# Patient Record
Sex: FEMALE | Race: BLACK OR AFRICAN AMERICAN | NOT HISPANIC OR LATINO | Employment: FULL TIME | URBAN - METROPOLITAN AREA
[De-identification: names, ages, dates, MRNs, and addresses within clinical notes are randomized per-mention and may not be internally consistent; named-entity substitution may affect disease eponyms.]

---

## 2017-05-18 ENCOUNTER — OFFICE VISIT (OUTPATIENT)
Dept: OBSTETRICS AND GYNECOLOGY | Facility: CLINIC | Age: 32
End: 2017-05-18
Payer: COMMERCIAL

## 2017-05-18 VITALS
HEIGHT: 66 IN | WEIGHT: 293 LBS | BODY MASS INDEX: 47.09 KG/M2 | SYSTOLIC BLOOD PRESSURE: 120 MMHG | DIASTOLIC BLOOD PRESSURE: 76 MMHG

## 2017-05-18 DIAGNOSIS — E28.2 PCOS (POLYCYSTIC OVARIAN SYNDROME): ICD-10-CM

## 2017-05-18 DIAGNOSIS — N97.9 FEMALE FERTILITY PROBLEMS: ICD-10-CM

## 2017-05-18 DIAGNOSIS — N92.0 MENORRHAGIA WITH REGULAR CYCLE: ICD-10-CM

## 2017-05-18 DIAGNOSIS — Z01.419 ENCOUNTER FOR GYNECOLOGICAL EXAMINATION WITHOUT ABNORMAL FINDING: ICD-10-CM

## 2017-05-18 DIAGNOSIS — Z00.00 ANNUAL PHYSICAL EXAM: Primary | ICD-10-CM

## 2017-05-18 PROCEDURE — 1160F RVW MEDS BY RX/DR IN RCRD: CPT | Mod: S$GLB,,, | Performed by: OBSTETRICS & GYNECOLOGY

## 2017-05-18 PROCEDURE — 99999 PR PBB SHADOW E&M-EST. PATIENT-LVL III: CPT | Mod: PBBFAC,,, | Performed by: OBSTETRICS & GYNECOLOGY

## 2017-05-18 PROCEDURE — 99385 PREV VISIT NEW AGE 18-39: CPT | Mod: S$GLB,,, | Performed by: OBSTETRICS & GYNECOLOGY

## 2017-05-18 PROCEDURE — 88175 CYTOPATH C/V AUTO FLUID REDO: CPT

## 2017-05-18 NOTE — PATIENT INSTRUCTIONS

## 2017-05-18 NOTE — MR AVS SNAPSHOT
Follow-up and Disposition     Return in about 4 weeks (around 6/15/2017).           Agustina Tillman   2017 11:00 AM   Office Visit    Description:  Female : 1985   Provider:  Karl Camarillo MD   Department:  [facility] - OB/ GYN           Reason for Visit     Gynecologic Exam     Menstrual Problem     Reason for Visit History      Diagnoses this Visit        Comments    Annual physical exam    -  Primary     Encounter for gynecological examination without abnormal finding         PCOS (polycystic ovarian syndrome)         Menorrhagia with regular cycle         Female fertility problems           Problem List as of 2017     Encounter for annual physical exam    Encounter for routine gynecological examination    Polycystic ovaries    Menorrhagia with regular cycle    Female fertility problem      Goals     None      Vital Signs/Measurements  Most recent update: 2017 11:21 AM by Xiomara Joseph MA    BP Wt BMI          120/76 (!) 137.5 kg (303 lb 2.1 oz) 48.93 kg/m2           Medications and Orders      Your Current Medications Are        Disp Refills Start End    azithromycin (ZITHROMAX Z-CIARA) 250 MG tablet 6 tablet 0 2016     Sig: Take 2 tablets on day 1, then 1 tablet on day 2-5    Class: Print      To-Do List     Future Orders Complete By Expires    Comprehensive metabolic panel [LAB17 Custom]  2017    DHEA-sulfate [TFK528 Custom]  2017    Estrogens, total [CML174 Custom]  2017    Follicle stimulating hormone [LAB86 Custom]  2017    Insulin, random [LIR719 Custom]  2017    Luteinizing hormone [LAB87 Custom]  2017    Prolactin [VCS934 Custom]  2017    Testosterone Panel [GKB833 Custom]  2017    TSH [WGL646 Custom]  2017    US Pelvis Comp with Transvag NON-OB (xpd [XNO192 Custom]  2017    Follow-Up    Return in about 4 weeks (around  6/15/2017).      We Performed the Following     Liquid-based pap smear, screening [LAB4 Custom]             Allergies as of 5/18/2017     No Known Allergies      Immunizations Administered on Date of Encounter - 5/18/2017     None         Result Summary      Instructions      Today's Plan:  Refer to your After Visit Summary for more information on your visit.       Talking to Your Healthcare Provider: Play an Active Role  To get the most out of your healthcare, take an active role. This means thinking of every healthcare provider (HCP) as a partner in your care. Below are things you can do to help that partnership go smoothly.    Tell the truth.  To get the best possible care, you need to be honest with your healthcare provider.      Dont be afraid to talk about sensitive subjects.  Keep in mind that your HCP is used to talking about personal matters.    Stay focused.  Try not to think about what your HCP said 5 minutes ago or what you will say next.    Take notes.  When you write down what you hear, you listen more carefully. Dont write down every word your HCP says, just the main points.    Look at your HCP.  When you make eye contact, you show you are listening and you get more out of the discussion.    Repeat back.  Say something like What I hear you saying is... This shows you are listening and understanding, and gives your HCP a chance to correct any mistakes.     Keep all your appointments.  If you cant make it, call as soon as you know.    Be on time.  When a patient is late for an appointment, it can throw the HCP and his or her staff off schedule.    If you are having a medical test, follow your HCPs instructions.  For some medical tests, you need to fast (not eat or drink). For others, you need to take medication.    Do what your HCP asks.  Your HCP may give you instructions concerning things such as medication use, diet, or exercise.    If you cant follow your HCPs instructions, be sure to speak  up.  Whether your medication makes you sick or you cant give up junk food, let your HCP know. Together, you may be able to solve the problem.    Talk about over-the-counter drugs.  If you are receiving treatment, ask your HCP which over-the-counter drugs you can take.    Take prescription drugs as instructed.  Dont take more or less than your doctor prescribed. Dont stop taking your medication without talking to your doctor first.    Make sure your primary doctor is kept up-to-date.  If you are seeing a specialist, ask the specialist to let your primary doctor know how you are doing.    Ask your HCP how to contact him or her.  Find out if there are certain times during the day when your HCP takes phone calls. Ask who to call if you need an answer right away.    © 3974-2131 [employer], [address] [place] [address], [place] [address]. All rights reserved. This information is not intended as a substitute for professional medical care. Always follow your healthcare professional's instructions.      Thank you for your visit today. Please call our office if you can NOT make your future appointment.    If you are a smoker and would like to quit smoking and need help please call [phone] ([phone])\

## 2017-05-18 NOTE — MR AVS SNAPSHOT
"    [facility] - OB/ GYN  [address] [facility] [address]  [place] [address]  Phone: [phone]                  Agustina Tillman   2017 11:00 AM   Office Visit    Description:  Female : 1985   Provider:  Karl Camarillo MD   Department:  [facility] - OB/ GYN           Reason for Visit     Gynecologic Exam     Menstrual Problem                To Do List           Goals (5 Years of Data)     None      [facility]     [facility] Nurse Care Line -  Assistance  Unless otherwise directed by your provider, please contact [facility], our nurse care line that is available for  assistance.     Registered nurses in the [facility] provide: appointment scheduling, clinical advisement, health education, and other advisory services.  Call: [phone] (toll free)               Medications           Message regarding Medications     Verify the changes and/or additions to your medication regime listed below are the same as discussed with your clinician today.  If any of these changes or additions are incorrect, please notify your healthcare provider.             Verify that the below list of medications is an accurate representation of the medications you are currently taking.  If none reported, the list may be blank. If incorrect, please contact your healthcare provider. Carry this list with you in case of emergency.           Current Medications     azithromycin (ZITHROMAX Z-CIARA) 250 MG tablet Take 2 tablets on day 1, then 1 tablet on day 2-5           Clinical Reference Information           Your Vitals Were     BP Height Weight Last Period BMI    120/76 5' 6" (1.676 m) 137.5 kg (303 lb 2.1 oz) 2017 48.93 kg/m2      Blood Pressure          Most Recent Value    BP  120/76      Allergies as of 2017     No Known Allergies      Immunizations Administered on Date of Encounter - 2017     None      Language Assistance Services     ATTENTION: Language assistance services " are available, free of charge. Please call [phone].      ATENCIÓN: Si habla monisha, tiene a owusu disposición servicios gratuitos de asistencia lingüística. Llame al [phone].     CHÚ Ý: N?u b?n nói Ti?ng Vi?t, có các d?ch v? h? tr? ngôn ng? mi?n phí dành cho b?n. G?i s? [phone].         [facility] - OB/ GYN complies with applicable Federal civil rights laws and does not discriminate on the basis of race, color, national origin, age, disability, or sex.

## 2017-05-18 NOTE — PROGRESS NOTES
Subjective:      Chief Complaint:    Chief Complaint   Patient presents with    Gynecologic Exam    Menstrual Problem       Menstrual History:    OB History     No data available          Menarche age: 15     Patient's last menstrual period was 04/19/2017.          HISTORY OF PRESENT ILLNESS:  The patient is 31 years of age.  Nulligravida, new   patient to [facility], new patient to me.  Apparently, Pap smear two years ago and   the patient is sexually active, has been on birth control pills; however, not at   the present time.  The patient's menarche is at age 14, regular cycles   according to the patient, bleeds seven days heavy with clots; the patient   changes about every two hours.  The patient denies any diabetes or problems,   sexually active.  Pregnancy has not occurred.  However, the patient also stated   that she was put on some birth control pill to regulate her cycles, I am not   quite clear if to regulate increased bleeding or she had irregular cycles.    PHYSICAL EXAMINATION:  VITAL SIGNS:  Blood pressure 120/76, weight 303.  HEAD:  Normocephalic.  EYES:  React.  BREASTS:  No lump, masses, discharge, skin changes, retraction, nipple changes.    Axilla negative.  PELVIC:  External normal.  Vulva normal.  Bartholin, urethral and [place] glands   are negative, no clitoral enlargement.  Vagina is clear.  Cervix is clear with   minimal bleeding starting the next cycle.  Uterus, normal size, shape, position.    Adnexa is negative, somewhat difficult exam because of the patient's size.  RECTAL:  Negative.    IMPRESSION:  Morbid obesity, overweight.  Irregular menstrual cycle.    Possibility of ovulatory dysfunction, polycystic ovary exists since she is   sexually active at the present, not using any methods of birth controls.    PLAN:  We will do a baseline evaluation and do a pelvic ultrasound,   comprehensive metabolic panel, and we will check for possible polycystic ovarian   syndrome and then we will have  the patient back and reevaluate the situation   and see what kind of treatment or we should be sure to follow.  The patient also   concerned with the fertility.      JIV/HN  dd: 05/18/2017 11:46:08 (CDT)  td: 05/19/2017 06:47:27 (CDT)  Doc ID   #[id number]  Job ID #[id number]    CC:        Objective:        History of Present Illness AND  Examination detailed DICTATE:        Physical Exam   Constitutional: She is oriented to person, place, and time. She appears well-developed and well-nourished. No distress.   HENT:   Head: Normocephalic.   Mouth/Throat: Oropharynx is clear.  Eyes: Pupils are equal, round, and reactive to light.   Neck: Neck supple.    Cardiovascular: Normal rate, regular rhythm and normal heart sounds. No murmur heard.  Pulmonary/Chest: Effort normal and breath sounds normal. No respiratory distress. She has no wheezes. She has no rales. She exhibits no tenderness.   Abdominal: Bowel sounds are normal. She exhibits no distension and no mass. There is no tenderness. There is no rebound and no guarding. .   Musculoskeletal: Normal range of motion.   Lymphadenopathy:        Right: No inguinal adenopathy present.        Left: No inguinal adenopathy present.   Neurological: She is alert and oriented.  Skin: Skin is warm. No rash noted.        Review of Systems  Review of Systems   Normal ROS:   Constitutional: Negative for fever, chills, activity change fatigue and unexpected weight change.   HENT: Negative for nosebleeds, congestion.  Eyes: Negative for visual disturbance.   Respiratory: Negative for shortness of breath and wheezing.    Cardiovascular: Negative for chest pain, palpitations.   Gastrointestinal: Negative for abdominal pain, diarrhea, constipation, blood in stool and abdominal distention.   Musculoskeletal: Negative for back pain.   Allergic/Immunologic: Negative    Neurological: Negative   Hematological: Negative for adenopathy. Does not bruise/bleed easily.   Psychiatric/Behavioral: Negative  f    Assessment:      Diagnosis:ANNUAL   EXAM   MENORRHAGIA      FERTILITY       Plan:      Return in 4  weeks

## 2017-05-23 ENCOUNTER — TELEPHONE (OUTPATIENT)
Dept: OBSTETRICS AND GYNECOLOGY | Facility: CLINIC | Age: 32
End: 2017-05-23

## 2017-05-23 NOTE — TELEPHONE ENCOUNTER
----- Message from Renu Perales sent at 5/23/2017  1:17 PM CDT -----  Contact: self  Pt states that someone from office called. Nothing is documented and pt was not left a name to call back and ask for. She can be reached @ [phone]      5/23/2017 Returned pt call, no answer, LM in regards to normal pap. sal

## 2017-05-28 ENCOUNTER — HOSPITAL ENCOUNTER (EMERGENCY)
Facility: OTHER | Age: 32
Discharge: HOME | End: 2017-05-28
Attending: EMERGENCY MEDICINE
Payer: COMMERCIAL

## 2017-05-28 VITALS
OXYGEN SATURATION: 98 % | RESPIRATION RATE: 20 BRPM | HEART RATE: 76 BPM | SYSTOLIC BLOOD PRESSURE: 139 MMHG | BODY MASS INDEX: 46.12 KG/M2 | TEMPERATURE: 101 F | DIASTOLIC BLOOD PRESSURE: 86 MMHG | WEIGHT: 287 LBS | HEIGHT: 66 IN

## 2017-05-28 DIAGNOSIS — J11.1 INFLUENZA: Primary | ICD-10-CM

## 2017-05-28 LAB
CTP QC/QA: YES
INFLUENZA A ANTIGEN, POC: NEGATIVE
INFLUENZA B ANTIGEN, POC: POSITIVE
S PYO RRNA THROAT QL PROBE: NEGATIVE

## 2017-05-28 PROCEDURE — 63600175 PHARM REV CODE 636 W HCPCS: Performed by: EMERGENCY MEDICINE

## 2017-05-28 PROCEDURE — 25000003 PHARM REV CODE 250: Performed by: EMERGENCY MEDICINE

## 2017-05-28 PROCEDURE — 96372 THER/PROPH/DIAG INJ SC/IM: CPT

## 2017-05-28 PROCEDURE — 99284 EMERGENCY DEPT VISIT MOD MDM: CPT | Mod: 25

## 2017-05-28 RX ORDER — IBUPROFEN 600 MG/1
600 TABLET ORAL EVERY 6 HOURS PRN
Qty: 20 TABLET | Refills: 0 | Status: SHIPPED | OUTPATIENT
Start: 2017-05-28 | End: 2017-09-02 | Stop reason: SDUPTHER

## 2017-05-28 RX ORDER — BENZONATATE 100 MG/1
100 CAPSULE ORAL 3 TIMES DAILY PRN
Qty: 20 CAPSULE | Refills: 0 | Status: SHIPPED | OUTPATIENT
Start: 2017-05-28 | End: 2017-06-01 | Stop reason: ALTCHOICE

## 2017-05-28 RX ORDER — ACETAMINOPHEN 500 MG
1000 TABLET ORAL
Status: COMPLETED | OUTPATIENT
Start: 2017-05-28 | End: 2017-05-28

## 2017-05-28 RX ORDER — KETOROLAC TROMETHAMINE 30 MG/ML
30 INJECTION, SOLUTION INTRAMUSCULAR; INTRAVENOUS
Status: COMPLETED | OUTPATIENT
Start: 2017-05-28 | End: 2017-05-28

## 2017-05-28 RX ORDER — OSELTAMIVIR PHOSPHATE 75 MG/1
75 CAPSULE ORAL 2 TIMES DAILY
Qty: 10 CAPSULE | Refills: 0 | Status: SHIPPED | OUTPATIENT
Start: 2017-05-28 | End: 2017-06-01 | Stop reason: ALTCHOICE

## 2017-05-28 RX ORDER — FLUTICASONE PROPIONATE 50 MCG
2 SPRAY, SUSPENSION (ML) NASAL 2 TIMES DAILY PRN
Qty: 15 G | Refills: 0 | Status: SHIPPED | OUTPATIENT
Start: 2017-05-28 | End: 2017-06-01 | Stop reason: ALTCHOICE

## 2017-05-28 RX ORDER — DEXAMETHASONE SODIUM PHOSPHATE 4 MG/ML
6 INJECTION, SOLUTION INTRA-ARTICULAR; INTRALESIONAL; INTRAMUSCULAR; INTRAVENOUS; SOFT TISSUE
Status: COMPLETED | OUTPATIENT
Start: 2017-05-28 | End: 2017-05-28

## 2017-05-28 RX ADMIN — DEXAMETHASONE SODIUM PHOSPHATE 6 MG: 4 INJECTION, SOLUTION INTRA-ARTICULAR; INTRALESIONAL; INTRAMUSCULAR; INTRAVENOUS; SOFT TISSUE at 02:05

## 2017-05-28 RX ADMIN — ACETAMINOPHEN 1000 MG: 500 TABLET ORAL at 03:05

## 2017-05-28 RX ADMIN — KETOROLAC TROMETHAMINE 30 MG: 30 INJECTION, SOLUTION INTRAMUSCULAR; INTRAVENOUS at 04:05

## 2017-05-28 NOTE — ED NOTES
32 y/o female prsents to the ER with the cc of pain to the throat and a post nasal drip x 2 days. Patient reports she has been using OTC medications with no relief. Patient at present has clear thin mucus noted. No coughing or sneezing at present. GIBRAN

## 2017-05-28 NOTE — ED PROVIDER NOTES
Encounter Date: 5/28/2017       History     Chief Complaint   Patient presents with    URI     nasal congestion, sore throat, non productive cough     Review of patient's allergies indicates:  No Known Allergies  Miss Tillman reports sore throat, congestion, and mild Dr. sounding cough.  Reports onset was yesterday.  She tried Tylenol Cold and sinus and had some mild relief.  She reports sore throat is the worst.  She denies difficulty swallowing or change in voice.  She denies history of same recently.  She denies chest pain, shortness of breath, neck pain or other acute problems.          History reviewed. No pertinent past medical history.  History reviewed. No pertinent surgical history.  Family History   Problem Relation Age of Onset    Lung cancer Father     Hypertension Mother      Social History   Substance Use Topics    Smoking status: Never Smoker    Smokeless tobacco: Not on file    Alcohol use Yes     Review of Systems   HENT: Positive for congestion, sinus pressure and sore throat. Negative for ear pain, facial swelling, hearing loss, trouble swallowing and voice change.    Respiratory: Positive for cough. Negative for chest tightness, shortness of breath, wheezing and stridor.    Cardiovascular: Negative.    All other systems reviewed and are negative.      Physical Exam     Initial Vitals [05/28/17 1352]   BP Pulse Resp Temp SpO2   124/67 88 18 99.6 °F (37.6 °C) 97 %     Physical Exam    Nursing note and vitals reviewed.  Constitutional: She appears well-developed and well-nourished. She is not diaphoretic. No distress.   HENT:   Head: Normocephalic and atraumatic.   Right Ear: External ear normal.   Left Ear: External ear normal.   Nose: Nose normal.   Mild bilateral symmetric tonsillar swelling with erythema.  No peritonsillar swelling.  Questionable exudate on the right.  Uvula is midline.  Oropharynx is clear, patent.   Eyes: EOM are normal. Pupils are equal, round, and reactive to light.    Neck: Normal range of motion. Neck supple.   Abdominal: Soft. She exhibits no distension. There is no tenderness. There is no rebound.   Neurological: She is alert and oriented to person, place, and time.   Skin: Skin is warm and dry.   Psychiatric: She has a normal mood and affect. Her behavior is normal. Thought content normal.         ED Course   Procedures  Labs Reviewed   POCT STREP A, RAPID             Medical Decision Making:   ED Management:  Rapid strep negative, rapid flu positive. Pt reports no known contacts, was surprised that flu positive. Is within tamiflu window, will treat. We discussed worrisome signs that should prompt need to return to er should they occur. There is no indication for further emergent intervention or evaluation at this time.              Admission on 05/28/2017, Discharged on 05/28/2017   Component Date Value Ref Range Status    Rapid Strep A Screen 05/28/2017 Negative  Negative Final     Acceptable 05/28/2017 Yes   Final    Influenza B Ag 05/28/2017 Positive* Positive/Negative Final    Inflenza A Ag 05/28/2017 Negative* Positive/Negative Final                ED Course     Clinical Impression:   The encounter diagnosis was Influenza.          Mona Reynaga MD  06/01/17 0633       Mona Reynaga MD  06/01/17 0628

## 2017-06-01 ENCOUNTER — OFFICE VISIT (OUTPATIENT)
Dept: FAMILY MEDICINE | Facility: CLINIC | Age: 32
End: 2017-06-01
Payer: COMMERCIAL

## 2017-06-01 VITALS
BODY MASS INDEX: 47.09 KG/M2 | TEMPERATURE: 99 F | WEIGHT: 293 LBS | DIASTOLIC BLOOD PRESSURE: 74 MMHG | HEART RATE: 80 BPM | OXYGEN SATURATION: 97 % | SYSTOLIC BLOOD PRESSURE: 108 MMHG | HEIGHT: 66 IN | RESPIRATION RATE: 20 BRPM

## 2017-06-01 DIAGNOSIS — E66.01 MORBID OBESITY WITH BMI OF 45.0-49.9, ADULT: ICD-10-CM

## 2017-06-01 DIAGNOSIS — Z00.00 WELL WOMAN EXAM (NO GYNECOLOGICAL EXAM): Primary | ICD-10-CM

## 2017-06-01 DIAGNOSIS — E28.2 PCOS (POLYCYSTIC OVARIAN SYNDROME): ICD-10-CM

## 2017-06-01 PROCEDURE — 99999 PR PBB SHADOW E&M-EST. PATIENT-LVL III: CPT | Mod: PBBFAC,,, | Performed by: FAMILY MEDICINE

## 2017-06-01 PROCEDURE — 99385 PREV VISIT NEW AGE 18-39: CPT | Mod: S$GLB,,, | Performed by: FAMILY MEDICINE

## 2017-06-01 RX ORDER — METFORMIN HYDROCHLORIDE 500 MG/1
500 TABLET ORAL 2 TIMES DAILY WITH MEALS
Qty: 180 TABLET | Refills: 1 | Status: SHIPPED | OUTPATIENT
Start: 2017-06-01 | End: 2018-02-16

## 2017-06-01 NOTE — PROGRESS NOTES
"Well Woman VISIT      CHIEF COMPLAINT  Chief Complaint   Patient presents with    Other     ER Follow-up       HPI  Agustina Tillman is a 31 y.o. female who presents for well woman.  Recently diagnosed with flu in ER, was given tamiflu.  +stuffy nose now resolving.  Some subjective fevers at night, has been taking ibuprofen.  Her throat hurts just a little bit.  Eating and drinking today.  +some nausea.  No vomiting. Had diarrhea, now resolved.   Wanted to make sure she can go back to work tomorrow.    Social Factors  Tobacco use: No  Alcohol: No  Intimate partner violence screening -   "Do you feel safe in your current relationship?" Yes  "Have you ever been in a relationship in which your partner frightened you or hurt you?" No  Regular Exercise: Yes - walks on treadmill.  30 min every night.  Sleep : 7 hrs/night    Depression  Over the past two weeks, have you felt down, depressed, or hopeless? No  Over the past two weeks, have you felt little interest or pleasure in doing things? No    Reproductive Health  First period at age 14.  Always has had abnormal periods.  Last menstrual period began May 4, 2017 and lasted 3 days.   Patient is sexually active.   Contraception: no method. Wanting pregnancy.  On Daily folic acid:  no  STD screening in last year: no  Last PAP: recent  HIV screening: no    CHD, HTN, DM2  CHD Risk Factors: obesity (BMI >= 30 kg/m2)   Women 45 years and older should be screened for dyslipidemia if at increased risk of CHD  Women 20 to 45 years of age should be screened for dyslipidemia if at increased risk of CHD  Asymptomatic adults with sustained blood pressure greater than 135/80 mm Hg (treated or untreated) should be screened for type 2 diabetes mellitus    Estimated body mass index is 48.75 kg/m² as calculated from the following:    Height as of this encounter: 5' 6" (1.676 m).    Weight as of this encounter: 137 kg (302 lb 0.5 oz).      Screening  Mammogram needed: no  Colonoscopy " "needed: no  Osteoporosis screen needed: no     Women 50 to 74 years of age should be screened for breast cancer with mammography biennially.  Women should be screened for cervical cancer with Pap tests beginning at 21 years of age. Low-risk women should receive Pap testing every three years. Co-testing for human papillomavirus is an option beginning at 30 years of age, and can extend the screening interval to five years. Cervical cancer screening should be discontinued at 65 years of age or after total hysterectomy if the woman has a benign gynecologic history  Adults 50 to 75 years of age should be screened for colorectal cancer with an FOBT annually, sigmoidoscopy every five years with an FOBT every three years, or colonoscopy every 10 years.  Women 65 years and older should be screened for osteoporosis. Women younger than 65 years should be screened if the risk of fracture is greater than or equal to that of a 65-year-old white woman without additional risk factors.      Immunizations  up to date and documented    ALLERGIES and MEDS were verified.   PMHx, PSHx, FHx, SOCIALHx were updated as pertinent.    REVIEW OF SYSTEMS  Negative 5 point review of systems    PHYSICAL EXAM  VITAL SIGNS: /74 (BP Location: Right arm, Patient Position: Sitting, BP Method: Manual)   Pulse 80   Temp 98.7 °F (37.1 °C) (Oral)   Resp 20   Ht 5' 6" (1.676 m)   Wt (!) 137 kg (302 lb 0.5 oz)   LMP 05/19/2017 (Exact Date)   SpO2 97%   BMI 48.75 kg/m²   GEN: Well developed, Well nourished, No acute distress.  Obese AAF in NAD.   HENT: Normocephalic, Atraumatic, Bilateral external ears normal, Nose normal, Oropharynx moist, No oral exudates.   Eyes: PERRLA, EOMI, Conjunctiva normal, No discharge.   Neck: Supple, No tenderness.  Lymphatic: No cervical or supraclavicular lymphadenopathy noted.   Cardiovascular: Normal heart rate, Normal rhythm, No murmurs, No rubs, No gallops.   Thorax & Lungs: Normal breath sounds, No respiratory " distress, No wheezing.  Abdomen: Soft, No tenderness, Bowel sounds normal.  Skin: Warm, Dry, No erythema, No rash.   Extremities: No edema, No tenderness.       ASSESSMENT/PLAN    Agustina was seen today for other.    Diagnoses and all orders for this visit:    Well woman exam (no gynecological exam)  -     Comprehensive metabolic panel; Future  -     Lipid panel; Future  -     Hemoglobin A1c; Future  -     TSH; Future  -     CBC auto differential; Future  -     Vitamin D; Future  -     T4, free; Future  -     RPR; Future  -     HIV-1 and HIV-2 antibodies; Future  -     Hepatitis C antibody; Future  -     C. trachomatis/N. gonorrhoeae by AMP DNA Urine; Future    PCOS (polycystic ovarian syndrome)  -     metformin (GLUCOPHAGE) 500 MG tablet; Take 1 tablet (500 mg total) by mouth 2 (two) times daily with meals.  -     May start with 1 tab in morning only, then increase to 1 tab bid.  Goal of increasing to 2 tabs bid.    Morbid obesity with BMI of 45.0-49.9, adult        -     Packet given for weight management.   She has secondary PCOS symptoms of acanthosis nigricans, and irregular periods.     FOLLOW UP: 1 month for weight management

## 2017-06-02 PROBLEM — Z01.419 ENCOUNTER FOR GYNECOLOGICAL EXAMINATION WITHOUT ABNORMAL FINDING: Status: RESOLVED | Noted: 2017-05-18 | Resolved: 2017-06-02

## 2017-06-02 PROBLEM — Z00.00 ANNUAL PHYSICAL EXAM: Status: RESOLVED | Noted: 2017-05-18 | Resolved: 2017-06-02

## 2017-06-08 ENCOUNTER — LAB VISIT (OUTPATIENT)
Dept: LAB | Facility: HOSPITAL | Age: 32
End: 2017-06-08
Attending: FAMILY MEDICINE
Payer: COMMERCIAL

## 2017-06-08 ENCOUNTER — TELEPHONE (OUTPATIENT)
Dept: FAMILY MEDICINE | Facility: CLINIC | Age: 32
End: 2017-06-08

## 2017-06-08 DIAGNOSIS — Z00.00 WELL WOMAN EXAM (NO GYNECOLOGICAL EXAM): ICD-10-CM

## 2017-06-08 LAB
ALBUMIN SERPL BCP-MCNC: 3.5 G/DL
ALP SERPL-CCNC: 81 U/L
ALT SERPL W/O P-5'-P-CCNC: 15 U/L
ANION GAP SERPL CALC-SCNC: 9 MMOL/L
AST SERPL-CCNC: 18 U/L
BASOPHILS # BLD AUTO: 0.03 K/UL
BASOPHILS NFR BLD: 0.4 %
BILIRUB SERPL-MCNC: 0.4 MG/DL
BUN SERPL-MCNC: 10 MG/DL
CALCIUM SERPL-MCNC: 9.4 MG/DL
CHLORIDE SERPL-SCNC: 106 MMOL/L
CHOLEST/HDLC SERPL: 4.3 {RATIO}
CO2 SERPL-SCNC: 29 MMOL/L
CREAT SERPL-MCNC: 1 MG/DL
DIFFERENTIAL METHOD: ABNORMAL
EOSINOPHIL # BLD AUTO: 0.1 K/UL
EOSINOPHIL NFR BLD: 1 %
ERYTHROCYTE [DISTWIDTH] IN BLOOD BY AUTOMATED COUNT: 16.1 %
EST. GFR  (AFRICAN AMERICAN): >60 ML/MIN/1.73 M^2
EST. GFR  (NON AFRICAN AMERICAN): >60 ML/MIN/1.73 M^2
GLUCOSE SERPL-MCNC: 76 MG/DL
HCT VFR BLD AUTO: 35.8 %
HDL/CHOLESTEROL RATIO: 23.1 %
HDLC SERPL-MCNC: 173 MG/DL
HDLC SERPL-MCNC: 40 MG/DL
HGB BLD-MCNC: 11.4 G/DL
LDLC SERPL CALC-MCNC: 113 MG/DL
LYMPHOCYTES # BLD AUTO: 3 K/UL
LYMPHOCYTES NFR BLD: 37.3 %
MCH RBC QN AUTO: 23.8 PG
MCHC RBC AUTO-ENTMCNC: 31.8 %
MCV RBC AUTO: 75 FL
MONOCYTES # BLD AUTO: 0.5 K/UL
MONOCYTES NFR BLD: 5.5 %
NEUTROPHILS # BLD AUTO: 4.5 K/UL
NEUTROPHILS NFR BLD: 55.7 %
NONHDLC SERPL-MCNC: 133 MG/DL
PLATELET # BLD AUTO: 350 K/UL
PMV BLD AUTO: 10.3 FL
POTASSIUM SERPL-SCNC: 4.2 MMOL/L
PROT SERPL-MCNC: 7.2 G/DL
RBC # BLD AUTO: 4.79 M/UL
SODIUM SERPL-SCNC: 144 MMOL/L
T4 FREE SERPL-MCNC: 0.95 NG/DL
TRIGL SERPL-MCNC: 100 MG/DL
TSH SERPL DL<=0.005 MIU/L-ACNC: 1.74 UIU/ML
WBC # BLD AUTO: 8.15 K/UL

## 2017-06-08 PROCEDURE — 85025 COMPLETE CBC W/AUTO DIFF WBC: CPT

## 2017-06-08 PROCEDURE — 83036 HEMOGLOBIN GLYCOSYLATED A1C: CPT

## 2017-06-08 PROCEDURE — 86592 SYPHILIS TEST NON-TREP QUAL: CPT

## 2017-06-08 PROCEDURE — 84439 ASSAY OF FREE THYROXINE: CPT

## 2017-06-08 PROCEDURE — 84443 ASSAY THYROID STIM HORMONE: CPT

## 2017-06-08 PROCEDURE — 86703 HIV-1/HIV-2 1 RESULT ANTBDY: CPT

## 2017-06-08 PROCEDURE — 80053 COMPREHEN METABOLIC PANEL: CPT

## 2017-06-08 PROCEDURE — 36415 COLL VENOUS BLD VENIPUNCTURE: CPT | Mod: PN

## 2017-06-08 PROCEDURE — 82306 VITAMIN D 25 HYDROXY: CPT

## 2017-06-08 PROCEDURE — 80061 LIPID PANEL: CPT

## 2017-06-08 PROCEDURE — 86803 HEPATITIS C AB TEST: CPT

## 2017-06-08 NOTE — TELEPHONE ENCOUNTER
----- Message from Swati Dillon sent at 6/8/2017 11:11 AM CDT -----  Contact: self  Patient states she is returning someone's call . She does not know who it was  .       [phone] YR

## 2017-06-09 LAB
25(OH)D3+25(OH)D2 SERPL-MCNC: 11 NG/ML
ESTIMATED AVG GLUCOSE: 117 MG/DL
HBA1C MFR BLD HPLC: 5.7 %
HCV AB SERPL QL IA: NEGATIVE
HIV 1+2 AB+HIV1 P24 AG SERPL QL IA: NEGATIVE
RPR SER QL: NORMAL

## 2017-06-13 ENCOUNTER — PATIENT MESSAGE (OUTPATIENT)
Dept: FAMILY MEDICINE | Facility: CLINIC | Age: 32
End: 2017-06-13

## 2017-06-13 DIAGNOSIS — E55.9 VITAMIN D DEFICIENCY: ICD-10-CM

## 2017-06-13 DIAGNOSIS — R73.03 PREDIABETES: ICD-10-CM

## 2017-06-13 DIAGNOSIS — D50.8 IRON DEFICIENCY ANEMIA SECONDARY TO INADEQUATE DIETARY IRON INTAKE: ICD-10-CM

## 2017-09-02 ENCOUNTER — HOSPITAL ENCOUNTER (EMERGENCY)
Facility: OTHER | Age: 32
Discharge: HOME | End: 2017-09-02
Attending: INTERNAL MEDICINE
Payer: COMMERCIAL

## 2017-09-02 VITALS
WEIGHT: 293 LBS | RESPIRATION RATE: 18 BRPM | HEART RATE: 68 BPM | HEIGHT: 66 IN | SYSTOLIC BLOOD PRESSURE: 130 MMHG | TEMPERATURE: 99 F | BODY MASS INDEX: 47.09 KG/M2 | OXYGEN SATURATION: 99 % | DIASTOLIC BLOOD PRESSURE: 77 MMHG

## 2017-09-02 DIAGNOSIS — J00 ACUTE NASOPHARYNGITIS: Primary | ICD-10-CM

## 2017-09-02 PROCEDURE — 99283 EMERGENCY DEPT VISIT LOW MDM: CPT

## 2017-09-02 RX ORDER — AZELASTINE 1 MG/ML
2 SPRAY, METERED NASAL 2 TIMES DAILY
Qty: 30 ML | Refills: 0 | Status: SHIPPED | OUTPATIENT
Start: 2017-09-02 | End: 2017-10-17 | Stop reason: SDUPTHER

## 2017-09-02 RX ORDER — IBUPROFEN 800 MG/1
800 TABLET ORAL EVERY 8 HOURS PRN
Qty: 30 TABLET | Refills: 0 | Status: SHIPPED | OUTPATIENT
Start: 2017-09-02 | End: 2018-02-16 | Stop reason: SDUPTHER

## 2017-09-02 RX ORDER — FLUTICASONE PROPIONATE 50 MCG
2 SPRAY, SUSPENSION (ML) NASAL DAILY
Qty: 15 G | Refills: 0 | Status: SHIPPED | OUTPATIENT
Start: 2017-09-02 | End: 2017-10-17 | Stop reason: SDUPTHER

## 2017-09-02 NOTE — ED PROVIDER NOTES
Encounter Date: 9/2/2017       History     Chief Complaint   Patient presents with    Sore Throat     pt presents to ER with c/o sore throat accompanied by nasal congestion and post nasal drip.  She denies any fever or other symptoms.      32-year-old female presents to the emergency department with sore throat, nasal congestion and cough ×3 days.  She denies fevers/chills, nausea/vomiting      The history is provided by the patient. No  was used.   Sore Throat   This is a new problem. The current episode started more than 2 days ago. The problem occurs daily. The problem has not changed since onset.Pertinent negatives include no chest pain, no abdominal pain, no headaches and no shortness of breath. The symptoms are aggravated by swallowing. She has tried nothing for the symptoms.     Review of patient's allergies indicates:  No Known Allergies  History reviewed. No pertinent past medical history.  History reviewed. No pertinent surgical history.  Family History   Problem Relation Age of Onset    Lung cancer Father     Hypertension Mother      Social History   Substance Use Topics    Smoking status: Never Smoker    Smokeless tobacco: Not on file    Alcohol use Yes     Review of Systems   HENT: Positive for congestion, postnasal drip, rhinorrhea and sore throat.    Respiratory: Positive for cough. Negative for shortness of breath.    Cardiovascular: Negative for chest pain.   Gastrointestinal: Negative for abdominal pain.   Musculoskeletal: Positive for myalgias.   Neurological: Negative for headaches.   All other systems reviewed and are negative.      Physical Exam     Initial Vitals [09/02/17 0236]   BP Pulse Resp Temp SpO2   133/70 69 18 98.7 °F (37.1 °C) 99 %      MAP       91         Physical Exam    Nursing note and vitals reviewed.  Constitutional: She appears well-developed and well-nourished.   HENT:   Head: Normocephalic and atraumatic.   Right Ear: External ear normal.   Left  Ear: External ear normal.   Clear post nasal drip, clear nasal discharge, posterior or pharyngeal erythema without exudate or edema   Eyes: Conjunctivae and EOM are normal. Pupils are equal, round, and reactive to light.   Neck: Normal range of motion. Neck supple.   Cardiovascular: Normal rate and regular rhythm.   Pulmonary/Chest: Breath sounds normal. No respiratory distress. She has no wheezes. She has no rales.   Abdominal: Soft. Bowel sounds are normal.   Musculoskeletal: Normal range of motion.   Neurological: She is alert and oriented to person, place, and time.   Skin: Skin is warm and dry.   Psychiatric: She has a normal mood and affect.         ED Course   Procedures  Labs Reviewed - No data to display          Medical Decision Making:   Initial Assessment:   32-year-old female presents to the emergency department with sore throat, nasal congestion and cough ×3 days.  She denies fevers/chills, nausea/vomiting    Differential Diagnosis:   Acute nasopharyngitis  Influenza  Bronchitis  ED Management:  Patient was given instructions for acute nasopharyngitis and prescriptions for Astelin, fluticasone and ibuprofen.  She was advised to follow-up with her primary care physician within the next week for reevaluation.                   ED Course      Clinical Impression:   The encounter diagnosis was Acute nasopharyngitis.    Disposition:   Disposition: Discharged  Condition: Stable                        Celio Looney MD  09/02/17 0319

## 2017-09-02 NOTE — ED NOTES
Pt reports pain to the throat and swallowing complaints, states the pain is worsee now than when she went to bed

## 2017-10-17 ENCOUNTER — HOSPITAL ENCOUNTER (EMERGENCY)
Facility: OTHER | Age: 32
Discharge: HOME | End: 2017-10-17
Attending: INTERNAL MEDICINE
Payer: COMMERCIAL

## 2017-10-17 VITALS
DIASTOLIC BLOOD PRESSURE: 71 MMHG | HEART RATE: 104 BPM | SYSTOLIC BLOOD PRESSURE: 98 MMHG | TEMPERATURE: 100 F | RESPIRATION RATE: 20 BRPM | OXYGEN SATURATION: 99 %

## 2017-10-17 DIAGNOSIS — B34.9 VIRAL SYNDROME: Primary | ICD-10-CM

## 2017-10-17 LAB
B-HCG UR QL: NEGATIVE
CTP QC/QA: YES

## 2017-10-17 PROCEDURE — 99284 EMERGENCY DEPT VISIT MOD MDM: CPT

## 2017-10-17 PROCEDURE — 25000003 PHARM REV CODE 250: Performed by: INTERNAL MEDICINE

## 2017-10-17 PROCEDURE — 81025 URINE PREGNANCY TEST: CPT | Performed by: EMERGENCY MEDICINE

## 2017-10-17 RX ORDER — ONDANSETRON 4 MG/1
4 TABLET, FILM COATED ORAL EVERY 6 HOURS PRN
Qty: 12 TABLET | Refills: 0 | Status: SHIPPED | OUTPATIENT
Start: 2017-10-17 | End: 2018-02-16

## 2017-10-17 RX ORDER — ACETAMINOPHEN 500 MG
1000 TABLET ORAL
Status: COMPLETED | OUTPATIENT
Start: 2017-10-17 | End: 2017-10-17

## 2017-10-17 RX ORDER — FLUTICASONE PROPIONATE 50 MCG
2 SPRAY, SUSPENSION (ML) NASAL DAILY
Qty: 15 G | Refills: 0 | Status: SHIPPED | OUTPATIENT
Start: 2017-10-17 | End: 2018-08-14 | Stop reason: SDUPTHER

## 2017-10-17 RX ORDER — AZELASTINE 1 MG/ML
2 SPRAY, METERED NASAL 2 TIMES DAILY
Qty: 30 ML | Refills: 0 | Status: SHIPPED | OUTPATIENT
Start: 2017-10-17 | End: 2019-01-23 | Stop reason: SDUPTHER

## 2017-10-17 RX ADMIN — ACETAMINOPHEN 1000 MG: 500 TABLET ORAL at 08:10

## 2017-10-18 ENCOUNTER — TELEPHONE (OUTPATIENT)
Dept: FAMILY MEDICINE | Facility: CLINIC | Age: 32
End: 2017-10-18

## 2017-10-18 RX ORDER — LOPERAMIDE HYDROCHLORIDE 2 MG/1
2 CAPSULE ORAL 4 TIMES DAILY PRN
Qty: 30 CAPSULE | Refills: 0 | Status: SHIPPED | OUTPATIENT
Start: 2017-10-18 | End: 2017-10-28

## 2017-10-18 NOTE — ED TRIAGE NOTES
Patient states that she has fever, chills, generalized body aches, headache and right ear pain that started yesterday.    LOC: The patient is awake and alert; oriented x 3 and speaking appropriately.  APPEARANCE: Patient resting comfortably, patient is clean and well groomed  SKIN: warm and dry, normal skin turgor & moist mucus membranes, skin intact, no breakdown noted.  MUSCULOSKELETAL: Patient moving all extremities well, no obvious swelling or deformities noted  RESPIRATORY: Airway is open and patent, breath sounds clear throughout all lung fields; respirations are spontaneous, normal effort and rate  CARDIAC: Patient has a normal rate, no peripheral edema noted, capillary refill < 3 seconds; No complaints of chest pain   ABDOMEN: Soft and non tender to palpation, no distention noted. Bowel sounds present x 4

## 2017-10-18 NOTE — ED PROVIDER NOTES
"Encounter Date: 10/17/2017       History     Chief Complaint   Patient presents with    Generalized Body Aches     patient reports having "flu like symptoms" X2 days,     Dizziness    Abdominal Cramping    Nausea     32-year-old female presents to the emergency department complaining of body aches, fever of 102 today, and dizziness ×2 days.  She had one episode of nausea with vomiting today and states she took ibuprofen this morning for fever.      The history is provided by the patient. No  was used.   Fever   Primary symptoms of the febrile illness include fever, nausea, vomiting and myalgias. The current episode started yesterday. This is a new problem. The problem has not changed since onset.  The maximum temperature recorded prior to her arrival was unknown.   Myalgias began yesterday. The myalgias have been unchanged since their onset. The myalgias are generalized. The myalgia pain is at a severity of 4/10.     Review of patient's allergies indicates:  No Known Allergies  History reviewed. No pertinent past medical history.  History reviewed. No pertinent surgical history.  Family History   Problem Relation Age of Onset    Lung cancer Father     Hypertension Mother      Social History   Substance Use Topics    Smoking status: Never Smoker    Smokeless tobacco: Never Used    Alcohol use Yes     Review of Systems   Constitutional: Positive for fever.   Gastrointestinal: Positive for nausea and vomiting. Negative for abdominal distention.   Musculoskeletal: Positive for myalgias.   All other systems reviewed and are negative.      Physical Exam     Initial Vitals [10/17/17 1830]   BP Pulse Resp Temp SpO2   98/71 104 20 99.5 °F (37.5 °C) 99 %      MAP       80         Physical Exam    Nursing note and vitals reviewed.  Constitutional: She appears well-developed and well-nourished. No distress.   HENT:   Head: Normocephalic and atraumatic.   Right Ear: External ear normal.   Left Ear: " External ear normal.   Posterior oropharyngeal erythema, clear nasal discharge, enlarged nasal turbinates, clear postnasal drip   Eyes: Conjunctivae and EOM are normal. Pupils are equal, round, and reactive to light.   Neck: Normal range of motion. Neck supple.   Cardiovascular: Normal rate and regular rhythm.   Pulmonary/Chest: Breath sounds normal. No respiratory distress.   Abdominal: Soft. She exhibits no distension. There is no tenderness.   Musculoskeletal:   Generalized muscle pain upon movement without tenderness to palpation or gross deformities   Neurological: She is alert. She has normal strength.   Skin: Skin is dry.   Psychiatric: She has a normal mood and affect.         ED Course   Procedures  Labs Reviewed   POCT URINE PREGNANCY             Medical Decision Making:   Initial Assessment:   32-year-old female presents to the emergency department complaining of body aches, fever of 102 today, and dizziness ×2 days.  She had one episode of nausea with vomiting today and states she took ibuprofen this morning for fever.    Differential Diagnosis:   Influenza  Acute nasopharyngitis    ED Management:  Patient was given instructions for viral syndrome and prescriptions for Astelin, Zofran and fluticasone.  She was given Tylenol in the emergency department.                   ED Course      Clinical Impression:   The encounter diagnosis was Viral syndrome.    Disposition:   Disposition: Discharged  Condition: Stable                        Celio Looney MD  10/17/17 2022

## 2017-10-18 NOTE — TELEPHONE ENCOUNTER
----- Message from Swati Dillon sent at 10/18/2017 10:23 AM CDT -----  Contact: self  Patient went to the ER last night . She was suppose to receive a prescription for diarrhea , but it was not sent in .She is requesting that something be called in to [facility] in [place] .             pts # [phone]       LL

## 2017-10-20 ENCOUNTER — OFFICE VISIT (OUTPATIENT)
Dept: FAMILY MEDICINE | Facility: CLINIC | Age: 32
End: 2017-10-20
Payer: COMMERCIAL

## 2017-10-20 ENCOUNTER — TELEPHONE (OUTPATIENT)
Dept: FAMILY MEDICINE | Facility: CLINIC | Age: 32
End: 2017-10-20

## 2017-10-20 VITALS
TEMPERATURE: 99 F | WEIGHT: 293 LBS | BODY MASS INDEX: 47.09 KG/M2 | HEART RATE: 74 BPM | RESPIRATION RATE: 20 BRPM | DIASTOLIC BLOOD PRESSURE: 80 MMHG | HEIGHT: 66 IN | OXYGEN SATURATION: 96 % | SYSTOLIC BLOOD PRESSURE: 108 MMHG

## 2017-10-20 DIAGNOSIS — K52.9 ACUTE GASTROENTERITIS: Primary | ICD-10-CM

## 2017-10-20 PROBLEM — J00 ACUTE NASOPHARYNGITIS: Status: RESOLVED | Noted: 2017-09-02 | Resolved: 2017-10-20

## 2017-10-20 PROCEDURE — 99214 OFFICE O/P EST MOD 30 MIN: CPT | Mod: S$GLB,,, | Performed by: FAMILY MEDICINE

## 2017-10-20 PROCEDURE — 99999 PR PBB SHADOW E&M-EST. PATIENT-LVL III: CPT | Mod: PBBFAC,,, | Performed by: FAMILY MEDICINE

## 2017-10-20 RX ORDER — HYOSCYAMINE SULFATE 0.125 MG
125 TABLET ORAL EVERY 6 HOURS PRN
Qty: 30 TABLET | Refills: 0 | Status: SHIPPED | OUTPATIENT
Start: 2017-10-20 | End: 2018-02-16

## 2017-10-20 NOTE — PROGRESS NOTES
Chief Complaint   Patient presents with    Diarrhea    Abdominal Cramping       CIERRA Tillman is 32 y.o. female. The encounter diagnosis was Acute gastroenteritis.    32 year old female comes to clinic with complaint of persistent GI symptoms.  Patient reports 5 days ago she began to have chills without other symptoms, the following day she began to have loss of appetite, nausea, and vomiting x 4 episodes; she also noted the onset of fever of 102 along with severe myalgias.  Before presenting to the ED she denied diarrhea. Patient denies upper respiratory symptoms.    Patient now reports continued abdominal cramping though decreased from presentation.  She has minimal nausea but vomiting has resolved.      Review of Systems   Constitutional: Positive for fatigue. Negative for activity change, chills, diaphoresis and fever.   Respiratory: Negative for shortness of breath.    Cardiovascular: Negative for chest pain.   Gastrointestinal: Positive for abdominal pain and diarrhea. Negative for nausea and vomiting.   Musculoskeletal: Negative for gait problem.   Psychiatric/Behavioral: Negative for suicidal ideas.           Current Outpatient Prescriptions:     azelastine (ASTELIN) 137 mcg (0.1 %) nasal spray, 2 sprays (274 mcg total) by Nasal route 2 (two) times daily., Disp: 30 mL, Rfl: 0    fluticasone (FLONASE) 50 mcg/actuation nasal spray, 2 sprays by Each Nare route once daily., Disp: 15 g, Rfl: 0    loperamide (IMODIUM) 2 mg capsule, Take 1 capsule (2 mg total) by mouth 4 (four) times daily as needed for Diarrhea., Disp: 30 capsule, Rfl: 0    ondansetron (ZOFRAN) 4 MG tablet, Take 1 tablet (4 mg total) by mouth every 6 (six) hours as needed for Nausea (for nausea)., Disp: 12 tablet, Rfl: 0    hyoscyamine (ANASPAZ,LEVSIN) 0.125 mg Tab, Take 1 tablet (125 mcg total) by mouth every 6 (six) hours as needed (abdominal cramping)., Disp: 30 tablet, Rfl: 0    ibuprofen (ADVIL,MOTRIN) 800 MG tablet, Take 1  "tablet (800 mg total) by mouth every 8 (eight) hours as needed for Pain., Disp: 30 tablet, Rfl: 0    metformin (GLUCOPHAGE) 500 MG tablet, Take 1 tablet (500 mg total) by mouth 2 (two) times daily with meals., Disp: 180 tablet, Rfl: 1      Blood pressure 108/80, pulse 74, temperature 99 °F (37.2 °C), temperature source Oral, resp. rate 20, height 5' 6" (1.676 m), weight 133.8 kg (295 lb), last menstrual period 09/12/2017, SpO2 96 %.    Physical Exam   Constitutional: Vital signs are normal. She appears well-developed.   HENT:   Mouth/Throat: Normal dentition.   Neck: Trachea normal. No thyromegaly present.   Cardiovascular: Normal rate, regular rhythm and intact distal pulses.    No murmur heard.  Pulmonary/Chest: Effort normal. She has no decreased breath sounds. She has no wheezes. She exhibits no deformity.   Abdominal: Soft. Bowel sounds are normal. She exhibits no distension and no mass. There is generalized tenderness.   Musculoskeletal:   Normal gait. No decreased range of motion of major joints.   Neurological: She is not disoriented.   Skin: Skin is intact. Capillary refill takes less than 2 seconds.   Psychiatric: Her speech is normal and behavior is normal. Her mood appears not anxious. She does not exhibit a depressed mood.       Admission on 10/17/2017, Discharged on 10/17/2017   Component Date Value Ref Range Status    POC Preg Test, Ur 10/17/2017 Negative  Negative Final     Acceptable 10/17/2017 Yes   Final   ]    Assessment:    1. Acute gastroenteritis          Agustina was seen today for diarrhea and abdominal cramping.    Diagnoses and all orders for this visit:    Acute gastroenteritis  -     hyoscyamine (ANASPAZ,LEVSIN) 0.125 mg Tab; Take 1 tablet (125 mcg total) by mouth every 6 (six) hours as needed (abdominal cramping).  - Improved. Patient counseled on symptoms of viral gastroenteritis.  - ED record reviewed with patient, patient seen for above complaints and prescribed " respiratory medications. See HPI for further summary.  - Antispasmodic prescribed as above.  - Recommend OTC Imodium or Pepto-bismol add probiotic to regimen.          FOLLOW UP: Return in about 1 week (around 10/27/2017), or if symptoms worsen or fail to improve.

## 2017-10-20 NOTE — PATIENT INSTRUCTIONS
Viral Gastroenteritis in Children  Viral gastroenteritis is often called stomach flu. But it is not really related to the flu or influenza. It is irritation of the stomach and intestines due to infection with a virus. Most children with viral gastroenteritis get better in a few days without a healthcare providers treatment. Because a child with gastroenteritis may have trouble keeping fluids down, he or she is at risk for fluid loss (dehydration) and should be watched closely.     Handwashing is the best way to prevent the spread of viruses that cause stomach flu.   Symptoms of viral gastroenteritis  Symptoms of gastroenteritis include loose, watery stools (diarrhea), sometimes with nausea and vomiting. The child may have cramps or pain in the stomach area. A fever or headache may also be present. Symptoms usually last for about 2 days, but may take as long as 7 days to go away.  How is viral gastroenteritis spread?  Viral gastroenteritis is highly contagious. The viruses that cause the infection are often passed from person to person by unwashed hands. Children can get the viruses from food, eating utensils, or toys. People who have had the infection can be contagious even after they feel better. And some people are infected but never have symptoms. Because of this, outbreaks of gastroenteritis are common in childcare and other group settings.  Treatment  Most cases of viral gastroenteritis get better without treatment. (Antibiotics are not helpful against viral infections.) The goal of treatment is to make your child comfortable and to prevent dehydration. These tips can help:  · Be sure your child gets plenty of rest.  · To prevent dehydration:  ¨ Give your child plenty of liquids such as water. You can also give your child an oral rehydration solution, which you can buy at the grocery store or pharmacy. Ask your child's healthcare provider which types of solutions are best for your child. Have your child take  small sips of fluid at first to avoid nausea. Dont dilute juice or give other drinks with sugar in them (such as sports drinks) as this may worsen the diarrhea.  ¨ If your older child seems dehydrated, give 1 to 2 teaspoons of an oral rehydration solution. Do this every 10 minutes until vomiting stops and your child is able to keep down larger amounts of liquid.  ¨ If your baby is bottle fed, you can give an oral rehydration solution for 4 to 6 hours and then resume formula. You may need to feed your baby more often to ensure he or she gets enough fluids. You can also give an oral rehydration solution if your baby is urinating less often or the urine is dark in color.  ¨ If your baby is breastfeeding, you may need to feed your baby more often. You can also give an oral rehydration solution if your baby is urinating less often or the urine is dark in color.   · When your child is able to eat again:  ¨ Feed your child regular foods. Returning to a regular diet quickly has been shown to reduce the length of symptoms of gastroenteritis.  ¨ Ask your childs healthcare provider if there are any foods to avoid while your child is recovering from gastroenteritis.  · Dont give your child any medicines unless they have been recommended by your child's healthcare provider.  · Some children may develop a short-term (temporary) intolerance to dairy products after a diarrheal illness. If dairy items seem to make your child's symptoms worse, you may need to avoid them temporarily.  Preventing viral gastroenteritis  These steps may help lessen the chances that you or your child will get or pass on viral gastroenteritis:  · Wash your hands with warm water and soap often, especially after going to the bathroom, diapering your child, and before preparing, serving, or eating food.  · Have your child wash his or her hands frequently.  · Keep food preparation areas clean.  · Wash soiled clothing promptly.  · Use diapers with waterproof  outer covers or use plastic pants.  · Prevent contact between your child and those who are sick.  · Keep your sick child home from school or childcare.  · Ask your childs healthcare provider if your child should receive the rotavirus vaccine. This vaccine protects infants and young children against rotavirus infection, one cause of viral gastroenteritis.  When to call the healthcare provider  Call your childs healthcare provider right away if your child:  · Has a fever (see fever and children section below)  · Has had a seizure caused by the fever  · Has been vomiting and having diarrhea for more than 6 hours  · Has blood in vomit or bloody diarrhea  · Is lethargic  · Has severe stomach pain  · Cant keep even small amounts of liquid down  · Shows signs of dehydration, such as very dark or very little urine, excessive thirst, dry mouth, or dizziness  · Is a baby and does not urinate for 8 hours or more  Fever and children  Always use a digital thermometer to check your childs temperature. Never use a mercury thermometer.  For infants and toddlers, be sure to use a rectal thermometer correctly. A rectal thermometer may accidentally poke a hole in (perforate) the rectum. It may also pass on germs from the stool. Always follow the product makers directions for proper use. If you dont feel comfortable taking a rectal temperature, use another method. When you talk to your childs healthcare provider, tell him or her which method you used to take your childs temperature.  Here are guidelines for fever temperature. Ear temperatures arent accurate before 6 months of age. Dont take an oral temperature until your child is at least 4 years old.  Infant under 3 months old:  · Ask your childs healthcare provider how you should take the temperature.  · Rectal or forehead (temporal artery) temperature of 100.4°F (38°C) or higher, or as directed by the provider  · Armpit temperature of 99°F (37.2°C) or higher, or as  directed by the provider  Child age 3 to 36 months:  · Rectal, forehead, or ear temperature of 102°F (38.9°C) or higher, or as directed by the provider  · Armpit (axillary) temperature of 101°F (38.3°C) or higher, or as directed by the provider  Child of any age:  · Repeated temperature of 104°F (40°C) or higher, or as directed by the provider  · Fever that lasts more than 24 hours in a child under 2 years old. Or a fever that lasts for 3 days in a child 2 years or older.   Date Last Reviewed: 1/1/2017 © 2000-2017 [employer] [address] [place] [address], [place] [address]. All rights reserved. This information is not intended as a substitute for professional medical care. Always follow your healthcare professional's instructions.        Noninfectious Gastroenteritis (Ages 6 Years to Adult)    Gastroenteritis can cause nausea, vomiting, diarrhea, and abdominal cramping. This may occur as a result of food sensitivity, inflammation of your gastrointestinal tract, medicines, stress, or other causes not related to infection. Your symptoms will usually last from 1 to 3 days, but can last longer. Antibiotics are not effective, but simple home treatment will be helpful.  Home care  Medicine  · You may use acetaminophen or NSAID medicines like ibuprofen or naproxen to control fever, unless another medicine is prescribed. (Note: If you have chronic liver or kidney disease, or ever had a stomach ulcer or gastrointestinalI bleeding, talk with your healthcare provider before using these medicines.) Aspirin should never be used in anyone under 18 years of age who is ill with a fever. It may cause severe liver damage. Don't increase your NSAID medicines if you are already taking these medicines for another condition (like arthritis). Don't use NSAIDS if you are on aspirin (such as for heart disease, or after a stroke).  · If medicines for diarrhea or vomiting are prescribed, take only as directed.  General care and  preventing spread of the illness  · If symptoms are severe, rest at home for the next 24 hours or until you feel better.  · Hand washing with soap and water is the best way to prevent the spread of infection. Wash your hands after touching anyone who is sick.  · Wash your hands after using the toilet and before meals. Clean the toilet after each use.  · Caffeine, tobacco, and alcohol can make your diarrhea, cramping, and pain worse.  Diet  · Water and clear liquids are important so you do not get dehydrated. Drink a small amount at a time.  · Do not force yourself to eat, especially if you have cramps, vomiting, or diarrhea. When you finally decide to start eating, do not eat large amounts at a time, even if you are hungry.  · If you eat, avoid fatty, greasy, spicy, or fried foods.  · Do not eat dairy products if you have diarrhea; they can make the diarrhea worse.  During the first 24 hours (the first full day), follow the diet below:  · Beverages: Water, clear liquids, soft drinks without caffeine, like ginger ale; mineral water (plain or flavored); decaffeinated tea and coffee.  · Soups: Clear broth, consommé, and bouillon Sports drinks aren't a good choice because they have too much sugar and not enough electrolytes. In this case, commercially available products called oral rehydration solutions are best.  · Desserts: Plain gelatin, popsicles, and fruit juice bars.  During the next 24 hours (the second day), you may add the following to the above if you have improved. If not, continue what you did the first day:  · Hot cereal, plain toast, bread, rolls, crackers  · Plain noodles, rice, mashed potatoes, chicken noodle or rice soup  · Unsweetened canned fruit (avoid pineapple), bananas  · Limit caffeine and chocolate. No spices or seasonings except salt.  During the next 24 hours  · Gradually resume a normal diet, as you feel better and your symptoms improve.  · If at any time your symptoms start getting worse, go  back to clear liquids until you feel better.  Food preparation  · If you have diarrhea, you should not prepare food for others. When you  prepare food for yourself, wash your hands before and after.  · Wash your hands after using cutting boards, countertops, and knives that have been in contact with raw food.  · Keep uncooked meats away from cooked and ready-to-eat foods.  Follow-up care  Follow up with your healthcare provider if you are not improving over the next 2 to 3 days, or as advised. If a stool (diarrhea) sample was taken, call for the results as directed.  When to seek medical care  Call your healthcare provider right away if any of these occur:   · Increasing abdominal pain or constant lower right abdominal pain  · Continued vomiting (unable to keep liquids down)  · Frequent diarrhea (more than 5 times a day)  · Blood in vomit or stool (black or red color)  · Inability to tolerate solid food after a few days.  · Dark urine, reduced urine output  · Weakness, dizziness  · Drowsiness  · Fever of 100.4ºF (38.0ºC) or higher, or as directed by your healthcare provider  · New rash  Call 911  Call 911 if any of these occur:  · Trouble breathing  · Chest pain  · Confusion  · Severe drowsiness or trouble awakening  · Seizure  · Stiff neck  Date Last Reviewed: 11/16/2015  © 2824-2582 [employer] [address] [place] [address], [place] [address]. All rights reserved. This information is not intended as a substitute for professional medical care. Always follow your healthcare professional's instructions.

## 2017-12-04 ENCOUNTER — PATIENT MESSAGE (OUTPATIENT)
Dept: FAMILY MEDICINE | Facility: CLINIC | Age: 32
End: 2017-12-04

## 2018-01-29 ENCOUNTER — TELEPHONE (OUTPATIENT)
Dept: FAMILY MEDICINE | Facility: CLINIC | Age: 33
End: 2018-01-29

## 2018-02-16 ENCOUNTER — TELEPHONE (OUTPATIENT)
Dept: FAMILY MEDICINE | Facility: CLINIC | Age: 33
End: 2018-02-16

## 2018-02-16 ENCOUNTER — OFFICE VISIT (OUTPATIENT)
Dept: FAMILY MEDICINE | Facility: CLINIC | Age: 33
End: 2018-02-16
Payer: COMMERCIAL

## 2018-02-16 VITALS
BODY MASS INDEX: 47.09 KG/M2 | SYSTOLIC BLOOD PRESSURE: 126 MMHG | TEMPERATURE: 99 F | DIASTOLIC BLOOD PRESSURE: 72 MMHG | OXYGEN SATURATION: 96 % | WEIGHT: 293 LBS | HEIGHT: 66 IN | HEART RATE: 69 BPM

## 2018-02-16 DIAGNOSIS — E66.01 MORBID OBESITY WITH BMI OF 45.0-49.9, ADULT: ICD-10-CM

## 2018-02-16 DIAGNOSIS — J02.9 ACUTE VIRAL PHARYNGITIS: Primary | ICD-10-CM

## 2018-02-16 DIAGNOSIS — R73.03 PREDIABETES: ICD-10-CM

## 2018-02-16 PROBLEM — N92.0 MENORRHAGIA WITH REGULAR CYCLE: Status: RESOLVED | Noted: 2017-05-18 | Resolved: 2018-02-16

## 2018-02-16 PROBLEM — B34.9 VIRAL SYNDROME: Status: RESOLVED | Noted: 2017-10-17 | Resolved: 2018-02-16

## 2018-02-16 LAB
CTP QC/QA: YES
S PYO RRNA THROAT QL PROBE: NEGATIVE

## 2018-02-16 PROCEDURE — 99999 PR PBB SHADOW E&M-EST. PATIENT-LVL III: CPT | Mod: PBBFAC,,, | Performed by: FAMILY MEDICINE

## 2018-02-16 PROCEDURE — 99214 OFFICE O/P EST MOD 30 MIN: CPT | Mod: S$GLB,,, | Performed by: FAMILY MEDICINE

## 2018-02-16 PROCEDURE — 87880 STREP A ASSAY W/OPTIC: CPT | Mod: QW,S$GLB,, | Performed by: FAMILY MEDICINE

## 2018-02-16 PROCEDURE — 3008F BODY MASS INDEX DOCD: CPT | Mod: S$GLB,,, | Performed by: FAMILY MEDICINE

## 2018-02-16 RX ORDER — METFORMIN HYDROCHLORIDE 500 MG/1
500 TABLET ORAL
Qty: 90 TABLET | Refills: 3
Start: 2018-02-16 | End: 2019-01-23 | Stop reason: SDUPTHER

## 2018-02-16 RX ORDER — CODEINE PHOSPHATE AND GUAIFENESIN 10; 100 MG/5ML; MG/5ML
10 SOLUTION ORAL EVERY 12 HOURS PRN
Qty: 118 ML | Refills: 0 | Status: SHIPPED | OUTPATIENT
Start: 2018-02-16 | End: 2018-02-26

## 2018-02-16 RX ORDER — IBUPROFEN 800 MG/1
800 TABLET ORAL EVERY 8 HOURS PRN
Qty: 30 TABLET | Refills: 0 | Status: SHIPPED | OUTPATIENT
Start: 2018-02-16 | End: 2018-08-14

## 2018-02-16 NOTE — PATIENT INSTRUCTIONS
Polycystic Ovary Syndrome  Polycystic ovary syndrome (PCOS) causes harmless, small cysts in the ovaries and other symptoms. PCOS is caused by certain hormones being out of balance. The word syndrome means a group of symptoms. Women with PCOS may have no periods, irregular periods, or very long periods.    Your ovaries  Women store their eggs in their ovaries. Each egg is in a capsule called a follicle. Normally during the reproductive years, one follicle grows to produce a mature egg each month. This egg is released during ovulation and the follicle dissolves.  Hormones out of balance  With polycystic ovary syndrome (PCOS), the hormones that control ovulation are out of balance. These include estrogen, progesterones, and androgen. As a result, ovulation may not occur. Instead, the follicle stays enlarged. This is a fluid-filled sac (cyst). Over time, the ovaries fill with many small cysts. This is why they are called poly (many) cystic ovaries. In some women, the ovaries also make too much androgen.  PCOS symptoms  Women with PCOS may also have one or more of these symptoms:  · Trouble getting pregnant (fertility problems)  · Weight gain, especially around the waist   · Acne  · Hair growth on the face and other parts of the body  · Patches of thickened, velvety, darkened skin called acanthosis nigricans  Women with PCOS are also at increased risk of developing cancer of the uterine lining, diabetes, and heart disease.   Date Last Reviewed: 5/10/2015  © 0259-4593 [employer]. [address] [place] [address]. All rights reserved. This information is not intended as a substitute for professional medical care. Always follow your healthcare professional's instructions.

## 2018-02-16 NOTE — TELEPHONE ENCOUNTER
----- Message from Anna Boucher sent at 2/16/2018 12:01 PM CST -----  Contact: [facility]  guaifenesin-codeine 100-10 mg/5 ml (TUSSI-ORGANIDIN NR)  mg/5 mL syrup    [facility] requests to change the medication listed above to the generic version. Can be reached at [phone]. Thank you!

## 2018-02-16 NOTE — PROGRESS NOTES
Chief Complaint   Patient presents with    Otalgia    Sore Throat    Nasal Congestion       HPI    Agustina Tillman is 32 y.o. female. The primary encounter diagnosis was Acute viral pharyngitis. Diagnoses of Prediabetes and Morbid obesity with BMI of 45.0-49.9, adult were also pertinent to this visit.    89 year old female comes to clinic with complaint of upper respiratory symptoms.  Patient reports 2 weeks prior being treated for Strep throat at [facility] with an antibiotic injection and oral antibiotics.  Patient reports having sore throat that resolved then returned while completing the antibiotics.    Patient reports that her current episode has been present for over 1 week.  OTC medications did not improve her symptoms.  She reports all of her symptoms began on the left side of the throat then moved to right.  Patient reports severe cough most bothersome at night.  Patient also notes intermittent hoarseness and discolored mucous.  She admits that she exerpienced body aches and fevers prior to the initial episode with multiple sick contacts.     Review of Systems   Constitutional: Negative for chills, diaphoresis, fatigue and fever.   HENT: Positive for ear pain, postnasal drip, rhinorrhea and sore throat. Negative for congestion, ear discharge, sinus pain and sinus pressure.    Respiratory: Positive for cough. Negative for chest tightness, shortness of breath and wheezing.    Cardiovascular: Negative for chest pain.   Musculoskeletal: Negative for arthralgias, back pain, joint swelling and myalgias.           Current Outpatient Prescriptions:     fluticasone (FLONASE) 50 mcg/actuation nasal spray, 2 sprays by Each Nare route once daily., Disp: 15 g, Rfl: 0    ibuprofen (ADVIL,MOTRIN) 800 MG tablet, Take 1 tablet (800 mg total) by mouth every 8 (eight) hours as needed for Pain or Temperature greater than., Disp: 30 tablet, Rfl: 0    metFORMIN (GLUCOPHAGE) 500 MG tablet, Take 1 tablet (500 mg total) by  "mouth daily with breakfast., Disp: 90 tablet, Rfl: 3    azelastine (ASTELIN) 137 mcg (0.1 %) nasal spray, 2 sprays (274 mcg total) by Nasal route 2 (two) times daily., Disp: 30 mL, Rfl: 0    guaifenesin-codeine 100-10 mg/5 ml (TUSSI-ORGANIDIN NR)  mg/5 mL syrup, Take 10 mLs by mouth every 12 (twelve) hours as needed for Cough or Congestion., Disp: 118 mL, Rfl: 0      Blood pressure 126/72, pulse 69, temperature 98.5 °F (36.9 °C), temperature source Oral, height 5' 6" (1.676 m), weight 134.8 kg (297 lb 2.9 oz), last menstrual period 02/01/2018, SpO2 96 %.    Physical Exam   Constitutional: Vital signs are normal. She appears well-developed.   HENT:   Mouth/Throat: Normal dentition.   Neck: Trachea normal. No thyromegaly present.   Cardiovascular: Normal rate, regular rhythm and intact distal pulses.    No murmur heard.  Pulmonary/Chest: Effort normal. She has no decreased breath sounds. She has no wheezes. She exhibits no deformity.   Musculoskeletal:   Normal gait. No decreased range of motion of major joints.   Neurological: She is not disoriented.   Skin: Skin is intact. Capillary refill takes less than 2 seconds.   Psychiatric: Her speech is normal and behavior is normal. Her mood appears not anxious. She does not exhibit a depressed mood.       Office Visit on 02/16/2018   Component Date Value Ref Range Status    Rapid Strep A Screen 02/16/2018 Negative  Negative Final     Acceptable 02/16/2018 Yes   Final   ]    Assessment:    1. Acute viral pharyngitis    2. Prediabetes    3. Morbid obesity with BMI of 45.0-49.9, adult          Agustina was seen today for otalgia, sore throat and nasal congestion.    Diagnoses and all orders for this visit:    Acute viral pharyngitis  -     POCT Rapid Strep A  -     guaifenesin-codeine 100-10 mg/5 ml (TUSSI-ORGANIDIN NR)  mg/5 mL syrup; Take 10 mLs by mouth every 12 (twelve) hours as needed for Cough or Congestion.  -     ibuprofen (ADVIL,MOTRIN) " 800 MG tablet; Take 1 tablet (800 mg total) by mouth every 8 (eight) hours as needed for Pain or Temperature greater than.  - New problem.  POCT negative.  Medications for viral pharyngitis prescribed.    Prediabetes  -     metFORMIN (GLUCOPHAGE) 500 MG tablet; Take 1 tablet (500 mg total) by mouth daily with breakfast.  - Stable.  Patient to restart Metformin daily.  Will repeat A1c and discuss return of regular menses at next visit.  - If not improved increase.    Morbid obesity with BMI of 45.0-49.9, adult  -     metFORMIN (GLUCOPHAGE) 500 MG tablet; Take 1 tablet (500 mg total) by mouth daily with breakfast.  - Stable.  Diet and exercise to be discussed further at next visit. Restart Metformin.          FOLLOW UP: Follow-up in about 4 months (around 6/16/2018) for Prediabetes/Menses follow up.

## 2018-02-26 ENCOUNTER — PATIENT MESSAGE (OUTPATIENT)
Dept: FAMILY MEDICINE | Facility: CLINIC | Age: 33
End: 2018-02-26

## 2018-08-14 ENCOUNTER — OFFICE VISIT (OUTPATIENT)
Dept: FAMILY MEDICINE | Facility: CLINIC | Age: 33
End: 2018-08-14
Payer: COMMERCIAL

## 2018-08-14 VITALS
HEIGHT: 65 IN | RESPIRATION RATE: 20 BRPM | WEIGHT: 293 LBS | TEMPERATURE: 99 F | DIASTOLIC BLOOD PRESSURE: 76 MMHG | BODY MASS INDEX: 48.82 KG/M2 | OXYGEN SATURATION: 98 % | SYSTOLIC BLOOD PRESSURE: 130 MMHG | HEART RATE: 81 BPM

## 2018-08-14 DIAGNOSIS — J06.9 VIRAL UPPER RESPIRATORY INFECTION: Primary | ICD-10-CM

## 2018-08-14 PROCEDURE — 99214 OFFICE O/P EST MOD 30 MIN: CPT | Mod: S$GLB,,, | Performed by: FAMILY MEDICINE

## 2018-08-14 PROCEDURE — 99999 PR PBB SHADOW E&M-EST. PATIENT-LVL III: CPT | Mod: PBBFAC,,, | Performed by: FAMILY MEDICINE

## 2018-08-14 RX ORDER — IBUPROFEN 600 MG/1
600 TABLET ORAL EVERY 6 HOURS PRN
Qty: 40 TABLET | Refills: 0 | Status: SHIPPED | OUTPATIENT
Start: 2018-08-14 | End: 2019-02-21

## 2018-08-14 RX ORDER — FLUTICASONE PROPIONATE 50 MCG
2 SPRAY, SUSPENSION (ML) NASAL DAILY
Qty: 15 G | Refills: 0 | Status: SHIPPED | OUTPATIENT
Start: 2018-08-14 | End: 2019-08-22

## 2018-08-14 RX ORDER — PROMETHAZINE HYDROCHLORIDE AND DEXTROMETHORPHAN HYDROBROMIDE 6.25; 15 MG/5ML; MG/5ML
5 SYRUP ORAL
Qty: 240 ML | Refills: 0 | Status: SHIPPED | OUTPATIENT
Start: 2018-08-14 | End: 2018-08-24

## 2018-08-20 NOTE — PROGRESS NOTES
Routine Office Visit    Patient Name: Agustina Tillman    : 1985  MRN: [id number]    Subjective:  Agustina is a 33 y.o. female who presents today for:   Chief Complaint   Patient presents with    Cough    Sore Throat    Laryngitis       33-year-old female comes in for evaluation of 2 day of cough, sore throat, and losing her voice.  The she states that last week she had a cold and thought that she had been better but then today started to lose her voice and having a sore throat.  She still has somewhat of a runny nose but no longer has a fever that she did last week.  She reports possible chills.  There is no chest pain, or shortness of breath.    Past Medical History  History reviewed. No pertinent past medical history.    Past Surgical History  History reviewed. No pertinent surgical history.     Family History  Family History   Problem Relation Age of Onset    Lung cancer Father     Hypertension Mother        Social History  Social History     Socioeconomic History    Marital status: Single     Spouse name: Not on file    Number of children: Not on file    Years of education: Not on file    Highest education level: Not on file   Social Needs    Financial resource strain: Not on file    Food insecurity - worry: Not on file    Food insecurity - inability: Not on file    Transportation needs - medical: Not on file    Transportation needs - non-medical: Not on file   Occupational History    Not on file   Tobacco Use    Smoking status: Never Smoker    Smokeless tobacco: Never Used   Substance and Sexual Activity    Alcohol use: Yes    Drug use: No    Sexual activity: Yes     Partners: Male   Other Topics Concern    Not on file   Social History Narrative    Not on file       Current Medications  Current Outpatient Medications on File Prior to Visit   Medication Sig Dispense Refill    azelastine (ASTELIN) 137 mcg (0.1 %) nasal spray 2 sprays (274 mcg total) by Nasal route 2 (two) times daily.  "30 mL 0    metFORMIN (GLUCOPHAGE) 500 MG tablet Take 1 tablet (500 mg total) by mouth daily with breakfast. 90 tablet 3     No current facility-administered medications on file prior to visit.        Allergies   Review of patient's allergies indicates:  No Known Allergies    Review of Systems   Constitutional: Positive for fatigue. Negative for chills and fever.   HENT: Positive for congestion, postnasal drip, rhinorrhea, sinus pressure, sore throat and voice change. Negative for ear discharge.    Eyes: Negative for discharge.   Respiratory: Positive for cough (productive). Negative for shortness of breath and wheezing.    Cardiovascular: Negative for palpitations.   Gastrointestinal: Negative for abdominal pain, blood in stool, constipation and diarrhea.   Genitourinary: Negative for dysuria.   Skin: Negative for rash.       /76 (BP Location: Left arm, Patient Position: Sitting, BP Method: Medium (Manual))   Pulse 81   Temp 98.6 °F (37 °C) (Oral)   Resp 20   Ht 5' 5" (1.651 m)   Wt 134.6 kg (296 lb 11.8 oz)   LMP 08/01/2018 (Exact Date)   SpO2 98%   BMI 49.38 kg/m²     Physical Exam   Constitutional: She appears well-developed. She appears ill. No distress.   HENT:   Head: Normocephalic and atraumatic.   Right Ear: Tympanic membrane, external ear and ear canal normal.   Left Ear: Tympanic membrane, external ear and ear canal normal.   Nose: Mucosal edema and rhinorrhea present.  No foreign bodies. Right sinus exhibits no maxillary sinus tenderness. Left sinus exhibits no maxillary sinus tenderness.   Mouth/Throat: Posterior oropharyngeal erythema present.   Eyes: EOM and lids are normal. Pupils are equal, round, and reactive to light.   Neck: Trachea normal and normal range of motion. No tracheal tenderness present. No thyroid mass present.   Cardiovascular: Normal rate, regular rhythm, normal heart sounds and intact distal pulses.   Pulmonary/Chest: Effort normal and breath sounds normal. No " respiratory distress. She has no wheezes. She has no rales.   Lymphadenopathy:     She has no cervical adenopathy.   Psychiatric: She has a normal mood and affect. Her behavior is normal.   Vitals reviewed.        Assessment/Plan:  Agustina was seen today for cough, sore throat and laryngitis.    Diagnoses and all orders for this visit:    Viral upper respiratory infection  -     promethazine-dextromethorphan (PROMETHAZINE-DM) 6.25-15 mg/5 mL Syrp; Take 5 mLs by mouth every 4 to 6 hours as needed.  -     fluticasone (FLONASE) 50 mcg/actuation nasal spray; 2 sprays (100 mcg total) by Each Nare route once daily.  -     ibuprofen (ADVIL,MOTRIN) 600 MG tablet; Take 1 tablet (600 mg total) by mouth every 6 (six) hours as needed for Pain or Temperature greater than (101).       Advised symptomatic care with plenty of fluids, honey and lemon, rest, and above regimen..  Discussed course of a cold.   Explained that after cold is better, may continue to have a dry cough for a week or two.  Return to office if symptoms worsen or do not improve in a week.        This office note has been dictated.  This dictation has been generated using [employer] Direct dictation; some phonetic errors may occur.

## 2018-08-28 ENCOUNTER — PATIENT MESSAGE (OUTPATIENT)
Dept: BARIATRICS | Facility: CLINIC | Age: 33
End: 2018-08-28

## 2018-08-28 NOTE — PROGRESS NOTES
Subjective:       Patient ID: Agustina Tillman is a 33 y.o. female.    Chief Complaint: Consult        Current attempts at weight loss: None  New pt to me, self referral  No address on file , with Patient Active Problem List:     PCOS (polycystic ovarian syndrome)     Female fertility problems     Morbid obesity with BMI of 45.0-49.9, adult     Vitamin D deficiency     Prediabetes     Iron deficiency anemia secondary to inadequate dietary iron intake     Lab Results       Component                Value               Date                       HGBA1C                   5.7                 06/08/2017            Lab Results       Component                Value               Date                       LDLCALC                  113.0               06/08/2017                 CREATININE               1.0                 06/08/2017               Previous diet attempts: Keto diet June for 2 weeks low energy level, intermittent fasting for one month last year stopped after vacation    History of medication for loss: none    Heaviest weight: 297, current    Lightest weight: 172    Goal weight: Wants to be at healthy weight, 60 pounds in 6 months      Last eye exam:  None                                Typical eating patterns:   Breakfast: ; none    lunch:; Food court    dinner:; Cooks heavy dinners on nights when she gets off early. Usually rice/bread/pasta. Drive through when get off late    snacks:; Chips, Cheez its    Beverages:. Soft drinks x 3 daily, mostly Fantas/fruit flavored    Willingness to change: 10    EKG:Normal sinus rhythm  Normal ECG    BMR: 2047    Walk in park, treadmill at home, walks for 30 minutes. Signed up for gym.     Works for [employer]      Review of Systems   Constitutional: Negative for chills and fever.   HENT: Positive for congestion. Negative for sore throat and trouble swallowing.    Eyes: Negative for visual disturbance.   Respiratory: Negative for cough and shortness of breath.   "  Cardiovascular: Positive for leg swelling. Negative for chest pain.   Gastrointestinal: Negative for diarrhea, nausea and vomiting.   Endocrine: Positive for cold intolerance.   Genitourinary: Negative for dysuria and hematuria.   Musculoskeletal: Negative for arthralgias and myalgias.   Skin: Negative for rash and wound.   Neurological: Positive for seizures (absence seizures as child).   Psychiatric/Behavioral: Negative for agitation and confusion.       Objective:     /70   Pulse 107   Ht 5' 4" (1.626 m)   Wt 134.8 kg (297 lb 2.9 oz)   LMP 08/01/2018 (Exact Date)   BMI 51.01 kg/m²     Physical Exam   Constitutional: She is oriented to person, place, and time. She appears well-developed. No distress.   Morbidly obese     HENT:   Head: Normocephalic and atraumatic.   Eyes: EOM are normal. Pupils are equal, round, and reactive to light. No scleral icterus.   Neck: Normal range of motion. Neck supple. No thyromegaly present.   Cardiovascular: Normal rate and normal heart sounds. Exam reveals no gallop and no friction rub.   No murmur heard.  Pulmonary/Chest: Effort normal and breath sounds normal. No respiratory distress. She has no wheezes.   Abdominal: Soft. Bowel sounds are normal. She exhibits no distension. There is no tenderness.   Musculoskeletal: Normal range of motion. She exhibits no edema.   Neurological: She is alert and oriented to person, place, and time. No cranial nerve deficit.   Skin: Skin is warm and dry. No erythema.   Psychiatric: She has a normal mood and affect. Her behavior is normal. Judgment normal.   Vitals reviewed.      Assessment:       1. Class 3 severe obesity due to excess calories with serious comorbidity and body mass index (BMI) of 50.0 to 59.9 in adult    2. Routine check-up    3. Pre-diabetes    4. PCOS (polycystic ovarian syndrome)        Plan:         1. Class 3 severe obesity due to excess calories with serious comorbidity and body mass index (BMI) of 50.0 to 59.9 " in adult    - phentermine (ADIPEX-P) 37.5 mg tablet; Take 1 tablet (37.5 mg total) by mouth before breakfast.  Dispense: 30 tablet; Refill: 2    Patient warned of common side effects of phentermine including anxiety, insomnia, palpitations and increased blood pressure. It was also explained that it is for short-term usage along with diet and exercise, and that stopping the medication without making lifestyle changes will result in regain of weight. Patient states understanding.     Weight loss medications are controlled substances.  They require routine follow up. Prescription or pills that are lost or destroyed will not be replaced.     Start phentermine with 1/2 pill a day for at least 1 week to see if that will control your appetite.  Go up to a full pill when needed.     Start metformin.      Exercise start 30 min 3 days a week.     Patient counseled in strategies for long term weight loss and maintenance: Keeping a food diary, exercise for 1 hour a day and eating breakfast everyday.        Intermittent fasting. NO eating from 8 pm to 12pm. Can have water, tea, coffee in that time without sweeteners.    2 meals made up of the following:  Unlimited green vegetables, tomatoes, mushrooms, spaghetti squash, cauliflower, meat, poultry, seafood, eggs and hard cheeses.   Avoid fried foods  Dressings, seasonings, condiments, etc should have less than 2 g sugars.   Beans or nuts can have 1 x a day.   1-2 servings of citrus fruits, berries, pineapple or melon a day (1/2 cup)  Milk and plain yogurt    No soda, sweet tea, juices or lemonade    [email or web address] for info on intermittent fasting.     2. Routine check-up  Update labs   - Hemoglobin A1c; Future  - CBC auto differential; Future  - Lipid panel; Future  - Comprehensive metabolic panel; Future  - TSH; Future    3. Pre-diabetes  Discussed decreasing the risks of diabetes with changes in diet, routine exercise and losing weight.  Could also consider metformin.    -  Hemoglobin A1c; Future    4. PCOS (polycystic ovarian syndrome)  Discussed using reliable contraception, as fertility may increase with changes in diet and weight loss.       1. Instructed on need for exercise, dietary changes. Start exercise at 30 minutes 3 days a week.   Intermittent fasting, patient has used in the past.   Will start pharmacotherapy with phentermine.     Patient warned of common side effects of phentermine including anxiety, insomnia, palpitations and increased blood pressure. It was also explained that it is for short-term usage along with diet and exercise, and that stopping the medication without making lifestyle changes will result in regain of weight. Patient states understanding.     Weight loss medications are controlled substances.  They require routine follow up. Prescription or pills that are lost or destroyed will not be replaced.     Start phentermine with 1/2 pill a day for at least 1 week to see if that will control your appetite.  Go up to a full pill when needed.     2. In setting of obesity, PCOS will screen for metabolic complications including DM, renal dysfunction, anemia, dyslipidemia, hypothyroidism    3. Pre-diabetes on last check, diet poor and possibly worsened since last check.   Advised to begin taking previously prescribed metformin, will follow up lab results    4. Metformin, as above    Patient seen and plan formulated by Dr. Van Angel MD   Internal Medicine PGY-2  [phone]

## 2018-08-29 ENCOUNTER — INITIAL CONSULT (OUTPATIENT)
Dept: BARIATRICS | Facility: CLINIC | Age: 33
End: 2018-08-29
Payer: COMMERCIAL

## 2018-08-29 VITALS
DIASTOLIC BLOOD PRESSURE: 70 MMHG | WEIGHT: 293 LBS | SYSTOLIC BLOOD PRESSURE: 120 MMHG | HEIGHT: 64 IN | BODY MASS INDEX: 50.02 KG/M2 | HEART RATE: 107 BPM

## 2018-08-29 DIAGNOSIS — E28.2 PCOS (POLYCYSTIC OVARIAN SYNDROME): ICD-10-CM

## 2018-08-29 DIAGNOSIS — E66.01 CLASS 3 SEVERE OBESITY DUE TO EXCESS CALORIES WITH SERIOUS COMORBIDITY AND BODY MASS INDEX (BMI) OF 50.0 TO 59.9 IN ADULT: Primary | ICD-10-CM

## 2018-08-29 DIAGNOSIS — R73.03 PRE-DIABETES: ICD-10-CM

## 2018-08-29 DIAGNOSIS — Z00.00 ROUTINE CHECK-UP: ICD-10-CM

## 2018-08-29 PROCEDURE — 99999 PR PBB SHADOW E&M-EST. PATIENT-LVL III: CPT | Mod: PBBFAC,,, | Performed by: INTERNAL MEDICINE

## 2018-08-29 PROCEDURE — 99215 OFFICE O/P EST HI 40 MIN: CPT | Mod: S$GLB,,, | Performed by: INTERNAL MEDICINE

## 2018-08-29 RX ORDER — PHENTERMINE HYDROCHLORIDE 37.5 MG/1
37.5 TABLET ORAL
Qty: 30 TABLET | Refills: 2 | Status: SHIPPED | OUTPATIENT
Start: 2018-08-29 | End: 2018-09-28

## 2018-08-29 NOTE — PATIENT INSTRUCTIONS
Patient warned of common side effects of phentermine including anxiety, insomnia, palpitations and increased blood pressure. It was also explained that it is for short-term usage along with diet and exercise, and that stopping the medication without making lifestyle changes will result in regain of weight. Patient states understanding.     Weight loss medications are controlled substances.  They require routine follow up. Prescription or pills that are lost or destroyed will not be replaced.     Start phentermine with 1/2 pill a day for at least 1 week to see if that will control your appetite.  Go up to a full pill when needed.     Start metformin.      Exercise start 30 min 3 days a week.     Patient counseled in strategies for long term weight loss and maintenance: Keeping a food diary, exercise for 1 hour a day and eating breakfast everyday.        Intermittent fasting. NO eating from 8 pm to 12pm. Can have water, tea, coffee in that time without sweeteners.    2 meals made up of the following:  Unlimited green vegetables, tomatoes, mushrooms, spaghetti squash, cauliflower, meat, poultry, seafood, eggs and hard cheeses.   Avoid fried foods  Dressings, seasonings, condiments, etc should have less than 2 g sugars.   Beans or nuts can have 1 x a day.   1-2 servings of citrus fruits, berries, pineapple or melon a day (1/2 cup)  Milk and plain yogurt    No soda, sweet tea, juices or lemonade    Www.dietdoctor.AthleteNetwork for info on intermittent fasting.     *You can substitute regular dairy and/or dressings, and whole eggs for egg whites in the ideas below.       Meal Ideas for Regular Bariatric Diet  *Recipes and products available at www.bariatriceating.com      Breakfast: (15-20g protein)    - Egg white omelet: 2 egg whites or ½ cup Egg Beaters. (Optional proteins: cheese, shrimp, black beans, chicken, sliced turkey) (Optional veggies: tomatoes, salsa, spinach, mushrooms, onions, green peppers, or small slice avocado)      - Egg and sausage: 1 egg or ¼ cup Egg Beaters (any variety), with 1 alma or 2 links of Turkey sausage or Veggie breakfast sausage ([employer] or Tufin)    - Crust-less breakfast quiche: To make a glass pie dish, mix 4oz part skim Ricotta, 1 cup skim milk, and 2 eggs as your base. Add protein: shredded cheese, sliced lean ham or turkey, turkey ramos/sausage. Add veggies: tomato, onion, green onion, mushroom, green pepper, spinach, etc.    - Yogurt parfait: Mix 1 - 6oz container Dannon Light N Fit vanilla yogurt, with ¼ cup Kashi Go Lean cereal    - Cottage cheese and fruit: ½ cup part-skim cottage cheese or ricotta cheese topped with fresh fruit or sugar free preserves     - Berenice Campbell's Vanilla Egg custard* (add 2 Tbsp instant coffee granules to make Cappuccino Custard*)    - Hi-Protein café latte (skim milk, decaf coffee, 1 scoop protein powder). Optional to add Sugar free syrup or extract flavoring.    Lunch: (20-30g protein)    - ½ cup Black bean soup (Homemade or Progresso), with ¼ cup shredded low-fat cheese. Top with chopped tomato or fresh salsa.     - Lean deli turkey breast and low-fat sliced cheese, mustard or light shields to moisten, rolled up together, or wrapped in a Omega lettuce leaf    - Chicken salad made from dinner leftovers, moisten with low-fat salad dressing or light shields. Also try leftover salmon, shrimp, tuna or boiled eggs. Serve ½ cup over dark green salad    - Fat-free canned refried beans, topped with ¼ cup shredded low-fat cheese. Top with chopped tomato or fresh salsa.     - Greek salad: Top mixed greens with 1-2oz grilled chicken, tomatoes, red onions, 2-3 kalamata olives, and sprinkle lightly with feta cheese. Spritz with Balsamic vinegar to taste.     - Crust-less lunch quiche: To make a glass pie dish, mix 4oz part skim Ricotta, 1 cup skim milk, and 2 eggs as your base. Add protein: shredded cheese, sliced lean ham or turkey, shrimp, chicken. Add veggies: tomato, onion,  green onion, mushroom, green pepper, spinach, artichoke, broccoli, etc.    - Pizza bake: tomato sauce, low-fat shredded mozzarella and turkey pepperoni or Nauruan ramos. Add any veggies.    - Cucumber crab bites: Spread ¼ cup crab dip (lump crabmeat + light cream cheese and green onions) over sliced cucumber.     - Chicken with light spinach and artichoke dip*: Puree in : 6oz cooked and drained spinach, 2 cloves garlic, 1 can cannelloni beans, ½ cup chopped green onions, 1 can drained artichoke hearts (not marinated in oil), lemon juice and basil. Mix in 2oz chopped up chicken.    Supper: (20-30g protein)    - Serve grilled fish over dark green salad tossed with low-fat dressing, served with grilled asparagus parrish     - Rotisserie chicken salad: served with sliced strawberries, walnuts, fat-free feta cheese crumbles and 1 tbsp Jains Own Light Raspberry [place] Vinaigrette    - Shrimp cocktail: Dip cold boiled shrimp in homemade low-sugar cocktail sauce (1/2 cup Chato One Carb ketchup, 2 tbsp horseradish, 1/4 tsp hot sauce, 1 tsp Worcestershire sauce, 1 tbsp freshly-squeezed lemon juice). Serve with dark green salad, walnuts, and crumbled blue cheese drizzled with olive oil and Balsamic vinegar    - Tuna Melt: Spread tuna salad onto 2 thick slices of tomato. Top with low-fat cheese and broil until cheese is melted. May also be made with chicken salad of shrimp salad. [place] with different types of cheeses.    - Homemade low-fat Chili using extra lean ground beef or ground turkey. Top with shredded cheese and salsa as desired. May add dollop fat-free sour cream if desired    - Dinner Omelet with shrimp or chicken and onion, green peppers and chives.    - No noodle lasagna: Use sliced zucchini or eggplant in place of noodles.  Layer with part skim ricotta cheese and low sugar meat sauce (use very lean ground beef or ground turkey).    - Mexican chicken bake: Bake chunks of chicken breast or  thigh with taco seasoning, Pace brand enchilada sauce, green onions and low-fat cheese. Serve with ¼ cup black beans or fat free refried beans topped with chopped tomatoes or salsa.    - Renea frozen meatballs, simmered in Classico Marinara sauce. Different flavors of salsa or spaghetti sauce create different dishes! Sprinkle with parmesan cheese. Serve with grilled or steamed veggies, or a dark green salad.    - Simmer boneless skinless chicken thigh chunks in Classico Marinara sauce or roasted salsa until tender with chopped onion, bell pepper, garlic, mushrooms, spinach, etc.     - Hamburger, without the bun, dressed the way you like. Served with grilled or steamed veggies.    - Eggplant parmesan: Bake slices of eggplant at 350 degrees for 15 minutes. Layer tomato sauce, sliced eggplant and low-fat mozzarella cheese in a baking dish and cover with foil. Bake 30-40 more minutes or until bubbly. Uncover and bake at 400 degrees for about 15 more minutes, or until top is slightly crisp.    - Fish tacos: grilled/baked white fish, wrapped in Omega lettuce leaf, topped with salsa, shredded low-fat cheese, and light coleslaw.    Snacks: (100-200 calories; >5g protein)    - 1 low-fat cheese stick with 8 cherry tomatoes or 1 serving fresh fruit  - 4 thin slices fat-free turkey breast and 1 slice low-fat cheese  - 4 thin slices fat-free honey ham with wedge of melon  - 1/4 cup unsalted nuts with ½ cup fruit  - 6-oz container Dannon Light n Fit vanilla yogurt, topped with 1oz unsalted nuts         - apple, celery or baby carrots spread with 2 Tbsp natural peanut butter or almond butter   - apple slices with 1 oz slice low-fat cheese  - celery, cucumber, bell pepper or baby carrots dipped in ¼ cup hummus bean spread or light spinach and artichoke dip (*recipe in lunch section)  - 100 calorie bag microwave light popcorn with 3 tbsp grated parmesan cheese  - Vitor Links Beef Steak - 14g protein! (similar to beef  jerky)  - 2 wedges [place] - Light Herb & Garlic Cheese with sliced cucumber or green bell pepper  - 1/2 cup low-fat cottage cheese with ¼ cup fruit or ¼ cup salsa  - RTD Protein drinks: Atkins, Low Carb Slim Fast, EAS light, Muscle Milk Light, etc.  - Homemade Protein drinks: GNC Soy95, Isopure, Nectar, UNJURY, Whey Gourmet, etc. Mix 1 scoop powder with 8oz skim/1% milk or light soymilk.  - Protein bars: Atkins, EAS, Pure Protein, Think Thin, Detour, etc. Must have 0-4 grams sugar - Read the label.    Takeout Options: No more than twice/week  Deli - Salads (no pasta or rice), meats, cheeses. Roasted chicken. Lox (salmon)    Mexican - Platters which don't include tortillas, chips, or rice. Go easy on the beans. Example: Fajitas without the tortillas. Ask the  not to bring chips to the table if they are too tempting.    Greek - Meat or fish and vegetable, but no bread or rice. Including hummus, baba ganoush, etc, is OK. Most sit-down Greek restaurants can provide you with cucumber slices for dipping instead of shavonne bread.    Fast Food (Avoid as much as possible) - Salads (no croutons and limit salad dressing to 2 tbsp), grilled chicken sandwich without the bun and ask for no shields. Naimas low fat chili or Taco Bell pintos and cheese.    BBQ - The meats are fine if you ask for sauces on the side, but most of the traditional side dishes are loaded with carbs. Jey slaw, baked beans and BBQ sauce are typically made with sugar.    Chinese - Nothing deep-fried, no rice or noodles. Many Chinese sauces have starch and sugar in them, so you'll have to use your judgement. If you find that these sauces trigger cravings, or cause Dumping, you can ask for the sauce to be made without sugar or just use soy sauce.    Eating well to be healthy and lose weight does not have to be hard. It also does not have to be time consuming or expensive. There a lots of ways you can work in healthy choices into your day. Many of these  are easy, quick and even family friendly!    Homemade hazelnut au lait  Brew your favorite brand of hazelnut flavored coffee (Community makes a good one). Microwave 1/2 cup of milk that fits your eating plan (whole, skim or sugar-free almond milk can all work). Add half to 1 oz sugar free hazelnut syrup.     Quick and easy breakfast  1-2 boiled eggs or mini-frittatas with a tangerine. The boiled eggs and mini-frittatas can both be made ahead and last for up to 4 days in the refrigerator. Bonus if you portion them out in ready to go containers or zipper bags.     Breakfast Egg Muffins with Ramos and Spinach  Makes 12 muffins  Ingredients    6 eggs  ¼ cup milk  ¼ teaspoon salt  2 cups grated cheddar cheese  3/4 cup spinach, cooked and drained (about 8 oz fresh spinach)  6 ramos slices, cooked, drained of fat, and chopped  1/2 cup grated Parmesan cheese (optional)    Instructions      Preheat oven to 350 degrees. Use a regular 12-cup muffin pan. Spray the muffin pan with non-stick cooking spray.  In a large bowl, beat eggs until smooth. Add milk, salt, Cheddar cheese and mix. Stir spinach, cooked ramos into the egg mixture. Ladle the egg mixture into greased muffin cups ¾ full.  Top each muffin cup with grated Parmesan cheese.  Bake for 25 minutes. Remove from the oven, let the muffins cool for 30 minutes before removing them from the pan.      Be a brown bagger! When you make dinner, plan for an extra helping. When you serve your plate for dinner, serve an additional helping into a container that you can take with you the next day. If you don't have a refrigerator available during the day, an insulated lunch bag and ice packs will help you safely store you lunch.     Cold Brewed Iced tea. Fill a pitcher with 64 oz filtered water. Add either 4 regular tea bags of your choice or a large iced tea bag. Refrigerate over night then remove the tea bags. The tea will not be bitter and is super flavorful. Get creative! Try  combinations like green tea and hibiscus tea or black tea with lemon zinger. Add orange or lemon slices for even more flavor.     Snack wisely. Protein filled snacks will fill you up, allowing you to get by with fewer calories. String cheese, pork skins (chicharrones), turkey pepperoni, or celery with cream cheese will all fit the bill.       Ditch the take out. Turkey tacos (with or without a low carb tortilla), burgers (without the bun), or fun stir fries are all quick and easy. The whole family will be happy, and you can save calories and money.      Orange Chicken Stir hernandez with asparagus   Makes 6 servings  Ingredients:    1.5 lbs boneless skinless chicken breast/tenders, diced into 1-inch pieces  1 Tbsp extra virgin olive or avocado oil, divided  2 lb asparagus, end portions trimmed and remainder diced into 1 1/2-inch pieces  1 small yellow onion, sliced into thin strips  8 oz button mushrooms, sliced  1 Tbsp peeled and finely grated fresh chavez  4 cloves garlic, minced  1/2 cup low-sodium chicken broth  Juice of 2 fresh oranges  2 Tbsp low sodium soy sauce  2 Tbsp cornstarch  Sea salt and freshly ground black pepper    Directions:    In a 12-inch non-stick wok, heat 1/2 oil over moderately high heat. Once oil is hot, add diced chicken and season lightly with salt and pepper. Sauté until cooked through, tossing occasionally, about 5-6 minutes.  Place chicken on a large plate and set aside. Return wok, reduce to medium-high heat, add remaining oil.  Once oil is hot, add asparagus, yellow onion and mushrooms, and sauté until tender-crisp, about 4 - 5 minutes, adding in garlic and chavez during the last 1 minute of sautéing.  Meanwhile, in a mixing bowl whisk together chicken broth, orange juice, soy sauce and cornstarch until well blended.  Pour chicken broth mixture into skillet with veggies, season with salt and pepper to taste, and bring mixture to a light boil, stirring constantly. Allow mixture to gently  boil, stirring constantly, until thickened, about 1 minute.  Toss chicken into mixture and serve immediately over cauliflower rice or Shirataki noodles.      Skinny Chicken Tortilla Soup  Makes 7 servings    2 teaspoons olive oil  1 cup onion, chopped (about 1 small)  2 cups celery, sliced (about 4 medium stalks)  4 garlic cloves, minced  4 medium tomatoes, chopped  2 cups water  4 cups low-sodium organic chicken broth  3 cups chopped and/or shredded rotisserie chicken, skinless  2 cups sliced carrots (about 3 medium)  1 teaspoon dried oregano leaves  2 teaspoons chili powder  1 teaspoon garlic powder  2 teaspoons cumin  ½ teaspoon cayenne pepper (add less or omit, if you don't want a spicy soup)  ½ teaspoon sea salt + more to taste  ½ teaspoon pepper + more to taste    Directions:   Put all ingredients into a large crock pot. Cook on low for 5-6 hours.     Optional garnish with chopped avocado, chopped fresh cilantro, crumbled Cotija cheese, sour cream, Greek yogurt, your favorite hot sauce.           Vegan Avocado Banana Chocolate Pudding  Makes 4 servings  Ingredients    1 1/2 ripe avocados  2 ripe bananas  6 tbsp raw cacao powder or unsweetened cocoa powder  2-3 tbsp maple syrup (or calorie free sweetener)  1/4 cup almond milk  Instructions    Blend everything together in a  until the consistency is smooth and velvety. Taste and see if more sweetener is needed and stir to make sure everything is evenly mixed. Blend a second time if needed.  Top with banana slices, raw cacao nibs, almond butter, or any other toppings and enjoy!

## 2018-09-26 ENCOUNTER — OFFICE VISIT (OUTPATIENT)
Dept: FAMILY MEDICINE | Facility: CLINIC | Age: 33
End: 2018-09-26
Payer: COMMERCIAL

## 2018-09-26 VITALS
TEMPERATURE: 99 F | OXYGEN SATURATION: 96 % | HEIGHT: 64 IN | WEIGHT: 289.5 LBS | HEART RATE: 72 BPM | SYSTOLIC BLOOD PRESSURE: 119 MMHG | BODY MASS INDEX: 49.42 KG/M2 | DIASTOLIC BLOOD PRESSURE: 70 MMHG

## 2018-09-26 DIAGNOSIS — J01.90 ACUTE BACTERIAL SINUSITIS: Primary | ICD-10-CM

## 2018-09-26 DIAGNOSIS — B96.89 ACUTE BACTERIAL SINUSITIS: Primary | ICD-10-CM

## 2018-09-26 PROCEDURE — 99999 PR PBB SHADOW E&M-EST. PATIENT-LVL IV: CPT | Mod: PBBFAC,,, | Performed by: INTERNAL MEDICINE

## 2018-09-26 PROCEDURE — 99203 OFFICE O/P NEW LOW 30 MIN: CPT | Mod: S$GLB,,, | Performed by: INTERNAL MEDICINE

## 2018-09-26 RX ORDER — AMOXICILLIN AND CLAVULANATE POTASSIUM 875; 125 MG/1; MG/1
1 TABLET, FILM COATED ORAL EVERY 12 HOURS
Qty: 14 TABLET | Refills: 0 | Status: SHIPPED | OUTPATIENT
Start: 2018-09-26 | End: 2018-10-03

## 2018-09-26 NOTE — PROGRESS NOTES
Subjective:        Chief Complaint  Chief Complaint   Patient presents with    Cough       HPI  Agustina Tillman is a 33 y.o. female with multiple medical diagnoses as listed in the medical history and problem list that presents for cough and sinus problems.  Patient is new to me.    Seen by other physician approx 1 month ago for symptoms of   Congestion remained for the past month  This past weekend had fever/chlls  Severe cough since yesterday and throughout the night  Has wet/productive cough, some blood noted from nose with blowing   Some facial discomfort/pressure  Was taking promethazine-dextromethorphan for cough - not helpful  Also was using using OTC cough med  On vacation currently    Takes allergy meds sometimes when exposed to perfume    PAST MEDICAL HISTORY:  Past Medical History:   Diagnosis Date    PCOS (polycystic ovarian syndrome) 5/18/2017       PAST SURGICAL HISTORY:  History reviewed. No pertinent surgical history.    SOCIAL HISTORY:  Social History     Socioeconomic History    Marital status: Single     Spouse name: Not on file    Number of children: Not on file    Years of education: Not on file    Highest education level: Not on file   Social Needs    Financial resource strain: Not on file    Food insecurity - worry: Not on file    Food insecurity - inability: Not on file    Transportation needs - medical: Not on file    Transportation needs - non-medical: Not on file   Occupational History    Not on file   Tobacco Use    Smoking status: Never Smoker    Smokeless tobacco: Never Used   Substance and Sexual Activity    Alcohol use: Yes    Drug use: No    Sexual activity: Yes     Partners: Male   Other Topics Concern    Not on file   Social History Narrative    Not on file       FAMILY HISTORY:  Family History   Problem Relation Age of Onset    Lung cancer Father     Hypertension Mother        ALLERGIES AND MEDICATIONS: updated and reviewed.  Review of patient's allergies  "indicates:  No Known Allergies  Current Outpatient Medications   Medication Sig Dispense Refill    metFORMIN (GLUCOPHAGE) 500 MG tablet Take 1 tablet (500 mg total) by mouth daily with breakfast. 90 tablet 3    phentermine (ADIPEX-P) 37.5 mg tablet Take 1 tablet (37.5 mg total) by mouth before breakfast. 30 tablet 2    azelastine (ASTELIN) 137 mcg (0.1 %) nasal spray 2 sprays (274 mcg total) by Nasal route 2 (two) times daily. 30 mL 0    fluticasone (FLONASE) 50 mcg/actuation nasal spray 2 sprays (100 mcg total) by Each Nare route once daily. 15 g 0    ibuprofen (ADVIL,MOTRIN) 600 MG tablet Take 1 tablet (600 mg total) by mouth every 6 (six) hours as needed for Pain or Temperature greater than (101). 40 tablet 0     No current facility-administered medications for this visit.          ROS  Review of Systems   Constitutional: Positive for chills and fever.   HENT: Positive for congestion, ear pain, hearing loss, rhinorrhea, sinus pressure, sneezing and sore throat. Negative for voice change.    Eyes: Positive for discharge. Negative for pain and redness.   Respiratory: Positive for cough and shortness of breath.    Cardiovascular: Negative for chest pain.   Gastrointestinal: Positive for abdominal pain (upper). Negative for diarrhea, nausea and vomiting.   Genitourinary: Negative for dysuria and hematuria.   Musculoskeletal: Negative for arthralgias and myalgias.   Skin: Negative for rash.   Allergic/Immunologic: Positive for environmental allergies (possible).         Objective:     Physical Exam  Vitals:    09/26/18 1056   BP: 119/70   Pulse: 72   Temp: 98.6 °F (37 °C)   TempSrc: Oral   SpO2: 96%   Weight: 131.3 kg (289 lb 8 oz)   Height: 5' 4" (1.626 m)    Body mass index is 49.69 kg/m².  Weight: 131.3 kg (289 lb 8 oz)   Height: 5' 4" (162.6 cm)   Physical Exam   Constitutional: She appears well-developed. No distress.   HENT:   Head: Normocephalic and atraumatic.   Mouth/Throat: Oropharynx is clear and " moist.   Bilateral nasal turbinate hypertrophy  +effusions b/l TMs   Eyes: Conjunctivae and EOM are normal. Pupils are equal, round, and reactive to light.   Neck: Normal range of motion. Neck supple. No thyromegaly present.   Cardiovascular: Normal rate, normal heart sounds and intact distal pulses.   No murmur heard.  Pulmonary/Chest: Effort normal and breath sounds normal.   Musculoskeletal: She exhibits no edema or deformity.   Lymphadenopathy:     She has no cervical adenopathy.   Neurological: She is alert. No cranial nerve deficit.   Skin: Skin is warm and dry.   Psychiatric: She has a normal mood and affect. Her behavior is normal.   Vitals reviewed.      Assessment (All problems are new to me):  aaA:     1. Acute bacterial sinusitis      Plan:     Agustina was seen today for cough.    Diagnoses and all orders for this visit:    Acute bacterial sinusitis  Discussed symptomatology and natural history of illness   -     amoxicillin-clavulanate 875-125mg (AUGMENTIN) 875-125 mg per tablet; Take 1 tablet by mouth every 12 (twelve) hours. for 7 days      Health Maintenance       Date Due Completion Date    TETANUS VACCINE 06/20/2005 ---    Influenza Vaccine 08/01/2018 ---    Pap Smear with HPV Cotest 05/18/2020 5/18/2017            Health Maintenance reviewed, deferred seasonal influenza vaccination    Follow-up in about 4 weeks (around 10/24/2018) for cpe.     The patient expressed understanding and no barriers to adherence were identified.     1. The patient indicates understanding of these issues and agrees with the plan. Brief care plan is updated and reviewed with the patient as applicable.     2. The patient is given an After Visit Summary that lists all medications with directions, allergies, orders placed during this encounter and follow-up instructions.     3. I have reviewed the patient's medical information including past medical, family, and social history sections including the medications and  allergies.     4. We discussed the patient's current medications. I reconciled the patient's medication list and prepared and supplied needed refills.       John Gaston MD  Internal Medicine-Pediatrics

## 2019-01-23 ENCOUNTER — NURSE TRIAGE (OUTPATIENT)
Dept: ADMINISTRATIVE | Facility: CLINIC | Age: 34
End: 2019-01-23

## 2019-01-23 ENCOUNTER — OFFICE VISIT (OUTPATIENT)
Dept: FAMILY MEDICINE | Facility: CLINIC | Age: 34
End: 2019-01-23
Payer: COMMERCIAL

## 2019-01-23 VITALS
HEART RATE: 81 BPM | SYSTOLIC BLOOD PRESSURE: 94 MMHG | BODY MASS INDEX: 49.17 KG/M2 | HEIGHT: 64 IN | DIASTOLIC BLOOD PRESSURE: 58 MMHG | OXYGEN SATURATION: 98 % | WEIGHT: 288 LBS | TEMPERATURE: 101 F

## 2019-01-23 DIAGNOSIS — R50.9 FEVER, UNSPECIFIED FEVER CAUSE: ICD-10-CM

## 2019-01-23 DIAGNOSIS — E66.01 MORBID OBESITY WITH BMI OF 45.0-49.9, ADULT: ICD-10-CM

## 2019-01-23 DIAGNOSIS — J06.9 VIRAL UPPER RESPIRATORY INFECTION: Primary | ICD-10-CM

## 2019-01-23 DIAGNOSIS — R73.03 PREDIABETES: ICD-10-CM

## 2019-01-23 DIAGNOSIS — R68.89 FLU-LIKE SYMPTOMS: ICD-10-CM

## 2019-01-23 LAB
CTP QC/QA: YES
POC MOLECULAR INFLUENZA A AGN: NEGATIVE
POC MOLECULAR INFLUENZA B AGN: NEGATIVE

## 2019-01-23 PROCEDURE — 99214 PR OFFICE/OUTPT VISIT, EST, LEVL IV, 30-39 MIN: ICD-10-PCS | Mod: S$GLB,,, | Performed by: NURSE PRACTITIONER

## 2019-01-23 PROCEDURE — 87502 POCT INFLUENZA A/B MOLECULAR: ICD-10-PCS | Mod: QW,S$GLB,, | Performed by: NURSE PRACTITIONER

## 2019-01-23 PROCEDURE — 87502 INFLUENZA DNA AMP PROBE: CPT | Mod: QW,S$GLB,, | Performed by: NURSE PRACTITIONER

## 2019-01-23 PROCEDURE — 99999 PR PBB SHADOW E&M-EST. PATIENT-LVL IV: ICD-10-PCS | Mod: PBBFAC,,, | Performed by: NURSE PRACTITIONER

## 2019-01-23 PROCEDURE — 99214 OFFICE O/P EST MOD 30 MIN: CPT | Mod: S$GLB,,, | Performed by: NURSE PRACTITIONER

## 2019-01-23 PROCEDURE — 99999 PR PBB SHADOW E&M-EST. PATIENT-LVL IV: CPT | Mod: PBBFAC,,, | Performed by: NURSE PRACTITIONER

## 2019-01-23 RX ORDER — METHYLPREDNISOLONE 4 MG/1
TABLET ORAL
Qty: 1 PACKAGE | Refills: 0 | Status: SHIPPED | OUTPATIENT
Start: 2019-01-23 | End: 2019-02-13

## 2019-01-23 RX ORDER — ACETAMINOPHEN 500 MG
1000 TABLET ORAL
Status: COMPLETED | OUTPATIENT
Start: 2019-01-23 | End: 2019-01-23

## 2019-01-23 RX ORDER — ACETAMINOPHEN 500 MG
500 TABLET ORAL
Status: DISCONTINUED | OUTPATIENT
Start: 2019-01-23 | End: 2019-01-23

## 2019-01-23 RX ORDER — AZELASTINE 1 MG/ML
2 SPRAY, METERED NASAL 2 TIMES DAILY
Qty: 30 ML | Refills: 0 | Status: SHIPPED | OUTPATIENT
Start: 2019-01-23 | End: 2019-08-22

## 2019-01-23 RX ORDER — METFORMIN HYDROCHLORIDE 500 MG/1
500 TABLET ORAL
Qty: 90 TABLET | Refills: 3
Start: 2019-01-23 | End: 2019-09-20

## 2019-01-23 RX ORDER — PROMETHAZINE HYDROCHLORIDE AND DEXTROMETHORPHAN HYDROBROMIDE 6.25; 15 MG/5ML; MG/5ML
5 SYRUP ORAL NIGHTLY
Qty: 180 ML | Refills: 0 | Status: SHIPPED | OUTPATIENT
Start: 2019-01-23 | End: 2019-02-02

## 2019-01-23 RX ADMIN — Medication 1000 MG: at 10:01

## 2019-01-23 NOTE — LETTER
January 23, 2019      [facility]  [address] [facility] [facility]  [place] [address]  Phone: [phone]  Fax: [phone]       Patient: Agustina Tillman   YOB: 1985  Date of Visit: 01/23/2019    To Whom It May Concern:    Evelyn Tillman  was at [facility] on 01/23/2019. She may return to work/school on 01/25/2019 with no restrictions. If you have any questions or concerns, or if I can be of further assistance, please do not hesitate to contact me.    Sincerely,      Sabina Phillip NP

## 2019-01-23 NOTE — PATIENT INSTRUCTIONS
Viral Upper Respiratory Illness (Adult)  You have a viral upper respiratory illness (URI), which is another term for the common cold. This illness is contagious during the first few days. It is spread through the air by coughing and sneezing. It may also be spread by direct contact (touching the sick person and then touching your own eyes, nose, or mouth). Frequent handwashing will decrease risk of spread. Most viral illnesses go away within 7 to 10 days with rest and simple home remedies. Sometimes the illness may last for several weeks. Antibiotics will not kill a virus, and they are generally not prescribed for this condition.    Home care  · If symptoms are severe, rest at home for the first 2 to 3 days. When you resume activity, don't let yourself get too tired.  · Avoid being exposed to cigarette smoke (yours or others).  · You may use acetaminophen or ibuprofen to control pain and fever, unless another medicine was prescribed. (Note: If you have chronic liver or kidney disease, have ever had a stomach ulcer or gastrointestinal bleeding, or are taking blood-thinning medicines, talk with your healthcare provider before using these medicines.) Aspirin should never be given to anyone under 18 years of age who is ill with a viral infection or fever. It may cause severe liver or brain damage.  · Your appetite may be poor, so a light diet is fine. Avoid dehydration by drinking 6 to 8 glasses of fluids per day (water, soft drinks, juices, tea, or soup). Extra fluids will help loosen secretions in the nose and lungs.  · Over-the-counter cold medicines will not shorten the length of time youre sick, but they may be helpful for the following symptoms: cough, sore throat, and nasal and sinus congestion. (Note: Do not use decongestants if you have high blood pressure.)  Follow-up care  Follow up with your healthcare provider, or as advised.  When to seek medical advice  Call your healthcare provider right away if any  of these occur:  · Cough with lots of colored sputum (mucus)  · Severe headache; face, neck, or ear pain  · Difficulty swallowing due to throat pain  · Fever of 100.4°F (38°C)  Call 911, or get immediate medical care  Call emergency services right away if any of these occur:  · Chest pain, shortness of breath, wheezing, or difficulty breathing  · Coughing up blood  · Inability to swallow due to throat pain  Date Last Reviewed: 9/13/2015  © 1934-7314 [employer]. [address] [place] [address]. All rights reserved. This information is not intended as a substitute for professional medical care. Always follow your healthcare professional's instructions.

## 2019-01-23 NOTE — PROGRESS NOTES
History of Present Illness   Agustina Tillman is a 33 y.o. woman with medical history as listed below who presents today for evaluation of flu like symptoms x1 day. She reports fevers, chills, body aches, and fatigue. She also reports nasal congestion, sore throat, and mostly dry cough. She reports intermittent ear pain and headaches. She has tried DayQuil with no relief. She denies known sick contacts. She has not had her flu shot this year. She has no additional complaints and is otherwise healthy on today's visit.    Past Medical History:   Diagnosis Date    PCOS (polycystic ovarian syndrome) 5/18/2017       History reviewed. No pertinent surgical history.    Social History     Socioeconomic History    Marital status: Single     Spouse name: None    Number of children: None    Years of education: None    Highest education level: None   Social Needs    Financial resource strain: None    Food insecurity - worry: None    Food insecurity - inability: None    Transportation needs - medical: None    Transportation needs - non-medical: None   Occupational History    None   Tobacco Use    Smoking status: Never Smoker    Smokeless tobacco: Never Used   Substance and Sexual Activity    Alcohol use: Yes    Drug use: No    Sexual activity: Yes     Partners: Male   Other Topics Concern    None   Social History Narrative    None       Family History   Problem Relation Age of Onset    Lung cancer Father     Hypertension Mother        Review of Systems  Review of Systems   Constitutional: Positive for chills, fever and malaise/fatigue.   HENT: Positive for congestion, ear pain and sore throat. Negative for ear discharge and sinus pain.    Eyes: Negative for discharge and redness.   Respiratory: Positive for cough. Negative for sputum production, shortness of breath and wheezing.    Cardiovascular: Negative for chest pain.   Gastrointestinal: Negative for nausea and vomiting.   Musculoskeletal: Positive for  "myalgias.   Neurological: Positive for headaches.     A complete review of systems was otherwise negative.    Physical Exam  BP (!) 94/58 (BP Location: Right arm, Patient Position: Sitting, BP Method: X-Large (Manual))   Pulse 81   Temp (!) 100.8 °F (38.2 °C) (Oral)   Ht 5' 4" (1.626 m)   Wt 130.6 kg (288 lb)   SpO2 98%   BMI 49.44 kg/m²   General appearance: alert, appears stated age, cooperative, no distress and ill appearing  Eyes: negative findings: lids and lashes normal and conjunctivae and sclerae normal  Ears: normal TM's and external ear canals both ears  Nose: clear discharge, moderate congestion, turbinates red, swollen, no sinus tenderness  Throat: lips, mucosa, and tongue normal; teeth and gums normal and moderate oropharyngeal erythema  Lungs: clear to auscultation bilaterally  Heart: regular rate and rhythm, S1, S2 normal, no murmur, click, rub or gallop  Skin: Skin color, texture, turgor normal. No rashes or lesions  Lymph nodes: Cervical, supraclavicular, and axillary nodes normal.  Neurologic: Grossly normal    Assessment/Plan  Viral upper respiratory infection  Rapid flu negative.  Tylenol x1 dose provided in clinic for body aches and fever.  Viral syndrome, antibiotics not indicated.  Medrol dose pack, take as directed.  Resume the Astelin nasal spray BID.  Nighttime cough syrup PRN.  Alternate Tylenol and NSAID for fever and body aches.  Rest and stay hydrated.  RTC PRN.  -     methylPREDNISolone (MEDROL DOSEPACK) 4 mg tablet; use as directed  Dispense: 1 Package; Refill: 0  -     promethazine-dextromethorphan (PROMETHAZINE-DM) 6.25-15 mg/5 mL Syrp; Take 5 mLs by mouth every evening. for 10 days  Dispense: 180 mL; Refill: 0    Flu-like symptoms  As above.  -     POCT Influenza A/B Molecular  -     acetaminophen tablet 1,000 mg    Fever, unspecified fever cause  As above.  -     POCT Influenza A/B Molecular  -     acetaminophen tablet 1,000 mg    Prediabetes  The current medical regimen is " effective;  continue present plan and medications.  She is also due for labs as ordered per Dr. Araujo, she will call to schedule when feeling better.  -     metFORMIN (GLUCOPHAGE) 500 MG tablet; Take 1 tablet (500 mg total) by mouth daily with breakfast.  Dispense: 90 tablet; Refill: 3    Morbid obesity with BMI of 45.0-49.9, adult  The patient is asked to make an attempt to improve diet and exercise patterns to aid in medical management of this problem.  The current medical regimen is effective;  continue present plan and medications.  Followed by bariatrics.  -     metFORMIN (GLUCOPHAGE) 500 MG tablet; Take 1 tablet (500 mg total) by mouth daily with breakfast.  Dispense: 90 tablet; Refill: 3    Other orders  As above.  -     azelastine (ASTELIN) 137 mcg (0.1 %) nasal spray; 2 sprays (274 mcg total) by Nasal route 2 (two) times daily.  Dispense: 30 mL; Refill: 0    Patient has verbalized understanding and is in agreement with plan of care.    Follow-up if symptoms worsen or fail to improve.

## 2019-01-24 NOTE — TELEPHONE ENCOUNTER
"Advised that the ibuprofen and the flonase are both available otc, no need for rx.  Will call in metformin and astelin, as it looks like they were printed instead of escripted.      Reason for Disposition   [1] Prescription not at pharmacy AND [2] was prescribed today by PCP    Answer Assessment - Initial Assessment Questions  1. SYMPTOMS: "Do you have any symptoms?"      Missing the astelin, ibuprofen, flonase, and metformin.  2. SEVERITY: If symptoms are present, ask "Are they mild, moderate or severe?"      na    Protocols used: ST MEDICATION QUESTION CALL-A-AH      "

## 2019-01-30 ENCOUNTER — PATIENT MESSAGE (OUTPATIENT)
Dept: FAMILY MEDICINE | Facility: CLINIC | Age: 34
End: 2019-01-30

## 2019-02-21 ENCOUNTER — OFFICE VISIT (OUTPATIENT)
Dept: FAMILY MEDICINE | Facility: CLINIC | Age: 34
End: 2019-02-21
Payer: COMMERCIAL

## 2019-02-21 VITALS
SYSTOLIC BLOOD PRESSURE: 126 MMHG | DIASTOLIC BLOOD PRESSURE: 76 MMHG | TEMPERATURE: 101 F | HEART RATE: 95 BPM | BODY MASS INDEX: 49.34 KG/M2 | WEIGHT: 289 LBS | OXYGEN SATURATION: 99 % | HEIGHT: 64 IN

## 2019-02-21 DIAGNOSIS — J06.9 UPPER RESPIRATORY TRACT INFECTION, UNSPECIFIED TYPE: ICD-10-CM

## 2019-02-21 DIAGNOSIS — M79.10 MYALGIA: ICD-10-CM

## 2019-02-21 DIAGNOSIS — R50.9 FEVER AND CHILLS: ICD-10-CM

## 2019-02-21 DIAGNOSIS — R68.89 FLU-LIKE SYMPTOMS: Primary | ICD-10-CM

## 2019-02-21 LAB
CTP QC/QA: YES
POC MOLECULAR INFLUENZA A AGN: NEGATIVE
POC MOLECULAR INFLUENZA B AGN: NEGATIVE

## 2019-02-21 PROCEDURE — 99999 PR PBB SHADOW E&M-EST. PATIENT-LVL III: ICD-10-PCS | Mod: PBBFAC,,, | Performed by: NURSE PRACTITIONER

## 2019-02-21 PROCEDURE — 99214 PR OFFICE/OUTPT VISIT, EST, LEVL IV, 30-39 MIN: ICD-10-PCS | Mod: S$GLB,,, | Performed by: NURSE PRACTITIONER

## 2019-02-21 PROCEDURE — 87502 POCT INFLUENZA A/B MOLECULAR: ICD-10-PCS | Mod: QW,S$GLB,, | Performed by: NURSE PRACTITIONER

## 2019-02-21 PROCEDURE — 99999 PR PBB SHADOW E&M-EST. PATIENT-LVL III: CPT | Mod: PBBFAC,,, | Performed by: NURSE PRACTITIONER

## 2019-02-21 PROCEDURE — 87502 INFLUENZA DNA AMP PROBE: CPT | Mod: QW,S$GLB,, | Performed by: NURSE PRACTITIONER

## 2019-02-21 PROCEDURE — 99214 OFFICE O/P EST MOD 30 MIN: CPT | Mod: S$GLB,,, | Performed by: NURSE PRACTITIONER

## 2019-02-21 RX ORDER — IBUPROFEN 800 MG/1
800 TABLET ORAL 3 TIMES DAILY
Qty: 30 TABLET | Refills: 0 | Status: SHIPPED | OUTPATIENT
Start: 2019-02-21 | End: 2019-08-22

## 2019-02-21 RX ORDER — PROMETHAZINE HYDROCHLORIDE AND DEXTROMETHORPHAN HYDROBROMIDE 6.25; 15 MG/5ML; MG/5ML
5 SYRUP ORAL
Qty: 240 ML | Refills: 0 | Status: SHIPPED | OUTPATIENT
Start: 2019-02-21 | End: 2019-03-03

## 2019-02-21 RX ORDER — OSELTAMIVIR PHOSPHATE 75 MG/1
75 CAPSULE ORAL 2 TIMES DAILY
Qty: 10 CAPSULE | Refills: 0 | Status: SHIPPED | OUTPATIENT
Start: 2019-02-21 | End: 2019-02-26

## 2019-02-21 NOTE — PROGRESS NOTES
"Subjective:       Patient ID: Agustina Tillman is a 33 y.o. female.    Chief Complaint: Dizziness; Headache; Sore Throat; Cough; Nasal Congestion; and Chills    Cough   This is a new problem. The current episode started yesterday. The problem has been gradually worsening. The problem occurs every few minutes. The cough is non-productive. Associated symptoms include chills, ear pain, a fever (102 max at home), headaches, myalgias, nasal congestion and a sore throat. Pertinent negatives include no chest pain, ear congestion, heartburn, hemoptysis, postnasal drip, rash, rhinorrhea, shortness of breath, sweats, weight loss or wheezing.     Review of Systems   Constitutional: Positive for appetite change (decreased), chills, fatigue and fever (102 max at home). Negative for weight loss.   HENT: Positive for congestion, ear pain, sinus pressure, sneezing and sore throat. Negative for postnasal drip, rhinorrhea, sinus pain, tinnitus, trouble swallowing and voice change.    Respiratory: Positive for cough. Negative for hemoptysis, shortness of breath and wheezing.    Cardiovascular: Negative for chest pain.   Gastrointestinal: Negative for heartburn.   Musculoskeletal: Positive for myalgias.   Skin: Negative for rash.   Neurological: Positive for dizziness, weakness (generalized) and headaches.       Objective:       /76 (BP Location: Left arm, Patient Position: Sitting, BP Method: Large (Manual))   Pulse 95   Temp (!) 100.5 °F (38.1 °C) (Oral)   Ht 5' 4" (1.626 m)   Wt 131.1 kg (289 lb 0.4 oz)   SpO2 99%   BMI 49.61 kg/m²     Physical Exam   Constitutional: She appears well-developed and well-nourished. No distress.   HENT:   Head: Normocephalic and atraumatic.   Right Ear: Tympanic membrane, external ear and ear canal normal.   Left Ear: Tympanic membrane, external ear and ear canal normal.   Nose: Mucosal edema present.   Mouth/Throat: Oropharynx is clear and moist. Mucous membranes are not dry. No " oropharyngeal exudate, posterior oropharyngeal edema or posterior oropharyngeal erythema.   Cardiovascular: Normal rate and regular rhythm.   No murmur heard.  Pulmonary/Chest: Effort normal and breath sounds normal. She has no wheezes. She has no rales.   Lymphadenopathy:     She has no cervical adenopathy.   Skin: Skin is warm and dry.   Nursing note and vitals reviewed.      Recent Results (from the past 24 hour(s))   POCT Influenza A/B Molecular    Collection Time: 02/21/19  3:20 PM   Result Value Ref Range    POC Molecular Influenza A Ag Negative Negative, Not Reported    POC Molecular Influenza B Ag Negative Negative, Not Reported     Acceptable Yes      Assessment:       1. Flu-like symptoms    2. Upper respiratory tract infection, unspecified type    3. Myalgia    4. Fever and chills        Plan:       Agustina was seen today for dizziness, headache, sore throat, cough, nasal congestion and chills.    Diagnoses and all orders for this visit:    Flu-like symptoms  -     oseltamivir (TAMIFLU) 75 MG capsule; Take 1 capsule (75 mg total) by mouth 2 (two) times daily. for 5 days    Upper respiratory tract infection, unspecified type  -     oseltamivir (TAMIFLU) 75 MG capsule; Take 1 capsule (75 mg total) by mouth 2 (two) times daily. for 5 days  -     promethazine-dextromethorphan (PROMETHAZINE-DM) 6.25-15 mg/5 mL Syrp; Take 5 mLs by mouth every 4 to 6 hours as needed (cough).    Myalgia  -     POCT Influenza A/B Molecular  -     oseltamivir (TAMIFLU) 75 MG capsule; Take 1 capsule (75 mg total) by mouth 2 (two) times daily. for 5 days  -     ibuprofen (ADVIL,MOTRIN) 800 MG tablet; Take 1 tablet (800 mg total) by mouth 3 (three) times daily.    Fever and chills  -     POCT Influenza A/B Molecular  -     oseltamivir (TAMIFLU) 75 MG capsule; Take 1 capsule (75 mg total) by mouth 2 (two) times daily. for 5 days  -     ibuprofen (ADVIL,MOTRIN) 800 MG tablet; Take 1 tablet (800 mg total) by mouth 3  (three) times daily.    ernestina your temperature several times a day. If your fever is 100.4°F for more than a day, call your doctor.   Relax, lie down. Go to bed if you want. Just get off your feet and rest. Also, drink plenty of fluids to avoid dehydration.   Tylenol 500 mg or ibuprofen 200 mg 2 tabs every 6 hours as needed for fever and pain  Breathe steam to open blocked nasal passages.  a hot shower or use a vaporizer. Be careful not to get burned by the steam.   Saline nasal sprays and decongestant tablets help open a stuffy nose. Dry up a runny nose or postnasal drip with antihistamines. Recommend Claritin D daily   Gargle every 2 hours with 1/4 teaspoon of salt dissolved in 1/2 cup of warm water. Suck on throat lozenges and cough drops to moisten your throat.   If coughing is a problem, take an expectorant to loosen mucus. If you have a dry cough, use a cough suppressant. Recommend Delsym.   Put fluid back into your body. Take frequent sips of clear liquids such as water or broth. Do not drink beverages with a lot of sugar in them, such as juices and sodas. These can make diarrhea worse. Also, do not drink sport drinks, such as Gatorade, which dont have the right mix of water, sugar, and minerals, and can make the symptoms worse.   As your appetite returns, you can resume your normal diet. Ask your doctor whether there are any foods you should avoid.     When to Call Your Doctor   When you first notice symptoms, ask your healthcare provider about antiviral medication. If taken soon after flu symptoms start, this can help you get well sooner. (Antibiotics should not be taken for colds or flu.) Also, call your doctor if you have any of the following symptoms or if you arent feeling better after 7 days:   Shortness of breath   Pain or pressure in the chest or abdomen   Worsening symptoms, especially after a period of improvement   Fever of 100.4°F or higher, or fever that doesnt go down with medication    Sudden dizziness or confusion   Severe or continued vomiting   Signs of dehydration, including extreme thirst, dark urine, infrequent urination, dry mouth   Spotted, red, or very sore throat    © 1653-7264 [employer], [address] [place] [address], [place] [address]. All rights reserved. This information is not intended as a substitute for professional medical care. Always follow your healthcare professional's instructions.

## 2019-02-21 NOTE — PATIENT INSTRUCTIONS
ernestina your temperature several times a day. If your fever is 100.4°F for more than a day, call your doctor.   Relax, lie down. Go to bed if you want. Just get off your feet and rest. Also, drink plenty of fluids to avoid dehydration.   Tylenol 500 mg or ibuprofen 200 mg 2 tabs every 6 hours as needed for fever and pain  Breathe steam to open blocked nasal passages.  a hot shower or use a vaporizer. Be careful not to get burned by the steam.   Saline nasal sprays and decongestant tablets help open a stuffy nose. Dry up a runny nose or postnasal drip with antihistamines. Recommend Claritin D daily   Gargle every 2 hours with 1/4 teaspoon of salt dissolved in 1/2 cup of warm water. Suck on throat lozenges and cough drops to moisten your throat.   If coughing is a problem, take an expectorant to loosen mucus. If you have a dry cough, use a cough suppressant. Recommend Delsym.   Put fluid back into your body. Take frequent sips of clear liquids such as water or broth. Do not drink beverages with a lot of sugar in them, such as juices and sodas. These can make diarrhea worse. Also, do not drink sport drinks, such as Gatorade, which dont have the right mix of water, sugar, and minerals, and can make the symptoms worse.   As your appetite returns, you can resume your normal diet. Ask your doctor whether there are any foods you should avoid.     When to Call Your Doctor   When you first notice symptoms, ask your healthcare provider about antiviral medication. If taken soon after flu symptoms start, this can help you get well sooner. (Antibiotics should not be taken for colds or flu.) Also, call your doctor if you have any of the following symptoms or if you arent feeling better after 7 days:   Shortness of breath   Pain or pressure in the chest or abdomen   Worsening symptoms, especially after a period of improvement   Fever of 100.4°F or higher, or fever that doesnt go down with medication   Sudden dizziness or  confusion   Severe or continued vomiting   Signs of dehydration, including extreme thirst, dark urine, infrequent urination, dry mouth   Spotted, red, or very sore throat    © 6833-3722 [employer], [address] [place] [address], [place] [address]. All rights reserved. This information is not intended as a substitute for professional medical care. Always follow your healthcare professional's instructions.

## 2019-03-07 ENCOUNTER — PATIENT MESSAGE (OUTPATIENT)
Dept: FAMILY MEDICINE | Facility: CLINIC | Age: 34
End: 2019-03-07

## 2019-07-15 ENCOUNTER — PATIENT OUTREACH (OUTPATIENT)
Dept: ADMINISTRATIVE | Facility: HOSPITAL | Age: 34
End: 2019-07-15

## 2019-07-15 DIAGNOSIS — R73.03 PRE-DIABETES: ICD-10-CM

## 2019-07-15 DIAGNOSIS — Z13.6 ENCOUNTER FOR SCREENING FOR CARDIOVASCULAR DISORDERS: Primary | ICD-10-CM

## 2019-07-15 DIAGNOSIS — E66.01 MORBID OBESITY WITH BMI OF 45.0-49.9, ADULT: Primary | ICD-10-CM

## 2019-07-30 ENCOUNTER — HOSPITAL ENCOUNTER (EMERGENCY)
Facility: HOSPITAL | Age: 34
Discharge: HOME | End: 2019-07-30
Attending: EMERGENCY MEDICINE
Payer: COMMERCIAL

## 2019-07-30 VITALS
BODY MASS INDEX: 50.81 KG/M2 | SYSTOLIC BLOOD PRESSURE: 112 MMHG | RESPIRATION RATE: 16 BRPM | OXYGEN SATURATION: 100 % | DIASTOLIC BLOOD PRESSURE: 64 MMHG | WEIGHT: 293 LBS | HEART RATE: 87 BPM | TEMPERATURE: 99 F

## 2019-07-30 DIAGNOSIS — K52.9 GASTROENTERITIS: Primary | ICD-10-CM

## 2019-07-30 LAB
ALBUMIN SERPL-MCNC: 3.8 G/DL (ref 3.3–5.5)
ALBUMIN SERPL-MCNC: NORMAL G/DL (ref 3.3–5.5)
ALP SERPL-CCNC: 78 U/L (ref 42–141)
ALP SERPL-CCNC: NORMAL U/L (ref 42–141)
B-HCG UR QL: NEGATIVE
BILIRUB SERPL-MCNC: 0.9 MG/DL (ref 0.2–1.6)
BILIRUB SERPL-MCNC: NORMAL MG/DL (ref 0.2–1.6)
BILIRUBIN, POC UA: NEGATIVE
BLOOD, POC UA: NEGATIVE
BUN SERPL-MCNC: NORMAL MG/DL (ref 7–22)
CALCIUM SERPL-MCNC: NORMAL MG/DL (ref 8–10.3)
CHLORIDE SERPL-SCNC: NORMAL MMOL/L (ref 98–108)
CLARITY, POC UA: ABNORMAL
COLOR, POC UA: YELLOW
CREAT SERPL-MCNC: NORMAL MG/DL (ref 0.6–1.2)
CTP QC/QA: YES
GLUCOSE SERPL-MCNC: NORMAL MG/DL (ref 73–118)
GLUCOSE, POC UA: NEGATIVE
KETONES, POC UA: NEGATIVE
LEUKOCYTE EST, POC UA: NEGATIVE
NITRITE, POC UA: NEGATIVE
PH UR STRIP: 7 [PH]
POC ALT (SGPT): 21 U/L (ref 10–47)
POC ALT (SGPT): NORMAL U/L (ref 10–47)
POC AMYLASE: 31 U/L (ref 14–97)
POC AST (SGOT): 23 U/L (ref 11–38)
POC AST (SGOT): NORMAL U/L (ref 11–38)
POC GGT: 11 U/L (ref 5–65)
POC TCO2: NORMAL MMOL/L (ref 18–33)
POTASSIUM BLD-SCNC: NORMAL MMOL/L (ref 3.6–5.1)
PROTEIN, POC UA: NEGATIVE
PROTEIN, POC: 7 G/DL (ref 6.4–8.1)
PROTEIN, POC: NORMAL G/DL (ref 6.4–8.1)
SODIUM BLD-SCNC: NORMAL MMOL/L (ref 128–145)
SPECIFIC GRAVITY, POC UA: 1.01
UROBILINOGEN, POC UA: 0.2 E.U./DL

## 2019-07-30 PROCEDURE — 85025 COMPLETE CBC W/AUTO DIFF WBC: CPT | Mod: ER

## 2019-07-30 PROCEDURE — 80053 COMPREHEN METABOLIC PANEL: CPT | Mod: ER

## 2019-07-30 PROCEDURE — 96372 THER/PROPH/DIAG INJ SC/IM: CPT | Mod: 59,ER

## 2019-07-30 PROCEDURE — 99284 EMERGENCY DEPT VISIT MOD MDM: CPT | Mod: 25,ER

## 2019-07-30 PROCEDURE — 81025 URINE PREGNANCY TEST: CPT | Mod: ER | Performed by: EMERGENCY MEDICINE

## 2019-07-30 PROCEDURE — 96374 THER/PROPH/DIAG INJ IV PUSH: CPT | Mod: ER

## 2019-07-30 PROCEDURE — 82150 ASSAY OF AMYLASE: CPT | Mod: ER

## 2019-07-30 PROCEDURE — 81003 URINALYSIS AUTO W/O SCOPE: CPT | Mod: ER

## 2019-07-30 PROCEDURE — 63600175 PHARM REV CODE 636 W HCPCS: Mod: ER | Performed by: NURSE PRACTITIONER

## 2019-07-30 RX ORDER — SODIUM CHLORIDE 9 MG/ML
1000 INJECTION, SOLUTION INTRAVENOUS
Status: COMPLETED | OUTPATIENT
Start: 2019-07-30 | End: 2019-07-30

## 2019-07-30 RX ORDER — ONDANSETRON 2 MG/ML
8 INJECTION INTRAMUSCULAR; INTRAVENOUS ONCE
Status: COMPLETED | OUTPATIENT
Start: 2019-07-30 | End: 2019-07-30

## 2019-07-30 RX ORDER — DICYCLOMINE HYDROCHLORIDE 10 MG/ML
20 INJECTION INTRAMUSCULAR
Status: COMPLETED | OUTPATIENT
Start: 2019-07-30 | End: 2019-07-30

## 2019-07-30 RX ORDER — DICYCLOMINE HYDROCHLORIDE 20 MG/1
20 TABLET ORAL EVERY 6 HOURS PRN
Qty: 28 TABLET | Refills: 0 | Status: SHIPPED | OUTPATIENT
Start: 2019-07-30 | End: 2019-08-22

## 2019-07-30 RX ORDER — ONDANSETRON 8 MG/1
8 TABLET, ORALLY DISINTEGRATING ORAL EVERY 8 HOURS PRN
Qty: 20 TABLET | Refills: 0 | Status: SHIPPED | OUTPATIENT
Start: 2019-07-30 | End: 2019-08-22

## 2019-07-30 RX ADMIN — DICYCLOMINE HYDROCHLORIDE 20 MG: 20 INJECTION, SOLUTION INTRAMUSCULAR at 04:07

## 2019-07-30 RX ADMIN — SODIUM CHLORIDE 1000 ML: 0.9 INJECTION, SOLUTION INTRAVENOUS at 04:07

## 2019-07-30 RX ADMIN — ONDANSETRON 8 MG: 2 INJECTION INTRAMUSCULAR; INTRAVENOUS at 04:07

## 2019-07-30 NOTE — ED PROVIDER NOTES
"Encounter Date: 7/30/2019    SCRIBE #1 NOTE: I, Chelsea Huber, am scribing for, and in the presence of,  OTILIA Linda. I have scribed the following portions of the note - Other sections scribed: HPI, ROS, PE.       History     Chief Complaint   Patient presents with    Abdominal Pain     Pt states," Diarrhea started last night, vomiting today with stomach pains."    Diarrhea    Vomiting    Nausea     34 year old female complaining of abdominal beginning last night. Patient is experiencing diarrhea, vomiting, and nausea.  She reports 1 episode of vomiting last night.  She had 5-7 episodes of vomiting this a.m. patient reports 4-5 episodes of diarrhea.  She had abdominal cramping this a.m. which has resolved. Denies sick contacts.  She states she ate Chinese food on yesterday.  No complaints of fever or chills.     The history is provided by the patient. No  was used.   Abdominal Pain   The current episode started yesterday (abdominal cramping ). The problem has been gradually improving. The abdominal pain is generalized. The abdominal pain does not radiate. The severity of the abdominal pain is 9/10. The other symptoms of the illness include nausea, vomiting and diarrhea. The other symptoms of the illness do not include fever.   The diarrhea began yesterday. The diarrhea is watery. The diarrhea occurs 2 - 4 times per day.   Nausea began yesterday.   The vomiting began yesterday. Vomiting occurs 6 to 10 times per day. The emesis contains stomach contents.   Symptoms associated with the illness do not include chills.     Review of patient's allergies indicates:  No Known Allergies  Past Medical History:   Diagnosis Date    PCOS (polycystic ovarian syndrome) 5/18/2017     History reviewed. No pertinent surgical history.  Family History   Problem Relation Age of Onset    Lung cancer Father     Hypertension Mother      Social History     Tobacco Use    Smoking status: Never Smoker    " Smokeless tobacco: Never Used   Substance Use Topics    Alcohol use: Yes    Drug use: No     Review of Systems   Constitutional: Negative.  Negative for chills and fever.   HENT: Negative.    Eyes: Negative.  Negative for redness.   Respiratory: Negative.    Cardiovascular: Negative.    Gastrointestinal: Positive for abdominal pain, diarrhea, nausea and vomiting.   Endocrine: Negative.    Genitourinary: Negative.    Musculoskeletal: Negative.    Skin: Negative.  Negative for rash and wound.   Allergic/Immunologic: Negative.    Neurological: Negative.    Hematological: Negative.    Psychiatric/Behavioral: Negative.    All other systems reviewed and are negative.      Physical Exam     Initial Vitals [07/30/19 1534]   BP Pulse Resp Temp SpO2   (!) 141/77 81 16 99.1 °F (37.3 °C) 99 %      MAP       --         Physical Exam    Nursing note and vitals reviewed.  Constitutional: She appears well-developed.   HENT:   Right Ear: External ear normal.   Left Ear: External ear normal.   Nose: Nose normal.   Mouth/Throat: Oropharynx is clear and moist.   Eyes: Conjunctivae are normal.   Neck: Normal range of motion. Neck supple.   Cardiovascular: Normal rate, regular rhythm, normal heart sounds and intact distal pulses.   Pulmonary/Chest: Effort normal and breath sounds normal. No respiratory distress.   Abdominal: Soft. Bowel sounds are normal. She exhibits no distension.   Musculoskeletal: Normal range of motion.   Neurological: She is alert and oriented to person, place, and time.   Skin: Skin is warm and dry. No rash noted.   Psychiatric: She has a normal mood and affect.         ED Course   Procedures  Labs Reviewed   POCT URINALYSIS W/O SCOPE - Abnormal; Notable for the following components:       Result Value    Glucose, UA Negative (*)     Bilirubin, UA Negative (*)     Ketones, UA Negative (*)     Blood, UA Negative (*)     Protein, UA Negative (*)     Nitrite, UA Negative (*)     Leukocytes, UA Negative (*)     All  other components within normal limits   POCT URINE PREGNANCY   POCT URINALYSIS W/O SCOPE   POCT CBC   POCT CMP   POCT AMYLASE   POCT LIVER PANEL   POCT CMP          Imaging Results    None       Imaging Results    None               Medical Decision Making:   Initial Assessment:   34 year old female complaining of abdominal beginning last night. Patient is experiencing diarrhea, vomiting, and nausea.  She reports 1 episode of vomiting last night.  She had 5-7 episodes of vomiting this a.m. patient reports 4-5 episodes of diarrhea.  She had abdominal cramping this a.m. which has resolved.  Differential Diagnosis:   Gastroenteritis, dehydration  Clinical Tests:   Lab Tests: Ordered and Reviewed  ED Management:  1 Liter NS administered. Medicated with Zofran and Bentyl.  Oral fluid challenge tolerated well. Abdominal cramping improving.   Discharged home with Zofran and Bentyl prn.  [place] diet.   Follow-up with PCP in 1-2 days return to ED for worsening of symptoms.            Scribe Attestation:   Scribe #1: I performed the above scribed service and the documentation accurately describes the services I performed. I attest to the accuracy of the note.    This document was produced by a scribe under my direction and in my presence. I agree with the content of the note and have made any necessary edits.     OTILIA Linda    07/30/2019 6:25 PM           Clinical Impression:     1. Gastroenteritis                                   OTILIA Archer  07/30/19 1826

## 2019-07-30 NOTE — ED NOTES
Pt reports feeling better compared to initial arrival to the ER and is comfortable leaving the facility.  Pt encouraged to return to ER for any new problems or if symptoms worsen.

## 2019-08-22 ENCOUNTER — HOSPITAL ENCOUNTER (EMERGENCY)
Facility: HOSPITAL | Age: 34
Discharge: HOME | End: 2019-08-22
Attending: EMERGENCY MEDICINE
Payer: COMMERCIAL

## 2019-08-22 VITALS
BODY MASS INDEX: 48.82 KG/M2 | SYSTOLIC BLOOD PRESSURE: 132 MMHG | OXYGEN SATURATION: 99 % | WEIGHT: 293 LBS | RESPIRATION RATE: 16 BRPM | HEIGHT: 65 IN | DIASTOLIC BLOOD PRESSURE: 74 MMHG | TEMPERATURE: 99 F | HEART RATE: 81 BPM

## 2019-08-22 DIAGNOSIS — J06.9 UPPER RESPIRATORY TRACT INFECTION, UNSPECIFIED TYPE: Primary | ICD-10-CM

## 2019-08-22 LAB
B-HCG UR QL: NEGATIVE
CTP QC/QA: YES
POCT GLUCOSE: 92 MG/DL (ref 70–110)

## 2019-08-22 PROCEDURE — 99284 EMERGENCY DEPT VISIT MOD MDM: CPT | Mod: 25,ER

## 2019-08-22 PROCEDURE — 81025 URINE PREGNANCY TEST: CPT | Mod: ER | Performed by: EMERGENCY MEDICINE

## 2019-08-22 PROCEDURE — 96372 THER/PROPH/DIAG INJ SC/IM: CPT | Mod: ER

## 2019-08-22 PROCEDURE — 63600175 PHARM REV CODE 636 W HCPCS: Mod: ER | Performed by: NURSE PRACTITIONER

## 2019-08-22 RX ORDER — CETIRIZINE HYDROCHLORIDE 10 MG/1
10 TABLET ORAL DAILY PRN
Qty: 30 TABLET | Refills: 0 | Status: SHIPPED | OUTPATIENT
Start: 2019-08-22 | End: 2019-10-08

## 2019-08-22 RX ORDER — DEXAMETHASONE SODIUM PHOSPHATE 4 MG/ML
10 INJECTION, SOLUTION INTRA-ARTICULAR; INTRALESIONAL; INTRAMUSCULAR; INTRAVENOUS; SOFT TISSUE
Status: COMPLETED | OUTPATIENT
Start: 2019-08-22 | End: 2019-08-22

## 2019-08-22 RX ORDER — BENZONATATE 200 MG/1
200 CAPSULE ORAL 3 TIMES DAILY PRN
Qty: 24 CAPSULE | Refills: 0 | Status: SHIPPED | OUTPATIENT
Start: 2019-08-22 | End: 2019-09-01

## 2019-08-22 RX ADMIN — DEXAMETHASONE SODIUM PHOSPHATE 10 MG: 4 INJECTION, SOLUTION INTRA-ARTICULAR; INTRALESIONAL; INTRAMUSCULAR; INTRAVENOUS; SOFT TISSUE at 02:08

## 2019-08-22 NOTE — ED PROVIDER NOTES
Encounter Date: 8/22/2019    SCRIBE #1 NOTE: I, Akila Coelho, am scribing for, and in the presence of,  OTILIA Linda. I have scribed the following portions of the note - Other sections scribed: HPI, ROS, PE.       History     Chief Complaint   Patient presents with    Cough     Productive cough with dark yellowish to green sputum since last monday  Patient also with stuffy nose  Denies fever     This is a 34 y.o. nontoxic female who presents to the ED with complaints of productive cough with green sputum, sore throat, congestion x a few days. Patient reports that the Sx first occurred 2 weeks ago and resolved after 3 days, but returned a few days ago. Patient denies fever. She reports nausea and weakness s/p taking Mucinex, and denies relief. Patient does not smoke.    The history is provided by the patient. No  was used.     Review of patient's allergies indicates:  No Known Allergies  Past Medical History:   Diagnosis Date    Diabetes mellitus     PCOS (polycystic ovarian syndrome) 5/18/2017     History reviewed. No pertinent surgical history.  Family History   Problem Relation Age of Onset    Lung cancer Father     Hypertension Mother      Social History     Tobacco Use    Smoking status: Never Smoker    Smokeless tobacco: Never Used   Substance Use Topics    Alcohol use: Yes    Drug use: No     Review of Systems   Constitutional: Negative.  Negative for fever.   HENT: Positive for congestion and sore throat.    Eyes: Negative.    Respiratory: Positive for cough. Negative for shortness of breath.    Cardiovascular: Negative.  Negative for chest pain.   Gastrointestinal: Positive for nausea.   Endocrine: Negative.    Genitourinary: Negative.    Musculoskeletal: Negative.    Skin: Negative.    Allergic/Immunologic: Negative.    Neurological: Positive for weakness.   Hematological: Negative.    Psychiatric/Behavioral: Negative.    All other systems reviewed and are negative.      Physical  Exam     Initial Vitals [08/22/19 1410]   BP Pulse Resp Temp SpO2   138/75 77 20 99.1 °F (37.3 °C) 100 %      MAP       --         Physical Exam    Nursing note and vitals reviewed.  Constitutional: She appears well-developed.   HENT:   Right Ear: External ear normal.   Left Ear: External ear normal.   Nose: Mucosal edema present.   Mouth/Throat: Oropharynx is clear and moist.   Eyes: Conjunctivae are normal.   Neck: Normal range of motion. Neck supple.   Cardiovascular: Normal rate, regular rhythm, normal heart sounds and intact distal pulses.   Pulmonary/Chest: Effort normal and breath sounds normal.   Abdominal: Soft.   Musculoskeletal: Normal range of motion. She exhibits no edema or tenderness.   Neurological: She is alert and oriented to person, place, and time.   Skin: Skin is warm and dry.   Psychiatric: She has a normal mood and affect. Her behavior is normal.         ED Course   Procedures  Labs Reviewed   POCT URINE PREGNANCY   POCT GLUCOSE MONITORING CONTINUOUS   POCT GLUCOSE          Imaging Results          X-Ray Chest PA And Lateral (Final result)  Result time 08/22/19 14:35:00    Final result by Jefferson Arvizu MD (08/22/19 14:35:00)                 Impression:      1. No acute cardiopulmonary process.      Electronically signed by: Jefferson Arvizu MD  Date:    08/22/2019  Time:    14:35             Narrative:    EXAMINATION:  XR CHEST PA AND LATERAL    CLINICAL HISTORY:  cough;    TECHNIQUE:  PA and lateral views of the chest were performed.    COMPARISON:  None    FINDINGS:  The cardiomediastinal silhouette is not enlarged.  There is no pleural effusion.  The trachea is midline.  The lungs are symmetrically expanded bilaterally without evidence of acute parenchymal process. No large focal consolidation seen.  There is no pneumothorax.  The osseous structures are remarkable for dextroscoliotic curvature at the thoracolumbar junction..                                 Medical Decision Making:    History:   Old Medical Records: I decided to obtain old medical records.  Initial Assessment:   This is a 34 y.o. nontoxic female who presents to the ED with complaints of productive cough with green sputum, sore throat, congestion x a few days. Patient reports that the Sx first occurred 2 weeks ago and resolved after 3 days, but returned a few days ago. Patient denies fever. She reports nausea and weakness s/p taking Mucinex, and denies relief.   Differential Diagnosis:   Acute sinusitis, bronchitis, pneumonia.  Independently Interpreted Test(s):   I have ordered and independently interpreted X-rays - see prior notes.  Clinical Tests:   Lab Tests: Ordered and Reviewed  Radiological Study: Ordered and Reviewed  ED Management:  Decadron 10 mg IM.    Discharge home with Tessalon Perles and Zyrtec.  Follow-up with PCP in 1 day.                Scribe Attestation:   Scribe #1: I performed the above scribed service and the documentation accurately describes the services I performed. I attest to the accuracy of the note.    This document was produced by a scribe under my direction and in my presence. I agree with the content of the note and have made any necessary edits.     OTILIA Linda    08/22/2019 9:22 PM           Clinical Impression:     1. Upper respiratory tract infection, unspecified type                                   OTILIA Archer  08/22/19 1457

## 2019-08-22 NOTE — ED TRIAGE NOTES
Pt with productive cough, throat pain and nasal congestion x 1 week. Denies fever and vomiting. Pt reports yellow/green sputum. Pt in no distress at present. RR unlabored. VSS.

## 2019-09-08 ENCOUNTER — HOSPITAL ENCOUNTER (EMERGENCY)
Facility: HOSPITAL | Age: 34
Discharge: HOME | End: 2019-09-08
Attending: EMERGENCY MEDICINE
Payer: COMMERCIAL

## 2019-09-08 VITALS
RESPIRATION RATE: 16 BRPM | TEMPERATURE: 100 F | DIASTOLIC BLOOD PRESSURE: 89 MMHG | OXYGEN SATURATION: 100 % | WEIGHT: 293 LBS | HEIGHT: 65 IN | HEART RATE: 67 BPM | BODY MASS INDEX: 48.82 KG/M2 | SYSTOLIC BLOOD PRESSURE: 135 MMHG

## 2019-09-08 DIAGNOSIS — K52.9 ENTERITIS: Primary | ICD-10-CM

## 2019-09-08 LAB
ALBUMIN SERPL-MCNC: 3.6 G/DL (ref 3.3–5.5)
ALBUMIN SERPL-MCNC: 3.8 G/DL (ref 3.3–5.5)
ALP SERPL-CCNC: 63 U/L (ref 42–141)
ALP SERPL-CCNC: 66 U/L (ref 42–141)
B-HCG UR QL: NEGATIVE
BILIRUB SERPL-MCNC: 0.4 MG/DL (ref 0.2–1.6)
BILIRUB SERPL-MCNC: 0.5 MG/DL (ref 0.2–1.6)
BILIRUBIN, POC UA: ABNORMAL
BLOOD, POC UA: ABNORMAL
BUN SERPL-MCNC: 9 MG/DL (ref 7–22)
CALCIUM SERPL-MCNC: 8.3 MG/DL (ref 8–10.3)
CHLORIDE SERPL-SCNC: 103 MMOL/L (ref 98–108)
CLARITY, POC UA: ABNORMAL
COLOR, POC UA: YELLOW
CREAT SERPL-MCNC: 1.1 MG/DL (ref 0.6–1.2)
CTP QC/QA: YES
GLUCOSE SERPL-MCNC: 87 MG/DL (ref 73–118)
GLUCOSE, POC UA: NEGATIVE
KETONES, POC UA: ABNORMAL
LEUKOCYTE EST, POC UA: NEGATIVE
NITRITE, POC UA: NEGATIVE
PH UR STRIP: 5.5 [PH]
POC ALT (SGPT): 14 U/L (ref 10–47)
POC ALT (SGPT): 23 U/L (ref 10–47)
POC AMYLASE: 15 U/L (ref 14–97)
POC AST (SGOT): 35 U/L (ref 11–38)
POC AST (SGOT): 39 U/L (ref 11–38)
POC GGT: 20 U/L (ref 5–65)
POC TCO2: 28 MMOL/L (ref 18–33)
POCT GLUCOSE: 101 MG/DL (ref 70–110)
POCT GLUCOSE: 65 MG/DL (ref 70–110)
POCT GLUCOSE: 68 MG/DL (ref 70–110)
POCT GLUCOSE: 80 MG/DL (ref 70–110)
POTASSIUM BLD-SCNC: 4.4 MMOL/L (ref 3.6–5.1)
PROTEIN, POC UA: ABNORMAL
PROTEIN, POC: 6.7 G/DL (ref 6.4–8.1)
PROTEIN, POC: 7.1 G/DL (ref 6.4–8.1)
SODIUM BLD-SCNC: 139 MMOL/L (ref 128–145)
SPECIFIC GRAVITY, POC UA: >=1.03
UROBILINOGEN, POC UA: 1 E.U./DL

## 2019-09-08 PROCEDURE — 81003 URINALYSIS AUTO W/O SCOPE: CPT | Mod: ER

## 2019-09-08 PROCEDURE — 63600175 PHARM REV CODE 636 W HCPCS: Mod: ER | Performed by: EMERGENCY MEDICINE

## 2019-09-08 PROCEDURE — 25500020 PHARM REV CODE 255: Mod: ER | Performed by: EMERGENCY MEDICINE

## 2019-09-08 PROCEDURE — 63600175 PHARM REV CODE 636 W HCPCS: Mod: ER | Performed by: NURSE PRACTITIONER

## 2019-09-08 PROCEDURE — 81025 URINE PREGNANCY TEST: CPT | Mod: ER | Performed by: EMERGENCY MEDICINE

## 2019-09-08 PROCEDURE — 85025 COMPLETE CBC W/AUTO DIFF WBC: CPT | Mod: ER

## 2019-09-08 PROCEDURE — 82150 ASSAY OF AMYLASE: CPT | Mod: ER

## 2019-09-08 PROCEDURE — 99284 EMERGENCY DEPT VISIT MOD MDM: CPT | Mod: 25,ER

## 2019-09-08 PROCEDURE — 80053 COMPREHEN METABOLIC PANEL: CPT | Mod: ER

## 2019-09-08 PROCEDURE — 25000003 PHARM REV CODE 250: Mod: ER | Performed by: NURSE PRACTITIONER

## 2019-09-08 PROCEDURE — 96361 HYDRATE IV INFUSION ADD-ON: CPT | Mod: ER

## 2019-09-08 PROCEDURE — 96374 THER/PROPH/DIAG INJ IV PUSH: CPT | Mod: ER,59

## 2019-09-08 RX ORDER — DICYCLOMINE HYDROCHLORIDE 20 MG/1
20 TABLET ORAL EVERY 6 HOURS PRN
Qty: 28 TABLET | Refills: 0 | Status: SHIPPED | OUTPATIENT
Start: 2019-09-08 | End: 2019-09-20 | Stop reason: SDUPTHER

## 2019-09-08 RX ORDER — ONDANSETRON 4 MG/1
4 TABLET, ORALLY DISINTEGRATING ORAL EVERY 8 HOURS PRN
Qty: 16 TABLET | Refills: 0 | Status: SHIPPED | OUTPATIENT
Start: 2019-09-08 | End: 2019-09-20 | Stop reason: SDUPTHER

## 2019-09-08 RX ORDER — DEXTROSE MONOHYDRATE 100 MG/ML
1000 INJECTION, SOLUTION INTRAVENOUS
Status: DISCONTINUED | OUTPATIENT
Start: 2019-09-08 | End: 2019-09-08

## 2019-09-08 RX ORDER — ONDANSETRON 2 MG/ML
4 INJECTION INTRAMUSCULAR; INTRAVENOUS
Status: COMPLETED | OUTPATIENT
Start: 2019-09-08 | End: 2019-09-08

## 2019-09-08 RX ORDER — ACETAMINOPHEN 325 MG/1
650 TABLET ORAL ONCE
Status: COMPLETED | OUTPATIENT
Start: 2019-09-08 | End: 2019-09-08

## 2019-09-08 RX ORDER — SODIUM CHLORIDE 9 MG/ML
1000 INJECTION, SOLUTION INTRAVENOUS
Status: COMPLETED | OUTPATIENT
Start: 2019-09-08 | End: 2019-09-08

## 2019-09-08 RX ORDER — DEXTROSE MONOHYDRATE 50 MG/ML
1000 INJECTION, SOLUTION INTRAVENOUS
Status: COMPLETED | OUTPATIENT
Start: 2019-09-08 | End: 2019-09-08

## 2019-09-08 RX ADMIN — ONDANSETRON 4 MG: 2 INJECTION INTRAMUSCULAR; INTRAVENOUS at 02:09

## 2019-09-08 RX ADMIN — IOHEXOL 100 ML: 350 INJECTION, SOLUTION INTRAVENOUS at 03:09

## 2019-09-08 RX ADMIN — SODIUM CHLORIDE 1000 ML: 0.9 INJECTION, SOLUTION INTRAVENOUS at 02:09

## 2019-09-08 RX ADMIN — SODIUM CHLORIDE 1000 ML: 0.9 INJECTION, SOLUTION INTRAVENOUS at 03:09

## 2019-09-08 RX ADMIN — DEXTROSE MONOHYDRATE 1000 ML: 5 INJECTION, SOLUTION INTRAVENOUS at 03:09

## 2019-09-08 RX ADMIN — ACETAMINOPHEN 650 MG: 325 TABLET, FILM COATED ORAL at 05:09

## 2019-09-08 NOTE — ED PROVIDER NOTES
Encounter Date: 9/8/2019    SCRIBE #1 NOTE: I, Holly Pedraza, am scribing for, and in the presence of,  OTILIA Linda. I have scribed the following portions of the note - Other sections scribed: HPI, ROS, PE.       History     Chief Complaint   Patient presents with    Diarrhea     pt reports upper abdominal pain, nausea and diarrhea that is green in color. She states her stool was dark initially after taking peptobismal and then turned green. She also adds back pain    Back Pain    Abdominal Pain     Agustina Tillman is a 34 y.o. female with PCOS who presents to the ED complaining of upper abdominal pain with nausea and diarrhea for a 4 days. Reports abdominal pain is a cramping and burning pain like menstrual cramps. Reports dark stool after taking Pepto Bismol. Denies fever and chills. No vomiting. Reports her niece has diarrhea, and she has been babysitting her for the past couple days. Denies recent antibiotics and foreign travel.     The history is provided by the patient.   Abdominal Pain   The current episode started several days ago. The onset of the illness was gradual. The abdominal pain is located in the epigastric region. The abdominal pain does not radiate. The severity of the abdominal pain is 5/10. The abdominal pain is relieved by nothing. The other symptoms of the illness include nausea and diarrhea. The other symptoms of the illness do not include fever, shortness of breath or vomiting.   The diarrhea began 3 to 5 days ago. The diarrhea is watery. The diarrhea occurs 5 - 10 times per day.   Nausea began 3 to 5 days ago. The nausea is exacerbated by food.   The patient states that she believes she is currently not pregnant. Additional symptoms associated with the illness include back pain. Symptoms associated with the illness do not include chills.     Review of patient's allergies indicates:  No Known Allergies  Past Medical History:   Diagnosis Date    Diabetes mellitus     PCOS  (polycystic ovarian syndrome) 5/18/2017     History reviewed. No pertinent surgical history.  Family History   Problem Relation Age of Onset    Lung cancer Father     Hypertension Mother      Social History     Tobacco Use    Smoking status: Never Smoker    Smokeless tobacco: Never Used   Substance Use Topics    Alcohol use: Yes    Drug use: No     Review of Systems   Constitutional: Negative.  Negative for chills and fever.   HENT: Negative.    Eyes: Negative.    Respiratory: Negative.  Negative for shortness of breath.    Cardiovascular: Negative.  Negative for chest pain.   Gastrointestinal: Positive for abdominal pain, diarrhea and nausea. Negative for vomiting.        +dark stool   Endocrine: Negative.    Genitourinary: Negative.    Musculoskeletal: Positive for back pain.   Skin: Negative.    Allergic/Immunologic: Negative.    Neurological: Negative.    Hematological: Negative.    Psychiatric/Behavioral: Negative.    All other systems reviewed and are negative.      Physical Exam     Initial Vitals [09/08/19 1216]   BP Pulse Resp Temp SpO2   121/74 68 16 99.2 °F (37.3 °C) 97 %      MAP       --         Physical Exam    Nursing note and vitals reviewed.  Constitutional: She appears well-developed.   HENT:   Right Ear: External ear normal.   Left Ear: External ear normal.   Nose: Nose normal.   Mouth/Throat: Oropharynx is clear and moist.   Eyes: Conjunctivae are normal.   Neck: Normal range of motion. Neck supple.   Cardiovascular: Normal rate, regular rhythm, normal heart sounds and intact distal pulses.   No murmur heard.  Pulmonary/Chest: Effort normal and breath sounds normal. No respiratory distress.   Abdominal: Soft. Bowel sounds are normal. She exhibits no distension. There is tenderness in the right upper quadrant, epigastric area and suprapubic area.   Musculoskeletal: Normal range of motion. She exhibits no edema or tenderness.   Neurological: She is alert and oriented to person, place, and  time.   Skin: Skin is warm and dry. Capillary refill takes less than 2 seconds. No rash noted.   Psychiatric: She has a normal mood and affect. Her behavior is normal.         ED Course   Procedures  Labs Reviewed   POCT URINALYSIS W/O SCOPE - Abnormal; Notable for the following components:       Result Value    Glucose, UA Negative (*)     Bilirubin, UA 1+ (*)     Ketones, UA Trace (*)     Spec Grav UA >=1.030 (*)     Blood, UA 3+ (*)     Protein, UA 1+ (*)     Nitrite, UA Negative (*)     Leukocytes, UA Negative (*)     All other components within normal limits   POCT GLUCOSE - Abnormal; Notable for the following components:    POCT Glucose 65 (*)     All other components within normal limits   POCT CMP - Abnormal; Notable for the following components:    AST (SGOT), POC 39 (*)     All other components within normal limits   POCT GLUCOSE - Abnormal; Notable for the following components:    POCT Glucose 68 (*)     All other components within normal limits   POCT URINE PREGNANCY   POCT URINALYSIS W/O SCOPE   POCT CBC   POCT CMP   POCT AMYLASE   POCT GLUCOSE   POCT LIVER PANEL   POCT GLUCOSE              Imaging Results          CT Abdomen Pelvis With Contrast (Final result)  Result time 09/08/19 15:38:36    Final result by Dean Marqeuz MD (09/08/19 15:38:36)                 Impression:      Nonvisualization of the appendix.  No CT findings of acute appendicitis.    Fluid prominence of the small bowel, suggestive of enteritis.    Innumerable lymph nodes in the abdomen and pelvis.  The findings may represent some component of mesenteric adenitis.  Suggest clinical correlation.    Poor definition of the bilateral adnexa.  Pelvic ultrasound may be obtained for further evaluation.      Electronically signed by: Dean Marquez MD  Date:    09/08/2019  Time:    15:38             Narrative:    EXAMINATION:  CT ABDOMEN PELVIS WITH CONTRAST    CLINICAL HISTORY:  Abd pain, fever, abscess suspected;RLQ pain, appendicitis  suspected;    TECHNIQUE:  Low dose axial images, sagittal and coronal reformations were obtained from the lung bases to the pubic symphysis following the IV administration of 100 mL of Omnipaque 350 .  Oral contrast was not given.    COMPARISON:  None.    FINDINGS:  There are no pleural effusions.  There is no evidence of a pneumothorax.  No airspace opacity is present.  No discrete pulmonary nodule is identified.    The heart is unremarkable.  There is normal tapering of the abdominal aorta.  The aortic branch vessels are within normal limits.  The portal veins and mesenteric vessels are unremarkable.  The IVC and the remainder of the venous structures are within normal limits.  There are innumerable subcentimeter lymph nodes throughout the abdomen and pelvis.    The esophagus, stomach, and duodenum are within normal limits.  There is minimal fluid prominence of the small bowel loops with minimal wall thickening.  The appendix is not visualized.  There are no secondary findings of acute appendicitis.  There is colonic diverticula without evidence of acute diverticulitis.    The liver is unremarkable.  The gallbladder is within normal limits.  The biliary tree is within normal limits.  The spleen is unremarkable.  The pancreas is within normal limits.    The adrenal glands are unremarkable.  The kidneys are within normal limits.  The ureters and urinary bladder are within normal limits.  The uterus is unremarkable.  There is fullness of the bilateral adnexa.    There is no evidence of free fluid in the abdomen or pelvis.  There is no evidence of free air.  There is no evidence of pneumatosis.  No portal venous air is identified.    The psoas margins are unremarkable.  The abdominal wall is within normal limits.  The osseous structures are unremarkable.  There is no evidence of a fracture.                               X-ray chest PA and lateral (Final result)  Result time 09/08/19 13:13:53    Final result by Jefferson  ZOYA Arvizu MD (09/08/19 13:13:53)                 Impression:      1. No acute cardiopulmonary process, hypoventilatory exam.      Electronically signed by: Jefferson Arvizu MD  Date:    09/08/2019  Time:    13:13             Narrative:    EXAMINATION:  XR CHEST PA AND LATERAL    CLINICAL HISTORY:  Other (add clinical information to comments box below);    TECHNIQUE:  PA and lateral views of the chest were performed.    COMPARISON:  08/22/2019    FINDINGS:  The cardiomediastinal silhouette is not enlarged.  There is no pleural effusion.  The trachea is midline.  The lungs are symmetrically expanded bilaterally without evidence of acute parenchymal process. No large focal consolidation seen.  There is no pneumothorax.  The osseous structures are remarkable for S shaped scoliotic curvature of the spine..                                 Medical Decision Making:   Initial Assessment:   This is a 34 y.o. nontoxic female who presents to the ED with complaints of upper abdominal pain with nausea and diarrhea onset 3 days. Denies fever, chills, or vomiting.  Differential Diagnosis:   Gastroenteritis  Gastritis  Appendicitis   Dehydration  Pancreatitis   Clinical Tests:   Lab Tests: Reviewed  The following lab test(s) were unremarkable: UPT       <> Summary of Lab: CMP- BUN 9, creatinine 1.1, glucose 87, K 4.4, Sodium 139, amylase 15, bicarb 28    Radiological Study: Ordered and Reviewed  ED Management:  Pt was given dextrose 5 % 1000 ml due to hypoglycemia. Glucose 65.  She was able to tolerate a meal during visit. Glucose rechecked.  Medicated with Zofran 4 mg IV. Medicated with Tylenol 650 mg. Pain improved.   Discharge with Bentyl and Zofran prn  [place] diet.  Follow-up with PCP in 2 days.   Return to ED for worsening of symptoms     I have reviewed the notes, assessments, and/or procedures performed by NP/PA, I concur with her/his documentation of Agustina Tillman.  Attending:   Physician Attestation Statement: I have  reviewed this case with my non-physician provider.   Physician Attestation Statement: I have provided a face to face evaluation of this patient at the request of my non-physician provider.  I agree with the HPI, review of systems, and physical exam, as documented.  The patient's condition warranted physician involvement.  Other Attend Additions:   Physical Exam: Awake alert oriented ×4 speaking clearly in full sentences.    Regular rate and rhythm no murmur gallop or rub.   Clear to auscultation bilateral without wheeze crackles or Rales   Abdomen is soft with right CVA and RUQ tenderness. Normal bowel sounds. No rebound, guarding, or rigidity.  Pulses palpable ×4 no clubbing cyanosis or edema.   Skin no rash, no wound.   Medical Decision Making:     I reviewed radiology and Lab Data.  The treatment regimen was reviewed by me.  Patient reports feeling better after treatment in the emergency room.  I agree with outpatient management as documented.  Discussed labs, and imaging results. Answered questions and discussed discharge plan.   Patient is to return to the Emergency department if symptoms worsen or do not resolve.                       Clinical Impression:     1. Enteritis                                   OTILIA Archer  09/10/19 [id number]

## 2019-09-12 ENCOUNTER — PATIENT MESSAGE (OUTPATIENT)
Dept: FAMILY MEDICINE | Facility: CLINIC | Age: 34
End: 2019-09-12

## 2019-09-12 ENCOUNTER — OFFICE VISIT (OUTPATIENT)
Dept: FAMILY MEDICINE | Facility: CLINIC | Age: 34
End: 2019-09-12
Payer: COMMERCIAL

## 2019-09-12 ENCOUNTER — LAB VISIT (OUTPATIENT)
Dept: LAB | Facility: HOSPITAL | Age: 34
End: 2019-09-12
Attending: INTERNAL MEDICINE
Payer: COMMERCIAL

## 2019-09-12 VITALS
BODY MASS INDEX: 48.82 KG/M2 | WEIGHT: 293 LBS | TEMPERATURE: 98 F | OXYGEN SATURATION: 99 % | DIASTOLIC BLOOD PRESSURE: 72 MMHG | HEART RATE: 96 BPM | HEIGHT: 65 IN | SYSTOLIC BLOOD PRESSURE: 128 MMHG

## 2019-09-12 DIAGNOSIS — Z13.6 ENCOUNTER FOR SCREENING FOR CARDIOVASCULAR DISORDERS: ICD-10-CM

## 2019-09-12 DIAGNOSIS — D50.8 IRON DEFICIENCY ANEMIA SECONDARY TO INADEQUATE DIETARY IRON INTAKE: ICD-10-CM

## 2019-09-12 DIAGNOSIS — E66.01 MORBID OBESITY WITH BMI OF 45.0-49.9, ADULT: ICD-10-CM

## 2019-09-12 DIAGNOSIS — E66.01 MORBID OBESITY WITH BMI OF 45.0-49.9, ADULT: Primary | ICD-10-CM

## 2019-09-12 DIAGNOSIS — R73.03 PREDIABETES: ICD-10-CM

## 2019-09-12 LAB
ALBUMIN SERPL BCP-MCNC: 3.4 G/DL (ref 3.5–5.2)
ALBUMIN SERPL BCP-MCNC: 3.4 G/DL (ref 3.5–5.2)
ALP SERPL-CCNC: 70 U/L (ref 55–135)
ALP SERPL-CCNC: 70 U/L (ref 55–135)
ALT SERPL W/O P-5'-P-CCNC: 18 U/L (ref 10–44)
ALT SERPL W/O P-5'-P-CCNC: 18 U/L (ref 10–44)
ANION GAP SERPL CALC-SCNC: 7 MMOL/L (ref 8–16)
ANION GAP SERPL CALC-SCNC: 7 MMOL/L (ref 8–16)
AST SERPL-CCNC: 21 U/L (ref 10–40)
AST SERPL-CCNC: 21 U/L (ref 10–40)
BASOPHILS # BLD AUTO: 0.03 K/UL (ref 0–0.2)
BASOPHILS # BLD AUTO: 0.03 K/UL (ref 0–0.2)
BASOPHILS NFR BLD: 0.5 % (ref 0–1.9)
BASOPHILS NFR BLD: 0.5 % (ref 0–1.9)
BILIRUB SERPL-MCNC: 0.3 MG/DL (ref 0.1–1)
BILIRUB SERPL-MCNC: 0.3 MG/DL (ref 0.1–1)
BUN SERPL-MCNC: 6 MG/DL (ref 6–20)
BUN SERPL-MCNC: 6 MG/DL (ref 6–20)
CALCIUM SERPL-MCNC: 8.8 MG/DL (ref 8.7–10.5)
CALCIUM SERPL-MCNC: 8.8 MG/DL (ref 8.7–10.5)
CHLORIDE SERPL-SCNC: 104 MMOL/L (ref 95–110)
CHLORIDE SERPL-SCNC: 104 MMOL/L (ref 95–110)
CHOLEST SERPL-MCNC: 150 MG/DL (ref 120–199)
CHOLEST/HDLC SERPL: 4.3 {RATIO} (ref 2–5)
CO2 SERPL-SCNC: 28 MMOL/L (ref 23–29)
CO2 SERPL-SCNC: 28 MMOL/L (ref 23–29)
CREAT SERPL-MCNC: 0.9 MG/DL (ref 0.5–1.4)
CREAT SERPL-MCNC: 0.9 MG/DL (ref 0.5–1.4)
DIFFERENTIAL METHOD: ABNORMAL
DIFFERENTIAL METHOD: ABNORMAL
EOSINOPHIL # BLD AUTO: 0.1 K/UL (ref 0–0.5)
EOSINOPHIL # BLD AUTO: 0.1 K/UL (ref 0–0.5)
EOSINOPHIL NFR BLD: 2.1 % (ref 0–8)
EOSINOPHIL NFR BLD: 2.1 % (ref 0–8)
ERYTHROCYTE [DISTWIDTH] IN BLOOD BY AUTOMATED COUNT: 14.6 % (ref 11.5–14.5)
ERYTHROCYTE [DISTWIDTH] IN BLOOD BY AUTOMATED COUNT: 14.6 % (ref 11.5–14.5)
EST. GFR  (AFRICAN AMERICAN): >60 ML/MIN/1.73 M^2
EST. GFR  (AFRICAN AMERICAN): >60 ML/MIN/1.73 M^2
EST. GFR  (NON AFRICAN AMERICAN): >60 ML/MIN/1.73 M^2
EST. GFR  (NON AFRICAN AMERICAN): >60 ML/MIN/1.73 M^2
ESTIMATED AVG GLUCOSE: 103 MG/DL (ref 68–131)
GLUCOSE SERPL-MCNC: 91 MG/DL (ref 70–110)
GLUCOSE SERPL-MCNC: 91 MG/DL (ref 70–110)
HBA1C MFR BLD HPLC: 5.2 % (ref 4–5.6)
HCT VFR BLD AUTO: 36.7 % (ref 37–48.5)
HCT VFR BLD AUTO: 36.7 % (ref 37–48.5)
HDLC SERPL-MCNC: 35 MG/DL (ref 40–75)
HDLC SERPL: 23.3 % (ref 20–50)
HGB BLD-MCNC: 11.5 G/DL (ref 12–16)
HGB BLD-MCNC: 11.5 G/DL (ref 12–16)
IMM GRANULOCYTES # BLD AUTO: 0.02 K/UL (ref 0–0.04)
IMM GRANULOCYTES # BLD AUTO: 0.02 K/UL (ref 0–0.04)
IMM GRANULOCYTES NFR BLD AUTO: 0.3 % (ref 0–0.5)
IMM GRANULOCYTES NFR BLD AUTO: 0.3 % (ref 0–0.5)
LDLC SERPL CALC-MCNC: 91.8 MG/DL (ref 63–159)
LYMPHOCYTES # BLD AUTO: 3.1 K/UL (ref 1–4.8)
LYMPHOCYTES # BLD AUTO: 3.1 K/UL (ref 1–4.8)
LYMPHOCYTES NFR BLD: 48.4 % (ref 18–48)
LYMPHOCYTES NFR BLD: 48.4 % (ref 18–48)
MCH RBC QN AUTO: 24.9 PG (ref 27–31)
MCH RBC QN AUTO: 24.9 PG (ref 27–31)
MCHC RBC AUTO-ENTMCNC: 31.3 G/DL (ref 32–36)
MCHC RBC AUTO-ENTMCNC: 31.3 G/DL (ref 32–36)
MCV RBC AUTO: 79 FL (ref 82–98)
MCV RBC AUTO: 79 FL (ref 82–98)
MONOCYTES # BLD AUTO: 0.3 K/UL (ref 0.3–1)
MONOCYTES # BLD AUTO: 0.3 K/UL (ref 0.3–1)
MONOCYTES NFR BLD: 4.4 % (ref 4–15)
MONOCYTES NFR BLD: 4.4 % (ref 4–15)
NEUTROPHILS # BLD AUTO: 2.8 K/UL (ref 1.8–7.7)
NEUTROPHILS # BLD AUTO: 2.8 K/UL (ref 1.8–7.7)
NEUTROPHILS NFR BLD: 44.3 % (ref 38–73)
NEUTROPHILS NFR BLD: 44.3 % (ref 38–73)
NONHDLC SERPL-MCNC: 115 MG/DL
NRBC BLD-RTO: 0 /100 WBC
NRBC BLD-RTO: 0 /100 WBC
PLATELET # BLD AUTO: 292 K/UL (ref 150–350)
PLATELET # BLD AUTO: 292 K/UL (ref 150–350)
PMV BLD AUTO: 10.1 FL (ref 9.2–12.9)
PMV BLD AUTO: 10.1 FL (ref 9.2–12.9)
POTASSIUM SERPL-SCNC: 3.1 MMOL/L (ref 3.5–5.1)
POTASSIUM SERPL-SCNC: 3.1 MMOL/L (ref 3.5–5.1)
PROT SERPL-MCNC: 6.8 G/DL (ref 6–8.4)
PROT SERPL-MCNC: 6.8 G/DL (ref 6–8.4)
RBC # BLD AUTO: 4.62 M/UL (ref 4–5.4)
RBC # BLD AUTO: 4.62 M/UL (ref 4–5.4)
SODIUM SERPL-SCNC: 139 MMOL/L (ref 136–145)
SODIUM SERPL-SCNC: 139 MMOL/L (ref 136–145)
TRIGL SERPL-MCNC: 116 MG/DL (ref 30–150)
WBC # BLD AUTO: 6.32 K/UL (ref 3.9–12.7)
WBC # BLD AUTO: 6.32 K/UL (ref 3.9–12.7)

## 2019-09-12 PROCEDURE — 99999 PR PBB SHADOW E&M-EST. PATIENT-LVL IV: CPT | Mod: PBBFAC,,, | Performed by: NURSE PRACTITIONER

## 2019-09-12 PROCEDURE — 3008F PR BODY MASS INDEX (BMI) DOCUMENTED: ICD-10-PCS | Mod: CPTII,S$GLB,, | Performed by: NURSE PRACTITIONER

## 2019-09-12 PROCEDURE — 3008F BODY MASS INDEX DOCD: CPT | Mod: CPTII,S$GLB,, | Performed by: NURSE PRACTITIONER

## 2019-09-12 PROCEDURE — 83036 HEMOGLOBIN GLYCOSYLATED A1C: CPT

## 2019-09-12 PROCEDURE — 80061 LIPID PANEL: CPT

## 2019-09-12 PROCEDURE — 80053 COMPREHEN METABOLIC PANEL: CPT

## 2019-09-12 PROCEDURE — 99214 PR OFFICE/OUTPT VISIT, EST, LEVL IV, 30-39 MIN: ICD-10-PCS | Mod: S$GLB,,, | Performed by: NURSE PRACTITIONER

## 2019-09-12 PROCEDURE — 99214 OFFICE O/P EST MOD 30 MIN: CPT | Mod: S$GLB,,, | Performed by: NURSE PRACTITIONER

## 2019-09-12 PROCEDURE — 85025 COMPLETE CBC W/AUTO DIFF WBC: CPT

## 2019-09-12 PROCEDURE — 36415 COLL VENOUS BLD VENIPUNCTURE: CPT | Mod: PO

## 2019-09-12 PROCEDURE — 99999 PR PBB SHADOW E&M-EST. PATIENT-LVL IV: ICD-10-PCS | Mod: PBBFAC,,, | Performed by: NURSE PRACTITIONER

## 2019-09-12 NOTE — PROGRESS NOTES
Subjective:       Patient ID: Agustina Tillman is a 34 y.o. female.    Chief Complaint: Weight Loss    Patient is a 34  year old female known to me who presented to clinic to discuss a referral to bariatrics for weight loss surgery. Patient has no other complaints. She states she has tried dieting and exercise but she is not able to lose the weight.     Past Medical History:   Diagnosis Date    Diabetes mellitus     PCOS (polycystic ovarian syndrome) 5/18/2017     History reviewed. No pertinent surgical history.   Social History     Socioeconomic History    Marital status: Single     Spouse name: Not on file    Number of children: Not on file    Years of education: Not on file    Highest education level: Not on file   Occupational History    Not on file   Social Needs    Financial resource strain: Not on file    Food insecurity:     Worry: Not on file     Inability: Not on file    Transportation needs:     Medical: Not on file     Non-medical: Not on file   Tobacco Use    Smoking status: Never Smoker    Smokeless tobacco: Never Used   Substance and Sexual Activity    Alcohol use: Yes    Drug use: No    Sexual activity: Yes     Partners: Male   Lifestyle    Physical activity:     Days per week: Not on file     Minutes per session: Not on file    Stress: Not on file   Relationships    Social connections:     Talks on phone: Not on file     Gets together: Not on file     Attends Zoroastrian service: Not on file     Active member of club or organization: Not on file     Attends meetings of clubs or organizations: Not on file     Relationship status: Not on file   Other Topics Concern    Not on file   Social History Narrative    Not on file     Family History   Problem Relation Age of Onset    Lung cancer Father     Hypertension Mother      Review of Systems   Constitutional: Negative for chills, diaphoresis, fatigue and fever.   HENT: Negative for congestion, nosebleeds, postnasal drip,  rhinorrhea, sinus pressure and sneezing.    Respiratory: Negative for cough, chest tightness, shortness of breath and wheezing.    Cardiovascular: Negative for chest pain, palpitations and leg swelling.   Gastrointestinal: Negative for abdominal pain, constipation, diarrhea, nausea and vomiting.   Genitourinary: Negative for dysuria, vaginal discharge and vaginal pain.   Musculoskeletal: Negative for arthralgias, back pain and myalgias.   Skin: Negative for color change and rash.   Neurological: Negative for dizziness, weakness, light-headedness and headaches.       Objective:      Physical Exam   Constitutional: She is oriented to person, place, and time. Vital signs are normal. She appears well-developed and well-nourished.   HENT:   Head: Normocephalic and atraumatic.   Right Ear: External ear normal.   Left Ear: External ear normal.   Nose: Nose normal.   Mouth/Throat: Oropharynx is clear and moist. No oropharyngeal exudate.   Eyes: Pupils are equal, round, and reactive to light. Conjunctivae and EOM are normal.   Cardiovascular: Normal rate, regular rhythm and normal heart sounds.   Pulmonary/Chest: Effort normal and breath sounds normal.   Neurological: She is alert and oriented to person, place, and time.   Skin: Skin is warm, dry and intact. Capillary refill takes less than 2 seconds.   Psychiatric: She has a normal mood and affect.       Assessment:       1. Morbid obesity with BMI of 45.0-49.9, adult    2. Prediabetes    3. Iron deficiency anemia secondary to inadequate dietary iron intake        Plan:       Agustina was seen today for weight loss.    Diagnoses and all orders for this visit:    Morbid obesity with BMI of 45.0-49.9, adult  -     Ambulatory consult to Bariatric Medicine    Prediabetes  -     CBC auto differential; Future  -     Comprehensive metabolic panel; Future  -     Hemoglobin A1c; Future    Iron deficiency anemia secondary to inadequate dietary iron intake  Your blood count was  slightly decreased. I recommend a daily multivitamin with iron.

## 2019-09-12 NOTE — PATIENT INSTRUCTIONS
Prediabetes  You have been diagnosed with prediabetes. This means that the level of sugar (glucose) in your blood is too high. If you have prediabetes, you are at risk for developing type 2 diabetes. Type 2 diabetes is diagnosed when the level of glucose in the blood reaches a certain high level. With prediabetes, it hasnt reached this point yet, but it is higher than normal. It is vital to make lifestyle changes to lower your blood sugar, improve your health, and prevent diabetes. This sheet will tell you more.      Why worry about prediabetes?  Prediabetes is a disease where the bodys cells have trouble using glucose in the blood for energy. As a result, too much glucose stays in the blood and can affect how your heart and blood vessels work. Without changes in diet and lifestyle, the problem can get worse. Once you have type 2 diabetes, it is chronic (ongoing) and needs to be managed for the rest of your life. Diabetes can harm the body and your health by damaging organs, such as your eyes and kidneys. It makes you more likely to have heart disease. And it can damage nerves and blood vessels.  Who is a risk for prediabetes?  The exact cause of prediabetes is not clear. But certain risk factors make a person more likely to have it. These include:  · A family history of type 2 diabetes  · Being overweight  · Being over age 45  · Have hypertension or elevated cholesterol   · Having had gestational diabetes  · Not being physically active  · Being ,  American, , , , or   Diagnosing prediabetes  Prediabetes may have no symptoms or you may have some of the symptoms of diabetes. The diagnosis is made with a blood test. You may have one or more of these blood tests:   · Fasting glucose test. Blood is taken and tested after you have fasted (not eaten) for at least 8 hours. A normal test result is 99 milligrams per deciliter (mg/dL) or lower.  Prediabetes is 100 mg/dL to 125 mg/dL. Diabetes is 126 mg/dL or higher.  · Glucose tolerance test. Your blood sugar is measured before and after you drink a very sugary liquid. A normal test result is 139 milligrams per deciliter (mg/dL) or lower. Prediabetes is 140 mg/dL to 199 mg/dL. Diabetes is 200 mg/dL or higher.  · Hemoglobin A1c (HbA1c). Your HbA1c is normal if it is below 5.7%. Prediabetes is 5.7% to 6.4%. Diabetes is 6.5% or higher.   Treating prediabetes  The best way to treat prediabetes is to lose at least 5% to 7% of your current weight and be more physically active by getting at least 150 minutes a week of physical activity. When sitting for long periods of time, get up for short sessions of light activity every 30 minutes. These changes help the bodys cells use blood sugar better. Even a small amount of weight loss can help. Work with your healthcare provider to make a plan to eat well and be more active. Keep in mind that small changes can add up. Other changes in your lifestyle (or even taking certain medicines, such as metformin) may make you less likely to develop diabetes. Your healthcare provider can talk with you about these.  Follow-up  If it is untreated, prediabetes can turn into diabetes. This is a serious health condition. Take steps to stop this from happening. Follow the treatment plan you have been given. You may have your blood glucose tested again in about 12 to 18 months.  Symptoms of diabetes  Let your healthcare provider know if you have any of the following:  · Always feel very tired  · Feel very thirsty or hungry much of the time  · Have to urinate often  · Lose weight for no reason  · Feel numbness or tingling in your fingers or toes  · Have cuts or bruises that dont seem to heal  · Have blurry vision   Date Last Reviewed: 5/1/2016  © 6361-6105 [employer]. [address] [place] [address], [place] [address]. All rights reserved. This information is not intended as a  substitute for professional medical care. Always follow your healthcare professional's instructions.

## 2019-09-13 NOTE — TELEPHONE ENCOUNTER
Patient says she tried calling to set up appointment with the provider Chhaya ross, says she only does medicine and not the surgery.    Patient says she was also told for insurance purposes have to specify what you referring her for and that is the only way her insurance company will cover it.     Thanks,  Alexander

## 2019-09-16 ENCOUNTER — TELEPHONE (OUTPATIENT)
Dept: FAMILY MEDICINE | Facility: CLINIC | Age: 34
End: 2019-09-16

## 2019-09-16 NOTE — TELEPHONE ENCOUNTER
----- Message from Aracely Francisco sent at 9/16/2019 10:28 AM CDT -----  Contact: Patient   Patient is looking for a sooner appointment and she is willing to go the [facility]    [phone]

## 2019-09-20 ENCOUNTER — OFFICE VISIT (OUTPATIENT)
Dept: FAMILY MEDICINE | Facility: CLINIC | Age: 34
End: 2019-09-20
Payer: COMMERCIAL

## 2019-09-20 VITALS
HEART RATE: 75 BPM | BODY MASS INDEX: 48.34 KG/M2 | DIASTOLIC BLOOD PRESSURE: 64 MMHG | HEIGHT: 65 IN | TEMPERATURE: 99 F | WEIGHT: 290.13 LBS | OXYGEN SATURATION: 97 % | RESPIRATION RATE: 16 BRPM | SYSTOLIC BLOOD PRESSURE: 82 MMHG

## 2019-09-20 DIAGNOSIS — A08.4 VIRAL GASTROENTERITIS: Primary | ICD-10-CM

## 2019-09-20 DIAGNOSIS — Z23 NEED FOR INFLUENZA VACCINATION: ICD-10-CM

## 2019-09-20 PROCEDURE — 90686 FLU VACCINE (QUAD) GREATER THAN OR EQUAL TO 3YO PRESERVATIVE FREE IM: ICD-10-PCS | Mod: S$GLB,,, | Performed by: NURSE PRACTITIONER

## 2019-09-20 PROCEDURE — 99214 OFFICE O/P EST MOD 30 MIN: CPT | Mod: 25,S$GLB,, | Performed by: NURSE PRACTITIONER

## 2019-09-20 PROCEDURE — 90471 IMMUNIZATION ADMIN: CPT | Mod: S$GLB,,, | Performed by: NURSE PRACTITIONER

## 2019-09-20 PROCEDURE — 99214 PR OFFICE/OUTPT VISIT, EST, LEVL IV, 30-39 MIN: ICD-10-PCS | Mod: 25,S$GLB,, | Performed by: NURSE PRACTITIONER

## 2019-09-20 PROCEDURE — 3008F PR BODY MASS INDEX (BMI) DOCUMENTED: ICD-10-PCS | Mod: CPTII,S$GLB,, | Performed by: NURSE PRACTITIONER

## 2019-09-20 PROCEDURE — 90686 IIV4 VACC NO PRSV 0.5 ML IM: CPT | Mod: S$GLB,,, | Performed by: NURSE PRACTITIONER

## 2019-09-20 PROCEDURE — 99999 PR PBB SHADOW E&M-EST. PATIENT-LVL IV: CPT | Mod: PBBFAC,,, | Performed by: NURSE PRACTITIONER

## 2019-09-20 PROCEDURE — 90471 FLU VACCINE (QUAD) GREATER THAN OR EQUAL TO 3YO PRESERVATIVE FREE IM: ICD-10-PCS | Mod: S$GLB,,, | Performed by: NURSE PRACTITIONER

## 2019-09-20 PROCEDURE — 99999 PR PBB SHADOW E&M-EST. PATIENT-LVL IV: ICD-10-PCS | Mod: PBBFAC,,, | Performed by: NURSE PRACTITIONER

## 2019-09-20 PROCEDURE — 3008F BODY MASS INDEX DOCD: CPT | Mod: CPTII,S$GLB,, | Performed by: NURSE PRACTITIONER

## 2019-09-20 RX ORDER — LOPERAMIDE HYDROCHLORIDE 2 MG/1
2 CAPSULE ORAL 4 TIMES DAILY PRN
Qty: 30 CAPSULE | Refills: 0 | Status: SHIPPED | OUTPATIENT
Start: 2019-09-20 | End: 2019-09-30

## 2019-09-20 RX ORDER — ONDANSETRON 8 MG/1
8 TABLET, ORALLY DISINTEGRATING ORAL EVERY 8 HOURS PRN
Qty: 30 TABLET | Refills: 0 | Status: SHIPPED | OUTPATIENT
Start: 2019-09-20 | End: 2019-09-20 | Stop reason: DRUGHIGH

## 2019-09-20 RX ORDER — ONDANSETRON HYDROCHLORIDE 8 MG/1
8 TABLET, FILM COATED ORAL EVERY 8 HOURS PRN
Qty: 30 TABLET | Refills: 0 | Status: SHIPPED | OUTPATIENT
Start: 2019-09-20 | End: 2019-10-08

## 2019-09-20 RX ORDER — ONDANSETRON 4 MG/1
8 TABLET, ORALLY DISINTEGRATING ORAL ONCE
Status: COMPLETED | OUTPATIENT
Start: 2019-09-20 | End: 2019-09-20

## 2019-09-20 RX ORDER — ONDANSETRON 4 MG/1
8 TABLET, FILM COATED ORAL ONCE
Status: DISCONTINUED | OUTPATIENT
Start: 2019-09-20 | End: 2019-09-20

## 2019-09-20 RX ORDER — DICYCLOMINE HYDROCHLORIDE 20 MG/1
20 TABLET ORAL
Qty: 28 TABLET | Refills: 0 | Status: SHIPPED | OUTPATIENT
Start: 2019-09-20 | End: 2019-09-27

## 2019-09-20 RX ADMIN — ONDANSETRON 8 MG: 4 TABLET, ORALLY DISINTEGRATING ORAL at 01:09

## 2019-09-20 NOTE — PATIENT INSTRUCTIONS

## 2019-09-20 NOTE — PROGRESS NOTES
Patient tolerated vaccination well. Advised patient to wait 15 mins after shot. Verbalized understanding.  Patient tolerated GI cocktail.

## 2019-09-20 NOTE — LETTER
September 20, 2019      [facility]  [address]  [place] [address]  Phone: [phone]  Fax: [phone]       Patient: Agustina Tillman   YOB: 1985  Date of Visit: 09/20/2019    To Whom It May Concern:    Evelyn Tillman  was at [facility] on 09/20/2019. She may return to work on 9/22/2019 with no restrictions. If you have any questions or concerns, or if I can be of further assistance, please do not hesitate to contact me.    Sincerely,    Sarah Lewis, DNP

## 2019-09-20 NOTE — PROGRESS NOTES
Subjective:        Chief Complaint  Chief Complaint   Patient presents with    Abdominal Pain     Started yesterday     Emesis    Nausea    Diarrhea       HPI  Agustina Tillman is a 34 y.o. female with multiple medical diagnoses as listed in the medical history and problem list that presents for N/V/D with abd cramping since yesterday.  Patient is new to me. Diarrhea x1 that started yesterday, with nausea and vomiting, and generalized abdominal cramping was able to rest and the symptoms resolved. This morning, diarrhea x4, vomiting x1 with abdominal cramping, which has resolved for the last 3-4 hours. Has been taking bentyl and zofran without significant relief. Right now, she feels nauseous. Denies abdominal pain or diarrhea/needing to go to the restroom at this time. Was seen in the ED on 9/8/2019 for similar symptoms, but with increased diarrhea, those symptoms resolved after 2-3 days.           PAST MEDICAL HISTORY:  Past Medical History:   Diagnosis Date    Diabetes mellitus     PCOS (polycystic ovarian syndrome) 5/18/2017       PAST SURGICAL HISTORY:  History reviewed. No pertinent surgical history.    SOCIAL HISTORY:  Social History     Socioeconomic History    Marital status: Single     Spouse name: Not on file    Number of children: Not on file    Years of education: Not on file    Highest education level: Not on file   Occupational History    Not on file   Social Needs    Financial resource strain: Not on file    Food insecurity:     Worry: Not on file     Inability: Not on file    Transportation needs:     Medical: Not on file     Non-medical: Not on file   Tobacco Use    Smoking status: Never Smoker    Smokeless tobacco: Never Used   Substance and Sexual Activity    Alcohol use: Yes    Drug use: No    Sexual activity: Yes     Partners: Male   Lifestyle    Physical activity:     Days per week: Not on file     Minutes per session: Not on file    Stress: Not on file    Relationships    Social connections:     Talks on phone: Not on file     Gets together: Not on file     Attends Yazdanism service: Not on file     Active member of club or organization: Not on file     Attends meetings of clubs or organizations: Not on file     Relationship status: Not on file   Other Topics Concern    Not on file   Social History Narrative    Not on file       FAMILY HISTORY:  Family History   Problem Relation Age of Onset    Lung cancer Father     Hypertension Mother        ALLERGIES AND MEDICATIONS: updated and reviewed.  Review of patient's allergies indicates:  No Known Allergies  Current Outpatient Medications   Medication Sig Dispense Refill    cetirizine (ZYRTEC) 10 MG tablet Take 1 tablet (10 mg total) by mouth daily as needed (congestion). 30 tablet 0    dicyclomine (BENTYL) 20 mg tablet Take 1 tablet (20 mg total) by mouth 4 (four) times daily before meals and nightly. for 7 days 28 tablet 0    loperamide (IMODIUM) 2 mg capsule Take 1 capsule (2 mg total) by mouth 4 (four) times daily as needed for Diarrhea. 30 capsule 0    ondansetron (ZOFRAN) 8 MG tablet Take 1 tablet (8 mg total) by mouth every 8 (eight) hours as needed for Nausea. 30 tablet 0     Current Facility-Administered Medications   Medication Dose Route Frequency Provider Last Rate Last Dose    ondansetron tablet 8 mg  8 mg Oral Once Sarah Lewis, NICANOR             ROS  Review of Systems   Constitutional: Negative for chills and fever.   Respiratory: Negative for shortness of breath.    Cardiovascular: Negative for chest pain.   Gastrointestinal: Positive for abdominal distention, abdominal pain, diarrhea, nausea and vomiting. Negative for anal bleeding, blood in stool, constipation and rectal pain.   Neurological: Negative for dizziness and headaches.   Psychiatric/Behavioral: Negative for behavioral problems and confusion.         Objective:     Physical Exam  Vitals:    09/20/19 1320   BP: (!) 82/64  "  BP Location: Right arm   Patient Position: Sitting   BP Method: Thigh Cuff (Manual)   Pulse: 75   Resp: 16   Temp: 99.1 °F (37.3 °C)   TempSrc: Oral   SpO2: 97%   Weight: 131.6 kg (290 lb 2 oz)   Height: 5' 5" (1.651 m)    Body mass index is 48.28 kg/m².  Weight: 131.6 kg (290 lb 2 oz)   Height: 5' 5" (165.1 cm)   Physical Exam   Constitutional: She appears well-developed. No distress.   HENT:   Head: Normocephalic and atraumatic.   Mouth/Throat: Oropharynx is clear and moist.   Eyes: Pupils are equal, round, and reactive to light. Conjunctivae and EOM are normal.   Neck: Normal range of motion. Neck supple. No thyromegaly present.   Cardiovascular: Normal rate, normal heart sounds and intact distal pulses.   No murmur heard.  Pulmonary/Chest: Effort normal and breath sounds normal.   Abdominal: Soft. Normal appearance and bowel sounds are normal. There is generalized tenderness. There is no rigidity, no rebound, no guarding, no CVA tenderness, no tenderness at McBurney's point and negative Blunt's sign.   Musculoskeletal: She exhibits no edema or deformity.   Lymphadenopathy:     She has no cervical adenopathy.   Neurological: She is alert. No cranial nerve deficit.   Skin: Skin is warm and dry.   Psychiatric: She has a normal mood and affect. Her behavior is normal.   Vitals reviewed.      Assessment:     1. Viral gastroenteritis    2. Need for influenza vaccination      Plan:     Agustina was seen today for abdominal pain, emesis, nausea and diarrhea.    Diagnoses and all orders for this visit:    Viral gastroenteritis  -     dicyclomine (BENTYL) 20 mg tablet; Take 1 tablet (20 mg total) by mouth 4 (four) times daily before meals and nightly. for 7 days  -     loperamide (IMODIUM) 2 mg capsule; Take 1 capsule (2 mg total) by mouth 4 (four) times daily as needed for Diarrhea.  -     ondansetron (ZOFRAN) 8 MG tablet; Take 1 tablet (8 mg total) by mouth every 8 (eight) hours as needed for Nausea.  -     (pyxis) gi " cocktail (mylanta 30 mL, lidocaine 2 % viscous 10 mL, dicyclomine 10 mL) 50 mL - administered  -     ondansetron disintegrating tablet 8 mg - administered  -     Encouraged steady water intake  -     Discussed strict RTC precautions - fever, increased abd pain, vomiting, diarrhea    Need for influenza vaccination  -     Influenza - Quadrivalent (3 years & older) (PF)  -     Counseled regarding seasonal influenza vaccination - administered      Health Maintenance       Date Due Completion Date    TETANUS VACCINE 06/20/2005 ---    Influenza Vaccine (1) 09/01/2019 ---    Pap Smear with HPV Cotest 05/18/2020 5/18/2017          Health Maintenance reviewed, addressed as per orders    Follow-up: Follow up if symptoms worsen or fail to improve.    The patient expressed understanding and no barriers to adherence were identified.     1. The patient indicates understanding of these issues and agrees with the plan. Brief care plan is updated and reviewed with the patient as applicable.     2. The patient is given an After Visit Summary that lists all medications with directions, allergies, orders placed during this encounter and follow-up instructions.     3. I have reviewed the patient's medical information including past medical, family, and social history sections including the medications and allergies.     4. We discussed the patient's current medications. I reconciled the patient's medication list and prepared and supplied needed refills.       Sarah Lewis, DNP, APRN, FNP-c  Family Medicine    My collaborating physicians are Dr. Ja Cloud and Dr. John Gaston

## 2019-10-08 ENCOUNTER — OFFICE VISIT (OUTPATIENT)
Dept: FAMILY MEDICINE | Facility: CLINIC | Age: 34
End: 2019-10-08
Payer: COMMERCIAL

## 2019-10-08 VITALS
BODY MASS INDEX: 48.19 KG/M2 | HEART RATE: 79 BPM | TEMPERATURE: 99 F | DIASTOLIC BLOOD PRESSURE: 80 MMHG | HEIGHT: 65 IN | SYSTOLIC BLOOD PRESSURE: 120 MMHG | OXYGEN SATURATION: 97 % | WEIGHT: 289.25 LBS

## 2019-10-08 DIAGNOSIS — E28.2 PCOS (POLYCYSTIC OVARIAN SYNDROME): Primary | ICD-10-CM

## 2019-10-08 DIAGNOSIS — R73.03 PREDIABETES: ICD-10-CM

## 2019-10-08 DIAGNOSIS — E66.01 MORBID OBESITY WITH BMI OF 45.0-49.9, ADULT: ICD-10-CM

## 2019-10-08 PROCEDURE — 99215 PR OFFICE/OUTPT VISIT, EST, LEVL V, 40-54 MIN: ICD-10-PCS | Mod: S$GLB,,, | Performed by: FAMILY MEDICINE

## 2019-10-08 PROCEDURE — 99215 OFFICE O/P EST HI 40 MIN: CPT | Mod: S$GLB,,, | Performed by: FAMILY MEDICINE

## 2019-10-08 PROCEDURE — 99999 PR PBB SHADOW E&M-EST. PATIENT-LVL IV: CPT | Mod: PBBFAC,,, | Performed by: FAMILY MEDICINE

## 2019-10-08 PROCEDURE — 3008F BODY MASS INDEX DOCD: CPT | Mod: CPTII,S$GLB,, | Performed by: FAMILY MEDICINE

## 2019-10-08 PROCEDURE — 3008F PR BODY MASS INDEX (BMI) DOCUMENTED: ICD-10-PCS | Mod: CPTII,S$GLB,, | Performed by: FAMILY MEDICINE

## 2019-10-08 PROCEDURE — 99999 PR PBB SHADOW E&M-EST. PATIENT-LVL IV: ICD-10-PCS | Mod: PBBFAC,,, | Performed by: FAMILY MEDICINE

## 2019-10-08 NOTE — PROGRESS NOTES
Routine Office Visit    Patient Name: Agustina Tillman    : 1985  MRN: [id number]    Subjective:  Agustina is a 34 y.o. female who presents today for:    1. Weight management - struggling with weight since 18 years. She is interested in bariatric surgery. She would like to speak with bariatric surgery to start the process. She states she has watched the video which had a code at the end, but when she called she was unable to get in due to her insurance. Patient had recent GI concerns and was advised to stop her metformin which she takes for her PCOS.     Diets tried  Keto - successful but made patient feel sick. Patient felt fatigue   Current diet - eating more whole unprocessed foods.  No bread, brown rice. No sugary drinks. No sodas.   Weight loss medications tried  adipex - helped, but Patient gained weight back. Patient was on keto diet at the time. Lost 23#    24 hours recall  Breakfast - banana and boiled egg x 2   Lunch - chicken salad from [employer]  Snack - peanut butter and crackers   Dinner - baked chicken and broccoli and cheese  Breakfast - boiled eggs x 2 and strawberry banana smoothie.     Exercise - 3 times per week - cardio exercises x 45 minutes. Going to start with a  today.       Answers for HPI/ROS submitted by the patient on 10/1/2019   activity change: Yes  unexpected weight change: No  rhinorrhea: No  trouble swallowing: No  visual disturbance: No  chest tightness: No  polyuria: No  difficulty urinating: No  menstrual problem: Yes  joint swelling: No  arthralgias: No  confusion: No  dysphoric mood: No    Review of Systems   Constitutional: Negative for chills and fever.   HENT: Negative for congestion and hearing loss.    Eyes: Negative for blurred vision and discharge.   Respiratory: Negative for cough and wheezing.    Cardiovascular: Negative for chest pain and palpitations.   Gastrointestinal: Negative for abdominal pain, blood in stool, constipation, diarrhea, heartburn,  nausea and vomiting.   Genitourinary: Negative for dysuria and hematuria.   Musculoskeletal: Negative for myalgias and neck pain.   Skin: Negative for itching and rash.   Neurological: Negative for dizziness, weakness and headaches.   Endo/Heme/Allergies: Negative for polydipsia.   Psychiatric/Behavioral: Negative for depression.       Active Problem List  Patient Active Problem List   Diagnosis    PCOS (polycystic ovarian syndrome)    Female fertility problems    Morbid obesity with BMI of 45.0-49.9, adult    Vitamin D deficiency    Prediabetes    Iron deficiency anemia secondary to inadequate dietary iron intake       Past Surgical History  History reviewed. No pertinent surgical history.    Family History  Family History   Problem Relation Age of Onset    Lung cancer Father     Hypertension Mother        Social History  Social History     Socioeconomic History    Marital status: Single     Spouse name: Not on file    Number of children: Not on file    Years of education: Not on file    Highest education level: Not on file   Occupational History    Not on file   Social Needs    Financial resource strain: Not hard at all    Food insecurity:     Worry: Never true     Inability: Never true    Transportation needs:     Medical: No     Non-medical: No   Tobacco Use    Smoking status: Never Smoker    Smokeless tobacco: Never Used   Substance and Sexual Activity    Alcohol use: Yes     Frequency: Monthly or less     Drinks per session: 1 or 2     Binge frequency: Never    Drug use: No    Sexual activity: Yes     Partners: Male   Lifestyle    Physical activity:     Days per week: 3 days     Minutes per session: 90 min    Stress: Not at all   Relationships    Social connections:     Talks on phone: More than three times a week     Gets together: More than three times a week     Attends Yazdanism service: Not on file     Active member of club or organization: No     Attends meetings of clubs or  "organizations: Never     Relationship status: Living with partner   Other Topics Concern    Not on file   Social History Narrative    Not on file       Medications and Allergies  Reviewed and updated.   Current Outpatient Medications   Medication Sig    phentermine (LOMAIRA) 8 mg Tab Take 1 each by mouth 2 (two) times daily.     No current facility-administered medications for this visit.        Physical Exam  /80 (BP Location: Right arm, Patient Position: Sitting, BP Method: Large (Manual))   Pulse 79   Temp 98.6 °F (37 °C) (Oral)   Ht 5' 5" (1.651 m)   Wt 131.2 kg (289 lb 3.9 oz)   SpO2 97%   BMI 48.13 kg/m²   Physical Exam   Constitutional: She is oriented to person, place, and time. She appears well-developed and well-nourished.   HENT:   Head: Normocephalic and atraumatic.   Eyes: Pupils are equal, round, and reactive to light. Conjunctivae and EOM are normal.   Neck: Normal range of motion. Neck supple.   Cardiovascular: Normal rate, regular rhythm and normal heart sounds. Exam reveals no gallop and no friction rub.   No murmur heard.  Pulmonary/Chest: Breath sounds normal. No respiratory distress.   Abdominal: Soft. Bowel sounds are normal. She exhibits no distension. There is no tenderness.   Musculoskeletal: Normal range of motion.   Lymphadenopathy:     She has no cervical adenopathy.   Neurological: She is alert and oriented to person, place, and time.   Skin: Skin is warm.   Psychiatric: She has a normal mood and affect.         Assessment/Plan:  Agustina Tillman is a 34 y.o. female who presents today for :    Problem List Items Addressed This Visit        Endocrine    Morbid obesity with BMI of 45.0-49.9, adult    Relevant Medications    phentermine (LOMAIRA) 8 mg Tab  Common side effects of this medication were discussed with the patient. Questions regarding medications were discussed during this visit.   Diet and exercise planning discussed.  Decrease portion control and " carbohydrate consumption.  Recommend 30 minutes of moderate intensity exercise 5 days/week.      Other Relevant Orders    Ambulatory Referral to Bariatric Surgery ([facility])    PCOS (polycystic ovarian syndrome) - Primary  Consider restarting metformin for pcos symptoms       Prediabetes  The patient is asked to make an attempt to improve diet and exercise patterns to aid in medical management of this problem.              Follow up in about 4 weeks (around 11/5/2019), or if symptoms worsen or fail to improve.

## 2019-10-25 ENCOUNTER — PATIENT OUTREACH (OUTPATIENT)
Dept: ADMINISTRATIVE | Facility: HOSPITAL | Age: 34
End: 2019-10-25

## 2019-11-01 ENCOUNTER — PATIENT OUTREACH (OUTPATIENT)
Dept: ADMINISTRATIVE | Facility: OTHER | Age: 34
End: 2019-11-01

## 2019-11-06 ENCOUNTER — HOSPITAL ENCOUNTER (OUTPATIENT)
Dept: CARDIOLOGY | Facility: CLINIC | Age: 34
Discharge: HOME | End: 2019-11-06
Payer: COMMERCIAL

## 2019-11-06 ENCOUNTER — CLINICAL SUPPORT (OUTPATIENT)
Dept: BARIATRICS | Facility: CLINIC | Age: 34
End: 2019-11-06
Payer: COMMERCIAL

## 2019-11-06 ENCOUNTER — INITIAL CONSULT (OUTPATIENT)
Dept: BARIATRICS | Facility: CLINIC | Age: 34
End: 2019-11-06
Payer: COMMERCIAL

## 2019-11-06 VITALS
WEIGHT: 293 LBS | HEART RATE: 83 BPM | DIASTOLIC BLOOD PRESSURE: 65 MMHG | BODY MASS INDEX: 48.82 KG/M2 | HEIGHT: 65 IN | SYSTOLIC BLOOD PRESSURE: 134 MMHG

## 2019-11-06 VITALS — HEIGHT: 65 IN | WEIGHT: 293 LBS | BODY MASS INDEX: 48.82 KG/M2

## 2019-11-06 DIAGNOSIS — E28.2 PCOS (POLYCYSTIC OVARIAN SYNDROME): ICD-10-CM

## 2019-11-06 DIAGNOSIS — E66.01 MORBID OBESITY WITH BMI OF 45.0-49.9, ADULT: ICD-10-CM

## 2019-11-06 DIAGNOSIS — R73.03 PREDIABETES: ICD-10-CM

## 2019-11-06 DIAGNOSIS — E66.01 MORBID OBESITY WITH BMI OF 45.0-49.9, ADULT: Primary | ICD-10-CM

## 2019-11-06 PROCEDURE — 99999 PR PBB SHADOW E&M-EST. PATIENT-LVL III: ICD-10-PCS | Mod: PBBFAC,,, | Performed by: NURSE PRACTITIONER

## 2019-11-06 PROCEDURE — 3008F PR BODY MASS INDEX (BMI) DOCUMENTED: ICD-10-PCS | Mod: CPTII,S$GLB,, | Performed by: NURSE PRACTITIONER

## 2019-11-06 PROCEDURE — 99499 UNLISTED E&M SERVICE: CPT | Mod: S$GLB,,, | Performed by: DIETITIAN, REGISTERED

## 2019-11-06 PROCEDURE — 93005 ELECTROCARDIOGRAM TRACING: CPT | Mod: S$GLB,,, | Performed by: NURSE PRACTITIONER

## 2019-11-06 PROCEDURE — 93010 EKG 12-LEAD: ICD-10-PCS | Mod: S$GLB,,, | Performed by: INTERNAL MEDICINE

## 2019-11-06 PROCEDURE — 93005 EKG 12-LEAD: ICD-10-PCS | Mod: S$GLB,,, | Performed by: NURSE PRACTITIONER

## 2019-11-06 PROCEDURE — 99999 PR PBB SHADOW E&M-EST. PATIENT-LVL II: ICD-10-PCS | Mod: PBBFAC,,, | Performed by: DIETITIAN, REGISTERED

## 2019-11-06 PROCEDURE — 99499 NO LOS: ICD-10-PCS | Mod: S$GLB,,, | Performed by: DIETITIAN, REGISTERED

## 2019-11-06 PROCEDURE — 99999 PR PBB SHADOW E&M-EST. PATIENT-LVL III: CPT | Mod: PBBFAC,,, | Performed by: NURSE PRACTITIONER

## 2019-11-06 PROCEDURE — 99999 PR PBB SHADOW E&M-EST. PATIENT-LVL II: CPT | Mod: PBBFAC,,, | Performed by: DIETITIAN, REGISTERED

## 2019-11-06 PROCEDURE — 99215 PR OFFICE/OUTPT VISIT, EST, LEVL V, 40-54 MIN: ICD-10-PCS | Mod: S$GLB,,, | Performed by: NURSE PRACTITIONER

## 2019-11-06 PROCEDURE — 93010 ELECTROCARDIOGRAM REPORT: CPT | Mod: S$GLB,,, | Performed by: INTERNAL MEDICINE

## 2019-11-06 PROCEDURE — 99215 OFFICE O/P EST HI 40 MIN: CPT | Mod: S$GLB,,, | Performed by: NURSE PRACTITIONER

## 2019-11-06 PROCEDURE — 3008F BODY MASS INDEX DOCD: CPT | Mod: CPTII,S$GLB,, | Performed by: NURSE PRACTITIONER

## 2019-11-06 NOTE — PATIENT INSTRUCTIONS
Bariatric Diet Grocery List      High in Protein:   ? Canned tuna or chicken (packed in water)  ? Lean ground turkey breast or ground round  ? Turkey or chicken (no skin)  ? Lean pork or beef   ? Scrambled, poached, or boiled eggs  ? Baked or broiled fish and seafood (not fried!)  ? Beans, edamame and lentils  ? Low fat deli meats: turkey, chicken, ham, roast beef  ? 1% or Skim Milk, Lactaid, or Soymilk  ? Low-fat cheese, cottage cheese, mozzarella string cheese, ricotta  ? Light yogurt, FF/SF frozen yogurt, custard, SF pudding  ? Protein drinks and protein bars with 0-4 grams sugar     Fruits, Vegetables and Snacks   - Green beans, broccoli, cauliflower, spinach, asparagus, carrots, lima beans, yellow squash, zucchini, etc.  - Apple, pineapple, peach, grapes, banana, watermelon, oranges, etc.  - Fruit canned in its own juice or in water (not in syrup)  - Raw veggies  - Lettuce: dark greens like spinach and Omega  - Unsalted Nuts  - Vitor Links beef jerky     Fluids:   Skim/1% milk, Lactaid, Soymilk  Sugar-free beverages  (decaf and non-carbonated)  Decaf coffee & decaf tea   Water     1200- 1500 calorie Sample meal plan   80-120g protein per day.   Protein drinks and bars: 0-4 grams sugar   Drink lots of water throughout the day and exercise!   MENU # 1   Breakfast: 2 eggs, 1 turkey sausage alma, 1 apple   Snack: P3 protein pack  Lunch: 2 roll-ups (sliced turkey, low-fat sliced cheese, mustard), 12 baby carrots dipped in 1 Tbsp natural peanut butter   Mid-Day Snack: ¼ cup unsalted almonds, ½ cup fruit   Dinner: 1 chicken thigh simmered in tomato sauce + 2 Tbsp mozzarella cheese, ½ cup black beans, 1/2 cup steamed carrots   Evening Snack: 1/2 cup grapes with 4 cubes low-fat cheddar cheese   ___________________________________________________   MENU # 2   Breakfast: 200 calorie protein drink   Mid-morning snack : ¼ cup unsalted nuts, medium banana   Lunch: 3oz tuna or chicken salad made with 2 tbsp light shields, over  salad with tomatoes and cucumbers.   Snack: low-fat cheese stick   Dinner: 3oz hamburger alma, slice of low-fat cheese, 1 cup yellow squash and zucchini sauteed in nonstick cookspray  Snack: 6oz light yogurt   _______________________________________________________   Menu #3   Breakfast: 6oz plain Greek yogurt + fruit (½ banana OR ½ cup fruit - pineapple, blueberries, strawberries, peach), may add Splenda to mariella.   Lunch: Grilled chicken breast w/ slice low-fat pepper roxanna cheese, 1/2 cup grilled/baked asparagus and small salad with Salad Spritzer.   Mid-Day snack: 200 calorie protein drink   Dinner: 4oz Grilled fish, ½ cup white beans, ½ cup cooked spinach   Evening Snack: fudgsicle no-sugar-added   Menu # 4   Breakfast: 1 scoop vanilla protein powder + 4oz skim milk + 4oz coffee   Snack: Pure protein bar   Lunch: 2 Lettuce tacos: 3oz seasoned ground turkey wrapped in a Omega lettuce leaves with 1 Tbsp shredded cheese and dollop low-fat sour cream   Snack: ½ cup cottage cheese, ½ cup pineapple chunks   Dinner: Shrimp omelet: 2 eggs, ½ cup shrimp, green onions, and shredded cheese   ______________________________________________________   Menu #5   Breakfast: 1 cup low-fat cottage cheese, ½ cup peaches (no sugar added)   Snack: 1 apple, 4 cubes cheese   Lunch: 3oz baked pork chop, 1 cup okra and tomato stew   Snack: 1/2 cup black beans + salsa + dollop light sour cream   Dinner: Caprese chicken salad: 3 oz chicken breast, 1oz fresh mozzarella, sliced tomato, 1 Tbsp olive oil, basil   Snack: sugar-free popsicle   _______________________________________________________  Menu #6   Breakfast: ½ cup part-skim ricotta cheese, 2 Tbsp sugar-free strawberry preserves, sprinkle of slivered almonds   Snack: 1 orange + string cheese  Lunch: 3 oz canned chicken, 1oz shredded cheddar cheese, ½ cup black beans, 2 Tbsp salsa   Snack: 200 calorie Protein drink   Dinner: 4 oz grilled salmon steak, over mixed green salad with 2  tbsp light dressing   _______________________________________________________  Menu #7  Breakfast: crust-less quiche (bake 1 egg mixed w/ low fat cheese, broccoli and low sodium ham in a muffin cup until cooked inside)  Snack: 1 light yogurt  Lunch: protein shake (1 scoop powder + 8 oz skim milk, blended w/ ice)  Snack: small apple w/ 2 tbsp PB2 (powdered peanut butter)  Dinner: 2 Turkey meatballs w/ low sugar marinara sauce, 1/2 cup spiralized zucchini (sauteed on stove top w/ nonstick cookspray)    Healthy Eating on the go ~ Pre and Post-Surgery     (*) Means this meal is appropriate for pre-surgery consumption because it is >30g of protein per meal. Post-surgery, may want to split meal in half or save a small portion for a snack.     Brendas Kitchen  Item  Calories  Protein (g)   Mediterranean Lamb [place]  350 22   *Chicken Kabobs 290 41   *Alba Kabobs 330 40   Shrimp Kabobs 170 23   *Steak Kabobs 490 42   *Cauliflower Rice Bowl- chicken (salmon, lamb)  490 41   Mediterranean Chicken 260 34   *Protein Power Plate 520 41   Side items: Greek side salad, fruit salad, roasted vegetables, baked falafel       Villedas   Item  Calories Protein (g)   Artisan Grilled Chicken [place], no bun  <380 36   *[place] Ranch Grilled Chicken Salad, no dressing <320 42   Double Hamburger, no bun <440 25   Side items: apple slices, side salad       Naimas   Item Calories Protein (g)   Grilled Chicken [place], no bun  <370 34   [place] Chicken Salad, half size, no dressing 320  22   Apple Pecan Chicken Salad, half size, no dressing  340 20   Large Chili 250 21   Side items: garden or caesar side salad (no croutons), apple bites       Burger Dmitriy   Item Calories Protein (g)   Grilled Chicken [place], no bun <470 37   Whopper Jr., no bun <310 13   Double Hamburger, no bun  <390 23   *Chicken Garden Salad, no croutons, use ½ packet of dressing  <520 40   Side items: garden side salad,         Chick-conor-A  Item Calories Protein (g)    Grilled Chicken [place], no bun  <310 29   Grilled Nuggets, 8 count 140 25   Grilled Chicken Market Salad with light balsamic dressing 330 28   Small Chicken Tortilla Soup, no tortilla strips 340 23   Side items: side salad, superfood side salad, carrot raisin salad, fruit cup,          Sonic  Item Calories Protein (g)   Jr. Carias, no bun  <330 15   Classic Grilled Chicken [place], no bun <490 31   Hearty Palmer, no fritos 140 10       Arturs   Item Calories Protein (g)   Blackened Chicken Tenders, 3 piece 170 26   Side items: green beans             [facility]   Item Calories Protein (g)   Unwich: any sandwich made wrapped in lettuce instead of bread. Ask for no shields.      Original Roast Beef [place] 260 16   [place] Breast Unwich 230 14   Perfect [place]ic Unwich 340 22   Ham and Provolone Unwich 350 19   Tuna Salad [place] 280 11   The Veggie Unwich 410 17   Side items: dill pickle          Chipotle  Item Calories Protein (g)   *Salad with your choice of meat, beans, fresh tomato salsa, fajita veggies, and guacamole (NO rice) ~375 ~40        Applebees  Item Calories Protein (g)   Grilled Chicken Breast 290 38   Urdu Shrimp Salad, no wonton strips, use ½ dressing <390 26   Blackened Shrimp Caesar Salad, no croutons, use ½ dressing  <660 25   *Grilled Chicken Caesar Salad, no croutons, use ½ dressing <770 47   [place] with Maple Mustard Glaze 350 37   Side Grilled Shrimp Skewer 110 27   Side items: steamed broccoli, garlicky green beans,               [employer]  Item Calories Protein (g)   *Spinach & Mushroom Omelette, no hollandaise sauce  <890 46   *Garden Omelette 840 46   Egg White Vegetable Omelette 380 29   *Grilled Chicken & Veggie Salad, use ½ dressing  <680 38     Olive Garden   Item Calories Protein (g)   *Herb-Grilled [place] 460 45   House salad with, no croutons. (Add grilled chicken or shrimp) <400 25   Side items: steamed broccoli       Walk Ons   Tuna Tini 410 30   Venison Palmer- Bowl 330 14    Chicken Thomas Pecan Salad      Side items: seasonal fruit, broccoli, green beans side salad

## 2019-11-06 NOTE — PROGRESS NOTES
BARIATRIC NEW PATIENT EVALUATION    CHIEF COMPLAINT:   Morbid Obesity Body mass index is 49.16 kg/m². and difficulty with weight loss.     HISTORY OF PRESENT ILLNESS:  Agustina Tillman  is a 34 y.o. female presenting for morbid obesity Body mass index is 49.16 kg/m². and difficulty with weight loss. Before gaining weight at age of [age] weighed 160 lbs. Normally at 286 lbs Dieting attempts include Keto diet, low carb diet, tried Adipex with Dr. Araujo- gave her shakes so she now uses lower dosage of phentermine Lomaira, which has worked well. Lot 18 pounds with low card diet, which she regained. Highest weight is today.     Psych history- negative.    CT A/P 9/8/2019 during ED visit for diarrhea. Deemed enteritis.  Impression       Nonvisualization of the appendix.  No CT findings of acute appendicitis.    Fluid prominence of the small bowel, suggestive of enteritis.    Innumerable lymph nodes in the abdomen and pelvis.  The findings may represent some component of mesenteric adenitis.  Suggest clinical correlation.    Poor definition of the bilateral adnexa.  Pelvic ultrasound may be obtained for further evaluation.         PAST MEDICAL HISTORY:  Past Medical History:   Diagnosis Date    Allergy 2016    Sinus    Diabetes mellitus     Iron deficiency anemia secondary to inadequate dietary iron intake     PCOS (polycystic ovarian syndrome) 5/18/2017    Seizures 1995    I use to have epilepsy. But they have julieta away         PAST SURGICAL HISTORY:  History reviewed. No pertinent surgical history.    FAMILY HISTORY:  Family History   Problem Relation Age of Onset    Lung cancer Father     Cancer Father         Lung cancer    Hypertension Mother        SOCIAL HISTORY:  Social History     Socioeconomic History    Marital status: Single     Spouse name: Not on file    Number of children: Not on file    Years of education: Not on file    Highest education level: Not on file   Occupational History     Not on file   Social Needs    Financial resource strain: Not hard at all    Food insecurity:     Worry: Never true     Inability: Never true    Transportation needs:     Medical: No     Non-medical: No   Tobacco Use    Smoking status: Never Smoker    Smokeless tobacco: Never Used   Substance and Sexual Activity    Alcohol use: Yes     Alcohol/week: 1.0 standard drinks     Types: 1 Glasses of wine per week     Frequency: Monthly or less     Drinks per session: 1 or 2     Binge frequency: Never    Drug use: Never    Sexual activity: Yes     Partners: Male   Lifestyle    Physical activity:     Days per week: 3 days     Minutes per session: 90 min    Stress: Not at all   Relationships    Social connections:     Talks on phone: More than three times a week     Gets together: More than three times a week     Attends Restoration service: Not on file     Active member of club or organization: No     Attends meetings of clubs or organizations: Never     Relationship status: Living with partner   Other Topics Concern    Not on file   Social History Narrative    Not on file       MEDICATIONS:  Current Outpatient Medications   Medication Sig Dispense Refill    phentermine (LOMAIRA) 8 mg Tab Take 1 each by mouth 2 (two) times daily. 60 each 0     No current facility-administered medications for this visit.        ALLERGIES:  Review of patient's allergies indicates:   Allergen Reactions    Metformin Other (See Comments)     Severe low blood sugar       ROS:  Review of Systems   Constitutional: Negative for chills, fever and malaise/fatigue.   Eyes: Negative for blurred vision and double vision.   Respiratory: Negative for cough, shortness of breath (no TAVAREZ with 1-2 flights of stairs) and wheezing.    Cardiovascular: Positive for leg swelling (left ankle, mild, x 2 months. reports PCP attributed to weight). Negative for chest pain and palpitations.   Gastrointestinal: Positive for heartburn (with red gravy only so she  "no longer eats). Negative for abdominal pain, blood in stool, constipation, diarrhea, melena, nausea and vomiting.   Genitourinary: Negative for dysuria, frequency, hematuria and urgency.   Musculoskeletal: Positive for back pain. Negative for joint pain, myalgias and neck pain.   Neurological: Negative for dizziness, tingling and headaches.   Psychiatric/Behavioral: Negative for depression, substance abuse and suicidal ideas. The patient is not nervous/anxious.        PE:  Vitals:    11/06/19 1311   BP: 134/65   Pulse: 83   Weight: 134 kg (295 lb 6.7 oz)   Height: 5' 5" (1.651 m)       Physical Exam   Constitutional: She is oriented to person, place, and time. She appears well-developed and well-nourished. No distress.   Morbidly obese   HENT:   Head: Normocephalic and atraumatic.   Eyes: Conjunctivae are normal. Right eye exhibits no discharge. Left eye exhibits no discharge. No scleral icterus.   Neck: Neck supple.   Cardiovascular: Normal rate, regular rhythm, normal heart sounds and intact distal pulses. Exam reveals no gallop and no friction rub.   No murmur heard.  Pulmonary/Chest: Effort normal and breath sounds normal. No respiratory distress. She has no wheezes. She has no rales.   Abdominal: Soft. Bowel sounds are normal. She exhibits no distension and no mass. There is no tenderness. There is no rebound and no guarding. No hernia.   Musculoskeletal: Normal range of motion.   Mild swelling left ankle, no pitting   Neurological: She is alert and oriented to person, place, and time.   Skin: Skin is warm, dry and intact. No rash noted. She is not diaphoretic. No erythema. No pallor.   Psychiatric: She has a normal mood and affect. Her speech is normal and behavior is normal. Judgment and thought content normal.   Nursing note and vitals reviewed.       DIAGNOSIS:  1. Morbid Obesity with Body mass index is 49.16 kg/m². and difficulty with weight loss.  2. Co-morbidities: prediabetes, PCOS, JOSH    PLAN:  The " patient is a potential candidate for Bariatric Surgery. she is interested in sleeve gastrectomy with Dr. Umaña. The surgery and post-op care were discussed in detail with the patient. All questions were answered.    she understands that bariatric surgery is a tool to aid in weight loss and that she needs to be committed to the diet and exercise post-operatively for successful weight loss.  Discussed expected weight loss outcomes after surgery which is 50% of the excess weight on her frame. Discussed with patient that bariatric surgery is not the easy way out and that it will take plenty of dedication on the patient's part to be successful. Also discussed the possibility of weight regain if the patient strays from the diet guidelines or exercise requirements. Patient verbalized understanding and wishes to proceed with the work-up.    ORDERS:  1. EKG  CXR 9/2019 reviewed  2. Psychological Consult and Bariatric Dietician Consult  3. Bariatric Labs: BMP, CBC, Folate Serum, H. pylori, HgA1C, Hepatic Panel/LFT, Iron & TIBC, Lipid Profile, Magnesium, Phosphate, T3, T4, TSH, Free T4, Vitamin B12, and Vitamin B1.  4. Short form MMPI  5. Discussed the possibility of hyper-fertility surrounding 2 week liquid diet before and after surgery: advised patient to use 2 forms of birth control during this time period to avoid conception.  Patient verbalized understanding and will discuss options with her GYN.    Primary Physician: John Gaston MD  RTC: As scheduled.    60 minute visit, over 50% of time spent counseling patient face to face on surgical options, risks, benefits, expected diet, recommended exercise regimen, and expected weight loss.

## 2019-11-06 NOTE — PATIENT INSTRUCTIONS
Prior to surgery you will need to complete:  1. Chest X-Ray and EKG  2. Psychological Consult and Bariatric Dietician Consult, and clearance  3. Bariatric Labs: BMP, CBC, Folate Serum, H. pylori, HgA1C, Hepatic Panel/LFT, Iron & TIBC, Lipid Profile, Magnesium, Phosphate, T3, T4, TSH, Free T4, Vitamin B12, and Vitamin B1.  4. Discussed the possibility of hyper-fertility surrounding 2 week liquid diet before and after surgery: advised patient to use 2 forms of birth control during this time period to avoid conception.  Patient verbalized understanding and will discuss options with her GYN.    In preparation for bariatric surgery, please complete the following:   · Discuss your current medications with your primary care provider, remember your medications will need to be crushed, chewable, or in liquid form for the first 2-4 weeks after a gastric bypass or sleeve.  For a gastric band, your medications will need to be crushed indefinitely.    · If you take a blood thinner such as: Coumadin (warfarin), Pradaxa (dabigatran), or Plavix (clopidogrel), you will need to speak with your prescribing provider on how or if this medication can be stopped before surgery.   · If you take a medication for depression or anxiety, you will need to begin crushing or opening the capsule 1-3 months prior to surgery.  Remember to discuss this with the psychologist or psychiatrist that you see.   · If you take medication for arthritis on a daily basis that is considered a non-steroidal anti-inflammatory (NSAID), please discuss with your prescribing physician an alternative medication.  After having gastric bypass or gastric sleeve, this group of medications is not appropriate to take due to increased risk of bleeding stomach ulcers.      DEFINITIONS  Appointments: Pre-scheduled meetings or consultations with any physician, advanced practice provider, dietitian, or psychologist, and labs, imaging studies, sleep studies, etc.   Late  cancellation: Cancelling an appointment 24-48 hours prior to scheduled time.  No-Show appointment:  is when    You do NOT arrive to your appointment at the time its scheduled.   You call to cancel or cancel via [facility] less than 24 hours in advance of your scheduled appointment   You show up 15 minutes AFTER your scheduled appointment time without any notification of being late.     POLICY  1. You are allowed up to 3 cancellations for appointments.    After 3 cancellations your case will be placed on hold for 2 months and after that time you can resume the program.   2. You are allowed only 1 no-show for an appointment.    You will be re-scheduled one time and if there is a 2nd no-show at any point, your case will be placed on hold for 3 months.  After 3 months you can resume the program.     3. Upon resuming the program after being placed on hold for either above mentioned reasons, if you have a late cancel or no show for any appointment, the bariatric team will review if youre an appropriate candidate for surgery at the monthly meeting.

## 2019-11-06 NOTE — PROGRESS NOTES
"NUTRITIONAL CONSULT    Referring Physician: Cj Umaña MD  Reason for MNT Referral: Initial assessment for sleeve gastrectomy work-up    PAST MEDICAL HISTORY:   34 y.o. female  Body mass index is 49.05 kg/m²..  Weight history includes highest wt is current weight.  Before gaining weight at age of [age] weighed 160 lbs. Normally at 286 lbs  Dieting attempts include Keto diet, low carb diet - brown rice, tried Adipex with Dr. Araujo- gave her shakes, has a low dosage of phentermine Lomaira  Stopped fast food  Past Medical History:   Diagnosis Date    Allergy 2016    Sinus    Diabetes mellitus     Iron deficiency anemia secondary to inadequate dietary iron intake     PCOS (polycystic ovarian syndrome) 2017    Seizures     I use to have epilepsy. But they have julieta away       CLINICAL DATA:  34 y.o.-year-old Black or  female.  Height: 5'5"  Weight: 295.5 lbs  IBW: 141 lbs  BMI: 49.16  The patient's goal weight (50% EBW): 218  lbs  Personal goal weight: not a number more about healthy lifestyle having back pain and swelling in feet and diagnosed with PCOS    Goal for Bariatric Surgery: to improve health, to improve quality of life and to prevent future medical conditions    DAILY NUTRITIONAL NEEDS:  1465 Calories (using [place] [address] Jeor Equation  BMR =2054 x AF 1.2= 2465 -1000 calories for wt loss)  77-96 Grams Protein    NUTRITION & HEALTH HISTORY:  Greatest challenge: portion control and skipping meals    Current diet recall:   Breakfast  Premier protein shake  Lunch skips at time, baked chicken or salad or cafeteria not healthy  Dinner: Red beans and rice, stew with chicken and brown gravy  Snacks: baked sweet potato or protein bar Fiber One    Current Diet:  Meal pattern: 2 meals- high protein low carb low fat and 1 protein shake and one snack  Protein supplements: premier protein shakes daily  Snackin-1/ day  Vegetables: Likes a variety. Eats 2-3 times per week.  Fruits: " Likes a variety. Eats almost daily.  Beverages: water, sweet tea and no milk  Dining out: Monthly. Mostly restaurants.  Cooking at home: Weekly. mostly every dayMostly baked, grilled and searing with olive oil meat, fish, vegetables and whole grain side at times or cauliflower rice.    Exercise:  Past exercise: None    Current exercise: Adequate  Joined gym goal of 3 times per week- has gone twice this week  Restrictions to exercise: back hurts    Vitamins / Minerals / Herbs:   none      Labs:   Low Vitamin D and saturated iron    Food Allergies:   None reported    Social:  Works regular daytime shifts.  [address]  Lives with boyfriend  Grocery shopping and food prep self  Patient believes the household will be supportive after surgery.  Alcohol: one drink every other month. last time she had a drink was 4th of July.  Smoking: None.    ASSESSMENT:  · Patient reports attempts at weight loss, only to regain lost weight.  · Patient demonstrated knowledge of healthy eating behaviors and exercise patterns; admits to not eating healthy and not exercising at this point.  · Patient demonstrates willingness to change lifestyle and make behavior modifications as evidenced by good exercise, dietary changes, including protein drinks, increased vegetables, reduced dining out, better choices when dining out, more food preparation at [employer], healthier cooking at home, bringing snacks to work, healthier snacking at work and healthier snacking at home.    Insurance requires 0 medically supervised diet prior to consideration for bariatric surgery.    Barriers to Education: none    Stage of change: action    NUTRITION DIAGNOSIS:    Obesity related to Physical inactivity as evidence by BMI.    BARIATRIC DIET DISCUSSION/PLAN:  Discussed diet after surgery and related to patient's food record.  Reviewed nutrition guidelines for before and after surgery.  Answered all questions.  5-6 meals per day.  Start shopping for bariatric  vitamins & minerals.    RECOMMENDATIONS:  Patient is a good candidate for bariatric surgery.      Patient verbalized understanding.    Expect good  compliance after surgery at this time.    Communicated nutrition plan with bariatric team.    SESSION TIME:  60 minutes

## 2019-11-06 NOTE — LETTER
[facility] - Bariatric Surgery  [address]  [facility] [address]  Phone: [phone]  Fax: [phone] November 6, 2019      John Gaston MD  [address]  [facility] [address]    Patient: Agustina Tillman   MR Number: [id number]   YOB: 1985   Date of Visit: 11/6/2019     Dear Dr. John Gaston:    Thank you for referring Agustina Tillman to me for evaluation. Attached you will find relevant portions of my assessment and plan of care.    DIAGNOSIS:  1. Morbid Obesity with Body mass index is 49.16 kg/m². and difficulty with weight loss.  2. Co-morbidities: prediabetes, PCOS, JOSH     PLAN:  The patient is a potential candidate for bariatric surgery.  She is interested in sleeve gastrectomy with Dr. Umaña. The surgery and post-op care were discussed in detail with the patient. All questions were answered.     S understands that bariatric surgery is a tool to aid in weight loss and that she needs to be committed to the diet and exercise post-operatively for successful weight loss.  We discussed expected weight loss outcomes after surgery which is 50% of the excess weight on her frame. It was discussed with the patient that bariatric surgery is not the easy way out and that it will take plenty of dedication on the patient's part to be successful. We also discussed the possibility of weight regain if the patient strays from the diet guidelines or exercise requirements. The patient verbalized understanding and wishes to proceed with the work-up.     ORDERS:  1. EKG  CXR 9/2019 reviewed  2. Psychological Consult and Bariatric Dietician Consult  3. Bariatric Labs: BMP, CBC, Folate Serum, H. pylori, HgA1C, Hepatic Panel/LFT, Iron & TIBC, Lipid Profile, Magnesium, Phosphate, T3, T4, TSH, Free T4, Vitamin B12, and Vitamin B1.  4. Short form MMPI  5. Discussed the possibility of hyper-fertility surrounding 2 week liquid diet before and after surgery: advised patient to use 2  forms of birth control during this time period to avoid conception.  Patient verbalized understanding and will discuss options with her GYN.     60 minute visit, over 50% of time spent counseling patient face to face on surgical options, risks, benefits, expected diet, recommended exercise regimen, and expected weight loss.    If you have questions, please do not hesitate to call me. I look forward to following Agustina Tillman along with you.    Sincerely,    OTILIA Gupta  Section of Bariatric Surgery  Department of Surgery  [facility]    NF/bryan

## 2019-11-07 ENCOUNTER — LAB VISIT (OUTPATIENT)
Dept: LAB | Facility: HOSPITAL | Age: 34
End: 2019-11-07
Attending: NURSE PRACTITIONER
Payer: COMMERCIAL

## 2019-11-07 DIAGNOSIS — E55.9 VITAMIN D DEFICIENCY: Primary | ICD-10-CM

## 2019-11-07 DIAGNOSIS — E66.01 MORBID OBESITY WITH BMI OF 45.0-49.9, ADULT: ICD-10-CM

## 2019-11-07 DIAGNOSIS — R73.03 PREDIABETES: ICD-10-CM

## 2019-11-07 DIAGNOSIS — E28.2 PCOS (POLYCYSTIC OVARIAN SYNDROME): ICD-10-CM

## 2019-11-07 LAB
25(OH)D3+25(OH)D2 SERPL-MCNC: 9 NG/ML (ref 30–96)
ALBUMIN SERPL BCP-MCNC: 3.5 G/DL (ref 3.5–5.2)
ALP SERPL-CCNC: 91 U/L (ref 55–135)
ALT SERPL W/O P-5'-P-CCNC: 13 U/L (ref 10–44)
ANION GAP SERPL CALC-SCNC: 8 MMOL/L (ref 8–16)
AST SERPL-CCNC: 16 U/L (ref 10–40)
BASOPHILS # BLD AUTO: 0.06 K/UL (ref 0–0.2)
BASOPHILS NFR BLD: 0.9 % (ref 0–1.9)
BILIRUB DIRECT SERPL-MCNC: 0.2 MG/DL (ref 0.1–0.3)
BILIRUB SERPL-MCNC: 0.4 MG/DL (ref 0.1–1)
BUN SERPL-MCNC: 9 MG/DL (ref 6–20)
CALCIUM SERPL-MCNC: 9 MG/DL (ref 8.7–10.5)
CHLORIDE SERPL-SCNC: 108 MMOL/L (ref 95–110)
CHOLEST SERPL-MCNC: 163 MG/DL (ref 120–199)
CHOLEST/HDLC SERPL: 3.5 {RATIO} (ref 2–5)
CO2 SERPL-SCNC: 27 MMOL/L (ref 23–29)
CREAT SERPL-MCNC: 0.9 MG/DL (ref 0.5–1.4)
DIFFERENTIAL METHOD: ABNORMAL
EOSINOPHIL # BLD AUTO: 0.2 K/UL (ref 0–0.5)
EOSINOPHIL NFR BLD: 2.8 % (ref 0–8)
ERYTHROCYTE [DISTWIDTH] IN BLOOD BY AUTOMATED COUNT: 14.5 % (ref 11.5–14.5)
EST. GFR  (AFRICAN AMERICAN): >60 ML/MIN/1.73 M^2
EST. GFR  (NON AFRICAN AMERICAN): >60 ML/MIN/1.73 M^2
ESTIMATED AVG GLUCOSE: 103 MG/DL (ref 68–131)
FOLATE SERPL-MCNC: 5.3 NG/ML (ref 4–24)
GLUCOSE SERPL-MCNC: 92 MG/DL (ref 70–110)
HBA1C MFR BLD HPLC: 5.2 % (ref 4–5.6)
HCT VFR BLD AUTO: 41.5 % (ref 37–48.5)
HDLC SERPL-MCNC: 47 MG/DL (ref 40–75)
HDLC SERPL: 28.8 % (ref 20–50)
HGB BLD-MCNC: 12.5 G/DL (ref 12–16)
IMM GRANULOCYTES # BLD AUTO: 0.01 K/UL (ref 0–0.04)
IMM GRANULOCYTES NFR BLD AUTO: 0.1 % (ref 0–0.5)
IRON SERPL-MCNC: 41 UG/DL (ref 30–160)
LDLC SERPL CALC-MCNC: 96 MG/DL (ref 63–159)
LYMPHOCYTES # BLD AUTO: 2.9 K/UL (ref 1–4.8)
LYMPHOCYTES NFR BLD: 42.8 % (ref 18–48)
MAGNESIUM SERPL-MCNC: 2.2 MG/DL (ref 1.6–2.6)
MCH RBC QN AUTO: 24.8 PG (ref 27–31)
MCHC RBC AUTO-ENTMCNC: 30.1 G/DL (ref 32–36)
MCV RBC AUTO: 82 FL (ref 82–98)
MONOCYTES # BLD AUTO: 0.3 K/UL (ref 0.3–1)
MONOCYTES NFR BLD: 4.8 % (ref 4–15)
NEUTROPHILS # BLD AUTO: 3.3 K/UL (ref 1.8–7.7)
NEUTROPHILS NFR BLD: 48.6 % (ref 38–73)
NONHDLC SERPL-MCNC: 116 MG/DL
NRBC BLD-RTO: 0 /100 WBC
PHOSPHATE SERPL-MCNC: 3.2 MG/DL (ref 2.7–4.5)
PLATELET # BLD AUTO: 333 K/UL (ref 150–350)
PMV BLD AUTO: 10.2 FL (ref 9.2–12.9)
POTASSIUM SERPL-SCNC: 4 MMOL/L (ref 3.5–5.1)
PROT SERPL-MCNC: 6.9 G/DL (ref 6–8.4)
RBC # BLD AUTO: 5.04 M/UL (ref 4–5.4)
SATURATED IRON: 10 % (ref 20–50)
SODIUM SERPL-SCNC: 143 MMOL/L (ref 136–145)
T3 SERPL-MCNC: 113 NG/DL (ref 60–180)
T4 FREE SERPL-MCNC: 1.01 NG/DL (ref 0.71–1.51)
T4 SERPL-MCNC: 7.8 UG/DL (ref 4.5–11.5)
TOTAL IRON BINDING CAPACITY: 392 UG/DL (ref 250–450)
TRANSFERRIN SERPL-MCNC: 265 MG/DL (ref 200–375)
TRIGL SERPL-MCNC: 100 MG/DL (ref 30–150)
TSH SERPL DL<=0.005 MIU/L-ACNC: 1.84 UIU/ML (ref 0.4–4)
VIT B12 SERPL-MCNC: 370 PG/ML (ref 210–950)
WBC # BLD AUTO: 6.69 K/UL (ref 3.9–12.7)

## 2019-11-07 PROCEDURE — 83036 HEMOGLOBIN GLYCOSYLATED A1C: CPT

## 2019-11-07 PROCEDURE — 84425 ASSAY OF VITAMIN B-1: CPT

## 2019-11-07 PROCEDURE — 80061 LIPID PANEL: CPT

## 2019-11-07 PROCEDURE — 82607 VITAMIN B-12: CPT

## 2019-11-07 PROCEDURE — 82306 VITAMIN D 25 HYDROXY: CPT

## 2019-11-07 PROCEDURE — 82746 ASSAY OF FOLIC ACID SERUM: CPT

## 2019-11-07 PROCEDURE — 36415 COLL VENOUS BLD VENIPUNCTURE: CPT | Mod: PO

## 2019-11-07 PROCEDURE — 80048 BASIC METABOLIC PNL TOTAL CA: CPT

## 2019-11-07 PROCEDURE — 84436 ASSAY OF TOTAL THYROXINE: CPT

## 2019-11-07 PROCEDURE — 84100 ASSAY OF PHOSPHORUS: CPT

## 2019-11-07 PROCEDURE — 84443 ASSAY THYROID STIM HORMONE: CPT

## 2019-11-07 PROCEDURE — 80076 HEPATIC FUNCTION PANEL: CPT

## 2019-11-07 PROCEDURE — 84480 ASSAY TRIIODOTHYRONINE (T3): CPT

## 2019-11-07 PROCEDURE — 86677 HELICOBACTER PYLORI ANTIBODY: CPT

## 2019-11-07 PROCEDURE — 85025 COMPLETE CBC W/AUTO DIFF WBC: CPT

## 2019-11-07 PROCEDURE — 84439 ASSAY OF FREE THYROXINE: CPT

## 2019-11-07 PROCEDURE — 83735 ASSAY OF MAGNESIUM: CPT

## 2019-11-07 PROCEDURE — 83540 ASSAY OF IRON: CPT

## 2019-11-07 RX ORDER — ERGOCALCIFEROL 1.25 MG/1
50000 CAPSULE ORAL
Qty: 24 CAPSULE | Refills: 0 | Status: SHIPPED | OUTPATIENT
Start: 2019-11-07 | End: 2021-03-17

## 2019-11-09 LAB — H PYLORI IGG SERPL QL IA: NEGATIVE

## 2019-11-11 LAB — VIT B1 BLD-MCNC: 30 UG/L (ref 38–122)

## 2019-11-14 ENCOUNTER — TELEPHONE (OUTPATIENT)
Dept: BARIATRICS | Facility: CLINIC | Age: 34
End: 2019-11-14

## 2019-11-14 DIAGNOSIS — E66.01 MORBID OBESITY WITH BMI OF 45.0-49.9, ADULT: Primary | ICD-10-CM

## 2019-11-14 DIAGNOSIS — E51.9 VITAMIN B1 DEFICIENCY: ICD-10-CM

## 2019-11-14 NOTE — TELEPHONE ENCOUNTER
----- Message from OTILIA Gupta sent at 11/11/2019  3:41 PM CST -----  Nursing, please call patient and place her on thiamine pathway.

## 2019-11-14 NOTE — TELEPHONE ENCOUNTER
Called patient to discuss thiamine level. Patient c/o sinus headache but denies any other forms of headache, visual disturbance, memory loss, weakness or tingling of extremities. Patient states she picked up Vit D prescription and has been taking as prescribed. Educated patient to begin taking multivitamin and B complex with at least 50 mg thiamine. Patient instructed to go to ER with any neurological changes. Patient verbalized understanding.

## 2019-11-18 ENCOUNTER — TELEPHONE (OUTPATIENT)
Dept: FAMILY MEDICINE | Facility: CLINIC | Age: 34
End: 2019-11-18

## 2019-11-18 NOTE — TELEPHONE ENCOUNTER
----- Message from Ann Olivas sent at 11/18/2019 12:55 PM CST -----  Contact: pt  Type: Patient Call Back    Who called:pt    What is the request in detail:pt following up on bariatrics referral. Call pt    Can the clinic reply by [facility]?    Would the patient rather a call back or a response via [facility]? call    Best call back number:[phone]    Additional Information:

## 2019-11-26 ENCOUNTER — TELEPHONE (OUTPATIENT)
Dept: BARIATRICS | Facility: CLINIC | Age: 34
End: 2019-11-26

## 2019-11-26 ENCOUNTER — PATIENT MESSAGE (OUTPATIENT)
Dept: BARIATRICS | Facility: CLINIC | Age: 34
End: 2019-11-26

## 2019-11-26 NOTE — TELEPHONE ENCOUNTER
Called pt to discuss protein shakes and discuss if she can be cleared from nutritional standpoint.  Pt did not answer and no vm set up.  Will send msg via [facility] portal.

## 2019-12-02 ENCOUNTER — OFFICE VISIT (OUTPATIENT)
Dept: PSYCHIATRY | Facility: CLINIC | Age: 34
End: 2019-12-02
Payer: COMMERCIAL

## 2019-12-02 DIAGNOSIS — R73.03 PREDIABETES: ICD-10-CM

## 2019-12-02 DIAGNOSIS — Z01.818 PREOP EXAMINATION: Primary | ICD-10-CM

## 2019-12-02 DIAGNOSIS — E28.2 PCOS (POLYCYSTIC OVARIAN SYNDROME): ICD-10-CM

## 2019-12-02 DIAGNOSIS — E66.01 MORBID OBESITY WITH BMI OF 45.0-49.9, ADULT: ICD-10-CM

## 2019-12-02 DIAGNOSIS — D50.8 IRON DEFICIENCY ANEMIA SECONDARY TO INADEQUATE DIETARY IRON INTAKE: ICD-10-CM

## 2019-12-02 DIAGNOSIS — E66.01 MORBID OBESITY DUE TO EXCESS CALORIES: ICD-10-CM

## 2019-12-02 PROCEDURE — 90791 PR PSYCHIATRIC DIAGNOSTIC EVALUATION: ICD-10-PCS | Mod: S$GLB,,, | Performed by: PSYCHOLOGIST

## 2019-12-02 PROCEDURE — 90791 PSYCH DIAGNOSTIC EVALUATION: CPT | Mod: S$GLB,,, | Performed by: PSYCHOLOGIST

## 2019-12-02 NOTE — PROGRESS NOTES
Psychiatry Initial Visit (PhD/LCSW)   Diagnostic Interview - CPT 12638     Date: 12/2/2019    Site: [facility]     Referral source: Cj Umaña M.D.     Clinical status of patient: Outpatient     Ms. Tillman, a 34 y.o.  female, presented for initial evaluation visit. Before this evaluation was initiated, the purposes and process of the assessment and the limits of confidentiality were discussed with the patient who expressed understanding of these issues and orally consented to proceed with the evaluation.     Chief complaint/reason for encounter: Routine psychological evaluation prior to bariatric surgery.     Type of surgery sought:  sleeve gastrectomy     History of present illness:  Ms. Tillman is a 34 y.o.  female who is pursuing bariatric surgery to improve her health and quality of life.  She denied a history of psychiatric symptoms and treatment.  She denied any history of suicidality or non-suicidal self-injury.  She is currently psychologically stable, functioning well in occupational and social areas of her life, and has a good support system.  She has attempted making positive lifestyle changes in anticipation for surgery, with reported benefit.  The patient has a Body Mass Index of 49.16 kg/m² as documented by the referring provider.    Ms. Tillman has struggled with weight since four years ago.  Factors that have contributed to her weight gain over the years include:  what I was eating (fried food, fast food, seafood boil). She stated she has lost and gained weight several times of the past few years.  She has tried many weight loss methods on her own (i.e., Keto diet, low carb diet, tried Adipex) with moderate success (slowly losing the pounds), and believes that her biggest weight loss challenge is portion control and skipping meals.  She also struggled with going to the gym, but is now going to the gym regularly and going to [employer].  Her  "motivation for seeking surgery now is "a life change."  Her postsurgical goals include "just want to live a healthy lifestyle."    Ms. Tillman has met with bariatric nutritionist Dipti Wei RD, and reports that she has made the following lifestyle changes since beginning the bariatric program:  stopped drinking sweet tea (drinking water, Gatorade 0, crystal light), and drinking the protein shakes recommended.  She chose the gastric sleeve because "from the research it shows is not as chronic, seen other people have issues with the bypass."  She understands that she needs to focus on protein intake after surgery, which includes protein supplements, as well as vitamins.  She noted that she will need to stay away from fried foods, bread, white rice, sugary foods after surgery.  She reported she does not have any goal in mind in regards to pounds she hopes to lose after surgery, only focused on changing her lifestyle.  She plans to take two weeks to recover after surgery.  Her boyfriend, mother, and sister will be available to help her during recovery.    Medical History:  prediabetes, PCOS, JOSH, history of epilepsy during childhood    Current medications and drug reactions:  see medication list.    Pain: Back pain - 8 /10 is the highest, at night likely due to weight as well as work duties of standing and lifting.    Psychiatric Symptoms:  Depression:  Denied.  She denied episodes of depressed mood or depression-related anhedonia, lack of motivation, lethargy, difficulty concentrating, feelings of worthlessness or guilt, hopelessness, appetite changes, or psychomotor changes.  Based on her reports, her symptoms do not meet full criteria for Major Depressive Disorder.  Amanda/Hypomania:  Denied.  She denied periods of elevated mood or abnormally increased energy or goal-directed activity.  Anxiety:  Denied.  She denied experiencing excessive, exaggerated anxiety that was unmanageable.  Based on her reports, her " "symptoms do not meet full criteria for Generalized Anxiety Disorder.  Thoughts:  Denied delusions, hallucinations.  Suicidal thoughts/behaviors:  Denied.  Self-injury:  Denied.  Sleep:  She reported having good sleep.   Cognitive functioning:  Denied problems.    Current psychosocial stressors:  Denied.  Report of coping skills:  Aubrie, does makeup   Support system:  Mom, sister, and boyfriend   Strengths and liabilities:  Strength: Patient accepts guidance/feedback, Strength: Patient is expressive/articulate., Strength: Patient is motivated for change., Strength: Patient has positive support network., Strength: Patient has reasonable judgment., Strength: Patient is stable.    Current and past substance use:  Alcohol: Denied current use or currently not drinking at all in anticipation for surgery; denied history of abuse or dependency.   Drugs: Denied current use; denied history of abuse or dependency.  Tobacco: None.   Caffeine: None, working on cutting back in anticipation for surgery.    Current Psychiatric Treatment:  Medications:  Denied.  Psychotherapy:  Denied.    Psychiatric History:  Previous diagnosis:  Denied.  Previous hospitalizations:  Denied.  History of outpatient treatment:  Denied.  Previous suicide attempt:  Denied.  Family history of psychiatric illness:  None reported.    Trauma history:  Father  from lung cancer when she was 20 years old. She stated it was hard because she took care of him as he was dying, but she added, "over time it healed itself."     History of eating disorders:  History of bulimia:  She denied recurrent episodes of binge eating and inappropriate compensatory behaviors.   History of binge-eating episodes:  She denied eating excessive amounts of food within a discrete time period with a lack of control during eating.  She denied eating more rapidly than normal, eating until uncomfortably full, eating large amounts of food when not physically hungry, eating alone due to " "embarrassment, or negative emotions (i.e., disgusted, guilty, depressed) afterwards.    Social history (marriage, employment, etc.):  Ms. Tillman was born and raised in [place], [place] by her biological mother and father along with her older sister.  She described her childhood as "great."  She denied childhood trauma, abuse, and neglect.  She did well in school and earned a high school diploma.  She is currently as  for [employer].  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  She has never been  and has a boyfriend for the past six years.  She said the relationship is really great.  She has no children.  She currently lives with her boyfriend.  She is L[employer]-day and attends Worship every Sunday. She said she does not like people getting too close to her and avoids drama in relationships; therefore, she only has two close friends, who are from high school.  She considers her coworkers to be "associates" but gets along well with them.    Legal history: Denied.    Mental Status Exam:   General appearance:  appears stated age, neatly dressed, well groomed  Speech:  normal rate and tone  Level of cooperation:  cooperative  Thought processes:  logical, goal-directed  Mood:  euthymic  Thought content:  no illusions, no visual hallucinations, no auditory hallucinations, no delusions, no active or passive homicidal thoughts, no active or passive suicidal ideation, no obsessions, no compulsions, no violence  Affect:  appropriate  Orientation:  oriented to person, place, and date  Memory:  Recent memory:  3 of 3 objects after brief delay.    Remote memory - intact  Attention span and concentration:  spelled HOUSE forward and backwards  Fund of general knowledge: 3 of 3 recent presidents  Abstract reasoning:    Similarities: abstract.    Proverbs: abstract.  Judgment and insight: fair  Language:  intact    Diagnostic " Impression:    ICD-10-CM ICD-9-CM             1. Preop examination Z01.818 V72.84     2. Morbid obesity due to excess calories E66.01 278.01     3. Prediabetes R73.03 790.29     4. PCOS (polycystic ovarian syndrome) E28.2 256.4     5. Iron deficiency anemia secondary to inadequate dietary iron intake D50.8 280.1     6. Morbid obesity with BMI of 45.0-49.9, adult E66.01 ... 278.01 ...         Summary/Conclusion:   There are no overt psychological contraindications for proceeding with bariatric surgery.  The patient has no significant psychiatric history, and reports no current psychiatric problems or major adjustment issues.  The patient has reasonable expectations for surgery, good social support, and has already begun making appropriate lifestyle changes in anticipation for surgery. The patient has verbalized appropriate awareness and commitment to the necessary behavioral changes associated with bariatric surgery and appears willing to comply with long-term lifestyle changes. There are no recommendations for psychological treatment at this time. The patient is aware of resources available should her needs change in the future.    Recommendations:    Clear   This patient is psychologically cleared to proceed with bariatric surgery.       Length of Session:  30 minutes

## 2019-12-03 ENCOUNTER — DOCUMENTATION ONLY (OUTPATIENT)
Dept: BARIATRICS | Facility: CLINIC | Age: 34
End: 2019-12-03

## 2019-12-08 ENCOUNTER — HOSPITAL ENCOUNTER (EMERGENCY)
Facility: HOSPITAL | Age: 34
Discharge: HOME | End: 2019-12-08
Attending: EMERGENCY MEDICINE
Payer: COMMERCIAL

## 2019-12-08 VITALS
SYSTOLIC BLOOD PRESSURE: 141 MMHG | OXYGEN SATURATION: 99 % | RESPIRATION RATE: 17 BRPM | HEART RATE: 91 BPM | DIASTOLIC BLOOD PRESSURE: 82 MMHG | HEIGHT: 65 IN | BODY MASS INDEX: 47.32 KG/M2 | WEIGHT: 284 LBS | TEMPERATURE: 100 F

## 2019-12-08 DIAGNOSIS — R11.2 NON-INTRACTABLE VOMITING WITH NAUSEA, UNSPECIFIED VOMITING TYPE: Primary | ICD-10-CM

## 2019-12-08 DIAGNOSIS — R19.7 DIARRHEA, UNSPECIFIED TYPE: ICD-10-CM

## 2019-12-08 LAB
ALBUMIN SERPL-MCNC: 3.9 G/DL (ref 3.3–5.5)
ALP SERPL-CCNC: 79 U/L (ref 42–141)
B-HCG UR QL: NEGATIVE
BILIRUB SERPL-MCNC: 0.7 MG/DL (ref 0.2–1.6)
BILIRUBIN, POC UA: NEGATIVE
BLOOD, POC UA: NEGATIVE
BUN SERPL-MCNC: 12 MG/DL (ref 7–22)
CALCIUM SERPL-MCNC: 9.5 MG/DL (ref 8–10.3)
CHLORIDE SERPL-SCNC: 106 MMOL/L (ref 98–108)
CLARITY, POC UA: CLEAR
COLOR, POC UA: YELLOW
CREAT SERPL-MCNC: 1 MG/DL (ref 0.6–1.2)
CTP QC/QA: YES
CTP QC/QA: YES
GLUCOSE SERPL-MCNC: 111 MG/DL (ref 73–118)
GLUCOSE, POC UA: NEGATIVE
KETONES, POC UA: NEGATIVE
LEUKOCYTE EST, POC UA: NEGATIVE
NITRITE, POC UA: NEGATIVE
PH UR STRIP: >=9 [PH]
POC ALT (SGPT): 18 U/L (ref 10–47)
POC AST (SGOT): 25 U/L (ref 11–38)
POC MOLECULAR INFLUENZA A AGN: NEGATIVE
POC MOLECULAR INFLUENZA B AGN: NEGATIVE
POC TCO2: 27 MMOL/L (ref 18–33)
POTASSIUM BLD-SCNC: 3.9 MMOL/L (ref 3.6–5.1)
PROTEIN, POC UA: ABNORMAL
PROTEIN, POC: 7.6 G/DL (ref 6.4–8.1)
SODIUM BLD-SCNC: 145 MMOL/L (ref 128–145)
SPECIFIC GRAVITY, POC UA: 1.01
UROBILINOGEN, POC UA: 0.2 E.U./DL

## 2019-12-08 PROCEDURE — 81003 URINALYSIS AUTO W/O SCOPE: CPT | Mod: ER

## 2019-12-08 PROCEDURE — 99284 EMERGENCY DEPT VISIT MOD MDM: CPT | Mod: 25,ER

## 2019-12-08 PROCEDURE — 63600175 PHARM REV CODE 636 W HCPCS: Mod: ER | Performed by: EMERGENCY MEDICINE

## 2019-12-08 PROCEDURE — 96372 THER/PROPH/DIAG INJ SC/IM: CPT | Mod: 59,ER

## 2019-12-08 PROCEDURE — 85025 COMPLETE CBC W/AUTO DIFF WBC: CPT | Mod: ER

## 2019-12-08 PROCEDURE — 80053 COMPREHEN METABOLIC PANEL: CPT | Mod: ER

## 2019-12-08 PROCEDURE — 96365 THER/PROPH/DIAG IV INF INIT: CPT | Mod: ER

## 2019-12-08 PROCEDURE — 81025 URINE PREGNANCY TEST: CPT | Mod: ER | Performed by: EMERGENCY MEDICINE

## 2019-12-08 PROCEDURE — 87804 INFLUENZA ASSAY W/OPTIC: CPT | Mod: ER

## 2019-12-08 PROCEDURE — 96361 HYDRATE IV INFUSION ADD-ON: CPT | Mod: ER

## 2019-12-08 RX ORDER — DICYCLOMINE HYDROCHLORIDE 10 MG/ML
20 INJECTION INTRAMUSCULAR
Status: COMPLETED | OUTPATIENT
Start: 2019-12-08 | End: 2019-12-08

## 2019-12-08 RX ORDER — PROMETHAZINE HYDROCHLORIDE 25 MG/1
25 TABLET ORAL EVERY 6 HOURS PRN
Qty: 15 TABLET | Refills: 0 | Status: SHIPPED | OUTPATIENT
Start: 2019-12-08 | End: 2021-03-17

## 2019-12-08 RX ORDER — DICYCLOMINE HYDROCHLORIDE 20 MG/1
20 TABLET ORAL 3 TIMES DAILY
Qty: 9 TABLET | Refills: 0 | Status: SHIPPED | OUTPATIENT
Start: 2019-12-08 | End: 2019-12-11

## 2019-12-08 RX ADMIN — DICYCLOMINE HYDROCHLORIDE 20 MG: 20 INJECTION, SOLUTION INTRAMUSCULAR at 04:12

## 2019-12-08 RX ADMIN — PROMETHAZINE HYDROCHLORIDE 12.5 MG: 25 INJECTION INTRAMUSCULAR; INTRAVENOUS at 04:12

## 2019-12-08 RX ADMIN — SODIUM CHLORIDE 1000 ML: 0.9 INJECTION, SOLUTION INTRAVENOUS at 04:12

## 2019-12-08 NOTE — ED PROVIDER NOTES
"Encounter Date: 12/8/2019       History     Chief Complaint   Patient presents with    Vomiting     vomited  x 9 today,   vomiting clear mucous now.  clear  drank water  and that came up also.        34-year-old female with a history of diabetes and polycystic ovary syndrome presenting with chief complaint of vomiting that started this morning, and diarrhea.  Patient says she woke up around 809 this morning and had acute episode of vomiting clear emesis.  She had a total of 9 episodes of vomiting, despite taking a vomiting pill", which she had from another illness.  The vomiting was nonbloody and nonbilious.  After several hours of vomiting, she began to develop diarrhea.  The diarrhea is watery and brown.  She has crampy generalized abdominal pain. No back pain no dysuria.  No known sick contacts.  She had no symptoms yesterday.  She has been making urine and is able to pass gas  Her last period started a few days after Thanksgiving    The history is provided by the patient.     Review of patient's allergies indicates:   Allergen Reactions    Metformin Other (See Comments)     Severe low blood sugar     Past Medical History:   Diagnosis Date    Allergy 2016    Sinus    Diabetes mellitus     Iron deficiency anemia secondary to inadequate dietary iron intake     PCOS (polycystic ovarian syndrome) 5/18/2017    Seizures 1995    I use to have epilepsy. But they have julieta away     No past surgical history on file.  Family History   Problem Relation Age of Onset    Lung cancer Father     Cancer Father         Lung cancer    Hypertension Mother      Social History     Tobacco Use    Smoking status: Never Smoker    Smokeless tobacco: Never Used   Substance Use Topics    Alcohol use: Yes     Alcohol/week: 1.0 standard drinks     Types: 1 Glasses of wine per week     Frequency: Monthly or less     Drinks per session: 1 or 2     Binge frequency: Never    Drug use: Never     Review of Systems   Constitutional: " Positive for chills. Negative for fever.   HENT: Negative for congestion.    Respiratory: Negative for cough and shortness of breath.    Gastrointestinal: Positive for abdominal pain, diarrhea, nausea and vomiting. Negative for blood in stool.   Genitourinary: Negative for decreased urine volume and dysuria.   Musculoskeletal: Negative for back pain.   Neurological: Negative for headaches.   Psychiatric/Behavioral: Negative for decreased concentration.       Physical Exam     Initial Vitals [12/08/19 1546]   BP Pulse Resp Temp SpO2   131/88 83 18 100.1 °F (37.8 °C) 100 %      MAP       --         Physical Exam    Nursing note and vitals reviewed.  Constitutional: She appears well-developed and well-nourished.   HENT:   Head: Normocephalic.   Eyes: Pupils are equal, round, and reactive to light.   Neck: Normal range of motion.   Cardiovascular: Normal rate.   Pulmonary/Chest: Breath sounds normal.   Abdominal: Soft. There is no tenderness.   Musculoskeletal: Normal range of motion.   Neurological: She is alert and oriented to person, place, and time.   Skin: Skin is warm. No rash noted.   Psychiatric: She has a normal mood and affect.         ED Course   Procedures  Labs Reviewed   POCT URINALYSIS W/O SCOPE - Abnormal; Notable for the following components:       Result Value    PH, UA >=9.0 (*)     Protein, UA Trace (*)     All other components within normal limits   POCT INFLUENZA A/B MOLECULAR   POCT URINE PREGNANCY   POCT URINALYSIS W/O SCOPE   POCT CBC   POCT CMP       5:06 PM the patient has not vomited or had diarrhea while here.  She is informed of her results.  She says she feels little bit better and will try to take some sips of water or juice.  5:29 PM tolerated apple juice. Feeling better. Will call for her ride  abd no rebound to palp      Imaging Results    None                                          Clinical Impression:       ICD-10-CM ICD-9-CM   1. Non-intractable vomiting with nausea, unspecified  vomiting type R11.2 787.01   2. Diarrhea, unspecified type R19.7 787.91                             Abbie Penn MD  12/08/19 [id number]

## 2019-12-10 ENCOUNTER — TELEPHONE (OUTPATIENT)
Dept: BARIATRICS | Facility: CLINIC | Age: 34
End: 2019-12-10

## 2019-12-10 DIAGNOSIS — E51.9 VITAMIN B1 DEFICIENCY: ICD-10-CM

## 2019-12-10 DIAGNOSIS — R73.03 PREDIABETES: ICD-10-CM

## 2019-12-10 DIAGNOSIS — E66.01 MORBID OBESITY: Primary | ICD-10-CM

## 2019-12-10 NOTE — TELEPHONE ENCOUNTER
Spoke with patient and scheduled preop appts/ surgery date/ post op appts. All dates and times agreed upon. Pt aware that protein liquid diet start date is 1/3/2020.  Screened patient for history of UTI per protocol.   Discussed with patient to avoid antibiotics and elective procedures involving sedation/anesthesia within 30 days of surgery.  Patient instructed to call the bariatric clinic post op for any s/s of UTI.  Pt aware of all appts can be seen in [facility] portal at this time and appt reminders mailed to patient's address today by RN.  Office phone and fax number given to patient for any future questions/concerns.

## 2019-12-20 ENCOUNTER — DOCUMENTATION ONLY (OUTPATIENT)
Dept: BARIATRICS | Facility: CLINIC | Age: 34
End: 2019-12-20

## 2019-12-24 ENCOUNTER — TELEPHONE (OUTPATIENT)
Dept: OPHTHALMOLOGY | Facility: CLINIC | Age: 34
End: 2019-12-24

## 2019-12-30 ENCOUNTER — TELEPHONE (OUTPATIENT)
Dept: BARIATRICS | Facility: CLINIC | Age: 34
End: 2019-12-30

## 2019-12-31 ENCOUNTER — TELEPHONE (OUTPATIENT)
Dept: BARIATRICS | Facility: CLINIC | Age: 34
End: 2019-12-31

## 2019-12-31 ENCOUNTER — LAB VISIT (OUTPATIENT)
Dept: LAB | Facility: HOSPITAL | Age: 34
End: 2019-12-31
Payer: COMMERCIAL

## 2019-12-31 DIAGNOSIS — E66.01 MORBID OBESITY WITH BMI OF 45.0-49.9, ADULT: ICD-10-CM

## 2019-12-31 DIAGNOSIS — E51.9 VITAMIN B1 DEFICIENCY: ICD-10-CM

## 2019-12-31 PROCEDURE — 36415 COLL VENOUS BLD VENIPUNCTURE: CPT | Mod: PO

## 2019-12-31 PROCEDURE — 84425 ASSAY OF VITAMIN B-1: CPT

## 2019-12-31 NOTE — TELEPHONE ENCOUNTER
----- Message from Cathy Hawthorne sent at 12/31/2019  9:02 AM CST -----  Please contact patient to reschedule surgery and pre op visits. She is unable to meet the $3200 financial obligation prior to 01/17/2020 scheduled surgery.     Thanks

## 2020-01-02 ENCOUNTER — TELEPHONE (OUTPATIENT)
Dept: BARIATRICS | Facility: CLINIC | Age: 35
End: 2020-01-02

## 2020-01-02 LAB — VIT B1 BLD-MCNC: 33 UG/L (ref 38–122)

## 2020-01-02 NOTE — TELEPHONE ENCOUNTER
Phoned patient to verify that she needs to change her surgery date to 4/24/20 and moved all post op appointments.  Also reviewed the liquid diet start date and that she would be drinking the [facility] RTD protein shakes.  Messages sent to [facility] [place] schedulers, Cathy Nunez and the dieticians as well.  She will make her 8 week appointments when she is here at the 2 wk post op appts.

## 2020-01-03 ENCOUNTER — TELEPHONE (OUTPATIENT)
Dept: BARIATRICS | Facility: CLINIC | Age: 35
End: 2020-01-03

## 2020-01-03 DIAGNOSIS — E51.9 THIAMIN DEFICIENCY: Primary | ICD-10-CM

## 2020-01-03 NOTE — PROGRESS NOTES
Please call for thiamine pathway. This level is a recheck after previous call and plan. Please ensure she is taking appropriate supplementation; may need SL form. Recheck B1 order is in.

## 2020-01-03 NOTE — TELEPHONE ENCOUNTER
Called patient to discuss liquid diet and vitamins.     Patient did not answer phone.  VM  Not set up.   Spoke with Dietitian today.       ----- Message from Ethel Stiles RN sent at 12/10/2019 10:54 AM CST -----  Regarding: liquid diet  2 week liquid diet starts 1/3/20  sleeve surgery scheduled 1/17/20  preop appt scheduled 12/31/19

## 2020-01-03 NOTE — TELEPHONE ENCOUNTER
Phoned patient to update her on B1 lab results. Patient reports taking alive one a day women's multivitamin but is not taking a B complex. Advised patient to begin taking B complex with at least 50 mg of thiamine in it. Will send patient portal message per request. Repeat lab draw scheduled for one week. Patient denies neurological changes and advised to call or report to ED if symptoms occur. Patient verbalized understanding.

## 2020-01-06 ENCOUNTER — TELEPHONE (OUTPATIENT)
Dept: BARIATRICS | Facility: CLINIC | Age: 35
End: 2020-01-06

## 2020-01-06 NOTE — TELEPHONE ENCOUNTER
Spoke with pt and she has started taking super b complex with 50 mg thiamin today.  Friday lab drawn 1/10/2020 already scheduled ----- Message from OTILIA Gupta sent at 1/3/2020  9:07 AM CST -----  Please call for thiamine pathway. This level is a recheck after previous call and plan. Please ensure she is taking appropriate supplementation; may need SL form. Recheck B1 order is in.

## 2020-01-12 ENCOUNTER — HOSPITAL ENCOUNTER (EMERGENCY)
Facility: HOSPITAL | Age: 35
Discharge: HOME | End: 2020-01-12
Attending: EMERGENCY MEDICINE
Payer: COMMERCIAL

## 2020-01-12 VITALS
OXYGEN SATURATION: 100 % | HEIGHT: 65 IN | BODY MASS INDEX: 47.32 KG/M2 | DIASTOLIC BLOOD PRESSURE: 78 MMHG | SYSTOLIC BLOOD PRESSURE: 132 MMHG | TEMPERATURE: 99 F | HEART RATE: 66 BPM | RESPIRATION RATE: 16 BRPM | WEIGHT: 284 LBS
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
## 2020-01-12 DIAGNOSIS — S13.9XXA NECK SPRAIN, INITIAL ENCOUNTER: Primary | ICD-10-CM

## 2020-01-12 DIAGNOSIS — S23.9XXA THORACIC BACK SPRAIN, INITIAL ENCOUNTER: ICD-10-CM

## 2020-01-12 LAB
B-HCG UR QL: NEGATIVE
CTP QC/QA: YES

## 2020-01-12 PROCEDURE — 81025 URINE PREGNANCY TEST: CPT | Mod: ER | Performed by: NURSE PRACTITIONER

## 2020-01-12 PROCEDURE — 99284 EMERGENCY DEPT VISIT MOD MDM: CPT | Mod: 25,ER

## 2020-01-12 PROCEDURE — 96372 THER/PROPH/DIAG INJ SC/IM: CPT | Mod: ER

## 2020-01-12 PROCEDURE — 63600175 PHARM REV CODE 636 W HCPCS: Mod: ER | Performed by: EMERGENCY MEDICINE

## 2020-01-12 RX ORDER — METHOCARBAMOL 500 MG/1
1000 TABLET, FILM COATED ORAL EVERY 6 HOURS PRN
Qty: 40 TABLET | Refills: 0 | Status: SHIPPED | OUTPATIENT
Start: 2020-01-12 | End: 2020-06-17 | Stop reason: ALTCHOICE

## 2020-01-12 RX ORDER — KETOROLAC TROMETHAMINE 30 MG/ML
30 INJECTION, SOLUTION INTRAMUSCULAR; INTRAVENOUS
Status: COMPLETED | OUTPATIENT
Start: 2020-01-12 | End: 2020-01-12

## 2020-01-12 RX ORDER — DICLOFENAC SODIUM 50 MG/1
50 TABLET, DELAYED RELEASE ORAL 3 TIMES DAILY PRN
Qty: 24 TABLET | Refills: 0 | Status: SHIPPED | OUTPATIENT
Start: 2020-01-12 | End: 2021-03-17

## 2020-01-12 RX ADMIN — KETOROLAC TROMETHAMINE 30 MG: 30 INJECTION, SOLUTION INTRAMUSCULAR at 02:01

## 2020-01-12 NOTE — ED PROVIDER NOTES
Encounter Date: 1/12/2020    SCRIBE #1 NOTE: I, Lauren Deleon, am scribing for, and in the presence of,  OTILIA Linda. I have scribed the following portions of the note - Other sections scribed: HPI, ROS, PE.       History     Chief Complaint   Patient presents with    Fall     Tripped and fell yesterday in the rain. Reports bilateral posterior shoulder and middle back pain. Reports hurts to stand up straight. Denies hitting head/LOC.     This is a nontoxic 34 year old female who complains of bilateral posterior shoulder and middle back pain since yesterday. Patient reports falling in the rain yesterday. Patient states it hurts to stand straight up. She denies hitting her head and loss of consciousness.     The history is provided by the patient. No  was used.   Fall   The accident occurred yesterday. The fall occurred while walking. She fell from a height of 3 to 5 ft. She landed on a hard floor. Pain location: cervical  pain  The pain is at a severity of 7/10. Associated symptoms include back pain. Pertinent negatives include no numbness, no loss of consciousness and no tingling.     Review of patient's allergies indicates:   Allergen Reactions    Metformin Other (See Comments)     Severe low blood sugar     Past Medical History:   Diagnosis Date    Allergy 2016    Sinus    Diabetes mellitus     Iron deficiency anemia secondary to inadequate dietary iron intake     PCOS (polycystic ovarian syndrome) 5/18/2017    Seizures 1995    I use to have epilepsy. But they have julieta away     No past surgical history on file.  Family History   Problem Relation Age of Onset    Lung cancer Father     Cancer Father         Lung cancer    Hypertension Mother      Social History     Tobacco Use    Smoking status: Never Smoker    Smokeless tobacco: Never Used   Substance Use Topics    Alcohol use: Yes     Alcohol/week: 1.0 standard drinks     Types: 1 Glasses of wine per week     Frequency:  Monthly or less     Drinks per session: 1 or 2     Binge frequency: Never    Drug use: Never     Review of Systems   Constitutional: Negative.    HENT: Negative.    Eyes: Negative.    Respiratory: Negative.  Negative for shortness of breath.    Cardiovascular: Negative.  Negative for chest pain.   Gastrointestinal: Negative.    Endocrine: Negative.    Genitourinary: Negative.    Musculoskeletal: Positive for arthralgias and back pain.   Skin: Negative.    Allergic/Immunologic: Negative.    Neurological: Negative.  Negative for tingling, loss of consciousness, syncope and numbness.   Hematological: Negative.    Psychiatric/Behavioral: Negative.    All other systems reviewed and are negative.      Physical Exam     Initial Vitals [01/12/20 1242]   BP Pulse Resp Temp SpO2   137/87 65 16 98.5 °F (36.9 °C) 100 %      MAP       --         Physical Exam    Nursing note and vitals reviewed.  Constitutional: She appears well-developed.   HENT:   Right Ear: External ear normal.   Left Ear: External ear normal.   Nose: Nose normal.   Mouth/Throat: Oropharynx is clear and moist.   Eyes: Conjunctivae are normal.   Neck: Normal range of motion. Neck supple.       Cardiovascular: Normal rate, regular rhythm, normal heart sounds and intact distal pulses. Exam reveals no gallop and no friction rub.    No murmur heard.  Pulmonary/Chest: Effort normal and breath sounds normal.   Abdominal: Soft.   Musculoskeletal: Normal range of motion. She exhibits no edema or tenderness.        Back:    Neurological: She is alert and oriented to person, place, and time.   Skin: Skin is warm and dry.   Psychiatric: She has a normal mood and affect. Her behavior is normal.         ED Course   Procedures  Labs Reviewed   POCT URINE PREGNANCY          Imaging Results          X-Ray Chest PA And Lateral (Final result)  Result time 01/12/20 13:45:41    Final result by Jefferson Arvizu MD (01/12/20 13:45:41)                 Impression:      1. No acute  cardiopulmonary process.      Electronically signed by: Jefferson Arvizu MD  Date:    01/12/2020  Time:    13:45             Narrative:    EXAMINATION:  XR CHEST PA AND LATERAL    CLINICAL HISTORY:  Unspecified fall, initial encounter    TECHNIQUE:  PA and lateral views of the chest were performed.    COMPARISON:  09/08/2019    FINDINGS:  The cardiomediastinal silhouette is not enlarged.  There is no pleural effusion.  The trachea is midline.  The lungs are symmetrically expanded bilaterally without evidence of acute parenchymal process. No large focal consolidation seen.  There is no pneumothorax.  The osseous structures are remarkable for degenerative changes of the spine noting dextroscoliotic curvature at the thoracolumbar junction..                               X-Ray Cervical Spine 2 or 3 Views (Final result)  Result time 01/12/20 13:44:59    Final result by Jefferson Arvizu MD (01/12/20 13:44:59)                 Impression:      1. No acute displaced fracture or dislocation of the cervical spine.      Electronically signed by: Jefferson Arvizu MD  Date:    01/12/2020  Time:    13:44             Narrative:    EXAMINATION:  XR CERVICAL SPINE 2 OR 3 VIEWS    CLINICAL HISTORY:  fal with neck pain;    TECHNIQUE:  AP, lateral and open mouth views of the cervical spine were performed.    COMPARISON:  None.    FINDINGS:  Six views.    Lateral imaging demonstrates adequate alignment of the cervical spine without significant vertebral body height loss or disc space height loss.  The facet joints are aligned.  AP spinal alignment is unremarkable.  The lung apices are grossly clear.  The odontoid is intact.  The lateral masses of C1 are in anatomic relationship with C2.  The paraspinous and hypopharyngeal soft tissues are unremarkable.  The airway is patent.                                 Medical Decision Making:   History:   Old Medical Records: I decided to obtain old medical records.  Initial Assessment:   This is a  nontoxic 34 year old female who complains of bilateral posterior shoulder and middle back pain since yesterday. She denies hitting her head and loss of consciousness.   Differential Diagnosis:   Cervical sprain, Cervical fracture, Thoracic sprain, Muscle spasms   Clinical Tests:   Lab Tests: Ordered and Reviewed  Radiological Study: Ordered and Reviewed  ED Management:  Toradol 30 mg IM. Pain improved.  Discharged with Diclofenac and Robaxin prn.   Follow-up with PCP in 2 days.   Return ED for worsening of symptoms.            Scribe Attestation:   Scribe #1: I performed the above scribed service and the documentation accurately describes the services I performed. I attest to the accuracy of the note.    This document was produced by a scribe under my direction and in my presence. I agree with the content of the note and have made any necessary edits.     OTILIA Linda    01/12/2020 4:10 PM                      Clinical Impression:     1. Neck sprain, initial encounter    2. Fall    3. Thoracic back sprain, initial encounter                                OTILIA Archer  01/12/20 1539

## 2020-01-29 ENCOUNTER — PATIENT OUTREACH (OUTPATIENT)
Dept: ADMINISTRATIVE | Facility: OTHER | Age: 35
End: 2020-01-29

## 2020-01-29 ENCOUNTER — PATIENT MESSAGE (OUTPATIENT)
Dept: BARIATRICS | Facility: CLINIC | Age: 35
End: 2020-01-29

## 2020-01-29 NOTE — TELEPHONE ENCOUNTER
Pt called to reschedule her missed appointment for B1 and Vit D lab recheck. Scheduled tomorrow for 2:20p. Pt aware and verbalized understanding.

## 2020-01-30 ENCOUNTER — LAB VISIT (OUTPATIENT)
Dept: LAB | Facility: HOSPITAL | Age: 35
End: 2020-01-30
Attending: NURSE PRACTITIONER
Payer: COMMERCIAL

## 2020-01-30 ENCOUNTER — OFFICE VISIT (OUTPATIENT)
Dept: OBSTETRICS AND GYNECOLOGY | Facility: CLINIC | Age: 35
End: 2020-01-30
Payer: COMMERCIAL

## 2020-01-30 VITALS
SYSTOLIC BLOOD PRESSURE: 147 MMHG | DIASTOLIC BLOOD PRESSURE: 85 MMHG | BODY MASS INDEX: 45.86 KG/M2 | WEIGHT: 275.56 LBS

## 2020-01-30 DIAGNOSIS — R73.03 PREDIABETES: ICD-10-CM

## 2020-01-30 DIAGNOSIS — Z01.419 WELL WOMAN EXAM WITH ROUTINE GYNECOLOGICAL EXAM: Primary | ICD-10-CM

## 2020-01-30 DIAGNOSIS — Z87.42 HISTORY OF PCOS: ICD-10-CM

## 2020-01-30 DIAGNOSIS — Z11.3 SCREENING EXAMINATION FOR STD (SEXUALLY TRANSMITTED DISEASE): ICD-10-CM

## 2020-01-30 DIAGNOSIS — N92.6 IRREGULAR PERIODS: ICD-10-CM

## 2020-01-30 DIAGNOSIS — E51.9 THIAMIN DEFICIENCY: ICD-10-CM

## 2020-01-30 DIAGNOSIS — E51.9 VITAMIN B1 DEFICIENCY: ICD-10-CM

## 2020-01-30 DIAGNOSIS — E66.01 MORBID OBESITY: ICD-10-CM

## 2020-01-30 PROCEDURE — 3008F BODY MASS INDEX DOCD: CPT | Mod: CPTII,S$GLB,, | Performed by: OBSTETRICS & GYNECOLOGY

## 2020-01-30 PROCEDURE — 99999 PR PBB SHADOW E&M-EST. PATIENT-LVL III: ICD-10-PCS | Mod: PBBFAC,,, | Performed by: OBSTETRICS & GYNECOLOGY

## 2020-01-30 PROCEDURE — 36415 COLL VENOUS BLD VENIPUNCTURE: CPT

## 2020-01-30 PROCEDURE — 99999 PR PBB SHADOW E&M-EST. PATIENT-LVL III: CPT | Mod: PBBFAC,,, | Performed by: OBSTETRICS & GYNECOLOGY

## 2020-01-30 PROCEDURE — 3008F PR BODY MASS INDEX (BMI) DOCUMENTED: ICD-10-PCS | Mod: CPTII,S$GLB,, | Performed by: OBSTETRICS & GYNECOLOGY

## 2020-01-30 PROCEDURE — 87661 TRICHOMONAS VAGINALIS AMPLIF: CPT

## 2020-01-30 PROCEDURE — 87481 CANDIDA DNA AMP PROBE: CPT | Mod: 59

## 2020-01-30 PROCEDURE — 99395 PR PREVENTIVE VISIT,EST,18-39: ICD-10-PCS | Mod: S$GLB,,, | Performed by: OBSTETRICS & GYNECOLOGY

## 2020-01-30 PROCEDURE — 84425 ASSAY OF VITAMIN B-1: CPT

## 2020-01-30 PROCEDURE — 88175 CYTOPATH C/V AUTO FLUID REDO: CPT

## 2020-01-30 PROCEDURE — 87624 HPV HI-RISK TYP POOLED RSLT: CPT

## 2020-01-30 PROCEDURE — 82306 VITAMIN D 25 HYDROXY: CPT

## 2020-01-30 PROCEDURE — 99395 PREV VISIT EST AGE 18-39: CPT | Mod: S$GLB,,, | Performed by: OBSTETRICS & GYNECOLOGY

## 2020-01-30 PROCEDURE — 87491 CHLMYD TRACH DNA AMP PROBE: CPT

## 2020-01-30 RX ORDER — NORGESTIMATE AND ETHINYL ESTRADIOL 0.25-0.035
1 KIT ORAL DAILY
Qty: 90 TABLET | Refills: 3 | Status: SHIPPED | OUTPATIENT
Start: 2020-01-30 | End: 2020-04-14

## 2020-01-30 NOTE — PATIENT INSTRUCTIONS
Prevention Guidelines, Women Ages 18 to 39  Screening tests and vaccines are an important part of managing your health. Health counseling is essential, too. Below are guidelines for these, for women ages 18 to 39. Talk with your healthcare provider to make sure youre up-to-date on what you need.  Screening Who needs it How often   Alcohol misuse All women in this age group At routine exams   Blood pressure All women in this age group Every 2 years if your blood pressure is less than 120/80 mm Hg; yearly if your systolic blood pressure is 120 to 139 mm Hg, or your diastolic blood pressure reading is 80 to 89 mm Hg   Breast cancer All women in this age group should talk with their healthcare providers about the need for clinical breast exams (CBE)1 Clinical breast exam every 3 years1   Cervical cancer Women ages 21 and older Women between ages 21 and 29 should have a Pap test every 3 years; women between ages 30 and 65 are advised to have a Pap test plus an HPV test every 5 years   Chlamydia Sexually active women ages 24 and younger, and women at increased risk for infection Every 3 years if you're at risk or have symptoms   Depression All women in this age group At routine exams   Diabetes mellitus, type 2 Adults with no symptoms who are overweight or obese and have 1 or more other risk factors for diabetes At least every 3 years   Gonorrhea Sexually active women at increased risk for infection At routine exams   Hepatitis C Anyone at increased risk At routine exams   HIV All women At routine exams   Obesity All women in this age group At routine exams   Syphilis Women at increased risk for infection - talk with your healthcare provider At routine exams   Tuberculosis Women at increased risk for infection - talk with your healthcare provider Ask your healthcare provider   Vision All women in this age group At least 1 complete exam in your 20s, and 2 in your 30s   Vaccine2 Who needs it How often   Chickenpox  (varicella) All women in this age group who have no record of this infection or vaccine 2 doses; the second dose should be given 4 to 8 weeks after the first dose   Hepatitis A Women at increased risk for infection - talk with your healthcare provider 2 doses given at least 6 months apart   Hepatitis B Women at increased risk for infection - talk with your healthcare provider 3 doses over 6 months; second dose should be given 1 month after the first dose; the third dose should be given at least 2 months after the second dose and at least 4 months after the first dose   Haemophilus influenzae Type B (HIB) Women at increased risk for infection - talk with your healthcare provider 1 to 3 doses   Human papillomavirus (HPV) All women in this age group up to age 26 3 doses; the second dose should be given 1 to 2 months after the first dose and the third dose given 6 months after the first dose   Influenza (flu) All women in this age group Once a year   Measles, mumps, rubella (MMR) All women in this age group who have no record of these infections or vaccines 1 or 2 doses   Meningococcal Women at increased risk for infection - talk with your healthcare provider 1 or more doses   Pneumococcal conjugate vaccine (PCV13) and pneumococcal polysaccharide vaccine (PPSV23) Women at increased risk for infection - talk with your healthcare provider PCV13: 1 dose ages 19 to 65 (protects against 13 types of pneumococcal bacteria)  PPSV23: 1 to 2 doses through age 64, or 1 dose at 65 or older (protects against 23 types of pneumococcal bacteria)      Tetanus/diphtheria/pertussis (Td/Tdap) booster All women in this age group Td every 10 years, or a one-time dose of Tdap instead of a Td booster after age 18, then Td every 10 years   Counseling Who needs it How often   BRCA gene mutation testing for breast and ovarian cancer susceptibility Women with increased risk for having gene mutation When your risk is known   Breast cancer and  chemoprevention Women at high risk for breast cancer When your risk is known   Diet and exercise Women who are overweight or obese When diagnosed, and then at routine exams   Domestic violence Women at the age in which they are able to have children At routine exams   Sexually transmitted infection prevention Women who are sexually active At routine exams   Skin cancer Prevention of skin cancer in fair-skinned adults through age 24 At routine exams   Use of tobacco and the health effects it can cause All women in this age group Every visit   1According to the ACS, women ages 20 to 39 years should have a clinical breast exam (CBE) as part of their routine health exam every 3 years. Breast self-exams are an option for women starting in their 20s. But the  USPSTF does not recommend CBE.  2Those who are 18 years old and not up-to-date on their childhood vaccines should get all appropriate catch-up vaccines recommended by the CDC.  Date Last Reviewed: 8/27/2015  © 2828-2080 [employer]. [address] [place] [address]. All rights reserved. This information is not intended as a substitute for professional medical care. Always follow your healthcare professional's instructions.        Polycystic Ovary Syndrome  Polycystic ovary syndrome (PCOS) causes harmless, small cysts in the ovaries and other symptoms. PCOS is caused by certain hormones being out of balance. The word syndrome means a group of symptoms. Women with PCOS may have no periods, irregular periods, or very long periods.    Your ovaries  Women store their eggs in their ovaries. Each egg is in a capsule called a follicle. Normally during the reproductive years, one follicle grows to produce a mature egg each month. This egg is released during ovulation and the follicle dissolves.  Hormones out of balance  With polycystic ovary syndrome (PCOS), the hormones that control ovulation are out of balance. These include estrogen, progesterones,  and androgen. As a result, ovulation may not occur. Instead, the follicle stays enlarged. This is a fluid-filled sac (cyst). Over time, the ovaries fill with many small cysts. This is why they are called poly (many) cystic ovaries. In some women, the ovaries also make too much androgen.  PCOS symptoms  Women with PCOS may also have one or more of these symptoms:  · Trouble getting pregnant (fertility problems)  · Weight gain, especially around the waist   · Acne  · Hair growth on the face and other parts of the body  · Patches of thickened, velvety, darkened skin called acanthosis nigricans  Women with PCOS are also at increased risk of developing cancer of the uterine lining, diabetes, and heart disease.   Date Last Reviewed: 5/10/2015  © 8324-3177 [employer]. [address] [place] [address], [place] [address]. All rights reserved. This information is not intended as a substitute for professional medical care. Always follow your healthcare professional's instructions.

## 2020-01-30 NOTE — PROGRESS NOTES
History & Physical  Gynecology      SUBJECTIVE:     Chief Complaint: Contraception (to help cycle ); Well Woman; Vaginal Discharge; and STD CHECK       History of Present Illness:  Annual Exam-Premenopausal  Ms. Tillman is a 33 y/o female presents for annual exam. The patient reports that she was diagnosed with PCOS a few years ago due to abnormal periods. She reports that her periods have been irregular her entire life but now reports that the irregularity is worsening. She reports that she has not had a period since October 2019. She reports that she would like to start OCPs to help regulate her periods.    The patient is sexually active. GYN screening history: last pap: approximate date 2016 and was normal. The patient wears seatbelts: yes. The patient participates in regular exercise: no. Has the patient ever been transfused or tattooed?: yes, tattoos. The patient reports that there is not domestic violence in her life.      Review of patient's allergies indicates:   Allergen Reactions    Metformin Other (See Comments)     Severe low blood sugar       Past Medical History:   Diagnosis Date    Allergy 2016    Sinus    Diabetes mellitus     Iron deficiency anemia secondary to inadequate dietary iron intake     PCOS (polycystic ovarian syndrome) 5/18/2017    Seizures 1995    I use to have epilepsy. But they have julieta away     History reviewed. No pertinent surgical history.  OB History    None       Family History   Problem Relation Age of Onset    Lung cancer Father     Cancer Father         Lung cancer    Hypertension Mother      Social History     Tobacco Use    Smoking status: Never Smoker    Smokeless tobacco: Never Used   Substance Use Topics    Alcohol use: Yes     Alcohol/week: 1.0 standard drinks     Types: 1 Glasses of wine per week     Frequency: Monthly or less     Drinks per session: 1 or 2     Binge frequency: Never    Drug use: Never       Current Outpatient Medications   Medication Sig     diclofenac (VOLTAREN) 50 MG EC tablet Take 1 tablet (50 mg total) by mouth 3 (three) times daily as needed (pain). (Patient not taking: Reported on 1/30/2020)    ergocalciferol (ERGOCALCIFEROL) 50,000 unit Cap Take 1 capsule (50,000 Units total) by mouth twice a week. (Patient not taking: Reported on 1/30/2020)    methocarbamol (ROBAXIN) 500 MG Tab Take 2 tablets (1,000 mg total) by mouth every 6 (six) hours as needed (muscle spasms). (Patient not taking: Reported on 1/30/2020)    norgestimate-ethinyl estradiol (ORTHO-CYCLEN) 0.25-35 mg-mcg per tablet Take 1 tablet by mouth once daily.    phentermine (LOMAIRA) 8 mg Tab Take 1 each by mouth 2 (two) times daily. (Patient not taking: Reported on 1/30/2020)    promethazine (PHENERGAN) 25 MG tablet Take 1 tablet (25 mg total) by mouth every 6 (six) hours as needed for Nausea. (Patient not taking: Reported on 1/30/2020)     No current facility-administered medications for this visit.          Review of Systems:  Review of Systems   Constitutional: Negative for chills and fever.   Respiratory: Negative for cough and wheezing.    Cardiovascular: Negative for chest pain and palpitations.   Gastrointestinal: Negative for abdominal pain, nausea and vomiting.   Genitourinary: Positive for menstrual problem. Negative for dysuria, frequency, hematuria, pelvic pain, vaginal bleeding, vaginal discharge and vaginal pain.        OBJECTIVE:     Physical Exam:  Physical Exam   Constitutional: She is oriented to person, place, and time. She appears well-developed and well-nourished.   Cardiovascular: Normal rate.   Pulmonary/Chest: Effort normal. No respiratory distress. No breast swelling, tenderness, discharge or bleeding. Breasts are symmetrical.   Abdominal: Soft. She exhibits no distension. There is no tenderness.   Genitourinary: No breast swelling, tenderness, discharge or bleeding. Vaginal discharge found.   Genitourinary Comments: Uterus difficult to palpate     Neurological: She is alert and oriented to person, place, and time.   Skin: Skin is warm and dry.   Psychiatric: She has a normal mood and affect.   Nursing note and vitals reviewed.        ASSESSMENT:       ICD-10-CM ICD-9-CM    1. Well woman exam with routine gynecological exam Z01.419 V72.31 Liquid-Based Pap Smear, Screening      HPV High Risk Genotypes, PCR   2. History of PCOS Z87.42 V13.29 norgestimate-ethinyl estradiol (ORTHO-CYCLEN) 0.25-35 mg-mcg per tablet   3. Irregular periods N92.6 626.4 norgestimate-ethinyl estradiol (ORTHO-CYCLEN) 0.25-35 mg-mcg per tablet   4. Screening examination for STD (sexually transmitted disease) Z11.3 V74.5 HIV 1/2 Ag/Ab (4th Gen)      RPR      Hepatitis panel, acute      Vaginosis Screen by DNA Probe      C. trachomatis/N. gonorrhoeae by AMP DNA     Plan:      Agustina was seen today for contraception, well woman, vaginal discharge and std check.    Diagnoses and all orders for this visit:    Well woman exam with routine gynecological exam  -     Liquid-Based Pap Smear, Screening  -     HPV High Risk Genotypes, PCR    History of PCOS  -     norgestimate-ethinyl estradiol (ORTHO-CYCLEN) 0.25-35 mg-mcg per tablet; Take 1 tablet by mouth once daily.  - Discussed PCOS with patient using [place] Criteria. She would like OCPs at this time. Patient understands that OCPs would assist with hair growth and menstrual abnormalities. Discussed insulin resistance and cardiovascular disease that has been linked to PCOS. Discussed the most important step to take at this time is lifestyle modifications with exercise and diet modification.     Irregular periods  -     norgestimate-ethinyl estradiol (ORTHO-CYCLEN) 0.25-35 mg-mcg per tablet; Take 1 tablet by mouth once daily.  - Discussed that OCPs can cause irregular periods    Screening examination for STD (sexually transmitted disease)  -     HIV 1/2 Ag/Ab (4th Gen); Future  -     RPR; Future  -     Hepatitis panel, acute; Future  -      Vaginosis Screen by DNA Probe  -     C. trachomatis/N. gonorrhoeae by AMP DNA        Orders Placed This Encounter   Procedures    HPV High Risk Genotypes, PCR    Vaginosis Screen by DNA Probe    C. trachomatis/N. gonorrhoeae by AMP DNA    HIV 1/2 Ag/Ab (4th Gen)    RPR    Hepatitis panel, acute       Follow up in about 3 months (around 4/30/2020) for follow up irregular periods on OCPS.    Counseling time: 30 minutes    Irma Lees

## 2020-01-31 DIAGNOSIS — E55.9 VITAMIN D INSUFFICIENCY: Primary | ICD-10-CM

## 2020-01-31 LAB
25(OH)D3+25(OH)D2 SERPL-MCNC: 21 NG/ML (ref 30–96)
C TRACH DNA SPEC QL NAA+PROBE: NOT DETECTED
N GONORRHOEA DNA SPEC QL NAA+PROBE: NOT DETECTED

## 2020-02-02 DIAGNOSIS — N76.0 BV (BACTERIAL VAGINOSIS): Primary | ICD-10-CM

## 2020-02-02 DIAGNOSIS — B96.89 BV (BACTERIAL VAGINOSIS): Primary | ICD-10-CM

## 2020-02-02 LAB
BACTERIAL VAGINOSIS DNA: POSITIVE
CANDIDA GLABRATA DNA: NEGATIVE
CANDIDA KRUSEI DNA: NEGATIVE
CANDIDA RRNA VAG QL PROBE: NEGATIVE
T VAGINALIS RRNA GENITAL QL PROBE: NEGATIVE

## 2020-02-02 RX ORDER — METRONIDAZOLE 500 MG/1
500 TABLET ORAL EVERY 12 HOURS
Qty: 14 TABLET | Refills: 0 | Status: SHIPPED | OUTPATIENT
Start: 2020-02-02 | End: 2020-02-09

## 2020-02-04 LAB — VIT B1 BLD-MCNC: 55 UG/L (ref 38–122)

## 2020-02-06 ENCOUNTER — PATIENT MESSAGE (OUTPATIENT)
Dept: FAMILY MEDICINE | Facility: CLINIC | Age: 35
End: 2020-02-06

## 2020-02-06 ENCOUNTER — TELEPHONE (OUTPATIENT)
Dept: FAMILY MEDICINE | Facility: CLINIC | Age: 35
End: 2020-02-06

## 2020-02-06 ENCOUNTER — OFFICE VISIT (OUTPATIENT)
Dept: FAMILY MEDICINE | Facility: CLINIC | Age: 35
End: 2020-02-06
Payer: COMMERCIAL

## 2020-02-06 VITALS
DIASTOLIC BLOOD PRESSURE: 86 MMHG | RESPIRATION RATE: 18 BRPM | BODY MASS INDEX: 48.82 KG/M2 | HEART RATE: 68 BPM | SYSTOLIC BLOOD PRESSURE: 124 MMHG | OXYGEN SATURATION: 97 % | HEIGHT: 65 IN | TEMPERATURE: 98 F | WEIGHT: 293 LBS

## 2020-02-06 DIAGNOSIS — R73.03 PREDIABETES: ICD-10-CM

## 2020-02-06 DIAGNOSIS — J30.9 CHRONIC ALLERGIC RHINITIS: ICD-10-CM

## 2020-02-06 DIAGNOSIS — E66.01 MORBID OBESITY WITH BMI OF 45.0-49.9, ADULT: ICD-10-CM

## 2020-02-06 DIAGNOSIS — J30.9 ALLERGIC SINUSITIS: Primary | ICD-10-CM

## 2020-02-06 DIAGNOSIS — E28.2 PCOS (POLYCYSTIC OVARIAN SYNDROME): ICD-10-CM

## 2020-02-06 LAB
HPV HR 12 DNA SPEC QL NAA+PROBE: NEGATIVE
HPV16 AG SPEC QL: NEGATIVE
HPV18 DNA SPEC QL NAA+PROBE: NEGATIVE

## 2020-02-06 PROCEDURE — 99999 PR PBB SHADOW E&M-EST. PATIENT-LVL IV: CPT | Mod: PBBFAC,,, | Performed by: INTERNAL MEDICINE

## 2020-02-06 PROCEDURE — 99214 PR OFFICE/OUTPT VISIT, EST, LEVL IV, 30-39 MIN: ICD-10-PCS | Mod: S$GLB,,, | Performed by: INTERNAL MEDICINE

## 2020-02-06 PROCEDURE — 99214 OFFICE O/P EST MOD 30 MIN: CPT | Mod: S$GLB,,, | Performed by: INTERNAL MEDICINE

## 2020-02-06 PROCEDURE — 3008F PR BODY MASS INDEX (BMI) DOCUMENTED: ICD-10-PCS | Mod: CPTII,S$GLB,, | Performed by: INTERNAL MEDICINE

## 2020-02-06 PROCEDURE — 99999 PR PBB SHADOW E&M-EST. PATIENT-LVL IV: ICD-10-PCS | Mod: PBBFAC,,, | Performed by: INTERNAL MEDICINE

## 2020-02-06 PROCEDURE — 3008F BODY MASS INDEX DOCD: CPT | Mod: CPTII,S$GLB,, | Performed by: INTERNAL MEDICINE

## 2020-02-06 RX ORDER — CETIRIZINE HYDROCHLORIDE 10 MG/1
10 TABLET ORAL DAILY
Refills: 0 | COMMUNITY
Start: 2020-02-06 | End: 2021-03-16

## 2020-02-06 RX ORDER — PREDNISONE 20 MG/1
40 TABLET ORAL DAILY
Qty: 10 TABLET | Refills: 0 | Status: SHIPPED | OUTPATIENT
Start: 2020-02-06 | End: 2020-02-11

## 2020-02-06 NOTE — PROGRESS NOTES
Subjective:     Chief Complaint  Chief Complaint   Patient presents with    Sinus Problem     on and off for a few days     Dizziness    Headache    Nasal Congestion     draining     Eye Pain     pressure        HPI  Agustina Tillman is a 34 y.o. female with medical diagnoses as listed in the medical history and problem list that presents for above complaints.  Last clinic visit was 10/8/2019.      Sinus Problem   This is a new problem. The current episode started in the past 7 days. The problem has been gradually worsening since onset. There has been no fever. Associated symptoms include congestion (nasal), headaches (for 1 day) and sinus pressure. Pertinent negatives include no coughing, neck pain, sore throat or swollen glands. Chills: dizziness. (Watery eyes) Treatments tried: Sudafed, Flonase. The treatment provided mild relief.         Patient Care Team:  John Gaston MD as PCP - General (Internal Medicine)  Latosha Faulkner LPN as Licensed Practical Nurse  Latosha Faulkner LPN as Care Coordinator      PAST MEDICAL HISTORY:  Past Medical History:   Diagnosis Date    Allergy 2016    Sinus    Diabetes mellitus     Iron deficiency anemia secondary to inadequate dietary iron intake     PCOS (polycystic ovarian syndrome) 5/18/2017    Seizures 1995    I use to have epilepsy. But they have julieta away       PAST SURGICAL HISTORY:  History reviewed. No pertinent surgical history.    SOCIAL HISTORY:  Social History     Socioeconomic History    Marital status: Single     Spouse name: Not on file    Number of children: Not on file    Years of education: Not on file    Highest education level: Not on file   Occupational History    Not on file   Social Needs    Financial resource strain: Not hard at all    Food insecurity:     Worry: Never true     Inability: Never true    Transportation needs:     Medical: No     Non-medical: No   Tobacco Use    Smoking status: Never Smoker     Smokeless tobacco: Never Used   Substance and Sexual Activity    Alcohol use: Yes     Alcohol/week: 1.0 standard drinks     Types: 1 Glasses of wine per week     Frequency: Monthly or less     Drinks per session: 1 or 2     Binge frequency: Never    Drug use: Never    Sexual activity: Yes     Partners: Male   Lifestyle    Physical activity:     Days per week: 3 days     Minutes per session: 90 min    Stress: Not at all   Relationships    Social connections:     Talks on phone: More than three times a week     Gets together: More than three times a week     Attends Bahai service: Not on file     Active member of club or organization: No     Attends meetings of clubs or organizations: Never     Relationship status: Living with partner   Other Topics Concern    Not on file   Social History Narrative    Not on file       FAMILY HISTORY:  Family History   Problem Relation Age of Onset    Lung cancer Father     Cancer Father         Lung cancer    Hypertension Mother        ALLERGIES AND MEDICATIONS: updated and reviewed.  Review of patient's allergies indicates:   Allergen Reactions    Metformin Other (See Comments)     Severe low blood sugar    Sudafed pe head congestn-pain [ibuprofen-phenylephrine] Anxiety     Current Outpatient Medications   Medication Sig Dispense Refill    metroNIDAZOLE (FLAGYL) 500 MG tablet Take 1 tablet (500 mg total) by mouth every 12 (twelve) hours. for 7 days 14 tablet 0    norgestimate-ethinyl estradiol (ORTHO-CYCLEN) 0.25-35 mg-mcg per tablet Take 1 tablet by mouth once daily. 90 tablet 3    cetirizine (ZYRTEC) 10 MG tablet Take 1 tablet (10 mg total) by mouth once daily.  0    diclofenac (VOLTAREN) 50 MG EC tablet Take 1 tablet (50 mg total) by mouth 3 (three) times daily as needed (pain). (Patient not taking: Reported on 1/30/2020) 24 tablet 0    ergocalciferol (ERGOCALCIFEROL) 50,000 unit Cap Take 1 capsule (50,000 Units total) by mouth twice a week. (Patient not  "taking: Reported on 1/30/2020) 24 capsule 0    methocarbamol (ROBAXIN) 500 MG Tab Take 2 tablets (1,000 mg total) by mouth every 6 (six) hours as needed (muscle spasms). (Patient not taking: Reported on 1/30/2020) 40 tablet 0    phentermine (LOMAIRA) 8 mg Tab Take 1 each by mouth 2 (two) times daily. (Patient not taking: Reported on 1/30/2020) 60 each 0    predniSONE (DELTASONE) 20 MG tablet Take 2 tablets (40 mg total) by mouth once daily. for 5 days 10 tablet 0    promethazine (PHENERGAN) 25 MG tablet Take 1 tablet (25 mg total) by mouth every 6 (six) hours as needed for Nausea. (Patient not taking: Reported on 1/30/2020) 15 tablet 0     No current facility-administered medications for this visit.          ROS:  Review of Systems   Constitutional: Negative for activity change and unexpected weight change. Chills: dizziness.   HENT: Positive for congestion (nasal) and sinus pressure. Negative for hearing loss, rhinorrhea, sore throat and trouble swallowing.    Eyes: Negative for discharge and visual disturbance.   Respiratory: Negative for cough, chest tightness and wheezing.    Cardiovascular: Negative for chest pain and palpitations.   Gastrointestinal: Negative for blood in stool, constipation, diarrhea and vomiting.   Endocrine: Negative for polydipsia and polyuria.   Genitourinary: Positive for menstrual problem. Negative for difficulty urinating, dysuria and hematuria.   Musculoskeletal: Negative for arthralgias, joint swelling and neck pain.   Neurological: Positive for headaches (for 1 day). Negative for weakness.   Psychiatric/Behavioral: Negative for confusion and dysphoric mood.       Objective:       Physical Exam  Vitals:    02/06/20 1015   BP: 124/86   Pulse: 68   Resp: 18   Temp: 98.2 °F (36.8 °C)   SpO2: 97%   Weight: 134.6 kg (296 lb 11.8 oz)   Height: 5' 5" (1.651 m)    Body mass index is 49.38 kg/m².  Weight: 134.6 kg (296 lb 11.8 oz)   Height: 5' 5" (165.1 cm)   Physical Exam "   Constitutional: She appears well-developed and well-nourished. No distress.   HENT:   Head: Normocephalic and atraumatic.   Mouth/Throat: Oropharynx is clear and moist. No oropharyngeal exudate.   Eyes: EOM are normal. Right eye exhibits no discharge. Left eye exhibits no discharge.   Neck: Normal range of motion.   Cardiovascular: Normal rate. Exam reveals no gallop and no friction rub.   No murmur heard.  Pulmonary/Chest: Effort normal and breath sounds normal. No stridor. No respiratory distress. She has no wheezes.   Lymphadenopathy:     She has cervical adenopathy.   Neurological: She is alert. No cranial nerve deficit.   Skin: Skin is warm and dry.   Psychiatric: She has a normal mood and affect.           Assessment:     1. Allergic sinusitis    2. Chronic allergic rhinitis    3. Morbid obesity with BMI of 45.0-49.9, adult    4. PCOS (polycystic ovarian syndrome)    5. Prediabetes      Plan:     Agustina was seen today for sinus problem, dizziness, headache, nasal congestion and eye pain.    Diagnoses and all orders for this visit:    Allergic sinusitis  Chronic allergic rhinitis  Trial steroid burst for severe symptoms without accompanying signs of respiratory infection  Start daily antihistamine  -     predniSONE (DELTASONE) 20 MG tablet; Take 2 tablets (40 mg total) by mouth once daily. for 5 days  -     cetirizine (ZYRTEC) 10 MG tablet; Take 1 tablet (10 mg total) by mouth once daily.    Morbid obesity with BMI of 45.0-49.9, adult  PCOS (polycystic ovarian syndrome)  Body mass index is 49.38 kg/m².   Pending Bariatric Surgery (gastric sleeve) in April 2020 - discussed     Prediabetes  Last A1c 5.2% - resolved         Health Maintenance       Date Due Completion Date    TETANUS VACCINE 06/20/2005 ---    Pap Smear with HPV Cotest 01/30/2025 1/30/2020 (Done)    Override on 1/30/2020: Done            Health Maintenance reviewed and addressed as per orders    Follow up in about 3 months (around  5/6/2020).    The patient expressed understanding and no barriers to adherence were identified.     1. The patient indicates understanding of these issues and agrees with the plan. Brief care plan is updated and reviewed with the patient as applicable.     2. The patient is given an After Visit Summary that lists all medications with directions, allergies, orders placed during this encounter and follow-up instructions.     3. I have reviewed the patient's medical information including past medical, family, and social history sections including the medications and allergies.     4. We discussed the patient's current medications. I reconciled the patient's medication list and prepared and supplied needed refills.       John Gaston MD  Internal Medicine-Pediatrics

## 2020-02-06 NOTE — TELEPHONE ENCOUNTER
----- Message from Rosalia Riggs sent at 2/6/2020 12:17 PM CST -----  Type: Patient Call Back    Who called: pt     What is the request in detail: pt asking for a doctor's note for today's appt to excuse her from work.     Can the clinic reply by [facility]? No     Would the patient rather a call back or a response via [facility]? Call back     Best call back number:[phone]    Additional Information:

## 2020-02-06 NOTE — LETTER
February 06,2020                 [facility]  Family Medicine  [address]  [place] [address]  Phone: [phone]  Fax: [phone]   February 06,2020     Patient: Agustina Tillman   YOB: 1985   Date of Visit: 2/6/2020       To Whom it May Concern:    Agustina Tillman was seen in my clinic on 2/6/2020. She may return to work on 02/07/2020.    Please excuse her from any work missed.    If you have any questions or concerns, please don't hesitate to call.    Sincerely,         Rosalia Yoo LPN

## 2020-02-12 ENCOUNTER — PATIENT MESSAGE (OUTPATIENT)
Dept: BARIATRICS | Facility: CLINIC | Age: 35
End: 2020-02-12

## 2020-02-19 ENCOUNTER — HOSPITAL ENCOUNTER (EMERGENCY)
Facility: HOSPITAL | Age: 35
Discharge: HOME | End: 2020-02-19
Attending: EMERGENCY MEDICINE
Payer: COMMERCIAL

## 2020-02-19 VITALS
RESPIRATION RATE: 18 BRPM | OXYGEN SATURATION: 100 % | DIASTOLIC BLOOD PRESSURE: 81 MMHG | WEIGHT: 293 LBS | TEMPERATURE: 98 F | BODY MASS INDEX: 49.26 KG/M2 | HEART RATE: 76 BPM | SYSTOLIC BLOOD PRESSURE: 138 MMHG

## 2020-02-19 DIAGNOSIS — R10.32 LLQ ABDOMINAL PAIN: ICD-10-CM

## 2020-02-19 DIAGNOSIS — K52.9 AGE (ACUTE GASTROENTERITIS): ICD-10-CM

## 2020-02-19 DIAGNOSIS — R11.10 VOMITING AND DIARRHEA: Primary | ICD-10-CM

## 2020-02-19 DIAGNOSIS — R19.7 VOMITING AND DIARRHEA: Primary | ICD-10-CM

## 2020-02-19 LAB
B-HCG UR QL: NEGATIVE
BILIRUBIN, POC UA: NEGATIVE
BLOOD, POC UA: NEGATIVE
CLARITY, POC UA: ABNORMAL
COLOR, POC UA: YELLOW
CTP QC/QA: YES
GLUCOSE, POC UA: NEGATIVE
KETONES, POC UA: NEGATIVE
LEUKOCYTE EST, POC UA: NEGATIVE
NITRITE, POC UA: NEGATIVE
PH UR STRIP: 5.5 [PH]
PROTEIN, POC UA: NEGATIVE
SPECIFIC GRAVITY, POC UA: >=1.03
UROBILINOGEN, POC UA: 0.2 E.U./DL

## 2020-02-19 PROCEDURE — 87427 SHIGA-LIKE TOXIN AG IA: CPT

## 2020-02-19 PROCEDURE — 96372 THER/PROPH/DIAG INJ SC/IM: CPT | Mod: ER

## 2020-02-19 PROCEDURE — 81025 URINE PREGNANCY TEST: CPT | Mod: ER | Performed by: EMERGENCY MEDICINE

## 2020-02-19 PROCEDURE — 87045 FECES CULTURE AEROBIC BACT: CPT

## 2020-02-19 PROCEDURE — 99284 EMERGENCY DEPT VISIT MOD MDM: CPT | Mod: 25,ER

## 2020-02-19 PROCEDURE — 63600175 PHARM REV CODE 636 W HCPCS: Mod: ER | Performed by: EMERGENCY MEDICINE

## 2020-02-19 PROCEDURE — 25000003 PHARM REV CODE 250: Mod: ER | Performed by: EMERGENCY MEDICINE

## 2020-02-19 PROCEDURE — 87046 STOOL CULTR AEROBIC BACT EA: CPT | Mod: 59

## 2020-02-19 PROCEDURE — 81003 URINALYSIS AUTO W/O SCOPE: CPT | Mod: ER

## 2020-02-19 RX ORDER — DICYCLOMINE HYDROCHLORIDE 20 MG/1
20 TABLET ORAL 2 TIMES DAILY
Qty: 20 TABLET | Refills: 0 | Status: SHIPPED | OUTPATIENT
Start: 2020-02-19 | End: 2020-03-20

## 2020-02-19 RX ORDER — DICYCLOMINE HYDROCHLORIDE 10 MG/ML
20 INJECTION INTRAMUSCULAR
Status: COMPLETED | OUTPATIENT
Start: 2020-02-19 | End: 2020-02-19

## 2020-02-19 RX ORDER — ONDANSETRON 8 MG/1
8 TABLET, ORALLY DISINTEGRATING ORAL EVERY 6 HOURS PRN
Qty: 12 TABLET | Refills: 0 | Status: SHIPPED | OUTPATIENT
Start: 2020-02-19 | End: 2021-03-16

## 2020-02-19 RX ORDER — ONDANSETRON 4 MG/1
8 TABLET, ORALLY DISINTEGRATING ORAL
Status: COMPLETED | OUTPATIENT
Start: 2020-02-19 | End: 2020-02-19

## 2020-02-19 RX ADMIN — ONDANSETRON 8 MG: 4 TABLET, ORALLY DISINTEGRATING ORAL at 05:02

## 2020-02-19 RX ADMIN — DICYCLOMINE HYDROCHLORIDE 20 MG: 20 INJECTION, SOLUTION INTRAMUSCULAR at 05:02

## 2020-02-19 NOTE — ED PROVIDER NOTES
Encounter Date: 2/19/2020       History     Chief Complaint   Patient presents with    Emesis     pt reports vomiting once since 0300 with 4-5 episodes of stools which progressed to diarrhea. Pt adds, feeling weak and persistent burping. No burping noted at triage while speaking to dr. Pt reports abdominal pain to lower region     34 y.o. Female with DM, PCOS presents to ED c/o acute nausea, vomiting, diarrhea and frequent belching that smells like rotten eggs since yesterday. She reports 1 episode of emesisat #M and 4-5 episodes of watery, nonbloody diarrhea since yesterday. She also reports LLQ abdominal pain that feels like pressure and twisting.          Review of patient's allergies indicates:   Allergen Reactions    Metformin Other (See Comments)     Severe low blood sugar    Sudafed pe head congestn-pain [ibuprofen-phenylephrine] Anxiety     Past Medical History:   Diagnosis Date    Allergy 2016    Sinus    Diabetes mellitus     Iron deficiency anemia secondary to inadequate dietary iron intake     PCOS (polycystic ovarian syndrome) 5/18/2017    Seizures 1995    I use to have epilepsy. But they have julieta away     No past surgical history on file.  Family History   Problem Relation Age of Onset    Lung cancer Father     Cancer Father         Lung cancer    Hypertension Mother      Social History     Tobacco Use    Smoking status: Never Smoker    Smokeless tobacco: Never Used   Substance Use Topics    Alcohol use: Yes     Alcohol/week: 1.0 standard drinks     Types: 1 Glasses of wine per week     Frequency: Monthly or less     Drinks per session: 1 or 2     Binge frequency: Never    Drug use: Never     Review of Systems   Constitutional: Negative for fever.   Gastrointestinal: Positive for abdominal pain, diarrhea, nausea and vomiting. Negative for blood in stool.   Genitourinary: Negative for dysuria, hematuria and vaginal discharge.   All other systems reviewed and are negative.      Physical  Exam     Initial Vitals   BP Pulse Resp Temp SpO2   02/19/20 0424 02/19/20 0424 02/19/20 0542 02/19/20 0424 02/19/20 0424   136/79 77 18 98.2 °F (36.8 °C) 100 %      MAP       --                Physical Exam    Nursing note and vitals reviewed.  Constitutional: She appears well-developed and well-nourished. She is not diaphoretic. No distress.   HENT:   Head: Normocephalic and atraumatic.   Eyes: Conjunctivae are normal. Pupils are equal, round, and reactive to light.   Neck: Normal range of motion. Neck supple.   Cardiovascular: Normal rate and intact distal pulses.   Pulmonary/Chest: No accessory muscle usage. No tachypnea. No respiratory distress.   Abdominal: Soft. She exhibits no distension. There is tenderness in the left lower quadrant. There is no rigidity, no rebound, no guarding, no tenderness at McBurney's point and negative Blunt's sign.   Musculoskeletal: Normal range of motion. She exhibits no tenderness.   Neurological: She is alert and oriented to person, place, and time. She has normal strength. Gait normal. GCS eye subscore is 4. GCS verbal subscore is 5. GCS motor subscore is 6.   Skin: Skin is warm. Capillary refill takes less than 2 seconds.   Psychiatric: She has a normal mood and affect.         ED Course   Procedures  Labs Reviewed   POCT URINALYSIS W/O SCOPE - Abnormal; Notable for the following components:       Result Value    Spec Grav UA >=1.030 (*)     All other components within normal limits   CULTURE, STOOL   ENTEROHEMORRHAGIC E.COLI   POCT URINE PREGNANCY   POCT URINALYSIS W/O SCOPE          Imaging Results          CT Renal Stone Study ABD Pelvis WO (Final result)  Result time 02/19/20 05:15:06    Final result by Cosme Mack MD (02/19/20 05:15:06)                 Impression:      There is no evidence for ureteral calculus or obstructive uropathy bilaterally.    Suspected uterine mass lesion, potentially fibroid, clinical correlation is needed if indicated ultrasound may be  helpful for further evaluation.    Suspected sludge and/or cholelithiasis, there is no evidence for pericholecystic inflammatory change.      Electronically signed by: Cosme Mack  Date:    02/19/2020  Time:    05:15             Narrative:    EXAMINATION:  CT RENAL STONE STUDY ABD PELVIS WO    CLINICAL HISTORY:  Abdominal pain, unspecified;    TECHNIQUE:  Low dose axial images, sagittal and coronal reformations were obtained from the lung bases to the pubic symphysis.  Contrast was not administered.    COMPARISON:  None    FINDINGS:  The kidneys bilaterally demonstrate no evidence for hydronephrosis or perinephric inflammatory change.  The ureters appear normal in caliber without evidence for ureteral calculus, hydroureter, and there is no evidence for obstructive uropathy.  The urinary bladder is decompressed not well evaluated, otherwise appearing unremarkable for lack of distention.    The visualized lung bases appear clear.  There is nonspecific mild-to-moderate distention of the stomach with ingested material and air.  Minimal layering density of the gallbladder may relate to sludge and/or cholelithiasis, there is no evidence for pericholecystic inflammatory change.    When accounting for limitations of the exam there is no evidence for acute process of the liver, pancreas, spleen or adrenal glands.  The abdominal aorta appears normal in caliber otherwise not well evaluated on this examination.  There is no evidence for small bowel obstructive process.  The appendix is identified, difficult to delineate in its entirety, it does not appear inflamed.  There is no evidence for inflammatory or obstructive process of the colon.  The uterus demonstrates configuration thought to represent retroverted position with anti flexion.  There is heterogeneity seen associated with the uterine fundus, this may relate to fibroid not well evaluated on this exam, clinical correlation is needed if indicated ultrasound follow-up  may be helpful.  This measures up to approximately 3.5 cm in size.  The visualized osseous structures appear intact.  Chronic changes are noted.                                           Labs Reviewed  Admission on 02/19/2020, Discharged on 02/19/2020   Component Date Value Ref Range Status    POC Preg Test, Ur 02/19/2020 Negative  Negative Final     Acceptable 02/19/2020 Yes   Final    Glucose, UA 02/19/2020 Negative   Final    Bilirubin, UA 02/19/2020 Negative   Final    Ketones, UA 02/19/2020 Negative   Final    Spec Grav UA 02/19/2020 >=1.030*  Final    Blood, UA 02/19/2020 Negative   Final    PH, UA 02/19/2020 5.5   Final    Protein, UA 02/19/2020 Negative   Final    Urobilinogen, UA 02/19/2020 0.2  E.U./dL Final    Nitrite, UA 02/19/2020 Negative   Final    Leukocytes, UA 02/19/2020 Negative   Final    Color, UA 02/19/2020 Yellow   Final    Clarity, UA 02/19/2020 Slightly Cloudy   Final    Stool Culture 02/19/2020 Culture in progress   Preliminary    Shiga Toxin 1 E.coli 02/19/2020 Negative   Final    Shiga Toxin 2 E.coli 02/19/2020 Negative   Final        Imaging Reviewed    Imaging Results          CT Renal Stone Study ABD Pelvis WO (Final result)  Result time 02/19/20 05:15:06    Final result by Cosme Mack MD (02/19/20 05:15:06)                 Impression:      There is no evidence for ureteral calculus or obstructive uropathy bilaterally.    Suspected uterine mass lesion, potentially fibroid, clinical correlation is needed if indicated ultrasound may be helpful for further evaluation.    Suspected sludge and/or cholelithiasis, there is no evidence for pericholecystic inflammatory change.      Electronically signed by: Cosme Mack  Date:    02/19/2020  Time:    05:15             Narrative:    EXAMINATION:  CT RENAL STONE STUDY ABD PELVIS WO    CLINICAL HISTORY:  Abdominal pain, unspecified;    TECHNIQUE:  Low dose axial images, sagittal and coronal reformations  were obtained from the lung bases to the pubic symphysis.  Contrast was not administered.    COMPARISON:  None    FINDINGS:  The kidneys bilaterally demonstrate no evidence for hydronephrosis or perinephric inflammatory change.  The ureters appear normal in caliber without evidence for ureteral calculus, hydroureter, and there is no evidence for obstructive uropathy.  The urinary bladder is decompressed not well evaluated, otherwise appearing unremarkable for lack of distention.    The visualized lung bases appear clear.  There is nonspecific mild-to-moderate distention of the stomach with ingested material and air.  Minimal layering density of the gallbladder may relate to sludge and/or cholelithiasis, there is no evidence for pericholecystic inflammatory change.    When accounting for limitations of the exam there is no evidence for acute process of the liver, pancreas, spleen or adrenal glands.  The abdominal aorta appears normal in caliber otherwise not well evaluated on this examination.  There is no evidence for small bowel obstructive process.  The appendix is identified, difficult to delineate in its entirety, it does not appear inflamed.  There is no evidence for inflammatory or obstructive process of the colon.  The uterus demonstrates configuration thought to represent retroverted position with anti flexion.  There is heterogeneity seen associated with the uterine fundus, this may relate to fibroid not well evaluated on this exam, clinical correlation is needed if indicated ultrasound follow-up may be helpful.  This measures up to approximately 3.5 cm in size.  The visualized osseous structures appear intact.  Chronic changes are noted.                                Medications given in ED    Medications   ondansetron disintegrating tablet 8 mg (8 mg Oral Given 2/19/20 0507)   dicyclomine injection 20 mg (20 mg Intramuscular Given 2/19/20 0507)       Note was created using voice recognition software.  Note may have occasional typographical errors that may not have been identified and edited despite good lyubov initial review prior to signing.                     Discharge Medications     Discharge Medication List as of 2/19/2020  5:22 AM      START taking these medications    Details   dicyclomine (BENTYL) 20 mg tablet Take 1 tablet (20 mg total) by mouth 2 (two) times daily., Starting Wed 2/19/2020, Until Fri 3/20/2020, Print      ondansetron (ZOFRAN-ODT) 8 MG TbDL Take 1 tablet (8 mg total) by mouth every 6 (six) hours as needed (nausea)., Starting Wed 2/19/2020, Print         CONTINUE these medications which have NOT CHANGED    Details   cetirizine (ZYRTEC) 10 MG tablet Take 1 tablet (10 mg total) by mouth once daily., Starting Thu 2/6/2020, Until Fri 2/5/2021, OTC      diclofenac (VOLTAREN) 50 MG EC tablet Take 1 tablet (50 mg total) by mouth 3 (three) times daily as needed (pain)., Starting Sun 1/12/2020, Print      ergocalciferol (ERGOCALCIFEROL) 50,000 unit Cap Take 1 capsule (50,000 Units total) by mouth twice a week., Starting Thu 11/7/2019, Normal      methocarbamol (ROBAXIN) 500 MG Tab Take 2 tablets (1,000 mg total) by mouth every 6 (six) hours as needed (muscle spasms)., Starting Sun 1/12/2020, Print      norgestimate-ethinyl estradiol (ORTHO-CYCLEN) 0.25-35 mg-mcg per tablet Take 1 tablet by mouth once daily., Starting Thu 1/30/2020, Until Fri 1/29/2021, Normal      phentermine (LOMAIRA) 8 mg Tab Take 1 each by mouth 2 (two) times daily., Starting Tue 10/8/2019, Print      promethazine (PHENERGAN) 25 MG tablet Take 1 tablet (25 mg total) by mouth every 6 (six) hours as needed for Nausea., Starting Sun 12/8/2019, Print                   Patient discharged to home in stable condition with instructions to:   1. Please take all meds as prescribed.  2. Follow-up with your primary care doctor   3. Return precautions discussed and patient and/or family/caretaker understands to return to the emergency room  for any concerns including worsening of your current symptoms, fever, chills, night sweats, worsening pain, chest pain, shortness of breath, nausea, vomiting, diarrhea, bleeding, headache, difficulty talking, visual disturbances, weakness, numbness or any other acute concerns        Clinical Impression:       ICD-10-CM ICD-9-CM   1. Vomiting and diarrhea R11.10 787.03    R19.7 787.91   2. LLQ abdominal pain R10.32 789.04   3. AGE (acute gastroenteritis) K52.9 558.9         Disposition:   Disposition: Discharged  Condition: Stable                     Hima Moran MD  02/21/20 0596

## 2020-02-20 LAB
E COLI SXT1 STL QL IA: NEGATIVE
E COLI SXT2 STL QL IA: NEGATIVE

## 2020-02-22 LAB — BACTERIA STL CULT: NORMAL

## 2020-02-24 LAB
FINAL PATHOLOGIC DIAGNOSIS: NORMAL
Lab: NORMAL

## 2020-03-19 ENCOUNTER — PATIENT MESSAGE (OUTPATIENT)
Dept: BARIATRICS | Facility: CLINIC | Age: 35
End: 2020-03-19

## 2020-03-30 ENCOUNTER — TELEPHONE (OUTPATIENT)
Dept: BARIATRICS | Facility: CLINIC | Age: 35
End: 2020-03-30

## 2020-03-30 NOTE — TELEPHONE ENCOUNTER
----- Message from Chikis Carvajal sent at 3/30/2020  2:11 PM CDT -----  Contact: Pt  Reason: Pt calling to reschedule surgery from 4/24      Communication: [phone]

## 2020-03-30 NOTE — TELEPHONE ENCOUNTER
Phoned patient. Patient states she wants to reschedule surgery to July or August due to COVID19. All pre and post op appointments canceled. Will reach out to patient when we are able to reschedule surgeries.

## 2020-04-09 ENCOUNTER — PATIENT MESSAGE (OUTPATIENT)
Dept: OBSTETRICS AND GYNECOLOGY | Facility: CLINIC | Age: 35
End: 2020-04-09

## 2020-04-13 ENCOUNTER — PATIENT OUTREACH (OUTPATIENT)
Dept: ADMINISTRATIVE | Facility: OTHER | Age: 35
End: 2020-04-13

## 2020-04-14 ENCOUNTER — PATIENT MESSAGE (OUTPATIENT)
Dept: OBSTETRICS AND GYNECOLOGY | Facility: CLINIC | Age: 35
End: 2020-04-14

## 2020-04-14 ENCOUNTER — OFFICE VISIT (OUTPATIENT)
Dept: OBSTETRICS AND GYNECOLOGY | Facility: CLINIC | Age: 35
End: 2020-04-14
Payer: COMMERCIAL

## 2020-04-14 DIAGNOSIS — Z31.69 ENCOUNTER FOR INFERTILITY COUNSELING: Primary | ICD-10-CM

## 2020-04-14 PROCEDURE — 99213 PR OFFICE/OUTPT VISIT, EST, LEVL III, 20-29 MIN: ICD-10-PCS | Mod: 95,,, | Performed by: OBSTETRICS & GYNECOLOGY

## 2020-04-14 PROCEDURE — 99213 OFFICE O/P EST LOW 20 MIN: CPT | Mod: 95,,, | Performed by: OBSTETRICS & GYNECOLOGY

## 2020-04-14 RX ORDER — MEDROXYPROGESTERONE ACETATE 10 MG/1
10 TABLET ORAL DAILY
Qty: 10 TABLET | Refills: 2 | Status: SHIPPED | OUTPATIENT
Start: 2020-04-14 | End: 2020-11-09

## 2020-04-14 NOTE — PROGRESS NOTES
History & Physical  Gynecology      SUBJECTIVE:     Chief Complaint: No chief complaint on file.       History of Present Illness:  Ms. Tillman is a 33 y/o female who presents for schedule video conference to discuss infertility. She reports that she has a history of PCOS. She been on OCPs since January with normal periods but stopped last month as she would like to get pregnant. She reports that she did not have a period as a result. She reports that she would like to discuss ways that she can get pregnant.      Review of patient's allergies indicates:   Allergen Reactions    Metformin Other (See Comments)     Severe low blood sugar    Sudafed pe head congestn-pain [ibuprofen-phenylephrine] Anxiety       Past Medical History:   Diagnosis Date    Allergy 2016    Sinus    Diabetes mellitus     Iron deficiency anemia secondary to inadequate dietary iron intake     PCOS (polycystic ovarian syndrome) 5/18/2017    Seizures 1995    I use to have epilepsy. But they have julieta away     History reviewed. No pertinent surgical history.  OB History    None       Family History   Problem Relation Age of Onset    Lung cancer Father     Cancer Father         Lung cancer    Hypertension Mother      Social History     Tobacco Use    Smoking status: Never Smoker    Smokeless tobacco: Never Used   Substance Use Topics    Alcohol use: Yes     Alcohol/week: 1.0 standard drinks     Types: 1 Glasses of wine per week     Frequency: Monthly or less     Drinks per session: 1 or 2     Binge frequency: Never    Drug use: Never       Current Outpatient Medications   Medication Sig    cetirizine (ZYRTEC) 10 MG tablet Take 1 tablet (10 mg total) by mouth once daily.    diclofenac (VOLTAREN) 50 MG EC tablet Take 1 tablet (50 mg total) by mouth 3 (three) times daily as needed (pain). (Patient not taking: Reported on 1/30/2020)    ergocalciferol (ERGOCALCIFEROL) 50,000 unit Cap Take 1 capsule (50,000 Units total) by mouth twice a  week. (Patient not taking: Reported on 1/30/2020)    medroxyPROGESTERone (PROVERA) 10 MG tablet Take 1 tablet (10 mg total) by mouth once daily. for 10 days    methocarbamol (ROBAXIN) 500 MG Tab Take 2 tablets (1,000 mg total) by mouth every 6 (six) hours as needed (muscle spasms). (Patient not taking: Reported on 1/30/2020)    ondansetron (ZOFRAN-ODT) 8 MG TbDL Take 1 tablet (8 mg total) by mouth every 6 (six) hours as needed (nausea).    phentermine (LOMAIRA) 8 mg Tab Take 1 each by mouth 2 (two) times daily. (Patient not taking: Reported on 1/30/2020)    promethazine (PHENERGAN) 25 MG tablet Take 1 tablet (25 mg total) by mouth every 6 (six) hours as needed for Nausea. (Patient not taking: Reported on 1/30/2020)     No current facility-administered medications for this visit.          Review of Systems:  Review of Systems   Constitutional: Negative for chills and fever.   Respiratory: Negative for cough and wheezing.    Cardiovascular: Negative for chest pain and palpitations.   Gastrointestinal: Negative for abdominal pain, nausea and vomiting.   Genitourinary: Positive for menstrual problem. Negative for dysuria, frequency, hematuria, pelvic pain, vaginal bleeding, vaginal discharge and vaginal pain.        OBJECTIVE:     Physical Exam:  Physical Exam   Constitutional: She is oriented to person, place, and time. She appears well-developed and well-nourished.   Pulmonary/Chest: Effort normal.   Neurological: She is alert and oriented to person, place, and time.   Psychiatric: She has a normal mood and affect.       ASSESSMENT:       ICD-10-CM ICD-9-CM    1. Encounter for infertility counseling Z31.69 V26.49 medroxyPROGESTERone (PROVERA) 10 MG tablet          Plan:   Diagnoses and all orders for this visit:    Encounter for infertility counseling  -     medroxyPROGESTERone (PROVERA) 10 MG tablet; Take 1 tablet (10 mg total) by mouth once daily. for 10 days  - Extensive conversation about infertility.    - Discussed the importance of  cycles with patient and identified that PCOS is likely the cause that patient has irregular cycles. Will use cyclic provera to help with regulate periods.  - Discussed the importance of weight loss with patient  - Discussed ovulatory, tubal and male factor infertility  - Discussed ovulation with patient and when to expect ovulation .Advised patient to start the use of OPKs to denote id she ovulating.  - Discussed timed intercourse with patient  - Low risk factor for tubal infertility. Likely anovulatory    No orders of the defined types were placed in this encounter.      No follow-ups on file.    Counseling time: 15 minutes    Irma Lees

## 2020-04-14 NOTE — PATIENT INSTRUCTIONS
pcos    Polycystic Ovary Syndrome  Polycystic ovary syndrome (PCOS) causes harmless, small cysts in the ovaries and other symptoms. PCOS is caused by certain hormones being out of balance. The word syndrome means a group of symptoms. Women with PCOS may have no periods, irregular periods, or very long periods.    Your ovaries  Women store their eggs in their ovaries. Each egg is in a capsule called a follicle. Normally during the reproductive years, one follicle grows to produce a mature egg each month. This egg is released during ovulation and the follicle dissolves.  Hormones out of balance  With polycystic ovary syndrome (PCOS), the hormones that control ovulation are out of balance. These include estrogen, progesterones, and androgen. As a result, ovulation may not occur. Instead, the follicle stays enlarged. This is a fluid-filled sac (cyst). Over time, the ovaries fill with many small cysts. This is why they are called poly (many) cystic ovaries. In some women, the ovaries also make too much androgen.  PCOS symptoms  Women with PCOS may also have one or more of these symptoms:  · Trouble getting pregnant (fertility problems)  · Weight gain, especially around the waist   · Acne  · Hair growth on the face and other parts of the body  · Patches of thickened, velvety, darkened skin called acanthosis nigricans  Women with PCOS are also at increased risk of developing cancer of the uterine lining, diabetes, and heart disease.   Date Last Reviewed: 5/10/2015  © 6300-1302 [employer]. [address] [place] [address]. All rights reserved. This information is not intended as a substitute for professional medical care. Always follow your healthcare professional's instructions.        Understanding Fertility Problems: The Womans Evaluation    To help your doctor look for the cause of fertility issues, you will have an evaluation. It will include a medical history, physical exam, and some  basic tests. If needed, your doctor may also suggest procedures. These allow him or her to look at your reproductive organs.  Medical history  Your doctor will ask about your health and lifestyle. Youll also be asked about factors that can affect your ability to get pregnant such as how often you have sex, your menstrual cycle and any medicines or herbs you take. Be sure to mention any prior pregnancies or surgeries. You should also mention if youve had any pelvic infections or sexually transmitted infections.  Physical exam  A physical exam helps your doctor learn about your health. It includes a pelvic exam to check for swelling, infection, or other problems. Your hormone function is also checked. To do this, your doctor looks at your breast development, body fat, and hair distribution.  Basic tests  Your evaluation will likely include some basic tests. These include blood tests. In some cases, it also includes tests that check the health of your cervix.  · Blood tests can be used to check the following factors:  ¨ Hormone levels (FSH, LH, AMH, estrogen, and progesterone, prolactin)  ¨ Blood sugar and insulin levels  ¨ Tests for current or prior pelvic infection  ¨ Thyroid function  · Cervical cultures are samples taken from the cervix using a sterile swab. The sample is then sent to a lab and checked for signs of infections that can affect fertility.  Imaging tests  Your doctor may recommend that you have imaging tests. These show the reproductive organs in more detail. These tests may be done in the doctors office or in a hospital or surgery center. In most cases, they cause little or no discomfort.  · HSG (hysterosalpingogram). This is an X-ray test. It is used to view the shape of the uterus and make sure the fallopian tubes are open. During the test, contrast fluid is placed in the uterus and fallopian tubes. The contrast makes it easier to see problems on the X-rays.  · Ultrasound. This uses painless  sound waves to make images of internal organs. This can help show problems with the ovaries or uterine lining.  · Sonohysterogram. This is an ultrasound done with sterile saltwater (saline) solution placed in the uterus. The saline makes it easier to view the inside of the uterus.  Other tests  If needed, your doctor may suggest other, less common tests. Some can be done in your doctors office. Others are done in a hospital or surgery center. For certain procedures, youll be given anesthesia to prevent discomfort.  · Hysteroscopy. This uses a small, lighted tube called a hysteroscope to view the inside of the cervix and uterus. It is usually done just after your period.  · Laparoscopy. This is a type of surgery that can help reveal problems on the surface of the reproductive organs. A thin, lighted tube device called a laparoscope is inserted into the body. Your doctor then views the reproductive organs through the scope.  Date Last Reviewed: 5/21/2015  © 0563-8582 [employer]. [address] [place] [address], [place] [address]. All rights reserved. This information is not intended as a substitute for professional medical care. Always follow your healthcare professional's instructions.

## 2020-04-15 NOTE — TELEPHONE ENCOUNTER
----- Message from Genevieve Underwood sent at 4/15/2020  1:30 PM CDT -----  Contact: patient  Patient called to speak with a nurse. Gave no further details.    She would like a callback at [phone]    Thanks  KB

## 2020-04-27 ENCOUNTER — OFFICE VISIT (OUTPATIENT)
Dept: FAMILY MEDICINE | Facility: CLINIC | Age: 35
End: 2020-04-27
Payer: COMMERCIAL

## 2020-04-27 DIAGNOSIS — L50.9 URTICARIA: ICD-10-CM

## 2020-04-27 DIAGNOSIS — J30.9 CHRONIC ALLERGIC RHINITIS: Primary | ICD-10-CM

## 2020-04-27 PROCEDURE — 99213 PR OFFICE/OUTPT VISIT, EST, LEVL III, 20-29 MIN: ICD-10-PCS | Mod: 95,,, | Performed by: INTERNAL MEDICINE

## 2020-04-27 PROCEDURE — 99213 OFFICE O/P EST LOW 20 MIN: CPT | Mod: 95,,, | Performed by: INTERNAL MEDICINE

## 2020-04-27 NOTE — PROGRESS NOTES
The patient location is: [facility]  The chief complaint leading to consultation is: as below  Visit type: Virtual visit with synchronous audio and video  Total time spent with patient: 15 min  Each patient to whom he or she provides medical services by telemedicine is:  (1) informed of the relationship between the physician and patient and the respective role of any other health care provider with respect to management of the patient; and (2) notified that he or she may decline to receive medical services by telemedicine and may withdraw from such care at any time.    Chief Complaint  No chief complaint on file.      HPI  Agustina Tillman is a 34 y.o. female with multiple medical diagnoses as listed in the medical history and problem list that presents for congestion, sinus headache, and hives via virtual visit after verbal consent was obtained from the patient to allow a student to see them.  Pt is known to me with his last appointment 2/6/2020.          PAST MEDICAL HISTORY:  Past Medical History:   Diagnosis Date    Allergy 2016    Sinus    Diabetes mellitus     Iron deficiency anemia secondary to inadequate dietary iron intake     PCOS (polycystic ovarian syndrome) 5/18/2017    Seizures 1995    I use to have epilepsy. But they have julieta away       PAST SURGICAL HISTORY:  No past surgical history on file.    SOCIAL HISTORY:  Social History     Socioeconomic History    Marital status: Single     Spouse name: Not on file    Number of children: Not on file    Years of education: Not on file    Highest education level: Not on file   Occupational History    Not on file   Social Needs    Financial resource strain: Not hard at all    Food insecurity:     Worry: Never true     Inability: Never true    Transportation needs:     Medical: No     Non-medical: No   Tobacco Use    Smoking status: Never Smoker    Smokeless tobacco: Never Used   Substance and Sexual Activity    Alcohol use: Yes      Alcohol/week: 1.0 standard drinks     Types: 1 Glasses of wine per week     Frequency: Monthly or less     Drinks per session: 1 or 2     Binge frequency: Never    Drug use: Never    Sexual activity: Yes     Partners: Male   Lifestyle    Physical activity:     Days per week: 3 days     Minutes per session: 90 min    Stress: Not at all   Relationships    Social connections:     Talks on phone: More than three times a week     Gets together: More than three times a week     Attends Religion service: Not on file     Active member of club or organization: No     Attends meetings of clubs or organizations: Never     Relationship status: Living with partner   Other Topics Concern    Not on file   Social History Narrative    Not on file       FAMILY HISTORY:  Family History   Problem Relation Age of Onset    Lung cancer Father     Cancer Father         Lung cancer    Hypertension Mother        ALLERGIES AND MEDICATIONS: updated and reviewed.  Review of patient's allergies indicates:   Allergen Reactions    Metformin Other (See Comments)     Severe low blood sugar    Sudafed pe head congestn-pain [ibuprofen-phenylephrine] Anxiety     Current Outpatient Medications   Medication Sig Dispense Refill    cetirizine (ZYRTEC) 10 MG tablet Take 1 tablet (10 mg total) by mouth once daily.  0    diclofenac (VOLTAREN) 50 MG EC tablet Take 1 tablet (50 mg total) by mouth 3 (three) times daily as needed (pain). (Patient not taking: Reported on 1/30/2020) 24 tablet 0    ergocalciferol (ERGOCALCIFEROL) 50,000 unit Cap Take 1 capsule (50,000 Units total) by mouth twice a week. (Patient not taking: Reported on 1/30/2020) 24 capsule 0    medroxyPROGESTERone (PROVERA) 10 MG tablet Take 1 tablet (10 mg total) by mouth once daily. for 10 days 10 tablet 2    methocarbamol (ROBAXIN) 500 MG Tab Take 2 tablets (1,000 mg total) by mouth every 6 (six) hours as needed (muscle spasms). (Patient not taking: Reported on 1/30/2020) 40  tablet 0    montelukast (SINGULAIR) 10 mg tablet Take 1 tablet (10 mg total) by mouth every evening. 30 tablet 0    ondansetron (ZOFRAN-ODT) 8 MG TbDL Take 1 tablet (8 mg total) by mouth every 6 (six) hours as needed (nausea). 12 tablet 0    phentermine (LOMAIRA) 8 mg Tab Take 1 each by mouth 2 (two) times daily. (Patient not taking: Reported on 1/30/2020) 60 each 0    predniSONE (DELTASONE) 20 MG tablet Take 2 tablets (40 mg total) by mouth once daily. for 5 days 10 tablet 0    promethazine (PHENERGAN) 25 MG tablet Take 1 tablet (25 mg total) by mouth every 6 (six) hours as needed for Nausea. (Patient not taking: Reported on 1/30/2020) 15 tablet 0     No current facility-administered medications for this visit.          ROS  Review of Systems   Constitutional: Negative for chills, fatigue and fever.   HENT: Positive for congestion, rhinorrhea, sinus pressure and sinus pain. Negative for sore throat.    Eyes: Negative for visual disturbance.   Respiratory: Negative for cough and shortness of breath.    Cardiovascular: Negative for chest pain and palpitations.   Gastrointestinal: Negative for abdominal pain, diarrhea and vomiting.   Endocrine: Negative for polyuria.   Genitourinary: Negative for difficulty urinating, dysuria and frequency.   Musculoskeletal: Negative for myalgias and neck stiffness.   Skin: Positive for rash.   Neurological: Negative for dizziness, seizures and headaches.   Psychiatric/Behavioral: Negative for behavioral problems.           Physical Exam  Physical Exam   Constitutional: She is oriented to person, place, and time. She appears well-developed and well-nourished. No distress.   HENT:   Head: Normocephalic and atraumatic.   Eyes: EOM are normal.   Pulmonary/Chest: Effort normal.   Neurological: She is alert and oriented to person, place, and time.   Skin: Rash (flesh colored to erythematous papules on bilateral forearm and hand) noted. She is not diaphoretic.   Psychiatric: She has a  normal mood and affect.         Health Maintenance       Date Due Completion Date    TETANUS VACCINE 06/20/2005 ---    Pap Smear with HPV Cotest 01/30/2025 1/30/2020    Override on 1/30/2020: Done            Assessment & Plan    Chronic Allergic Rhinitis  Singulair- 10mg daily  Zyrtec continue 10mg daily    Urticaria  Prednisone burst 40mg for 5 days  Zyrtec continue 10mg daily    Refer to allergy    I hereby acknowledge that I am relying upon documentation authored by a medical student working under my supervision and further I hereby attest that I have verified the student documentation or findings by personally performing the physical exam and medical decision making activities of the Evaluation and Management service to be billed.        John Gaston MD  Internal Medicine-Pediatrics

## 2020-04-28 ENCOUNTER — PATIENT MESSAGE (OUTPATIENT)
Dept: FAMILY MEDICINE | Facility: CLINIC | Age: 35
End: 2020-04-28

## 2020-04-28 ENCOUNTER — PATIENT MESSAGE (OUTPATIENT)
Dept: BARIATRICS | Facility: CLINIC | Age: 35
End: 2020-04-28

## 2020-04-28 RX ORDER — PREDNISONE 20 MG/1
40 TABLET ORAL DAILY
Qty: 10 TABLET | Refills: 0 | Status: SHIPPED | OUTPATIENT
Start: 2020-04-28 | End: 2020-05-03

## 2020-04-28 RX ORDER — MONTELUKAST SODIUM 10 MG/1
10 TABLET ORAL NIGHTLY
Qty: 30 TABLET | Refills: 0 | Status: SHIPPED | OUTPATIENT
Start: 2020-04-28 | End: 2020-05-25

## 2020-05-08 PROBLEM — E66.01 MORBID OBESITY WITH BMI OF 45.0-49.9, ADULT: Chronic | Status: ACTIVE | Noted: 2017-06-01

## 2020-05-08 PROBLEM — J30.1 CHRONIC ALLERGIC RHINITIS DUE TO POLLEN: Chronic | Status: ACTIVE | Noted: 2020-05-08

## 2020-05-08 PROBLEM — J30.1 CHRONIC ALLERGIC RHINITIS DUE TO POLLEN: Status: ACTIVE | Noted: 2020-05-08

## 2020-05-20 ENCOUNTER — TELEPHONE (OUTPATIENT)
Dept: BARIATRICS | Facility: CLINIC | Age: 35
End: 2020-05-20

## 2020-05-20 NOTE — TELEPHONE ENCOUNTER
Phoned patient. Patient would like to keep rescheduled surgery date on 7/24/20. Notified patient we will update her on pre op date and post op appointments closer to surgery day. Patient verbalized understanding.

## 2020-05-20 NOTE — TELEPHONE ENCOUNTER
----- Message from Grecia Reynoso sent at 5/20/2020  4:09 PM CDT -----  Contact: Pt called [phone]  States that she received a letter informing her of a surgery scheduled for 7/24/20.  But it is not showing in her portal.  Please contact patient at

## 2020-05-22 ENCOUNTER — TELEPHONE (OUTPATIENT)
Dept: BARIATRICS | Facility: CLINIC | Age: 35
End: 2020-05-22

## 2020-05-22 DIAGNOSIS — E51.9 VITAMIN B1 DEFICIENCY: ICD-10-CM

## 2020-05-22 DIAGNOSIS — E66.01 MORBID OBESITY: ICD-10-CM

## 2020-05-22 DIAGNOSIS — R73.03 PREDIABETES: ICD-10-CM

## 2020-05-22 DIAGNOSIS — E28.2 PCOS (POLYCYSTIC OVARIAN SYNDROME): ICD-10-CM

## 2020-05-22 DIAGNOSIS — Z01.818 PRE-OP TESTING: Primary | ICD-10-CM

## 2020-05-22 NOTE — TELEPHONE ENCOUNTER
Received approval from [facility] for surgery on 7/24/2020.  Scanned into the media.  Auth# [id number].  Patient phoned clinic asking about approval letter.  Agreed to reschedule her surgery for that date.  Will mail out all new appointment reminders.

## 2020-05-23 DIAGNOSIS — J30.9 CHRONIC ALLERGIC RHINITIS: ICD-10-CM

## 2020-05-25 RX ORDER — MONTELUKAST SODIUM 10 MG/1
TABLET ORAL
Qty: 30 TABLET | Refills: 0 | Status: SHIPPED | OUTPATIENT
Start: 2020-05-25 | End: 2020-06-18

## 2020-06-16 ENCOUNTER — PATIENT OUTREACH (OUTPATIENT)
Dept: ADMINISTRATIVE | Facility: OTHER | Age: 35
End: 2020-06-16

## 2020-06-17 ENCOUNTER — OFFICE VISIT (OUTPATIENT)
Dept: SPINE | Facility: CLINIC | Age: 35
End: 2020-06-17
Payer: COMMERCIAL

## 2020-06-17 DIAGNOSIS — M41.115 JUVENILE IDIOPATHIC SCOLIOSIS OF THORACOLUMBAR REGION: Primary | ICD-10-CM

## 2020-06-17 PROCEDURE — 99203 PR OFFICE/OUTPT VISIT, NEW, LEVL III, 30-44 MIN: ICD-10-PCS | Mod: 95,,, | Performed by: PHYSICIAN ASSISTANT

## 2020-06-17 PROCEDURE — 99203 OFFICE O/P NEW LOW 30 MIN: CPT | Mod: 95,,, | Performed by: PHYSICIAN ASSISTANT

## 2020-06-17 RX ORDER — TIZANIDINE 4 MG/1
4 TABLET ORAL EVERY 6 HOURS PRN
Qty: 40 TABLET | Refills: 1 | Status: SHIPPED | OUTPATIENT
Start: 2020-06-17 | End: 2020-06-19 | Stop reason: ALTCHOICE

## 2020-06-17 NOTE — LETTER
June 23, 2020      John Gaston MD  [address] [facility]  [place] [address]           [facility]  [address] [place] [address]  Phone: [phone]  Fax: [phone]          Patient: Agustina Tillman   MR Number: [id number]   YOB: 1985   Date of Visit: 6/17/2020       Dear Dr. John Gaston:    Thank you for referring Agustina Tillman to me for evaluation. Attached you will find relevant portions of my assessment and plan of care.    If you have questions, please do not hesitate to call me. I look forward to following Agustina Tillman along with you.    Sincerely,    Tomasa Ramos PA-C    Enclosure  CC:  No Recipients    If you would like to receive this communication electronically, please contact [email or web address] or [phone] to request more information on [facility] Link access.    For providers and/or their staff who would like to refer a patient to [facility], please contact us through our one-stop-shop provider referral line, [facility], at [phone].    If you feel you have received this communication in error or would no longer like to receive these types of communications, please e-mail [email or web address]

## 2020-06-18 ENCOUNTER — TELEPHONE (OUTPATIENT)
Dept: SPINE | Facility: CLINIC | Age: 35
End: 2020-06-18

## 2020-06-18 NOTE — PROGRESS NOTES
"[facility]  Physical Therapy Initial Evaluation    Date: 6/19/2020   Name: Agustina Tillman  Clinic Number: [id number]    Therapy Diagnosis:   Encounter Diagnoses   Name Primary?    Juvenile idiopathic scoliosis of thoracolumbar region     Chronic bilateral low back pain without sciatica     Difficulty walking     Chronic midline thoracic back pain     Generalized weakness      Physician: Tomasa Ramos, *    Physician Orders: PT Eval and Treat   Medical Diagnosis from Referral: M41.115 (ICD-10-CM) - Juvenile idiopathic scoliosis of thoracolumbar region  Evaluation Date: 6/19/2020  Authorization Period Expiration: 6/17/2021  Plan of Care Expiration: 09/19/2020  Visit # / Visits authorized: 1/ 20    Time In: 11:25 (pt arrived late to appointment)  Time Out: 12:00 pm   Total Appointment Time (timed & untimed codes): 35 minutes    Precautions: Standard    Subjective   Date of onset: pt reports pain in her back for many years, states that pain has moved from her low back to her mid back   History of current condition - Agustina reports: to PT with complaints of low and mid back pain. She reports that she has had low back pain off and on her whole life, but that over the last year her pain has moved up to her mid back. She reports that her pain is central in her mid back, but that at times she has some "shooting pain" from around her shoulder blades. Pt reports that she has been told that she has a mild scoliosis, but that she was never truly diagnosed as a child. Pt reports that she will be having gastric sleeve surgery in a month and is hopeful that once she loses weight that she might have a decrease in her back pain.      Medical History:   Past Medical History:   Diagnosis Date    Allergy 2016    Sinus    Diabetes mellitus     Iron deficiency anemia secondary to inadequate dietary iron intake     PCOS (polycystic ovarian syndrome) 5/18/2017    Seizures 1995    I use " "to have epilepsy. But they have julieta away       Surgical History:   Agustina Tillman  has no past surgical history on file.    Medications:   Agustina has a current medication list which includes the following prescription(s): cetirizine, diclofenac, ergocalciferol, medroxyprogesterone, montelukast, ondansetron, phentermine, promethazine, and tizanidine.    Allergies:   Review of patient's allergies indicates:   Allergen Reactions    Metformin Other (See Comments)     Severe low blood sugar    Sudafed pe head congestn-pain [ibuprofen-phenylephrine] Anxiety        Imaging, pt reports "they said my x ray showed a little bit of a scoliosis" :     Prior Therapy: no pror   Social History: pt reports that she lives with family who are supportive of her   Occupation: pt reports that she is not working at this time   Prior Level of Function: pt reports that prior to a few years ago she was pain free  Current Level of Function: pt reports that she has pain daily, especially with bending and lifting tasks    Pain:  Current 5/10, worst 10/10, best 4/10   Location:   Low and mid back; moves to the R side of the mid back at times   Description: Aching, Dull, Throbbing and Shooting  Aggravating Factors: picking up packages, lifting things. Turning the wrong direction   Easing Factors: medication (states that it makes her very drowsy)    Pts goals: "I just want to get control of the pain in my back"     Objective       Observation: pt is A&O x 4 and very motivated to do well in PT; see postural observation below     Posture:  Rounded shoulders: forward head, increased resting lumbar lordosis especially noted in standing; L lumbar convexity scoliosis     Lumbar Range of Motion:    Degrees   Flexion  moderate loss p!     Extension Mild loss p!     Left Side Bending Min loss    Right Side Bending Mod loss p!    Left rotation   Mild loss    Right Rotation   Moderate limitation p!        Lower Extremity Strength  Right LE  " Left LE    Knee extension: 4-/5 Knee extension: 4-/5   Knee flexion: 4-/5 Knee flexion: 4-/5   Hip flexion: 4-/5 Hip flexion: 4-/5   Hip extension:  4-/5 Hip extension: 4-/5   Hip abduction: 4-/5 Hip abduction: 4-/5   Hip adduction: 4-/5 Hip adduction 4-/5         Special Tests:  -Repeated Flexion: positive   -Repeated Ext: positive   -Piriformis Test: positive   -Bridge Test: positive for pain     DTR:   Right Left Comment   Patellar (L3-4) 2+ 2+    Achilles (S1) 2+ 2+        Neuro Dynamic Testing:    Sciatic nerve:      Slump: R = negative      L = negative         Joint Mobility: hypomobile lumbar and thoracic spine     Palpation: tenderness to palpation of lumbar paraspinals; thoracic paraspinal tenderness     Sensation: intact    Flexibility: impaired lumbar and thoracic flexibility; impaired piriformis length B           Limitation/Restriction for FOTO low back Survey    Therapist reviewed FOTO scores for Agustina Tillman on 6/19/2020.   FOTO documents entered into EPIC - see Media section.    Limitation Score: 37%         TREATMENT   Pt arrived late for appointment and there was no time for treatment following evaluaiton:     Pt issued HEP: see pt instructions     Home Exercises and Patient Education Provided    Education provided:   - - PT POC  - HEP  - PT prognosis and diagnosis  - all questions and concerns answered       Written Home Exercises Provided: yes.  Exercises were reviewed and Agustina was able to demonstrate them prior to the end of the session.  Agustina demonstrated fair  understanding of the education provided.     See EMR under Patient Instructions for exercises provided 6/19/2020.    Assessment   Agustina is a 34 y.o. female referred to outpatient Physical Therapy with a medical diagnosis of M41.115 (ICD-10-CM) - Juvenile idiopathic scoliosis of thoracolumbar region. Pt presents with complaints of low and mid back pain that have been ongoing for greater than 3 months. Pt presents with  a functional thorocolumbar scoliosis, however, scoliosis appears flexible in nature. Pt educated on postural awareness as well as proper stretching and exercise performance. She will benefit from skilled PT intervention in order to address the functional deficits noted above in order to improve posturing, strength, ROM, and overall QOL. Pt with upcoming weight loss surgery scheduled. Educated pt on need for clearance from surgeon prior to resuming PT after surgery.     Pt prognosis is Good.   Pt will benefit from skilled outpatient Physical Therapy to address the deficits stated above and in the chart below, provide pt/family education, and to maximize pt's level of independence.     Plan of care discussed with patient: Yes  Pt's spiritual, cultural and educational needs considered and patient is agreeable to the plan of care and goals as stated below:     Anticipated Barriers for therapy: chronicity of pain; limited participation d/t upcoming surgery     Medical Necessity is demonstrated by the following  History  Co-morbidities and personal factors that may impact the plan of care Co-morbidities:   high BMI    Personal Factors:   age  lifestyle     low   Examination  Body Structures and Functions, activity limitations and participation restrictions that may impact the plan of care Body Regions:   back  lower extremities  trunk    Body Systems:    gross symmetry  ROM  strength  gross coordinated movement  transfers  motor control    Participation Restrictions:   Pt has pain with forward flexion     Activity limitations:   Learning and applying knowledge  no deficits    General Tasks and Commands  no deficits    Communication  no deficits    Mobility  walking    Self care  washing oneself (bathing, drying, washing hands)  caring for body parts (brushing teeth, shaving, grooming)  dressing  looking after one's health    Domestic Life  shopping  cooking  doing house work (cleaning house, washing dishes,  laundry)  assisting others    Interactions/Relationships  no deficits    Life Areas  no deficits    Community and Social Life  no deficits         low   Clinical Presentation stable and uncomplicated low   Decision Making/ Complexity Score: low     Goals:  Short Term Goals: 4 weeks   1. Pt will be independent with HEP performance  2. Pt will report pain at worst of 6/10 or less  3. Pt will demonstrate improved core control as evidenced by ability to perform TA activation for 5 seconds with good control   4. Pt will improve MMT by at least 1/2 grade  5. Pt will report ability to lift small package without increase in back pain    Long Term Goals: 8 weeks   1. Pt will be independent with performance of progressed HEP in order to continue her progress at home.   2. Pt will report pain at worst of 4/10 or less  3. Pt will improve FOTO limitation score to 20% limitation or less  4. Pt will demonstrate ability to lift 10 pound box from floor without increased pain in low back.   5. Pt will improve LE strength to at least 4+/5 in all planes in order to improve ANDREA and improve standing posturing.     Plan   Plan of care Certification: 6/19/2020 to 09/19/2020.    Outpatient Physical Therapy 1-2 times weekly for 8 weeks to include the following interventions: Cervical/Lumbar Traction, Gait Training, Manual Therapy, Moist Heat/ Ice, Neuromuscular Re-ed, Patient Education, Therapeutic Activites, Therapeutic Exercise and any other therapies or modalities as clinically indicated and appropriate. Pt may be seen by PTA as part of pt's care team.       Salud Champion, PT, DPT  6/19/2020

## 2020-06-18 NOTE — TELEPHONE ENCOUNTER
----- Message from Tomasa Ramos PA-C sent at 6/18/2020 12:44 PM CDT -----  Regarding: FW: reschedlue appt  She was a virtual visit that only needed PT. Not sure what else to do for her?  ----- Message -----  From: Janae Estrada  Sent: 6/18/2020  10:07 AM CDT  To: Tomasa Ramos PA-C  Subject: reschedlue appt                                  Pt requesting to reschedule Rehab appt    Please call and advise    Phone [phone]

## 2020-06-19 ENCOUNTER — CLINICAL SUPPORT (OUTPATIENT)
Dept: REHABILITATION | Facility: HOSPITAL | Age: 35
End: 2020-06-19
Payer: COMMERCIAL

## 2020-06-19 DIAGNOSIS — G89.29 CHRONIC MIDLINE THORACIC BACK PAIN: ICD-10-CM

## 2020-06-19 DIAGNOSIS — M54.6 CHRONIC MIDLINE THORACIC BACK PAIN: ICD-10-CM

## 2020-06-19 DIAGNOSIS — R53.1 GENERALIZED WEAKNESS: ICD-10-CM

## 2020-06-19 DIAGNOSIS — G89.29 CHRONIC BILATERAL LOW BACK PAIN WITHOUT SCIATICA: ICD-10-CM

## 2020-06-19 DIAGNOSIS — M41.115 JUVENILE IDIOPATHIC SCOLIOSIS OF THORACOLUMBAR REGION: ICD-10-CM

## 2020-06-19 DIAGNOSIS — M54.50 CHRONIC BILATERAL LOW BACK PAIN WITHOUT SCIATICA: ICD-10-CM

## 2020-06-19 DIAGNOSIS — R26.2 DIFFICULTY WALKING: ICD-10-CM

## 2020-06-19 PROCEDURE — 97161 PT EVAL LOW COMPLEX 20 MIN: CPT | Mod: PN

## 2020-06-19 RX ORDER — METHOCARBAMOL 500 MG/1
500 TABLET, FILM COATED ORAL EVERY 6 HOURS PRN
Qty: 30 TABLET | Refills: 1 | Status: SHIPPED | OUTPATIENT
Start: 2020-06-19 | End: 2020-06-29

## 2020-06-19 NOTE — TELEPHONE ENCOUNTER
Spoke with patient and scheduled her for scoliosis x-rays next week and a 6 week follow-up with Tomasa Ramos on 7-22-20, she indicated understanding.

## 2020-06-19 NOTE — TELEPHONE ENCOUNTER
----- Message from Tomasa Ramos PA-C sent at 6/17/2020 10:04 AM CDT -----  Regarding: virtual appt  Hi Ms Batista,    Please schedule her for an 6 week f/u in back and spine clinic. She needs to be scheduled for PT and scoliosis xrays. I will discuss it with her on follow up. I gave her the option of virtual or in person follow up. No complains of neuro deficits at this time. If it is more than back pain, I asked that she come see me in person    Thank you  K

## 2020-06-23 NOTE — PROGRESS NOTES
The patient location is: car  The chief complaint leading to consultation is: back pain  Visit type: audiovisual            The reason for the audio only service rather than synchronous audio and video virtual visit was related to technical difficulties or patient preference/necessity.     Total time spent with patient: 30  Each patient to whom he or she provides medical services by telemedicine is:  (1) informed of the relationship between the physician and patient and the respective role of any other health care provider with respect to management of the patient; and (2) notified that he or she may decline to receive medical services by telemedicine and may withdraw from such care at any time.      [facility]    Patient ID: Agustina Tillman is a 35 y.o. female.    Chief Complaint   Patient presents with    Back Pain       Review of Systems   Constitutional: Negative for activity change, chills, fever and unexpected weight change.   HENT: Negative for hearing loss, tinnitus, trouble swallowing and voice change.    Eyes: Negative.  Negative for visual disturbance.   Respiratory: Negative for apnea, chest tightness and shortness of breath.    Cardiovascular: Negative.  Negative for chest pain and palpitations.   Gastrointestinal: Negative.  Negative for abdominal pain, constipation, diarrhea, nausea and vomiting.   Genitourinary: Negative.  Negative for difficulty urinating, dysuria and frequency.   Musculoskeletal: Positive for back pain. Negative for gait problem, myalgias, neck pain and neck stiffness.   Skin: Negative for wound.   Allergic/Immunologic: Negative for immunocompromised state.   Neurological: Negative for dizziness, tremors, seizures, facial asymmetry, speech difficulty, weakness, light-headedness, numbness and headaches.   Hematological: Negative for adenopathy. Does not bruise/bleed easily.   Psychiatric/Behavioral: Negative for confusion, decreased concentration,  dysphoric mood and sleep disturbance.       Past Medical History:   Diagnosis Date    Allergy 2016    Sinus    Diabetes mellitus     Iron deficiency anemia secondary to inadequate dietary iron intake     PCOS (polycystic ovarian syndrome) 5/18/2017    Seizures 1995    I use to have epilepsy. But they have julieta away     History reviewed. No pertinent surgical history.  Social History     Socioeconomic History    Marital status: Single     Spouse name: Not on file    Number of children: Not on file    Years of education: Not on file    Highest education level: Not on file   Occupational History    Not on file   Social Needs    Financial resource strain: Not hard at all    Food insecurity     Worry: Never true     Inability: Never true    Transportation needs     Medical: No     Non-medical: No   Tobacco Use    Smoking status: Never Smoker    Smokeless tobacco: Never Used   Substance and Sexual Activity    Alcohol use: Yes     Alcohol/week: 1.0 standard drinks     Types: 1 Glasses of wine per week     Frequency: Monthly or less     Drinks per session: 1 or 2     Binge frequency: Never    Drug use: Never    Sexual activity: Yes     Partners: Male   Lifestyle    Physical activity     Days per week: 3 days     Minutes per session: 90 min    Stress: Not at all   Relationships    Social connections     Talks on phone: More than three times a week     Gets together: More than three times a week     Attends Hinduism service: Not on file     Active member of club or organization: No     Attends meetings of clubs or organizations: Never     Relationship status: Living with partner   Other Topics Concern    Not on file   Social History Narrative    Not on file     Family History   Problem Relation Age of Onset    Lung cancer Father     Cancer Father         Lung cancer    Hypertension Mother      Review of patient's allergies indicates:   Allergen Reactions    Metformin Other (See Comments)     Severe  low blood sugar    Sudafed pe head congestn-pain [ibuprofen-phenylephrine] Anxiety       Current Outpatient Medications:     cetirizine (ZYRTEC) 10 MG tablet, Take 1 tablet (10 mg total) by mouth once daily., Disp: , Rfl: 0    diclofenac (VOLTAREN) 50 MG EC tablet, Take 1 tablet (50 mg total) by mouth 3 (three) times daily as needed (pain). (Patient not taking: Reported on 1/30/2020), Disp: 24 tablet, Rfl: 0    ergocalciferol (ERGOCALCIFEROL) 50,000 unit Cap, Take 1 capsule (50,000 Units total) by mouth twice a week. (Patient not taking: Reported on 1/30/2020), Disp: 24 capsule, Rfl: 0    medroxyPROGESTERone (PROVERA) 10 MG tablet, Take 1 tablet (10 mg total) by mouth once daily. for 10 days, Disp: 10 tablet, Rfl: 2    methocarbamoL (ROBAXIN) 500 MG Tab, Take 1 tablet (500 mg total) by mouth every 6 (six) hours as needed (muscle spasms)., Disp: 30 tablet, Rfl: 1    montelukast (SINGULAIR) 10 mg tablet, TAKE 1 TABLET BY MOUTH EVERY DAY IN THE EVENING, Disp: 30 tablet, Rfl: 0    ondansetron (ZOFRAN-ODT) 8 MG TbDL, Take 1 tablet (8 mg total) by mouth every 6 (six) hours as needed (nausea)., Disp: 12 tablet, Rfl: 0    phentermine (LOMAIRA) 8 mg Tab, Take 1 each by mouth 2 (two) times daily. (Patient not taking: Reported on 1/30/2020), Disp: 60 each, Rfl: 0    promethazine (PHENERGAN) 25 MG tablet, Take 1 tablet (25 mg total) by mouth every 6 (six) hours as needed for Nausea. (Patient not taking: Reported on 1/30/2020), Disp: 15 tablet, Rfl: 0          Visit Diagnosis:  Juvenile idiopathic scoliosis of thoracolumbar region  -     X-Ray Scoliosis Complete Including Supine And Erect; Future; Expected date: 06/17/2020  -     Ambulatory referral/consult to Physical/Occupational Therapy; Future; Expected date: 06/24/2020    Other orders  -     Discontinue: tiZANidine (ZANAFLEX) 4 MG tablet; Take 1 tablet (4 mg total) by mouth every 6 (six) hours as needed.  Dispense: 40 tablet; Refill: 1  -     methocarbamoL  (ROBAXIN) 500 MG Tab; Take 1 tablet (500 mg total) by mouth every 6 (six) hours as needed (muscle spasms).  Dispense: 30 tablet; Refill: 1        Provider dictation:    The patient is a pleasant 35-year-old female with a past medical history of thoracic and low back pain, morbid obesity, prediabetes, vitamin-D deficiency, and PCOS presenting today for evaluation of lumbar back pain.  She reports chronic back pain that he be sometimes debilitating.  She feels that she has to sit due to the severe back pain.  She has not tried physical therapy or injections.  She has severe spasms and cramping in her back that prevents her from sleeping.  She denies bowel or bladder dysfunction.      Physical examination is limited due to virtual visit the patient is able to move all extremities well    No formal spine imaging to review however on CT of the abdomen and pelvis there is rotatory scoliosis of the thoracolumbar spine.    I had an extensive conversation in the patient with discussion of her scoliosis.  This can cause spasms and pain in the lumbar spine.  I have recommended weight loss and physical activity to help with this.  I have recommended evaluation with physical therapy to help with back pain.  I have also ordered scoliosis films for measurement of Brar angles and monitoring.  We discussed treatment given the minor scoliotic deformity would be conservative.  No surgical intervention indicated.  She will follow-up with me as needed.            This note was done using voice recognition software. Please excuse any errors missed in proof reading.

## 2020-06-24 PROBLEM — M41.115 JUVENILE IDIOPATHIC SCOLIOSIS OF THORACOLUMBAR REGION: Status: ACTIVE | Noted: 2020-06-24

## 2020-07-06 ENCOUNTER — TELEPHONE (OUTPATIENT)
Dept: BARIATRICS | Facility: CLINIC | Age: 35
End: 2020-07-06

## 2020-07-06 ENCOUNTER — TELEPHONE (OUTPATIENT)
Dept: OPHTHALMOLOGY | Facility: CLINIC | Age: 35
End: 2020-07-06

## 2020-07-06 ENCOUNTER — PATIENT OUTREACH (OUTPATIENT)
Dept: ADMINISTRATIVE | Facility: OTHER | Age: 35
End: 2020-07-06

## 2020-07-06 ENCOUNTER — OFFICE VISIT (OUTPATIENT)
Dept: URGENT CARE | Facility: CLINIC | Age: 35
End: 2020-07-06
Payer: COMMERCIAL

## 2020-07-06 VITALS
TEMPERATURE: 98 F | DIASTOLIC BLOOD PRESSURE: 95 MMHG | SYSTOLIC BLOOD PRESSURE: 142 MMHG | RESPIRATION RATE: 18 BRPM | HEART RATE: 71 BPM | BODY MASS INDEX: 48.82 KG/M2 | HEIGHT: 65 IN | OXYGEN SATURATION: 98 % | WEIGHT: 293 LBS

## 2020-07-06 DIAGNOSIS — H00.012 HORDEOLUM EXTERNUM OF RIGHT LOWER EYELID: Primary | ICD-10-CM

## 2020-07-06 PROCEDURE — 99214 PR OFFICE/OUTPT VISIT, EST, LEVL IV, 30-39 MIN: ICD-10-PCS | Mod: S$GLB,,, | Performed by: PHYSICIAN ASSISTANT

## 2020-07-06 PROCEDURE — 99214 OFFICE O/P EST MOD 30 MIN: CPT | Mod: S$GLB,,, | Performed by: PHYSICIAN ASSISTANT

## 2020-07-06 RX ORDER — ERYTHROMYCIN 5 MG/G
OINTMENT OPHTHALMIC
Qty: 1 TUBE | Refills: 0 | Status: SHIPPED | OUTPATIENT
Start: 2020-07-06 | End: 2021-03-17

## 2020-07-06 NOTE — TELEPHONE ENCOUNTER
----- Message from Arash Whitney sent at 7/6/2020 11:31 AM CDT -----  Contact: pt  Pt would like speak with staff re surgery         Please contact pt: [phone]

## 2020-07-06 NOTE — PROGRESS NOTES
"Subjective:       Patient ID: Agustina Tillman is a 35 y.o. female.    Vitals:  height is 5' 5" (1.651 m) and weight is 134.3 kg (296 lb). Her temperature is 97.7 °F (36.5 °C). Her blood pressure is 142/95 (abnormal) and her pulse is 71. Her respiration is 18 and oxygen saturation is 98%.     Chief Complaint: Eye Problem    Pt stated that on Friday her right eye started to feel a little sore and by saturdays he had a stye on her right lower eye lid. She stated that she is having discharge from the right eye as well.     Eye Problem   The right eye is affected. This is a new problem. The current episode started in the past 7 days. The problem occurs constantly. The problem has been gradually worsening. There was no injury mechanism. The pain is moderate. There is no known exposure to pink eye. She does not wear contacts. Pertinent negatives include no blurred vision, eye discharge, double vision, eye redness, fever, itching, nausea, photophobia or vomiting. She has tried eye drops (warm compress ) for the symptoms. The treatment provided no relief.       Constitution: Negative for chills and fever.   HENT: Negative for congestion and sinus pain.    Eyes: Positive for eye pain and eyelid swelling. Negative for eye trauma, foreign body in eye, eye discharge, eye itching, eye redness, photophobia, vision loss, double vision and blurred vision.        Stye   Gastrointestinal: Negative for nausea and vomiting.   Genitourinary: Negative for history of kidney stones.   Skin: Negative for rash.   Allergic/Immunologic: Negative for seasonal allergies and itching.   Neurological: Negative for headaches.       Objective:      Physical Exam   Constitutional: She is oriented to person, place, and time. She appears well-developed. No distress.   HENT:   Head: Normocephalic and atraumatic.   Eyes: Conjunctivae are normal. Right eye exhibits hordeolum.       Cardiovascular: Normal rate, regular rhythm and normal heart sounds. " Exam reveals no gallop and no friction rub.   No murmur heard.  Pulmonary/Chest: Effort normal and breath sounds normal. No respiratory distress. She has no wheezes.   Musculoskeletal: Normal range of motion.         General: No tenderness.   Neurological: She is alert and oriented to person, place, and time.   Skin: Skin is warm, dry and not diaphoretic.   Psychiatric: Her behavior is normal. Judgment and thought content normal.         Assessment:       1. Hordeolum externum of right lower eyelid        Plan:         Hordeolum externum of right lower eyelid  -     erythromycin (ROMYCIN) ophthalmic ointment; Apply 1/2 inch ribbon to the inner lower eyelid and then gently massage onto the eye with the eye closed 3 times a day for 1 week.  Dispense: 1 Tube; Refill: 0         Agustina was seen today for eye problem.    Diagnoses and all orders for this visit:    Hordeolum externum of right lower eyelid  -     erythromycin (ROMYCIN) ophthalmic ointment; Apply 1/2 inch ribbon to the inner lower eyelid and then gently massage onto the eye with the eye closed 3 times a day for 1 week.      Patient Instructions   - Rest.  - Drink plenty of fluids.  - Take Tylenol and/or Ibuprofen as directed as needed for fever/pain.  Do not take more than the recommended dose.  - follow up with your PCP within the next 1-2 weeks as needed.  - Warm compresses to the affected area for 20 minutes at a time.   - You must understand that you have received an Urgent Care treatment only and that you may be released before all of your medical problems are known or treated.   - You, the patient, will arrange for follow up care as instructed.   - If your condition worsens or fails to improve we recommend that you receive another evaluation at the ER immediately or contact your PCP to discuss your concerns.   - You can call [phone] or [phone] to help schedule an appointment with the appropriate provider.      Scott (or Stye)  YUE talavera is an  infection of the oil gland of the eyelid. It may develop into a small pocket of pus (abscess). This can cause pain, redness, and swelling. In early stages, styes are treated with antibiotic cream, eye drops, or warm packs (small towels soaked in warm water). More severe cases may need to be opened and drained by a health care provider.  Home care  · Eye drops or ointment are usually prescribed to treat the infection. Use these as directed.   ¨ Artificial tears may also be used to lubricate the eye and make it more comfortable. These may be purchased without a prescription.   ¨ Talk to your health care provider before using any over-the-counter treatment for a sty.  · Apply a warm, damp towel to the affected eye for at least 5 minutes, 3 to 4 times a day for a week. Warm compresses open the pores and speed the healing. If the compresses are too hot, they may burn your eyelid.  · Sometimes the sty will drain with this treatment alone. If this happens, continue the antibiotic until all the redness and swelling are gone.  · Wash your hands before and after touching the infected eye to avoid spreading the infection.  · Do not squeeze or try to puncture the sty.  Follow-up care  Follow up with your health care provider, or as advised.   When to seek medical advice  Call your health care provider right away if you have:  · Increase in swelling or redness around the eyelid after 48 to 72 hours  · Increase in eye pain or the eyelid blisters  · Increase in warmth--the eyelid feels hot  · Drainage of blood or thick pus from the sty  · Blister on the eyelid  · Inability to open the eyelid due to swelling  · Fever  ¨ 1 degree above your normal temperature lasting for 24 to 48 hours, or  ¨ Whatever your health care provider told you to report based on your medical condition  · Vision changes  · Headache or stiff neck  · Recurrence of the sty  Date Last Reviewed: 6/14/2015  © 9134-5056 [employer]. [address] [place] [address]  Road, [place] [address]. All rights reserved. This information is not intended as a substitute for professional medical care. Always follow your healthcare professional's instructions.

## 2020-07-06 NOTE — PATIENT INSTRUCTIONS
- Rest.  - Drink plenty of fluids.  - Take Tylenol and/or Ibuprofen as directed as needed for fever/pain.  Do not take more than the recommended dose.  - follow up with your PCP within the next 1-2 weeks as needed.  - Warm compresses to the affected area for 20 minutes at a time.   - You must understand that you have received an Urgent Care treatment only and that you may be released before all of your medical problems are known or treated.   - You, the patient, will arrange for follow up care as instructed.   - If your condition worsens or fails to improve we recommend that you receive another evaluation at the ER immediately or contact your PCP to discuss your concerns.   - You can call [phone] or [phone] to help schedule an appointment with the appropriate provider.      Sty (or Stye)  A sty is an infection of the oil gland of the eyelid. It may develop into a small pocket of pus (abscess). This can cause pain, redness, and swelling. In early stages, styes are treated with antibiotic cream, eye drops, or warm packs (small towels soaked in warm water). More severe cases may need to be opened and drained by a health care provider.  Home care  · Eye drops or ointment are usually prescribed to treat the infection. Use these as directed.   ¨ Artificial tears may also be used to lubricate the eye and make it more comfortable. These may be purchased without a prescription.   ¨ Talk to your health care provider before using any over-the-counter treatment for a sty.  · Apply a warm, damp towel to the affected eye for at least 5 minutes, 3 to 4 times a day for a week. Warm compresses open the pores and speed the healing. If the compresses are too hot, they may burn your eyelid.  · Sometimes the sty will drain with this treatment alone. If this happens, continue the antibiotic until all the redness and swelling are gone.  · Wash your hands before and after touching the infected eye to avoid spreading the  infection.  · Do not squeeze or try to puncture the sty.  Follow-up care  Follow up with your health care provider, or as advised.   When to seek medical advice  Call your health care provider right away if you have:  · Increase in swelling or redness around the eyelid after 48 to 72 hours  · Increase in eye pain or the eyelid blisters  · Increase in warmth--the eyelid feels hot  · Drainage of blood or thick pus from the sty  · Blister on the eyelid  · Inability to open the eyelid due to swelling  · Fever  ¨ 1 degree above your normal temperature lasting for 24 to 48 hours, or  ¨ Whatever your health care provider told you to report based on your medical condition  · Vision changes  · Headache or stiff neck  · Recurrence of the sty  Date Last Reviewed: 6/14/2015  © 3655-1805 [employer]. [address] [place] [address], [place] [address]. All rights reserved. This information is not intended as a substitute for professional medical care. Always follow your healthcare professional's instructions.

## 2020-07-06 NOTE — TELEPHONE ENCOUNTER
Spoke to patient. Patient states she found out today her financial responsibility for surgery and needs to postpone surgery date. Agreed upon surgery date of August 19 with pre op on August 11th and liquid diet starting August 5th. Patient aware financial responsibilities due at pre op. Will remail pre op packet.

## 2020-07-09 ENCOUNTER — OFFICE VISIT (OUTPATIENT)
Dept: DERMATOLOGY | Facility: CLINIC | Age: 35
End: 2020-07-09
Payer: COMMERCIAL

## 2020-07-09 DIAGNOSIS — L30.9 DERMATITIS: Primary | ICD-10-CM

## 2020-07-09 PROCEDURE — 99202 OFFICE O/P NEW SF 15 MIN: CPT | Mod: 95,,, | Performed by: STUDENT IN AN ORGANIZED HEALTH CARE EDUCATION/TRAINING PROGRAM

## 2020-07-09 PROCEDURE — 99202 PR OFFICE/OUTPT VISIT, NEW, LEVL II, 15-29 MIN: ICD-10-PCS | Mod: 95,,, | Performed by: STUDENT IN AN ORGANIZED HEALTH CARE EDUCATION/TRAINING PROGRAM

## 2020-07-09 RX ORDER — HYDROXYZINE HYDROCHLORIDE 25 MG/1
25 TABLET, FILM COATED ORAL NIGHTLY
Qty: 30 TABLET | Refills: 1 | Status: SHIPPED | OUTPATIENT
Start: 2020-07-09 | End: 2020-07-31

## 2020-07-09 RX ORDER — TRIAMCINOLONE ACETONIDE 1 MG/G
CREAM TOPICAL
Qty: 454 G | Refills: 0 | Status: SHIPPED | OUTPATIENT
Start: 2020-07-09 | End: 2020-11-10

## 2020-07-09 RX ORDER — KETOCONAZOLE 20 MG/ML
SHAMPOO, SUSPENSION TOPICAL
Qty: 120 ML | Refills: 1 | Status: SHIPPED | OUTPATIENT
Start: 2020-07-09 | End: 2021-03-16

## 2020-07-09 RX ORDER — LEVOCETIRIZINE DIHYDROCHLORIDE 5 MG/1
5 TABLET, FILM COATED ORAL DAILY
Qty: 30 TABLET | Refills: 1 | Status: SHIPPED | OUTPATIENT
Start: 2020-07-09 | End: 2020-07-31

## 2020-07-09 NOTE — PROGRESS NOTES
"The patient location is: home  The chief complaint leading to consultation is: rash on hands and face    Visit type: audiovisual    Face to Face time with patient: 15mins  15 minutes of total time spent on the encounter, which includes face to face time and non-face to face time preparing to see the patient (eg, review of tests), Obtaining and/or reviewing separately obtained history, Documenting clinical information in the electronic or other health record, Independently interpreting results (not separately reported) and communicating results to the patient/family/caregiver, or Care coordination (not separately reported).         Each patient to whom he or she provides medical services by telemedicine is:  (1) informed of the relationship between the physician and patient and the respective role of any other health care provider with respect to management of the patient; and (2) notified that he or she may decline to receive medical services by telemedicine and may withdraw from such care at any time.    Notes: Pt presents for evaluation of "hives" on the hands and occasionally the feet. Reports symptoms have been present for several months. Daily hive-like lesions that are red, swollen, and itchy. Has not tried anything for her symptoms.     ROS: complains of some dryness and flaking on the face.     PE: Const/Neuro - AAOx3, NAD          Skin - erythematous edematous wheals on the dorsal hands.     A/P: 1) Dermatitis - Begin zyrtec and hydroxyzine daily and nightly respectively, along with TAC cream. Ketoconazole for dryness of the face.     "

## 2020-07-27 ENCOUNTER — TELEPHONE (OUTPATIENT)
Dept: BARIATRICS | Facility: CLINIC | Age: 35
End: 2020-07-27

## 2020-07-27 NOTE — TELEPHONE ENCOUNTER
----- Message from Joseph Sarmiento sent at 7/27/2020 10:34 AM CDT -----  Patient called to speak w/ someone regarding her pre-op dietary plan, requesting callback    [phone]   Liquid diet starts 8/5/2020  Preop 8/11/2020  Called and spoke with pt.  Reviewed 2 week preop liquid diet and sent msg via portal.  Also discussed vitamins and minerals needed after surgery.  Sent in email also.  Will send nutrition booklet to address listed on file.  Pt v/u/

## 2020-07-29 ENCOUNTER — HOSPITAL ENCOUNTER (OUTPATIENT)
Dept: RADIOLOGY | Facility: HOSPITAL | Age: 35
Discharge: HOME | End: 2020-07-29
Attending: NURSE PRACTITIONER
Payer: COMMERCIAL

## 2020-07-29 ENCOUNTER — OFFICE VISIT (OUTPATIENT)
Dept: FAMILY MEDICINE | Facility: CLINIC | Age: 35
End: 2020-07-29
Payer: COMMERCIAL

## 2020-07-29 VITALS
HEIGHT: 65 IN | WEIGHT: 293 LBS | TEMPERATURE: 98 F | SYSTOLIC BLOOD PRESSURE: 136 MMHG | OXYGEN SATURATION: 98 % | BODY MASS INDEX: 48.82 KG/M2 | HEART RATE: 67 BPM | DIASTOLIC BLOOD PRESSURE: 82 MMHG

## 2020-07-29 DIAGNOSIS — E28.2 PCOS (POLYCYSTIC OVARIAN SYNDROME): ICD-10-CM

## 2020-07-29 DIAGNOSIS — E66.01 MORBID OBESITY WITH BMI OF 45.0-49.9, ADULT: Chronic | ICD-10-CM

## 2020-07-29 DIAGNOSIS — R73.03 PREDIABETES: ICD-10-CM

## 2020-07-29 DIAGNOSIS — D50.8 IRON DEFICIENCY ANEMIA SECONDARY TO INADEQUATE DIETARY IRON INTAKE: ICD-10-CM

## 2020-07-29 DIAGNOSIS — Z01.818 PRE-OP EXAMINATION: ICD-10-CM

## 2020-07-29 DIAGNOSIS — E55.9 VITAMIN D DEFICIENCY: ICD-10-CM

## 2020-07-29 DIAGNOSIS — Z01.818 PRE-OP EXAMINATION: Primary | ICD-10-CM

## 2020-07-29 PROCEDURE — 99999 PR PBB SHADOW E&M-EST. PATIENT-LVL IV: ICD-10-PCS | Mod: PBBFAC,,, | Performed by: NURSE PRACTITIONER

## 2020-07-29 PROCEDURE — 93010 EKG 12-LEAD: ICD-10-PCS | Mod: S$GLB,,, | Performed by: INTERNAL MEDICINE

## 2020-07-29 PROCEDURE — 71046 X-RAY EXAM CHEST 2 VIEWS: CPT | Mod: 26,,, | Performed by: RADIOLOGY

## 2020-07-29 PROCEDURE — 99999 PR PBB SHADOW E&M-EST. PATIENT-LVL IV: CPT | Mod: PBBFAC,,, | Performed by: NURSE PRACTITIONER

## 2020-07-29 PROCEDURE — 93005 EKG 12-LEAD: ICD-10-PCS | Mod: S$GLB,,, | Performed by: NURSE PRACTITIONER

## 2020-07-29 PROCEDURE — 71046 X-RAY EXAM CHEST 2 VIEWS: CPT | Mod: TC,FY,PO

## 2020-07-29 PROCEDURE — 93005 ELECTROCARDIOGRAM TRACING: CPT | Mod: S$GLB,,, | Performed by: NURSE PRACTITIONER

## 2020-07-29 PROCEDURE — 99214 PR OFFICE/OUTPT VISIT, EST, LEVL IV, 30-39 MIN: ICD-10-PCS | Mod: S$GLB,,, | Performed by: NURSE PRACTITIONER

## 2020-07-29 PROCEDURE — 71046 XR CHEST PA AND LATERAL: ICD-10-PCS | Mod: 26,,, | Performed by: RADIOLOGY

## 2020-07-29 PROCEDURE — 93010 ELECTROCARDIOGRAM REPORT: CPT | Mod: S$GLB,,, | Performed by: INTERNAL MEDICINE

## 2020-07-29 PROCEDURE — 3008F BODY MASS INDEX DOCD: CPT | Mod: CPTII,S$GLB,, | Performed by: NURSE PRACTITIONER

## 2020-07-29 PROCEDURE — 3008F PR BODY MASS INDEX (BMI) DOCUMENTED: ICD-10-PCS | Mod: CPTII,S$GLB,, | Performed by: NURSE PRACTITIONER

## 2020-07-29 PROCEDURE — 99214 OFFICE O/P EST MOD 30 MIN: CPT | Mod: S$GLB,,, | Performed by: NURSE PRACTITIONER

## 2020-07-29 NOTE — PROGRESS NOTES
" Subjective:      Agustina Tillman is a 35 y.o. female who presents to the office today for a preoperative consultation at the request of surgeon Dr. Umaña who plans on performing laparoscopic sleeve gastrectomy on August 19. This consultation is requested for the specific conditions prompting preoperative evaluation (i.e. because of potential affect on operative risk): morbid obesity, prediabetes, iron deficiency anemia. Planned anesthesia: general. The patient has the following known anesthesia issues: no known complications. Patients bleeding risk: no recent abnormal bleeding and no remote history of abnormal bleeding. Patient does not have objections to receiving blood products if needed.    NSAID/OAC use: None.  Recent fever, illness, or antibiotic use: None.  Corticosteroid use: None.    The following portions of the patient's history were reviewed and updated as appropriate: allergies, current medications, past medical history, past social history, past surgical history and problem list.    Review of Systems  A comprehensive review of systems was negative.   Negative for: TAVAREZ, shortness of breath, chest pain, palpitations, swelling to lower extremities, orthopnea, dizziness, or LOC.     Objective:      /82 (BP Location: Left arm, Patient Position: Sitting, BP Method: Large (Automatic))   Pulse 67   Temp 97.7 °F (36.5 °C) (Other (see comments))   Ht 5' 5" (1.651 m)   Wt (!) 140 kg (308 lb 10.3 oz)   LMP 06/30/2020 (Exact Date)   SpO2 98%   BMI 51.36 kg/m²   General appearance: alert, appears stated age, cooperative and no distress  Eyes: negative findings: lids and lashes normal, conjunctivae and sclerae normal and pupils equal, round, reactive to light and accomodation  Ears: normal TM's and external ear canals both ears  Nose: Nares normal. Septum midline. Mucosa normal. No drainage or sinus tenderness.  Throat: lips, mucosa, and tongue normal; teeth and gums normal  Neck: no " adenopathy, no carotid bruit, no JVD, supple, symmetrical, trachea midline and thyroid not enlarged, symmetric, no tenderness/mass/nodules  Back: symmetric, no curvature. ROM normal. No CVA tenderness.  Lungs: clear to auscultation bilaterally  Heart: regular rate and rhythm, S1, S2 normal, no murmur, click, rub or gallop  Abdomen: soft, non-tender; bowel sounds normal; no masses,  no organomegaly  Extremities: extremities normal, atraumatic, no cyanosis or edema  Pulses: 2+ and symmetric  Skin: Skin color, texture, turgor normal. No rashes or lesions  Lymph nodes: Cervical, supraclavicular, and axillary nodes normal.  Neurologic: Grossly normal, no focal neurological deficits    Predictors of intubation difficulty:   Morbid obesity? yes - BMI: 51.36   Anatomically abnormal facies? no   Prominent incisors? no   Receding mandible? no   Short, thick neck? yes    Neck range of motion: normal   Mallampati score: II (hard and soft palate, upper portion of tonsils anduvula visible)   Mouth opening: WNL   Dentition: No chipped, loose, or missing teeth.    Cardiographics  ECG: normal sinus rhythm, no blocks or conduction defects, no ischemic changes  Echocardiogram: not done    Imaging  Chest x-ray 7/29/20: pending    Lab Review   Labs 7/29/20: pending     Assessment:        35 y.o. female with planned surgery as above.    Known risk factors for perioperative complications: Anemia  prediabetes, and morbid obesity    Difficulty with intubation is not anticipated.    Cardiac Risk Estimation: moderate risk    Current medications which may produce withdrawal symptoms if withheld perioperatively: None.       Plan:     Pre-op examination  Clearance pending work-up below.  -     CBC auto differential; Future; Expected date: 07/29/2020  -     Comprehensive metabolic panel; Future; Expected date: 07/29/2020  -     Hemoglobin A1C; Future; Expected date: 07/29/2020  -     IN OFFICE EKG 12-LEAD (to Muse)  -     X-Ray Chest PA And  Lateral; Future; Expected date: 07/29/2020    Morbid obesity with BMI of 45.0-49.9, adult  Plan for bariatric surgery.    Prediabetes  The patient is asked to make an attempt to improve diet and exercise patterns to aid in medical management of this problem.  -     Hemoglobin A1C; Future; Expected date: 07/29/2020    Iron deficiency anemia secondary to inadequate dietary iron intake  The current medical regimen is effective;  continue present plan and medications.  -     Iron and TIBC; Future; Expected date: 07/29/2020  -     Ferritin; Future; Expected date: 07/29/2020    Vitamin D deficiency  The current medical regimen is effective;  continue present plan and medications.  -     Vitamin D; Future; Expected date: 07/29/2020    PCOS (polycystic ovarian syndrome)  The current medical regimen is effective;  continue present plan and medications.    Patient has verbalized understanding and is in agreement with plan of care.    Follow up in about 3 months (around 10/29/2020) for routine follow-up after bariatric surgery with PCP team.    7/29/20: Clearance pending work-up.  7/30/20: Patient is cleared for upcoming procedure, intermediate risk. She is medically maximized for upcoming procedure.

## 2020-07-30 ENCOUNTER — TELEPHONE (OUTPATIENT)
Dept: FAMILY MEDICINE | Facility: CLINIC | Age: 35
End: 2020-07-30

## 2020-08-05 ENCOUNTER — TELEPHONE (OUTPATIENT)
Dept: BARIATRICS | Facility: CLINIC | Age: 35
End: 2020-08-05

## 2020-08-05 NOTE — TELEPHONE ENCOUNTER
Called patient to discuss liquid diet and vitamins.    Pt using Yhat 30g prot and 2g sugar, and Quest brand shakes. Will drink 4-5/day.    Discussion:  -  gms of protein per day  - 600-800 calories per day   - Less than 4gm sugar per shake  - SF, Decaf, non-carbonated Fluids  - No Fruits, juices, yogurt or pudding on liquid diet  - No vitamins, fish oils or herbal supplements for 1 week prior to surgery    Remind pt per nursing and medical team to inform our department if taking antibiotics within the 30 days post bariatric surgery or it any other surgeries/procedures are scheduled within 30 days after bariatric surgery.    Reminded pt of pre-op and surgery dates.    Pt to call back with any questions.

## 2020-08-05 NOTE — TELEPHONE ENCOUNTER
----- Message from Shelia Shepherd RN sent at 7/6/2020  1:13 PM CDT -----  Regarding: NEW LIQUID DIET DATE  SURGERY RESCHEDULED  2 week liquid diet starts 8/5/2020  sleeve surgery scheduled 8/19/2020  preop appt scheduled 8/11/2020

## 2020-08-10 ENCOUNTER — PATIENT OUTREACH (OUTPATIENT)
Dept: ADMINISTRATIVE | Facility: OTHER | Age: 35
End: 2020-08-10

## 2020-08-11 ENCOUNTER — CLINICAL SUPPORT (OUTPATIENT)
Dept: BARIATRICS | Facility: CLINIC | Age: 35
End: 2020-08-11
Payer: COMMERCIAL

## 2020-08-11 ENCOUNTER — TELEPHONE (OUTPATIENT)
Dept: BARIATRICS | Facility: CLINIC | Age: 35
End: 2020-08-11

## 2020-08-11 ENCOUNTER — OFFICE VISIT (OUTPATIENT)
Dept: BARIATRICS | Facility: CLINIC | Age: 35
End: 2020-08-11
Payer: COMMERCIAL

## 2020-08-11 VITALS
HEIGHT: 65 IN | SYSTOLIC BLOOD PRESSURE: 139 MMHG | WEIGHT: 293 LBS | BODY MASS INDEX: 48.82 KG/M2 | DIASTOLIC BLOOD PRESSURE: 67 MMHG | HEART RATE: 82 BPM

## 2020-08-11 DIAGNOSIS — E66.01 MORBID OBESITY WITH BMI OF 45.0-49.9, ADULT: Primary | Chronic | ICD-10-CM

## 2020-08-11 PROCEDURE — 99214 OFFICE O/P EST MOD 30 MIN: CPT | Mod: S$GLB,,, | Performed by: SURGERY

## 2020-08-11 PROCEDURE — 99214 PR OFFICE/OUTPT VISIT, EST, LEVL IV, 30-39 MIN: ICD-10-PCS | Mod: S$GLB,,, | Performed by: SURGERY

## 2020-08-11 PROCEDURE — 99499 NO LOS: ICD-10-PCS | Mod: S$GLB,,, | Performed by: DIETITIAN, REGISTERED

## 2020-08-11 PROCEDURE — 3008F BODY MASS INDEX DOCD: CPT | Mod: CPTII,S$GLB,, | Performed by: SURGERY

## 2020-08-11 PROCEDURE — 3008F PR BODY MASS INDEX (BMI) DOCUMENTED: ICD-10-PCS | Mod: CPTII,S$GLB,, | Performed by: SURGERY

## 2020-08-11 PROCEDURE — 99999 PR PBB SHADOW E&M-EST. PATIENT-LVL IV: CPT | Mod: PBBFAC,,, | Performed by: SURGERY

## 2020-08-11 PROCEDURE — 99999 PR PBB SHADOW E&M-EST. PATIENT-LVL IV: ICD-10-PCS | Mod: PBBFAC,,, | Performed by: SURGERY

## 2020-08-11 PROCEDURE — 99499 UNLISTED E&M SERVICE: CPT | Mod: S$GLB,,, | Performed by: DIETITIAN, REGISTERED

## 2020-08-11 RX ORDER — HEPARIN SODIUM 5000 [USP'U]/ML
5000 INJECTION, SOLUTION INTRAVENOUS; SUBCUTANEOUS ONCE
Status: CANCELLED | OUTPATIENT
Start: 2020-08-11 | End: 2020-08-11

## 2020-08-11 RX ORDER — ONDANSETRON 8 MG/1
8 TABLET, ORALLY DISINTEGRATING ORAL EVERY 6 HOURS PRN
Qty: 30 TABLET | Refills: 0 | Status: SHIPPED | OUTPATIENT
Start: 2020-08-11 | End: 2021-03-16

## 2020-08-11 RX ORDER — FAMOTIDINE 10 MG/ML
20 INJECTION INTRAVENOUS ONCE
Status: CANCELLED | OUTPATIENT
Start: 2020-08-11 | End: 2020-08-11

## 2020-08-11 RX ORDER — OMEPRAZOLE 40 MG/1
40 CAPSULE, DELAYED RELEASE ORAL EVERY MORNING
Qty: 30 CAPSULE | Refills: 2 | Status: SHIPPED | OUTPATIENT
Start: 2020-08-11 | End: 2021-03-16

## 2020-08-11 RX ORDER — POLYETHYLENE GLYCOL 3350 17 G/17G
17 POWDER, FOR SOLUTION ORAL DAILY
Qty: 510 G | Refills: 3 | Status: SHIPPED | OUTPATIENT
Start: 2020-08-11 | End: 2021-03-16

## 2020-08-11 RX ORDER — HYDROCODONE BITARTRATE AND ACETAMINOPHEN 7.5; 325 MG/15ML; MG/15ML
15 SOLUTION ORAL 4 TIMES DAILY PRN
Qty: 200 ML | Refills: 0 | Status: SHIPPED | OUTPATIENT
Start: 2020-08-11 | End: 2021-03-16

## 2020-08-11 RX ORDER — URSODIOL 500 MG/1
TABLET, FILM COATED ORAL
Qty: 90 TABLET | Refills: 1 | Status: SHIPPED | OUTPATIENT
Start: 2020-08-11 | End: 2021-03-16

## 2020-08-11 RX ORDER — METOCLOPRAMIDE HYDROCHLORIDE 5 MG/ML
10 INJECTION INTRAMUSCULAR; INTRAVENOUS ONCE
Status: CANCELLED | OUTPATIENT
Start: 2020-08-11 | End: 2020-08-11

## 2020-08-11 RX ORDER — SODIUM CITRATE AND CITRIC ACID MONOHYDRATE 334; 500 MG/5ML; MG/5ML
15 SOLUTION ORAL ONCE
Status: CANCELLED | OUTPATIENT
Start: 2020-08-11 | End: 2020-08-11

## 2020-08-11 NOTE — PROGRESS NOTES
Pt will use fairlife and quest  protein shakes mix with fairlife .  If using protein powder, reminded pt of mixing with water for days 1 and 2, by day 3 may try using skim, 1% milk or unsweetened almond/soy milk.  By Day 4, may use RTD protein shakes as tolerated  Pt has the following vitamins and minerals to start taking once discharged from hospital  Multivitamin with 18 mg iron take one tablet or chewable twice a day  B-complex with 50 mg thiamin taken once daily  Calcium citrate 500 mg with vitamin D three times per day  Vitamin B-12 500 mcg  Sublingually daily  Reviewed dosage and timing of vitamin/mineral guidelines.  Reviewed nutritional guidelines for protein and fluid requirements for week 1 and week 2 post-surgery.  Handout provided to log protein and fluid daily.  1 ounce medicine cups provided for patient to measure fluid intake after surgery.  Reviewed common nutritional concerns and prevention tips after bariatric surgery  Pt verbalized understanding of information provided with appropriate questions and comments.

## 2020-08-11 NOTE — TELEPHONE ENCOUNTER
Called patient regarding appointment I canceled that was notified from [employer] call list. Pt stated she did not want her appointment canceled. I Was able to assist with rescheduling pt appointment with the same date and time.

## 2020-08-11 NOTE — TELEPHONE ENCOUNTER
Called and confirmed appt with dietitian Dipti today----- Message from Ang Nogueira sent at 8/11/2020  9:25 AM CDT -----  Contact: pt @ [phone]  Pt calling to confirm if she will need to be scheduled w/ another dietician today for preop, due to appt w/ Dipti canceled

## 2020-08-11 NOTE — PROGRESS NOTES
History & Physical    SUBJECTIVE:     History of Present Illness:  Agustina Tillman is a 35 y.o. female who presents for pre-operative exam for weight loss surgery.  she has completed her pre-operative evaluation.  she has failed medical treatment for obesity.  1. Morbid Obesity with Body mass index is 49.16 kg/m². and difficulty with weight loss.  2. Co-morbidities: prediabetes, PCOS (rare menstrual periods), back pain, Iron deficiency anemia 3. Being treated for low iron, b1, vit d.    Chief Complaint   Patient presents with    Pre-op Exam       Review of patient's allergies indicates:   Allergen Reactions    Metformin Other (See Comments)     Severe low blood sugar    Sudafed pe head congestn-pain [ibuprofen-phenylephrine] Anxiety       Current Outpatient Medications   Medication Sig    cetirizine (ZYRTEC) 10 MG tablet Take 1 tablet (10 mg total) by mouth once daily.    diclofenac (VOLTAREN) 50 MG EC tablet Take 1 tablet (50 mg total) by mouth 3 (three) times daily as needed (pain).    ergocalciferol (ERGOCALCIFEROL) 50,000 unit Cap Take 1 capsule (50,000 Units total) by mouth twice a week.    erythromycin (ROMYCIN) ophthalmic ointment Apply 1/2 inch ribbon to the inner lower eyelid and then gently massage onto the eye with the eye closed 3 times a day for 1 week.    hydrOXYzine HCL (ATARAX) 25 MG tablet TAKE 1 TABLET BY MOUTH EVERY EVENING    ketoconazole (NIZORAL) 2 % shampoo Apply topically twice a week.    levocetirizine (XYZAL) 5 MG tablet TAKE 1 TABLET BY MOUTH EVERY DAY    montelukast (SINGULAIR) 10 mg tablet TAKE 1 TABLET BY MOUTH EVERY DAY IN THE EVENING    phentermine (LOMAIRA) 8 mg Tab Take 1 each by mouth 2 (two) times daily.    promethazine (PHENERGAN) 25 MG tablet Take 1 tablet (25 mg total) by mouth every 6 (six) hours as needed for Nausea.    triamcinolone acetonide 0.1% (KENALOG) 0.1 % cream AAA bid. Do not apply to the face.    medroxyPROGESTERone (PROVERA) 10 MG tablet  "Take 1 tablet (10 mg total) by mouth once daily. for 10 days (Patient not taking: Reported on 7/29/2020)    ondansetron (ZOFRAN-ODT) 8 MG TbDL Take 1 tablet (8 mg total) by mouth every 6 (six) hours as needed (nausea).     No current facility-administered medications for this visit.        Past Medical History:   Diagnosis Date    Allergy 2016    Sinus    Diabetes mellitus     Iron deficiency anemia secondary to inadequate dietary iron intake     PCOS (polycystic ovarian syndrome) 5/18/2017    Seizures 1995    I use to have epilepsy. But they have julieta away       History reviewed. No pertinent surgical history.    Review of Systems   Constitutional: Negative for fever.   HENT: Positive for congestion.         Has allergies   Respiratory: Negative for cough and shortness of breath.    Cardiovascular: Negative for chest pain and palpitations.   Gastrointestinal: Negative for abdominal pain, constipation, diarrhea, heartburn, nausea and vomiting.        Very rare heartburn, only when drinking thinks like lemonade    Genitourinary: Negative for dysuria and frequency.   Musculoskeletal: Positive for back pain.        Takes occasional robaxin for back pain   Neurological: Positive for seizures.        No seizures for 24 years and not on any medications   Endo/Heme/Allergies: Does not bruise/bleed easily.          OBJECTIVE:     Vitals:    08/11/20 1354   BP: 139/67   Pulse: 82   Weight: (!) 137.5 kg (303 lb 2.1 oz)   Height: 5' 5" (1.651 m)       Physical Exam   Constitutional: She is oriented to person, place, and time and well-developed, well-nourished, and in no distress.   Neurological: She is alert and oriented to person, place, and time.   Skin: Skin is warm and dry.   Psychiatric: Mood, memory, affect and judgment normal.   Vitals reviewed.       Laboratory  CBC: Reviewed  CMP: Reviewed    Diagnostic Results:  ECG: Reviewed  X-Ray: Reviewed  CT: Reviewed     Dietitian: Patient has participated in " pre-operative nutritional program with understanding of necessary lifelong dietary changes required with surgery.    Psych: No overt contraindications to bariatric surgery, patient has completed psychological evaluation and is able to give informed consent.    Clearance: pcp    ASSESSMENT/PLAN:     Morbid obesity with failure of medical conservative therapy.    Co Morbid Conditions:   Patient Active Problem List   Diagnosis    PCOS (polycystic ovarian syndrome)    Female fertility problems    Morbid obesity with BMI of 45.0-49.9, adult    Vitamin D deficiency    Prediabetes    Iron deficiency anemia secondary to inadequate dietary iron intake    Chronic allergic rhinitis due to pollen    Low back pain    Difficulty walking    Thoracic back pain    Generalized weakness    Juvenile idiopathic scoliosis of thoracolumbar region       Patient wishes to undergo sleeve gastrectomy.      The patient was informed that risks include, but are not limited to: death, leak, obstruction, bleeding, and sepsis. Any of these could require further surgery. Other risks include DVT, PE, pneumonia, wound dehiscence, hernia, wound infection, the need for dilatations and the inability to lose appropriate weight and keep it off.     We discussed that our goal is to ameliorate her medical problems and not to obtain a specific body mass index. she understands the risks and benefits and wishes to proceed with the procedure.  she has signed a consent form.

## 2020-08-12 ENCOUNTER — TELEPHONE (OUTPATIENT)
Dept: BARIATRICS | Facility: CLINIC | Age: 35
End: 2020-08-12

## 2020-08-12 NOTE — TELEPHONE ENCOUNTER
----- Message from Grecia Reynoso sent at 8/12/2020  9:05 AM CDT -----  Contact: Patient Called [phone]  Wanting to know if she can have nail polish on toes on the day of her upcoming surgery 8/19/20.

## 2020-08-16 ENCOUNTER — LAB VISIT (OUTPATIENT)
Dept: URGENT CARE | Facility: CLINIC | Age: 35
DRG: 620 | End: 2020-08-16
Payer: COMMERCIAL

## 2020-08-16 DIAGNOSIS — E51.9 VITAMIN B1 DEFICIENCY: ICD-10-CM

## 2020-08-16 DIAGNOSIS — E66.01 MORBID OBESITY: ICD-10-CM

## 2020-08-16 DIAGNOSIS — E28.2 PCOS (POLYCYSTIC OVARIAN SYNDROME): ICD-10-CM

## 2020-08-16 DIAGNOSIS — Z01.818 PRE-OP TESTING: ICD-10-CM

## 2020-08-16 DIAGNOSIS — R73.03 PREDIABETES: ICD-10-CM

## 2020-08-16 PROCEDURE — U0003 INFECTIOUS AGENT DETECTION BY NUCLEIC ACID (DNA OR RNA); SEVERE ACUTE RESPIRATORY SYNDROME CORONAVIRUS 2 (SARS-COV-2) (CORONAVIRUS DISEASE [COVID-19]), AMPLIFIED PROBE TECHNIQUE, MAKING USE OF HIGH THROUGHPUT TECHNOLOGIES AS DESCRIBED BY CMS-2020-01-R: HCPCS

## 2020-08-17 LAB — SARS-COV-2 RNA RESP QL NAA+PROBE: NOT DETECTED

## 2020-08-18 ENCOUNTER — TELEPHONE (OUTPATIENT)
Dept: BARIATRICS | Facility: CLINIC | Age: 35
End: 2020-08-18

## 2020-08-18 ENCOUNTER — ANESTHESIA EVENT (OUTPATIENT)
Dept: SURGERY | Facility: HOSPITAL | Age: 35
DRG: 620 | End: 2020-08-18
Payer: COMMERCIAL

## 2020-08-18 NOTE — PRE-PROCEDURE INSTRUCTIONS
PREOP INSTRUCTIONS:No solid food ,milk or milk products for 8 hours prior to procedure.Clear liquids are allowed up to 2 hours before procedure.Clear liquids are:water,apple juice,gatorade & powerade.Patient instructed to follow the surgeon's instructions if they differ from these.Shower instructions as well as directions to the Surgery Center were given.Patient encouraged to wear loose fitting,comfortable clothing.Medication instructions for pm prior to and am of procedure reviewed.Instructed patient to avoid taking vitamins,supplements,aspirin and ibuprofen the morning of surgery. Patient stated an understanding.Patient informed of the current visitor policy and advised patient that one visitor may accompany each patient into the hospital and wait (socially distanced) until a member of the medical team provides an update at the conclusion of the procedure.When they enter the hospital both patient and visitor will have their temperature checked.All visitors are asked to arrive with a mask and to keep their mask on throughout the visit.Each inpatient is allowed 1 visitor per day between the hours of 8am and 6pm.This visitor must remain in the patient's room and not gather in common areas such as waiting rooms or cafeterias.The visitor must be 18 years or older and arrive with a mask and keep the mask on throughout the visit.    Covid test completed 8/16/2020-Negative    Patient denies any familial side effects or issues with anesthesia or sedation.This is the patient's first surgery

## 2020-08-18 NOTE — TELEPHONE ENCOUNTER
Notified patient of arrival time to the [facility] at 0815 with expected surgery start time 1020 on 8/19/2020.   Instructed patient regarding pre-op instructions including no protein shakes or sugar free clear liquids after midnight but can have a rare sip of water for comfort, showering and preop medications to hold/take per anesthesia/preop.  Instructed patient on the s/s of dehydration and for patient to call at the first sign of dehydration.  Informed patient that someone from bariatrics will call them 1 week post op to review diet/fluid intake and to ensure adequate hydration.    Reminded patient not to take antibiotics for 30 days following surgery or schedule elective procedures involving anesthesia/sedation for 30 days following surgery unless checking with the bariatric clinic first.  Pt verbalized understanding. Pt given office phone number for any additional questions/concerns.

## 2020-08-18 NOTE — ANESTHESIA PREPROCEDURE EVALUATION
[facility]  Anesthesia Pre-Operative Evaluation         Patient Name: Agustina Tillman  YOB: 1985  MRN: [id number]    SUBJECTIVE:     Pre-operative evaluation for Procedure(s) (LRB):  GASTRECTOMY, SLEEVE, LAPAROSCOPIC, with intraop EGD (N/A)     08/18/2020    Agustina Tillman is a 35 y.o. female w/ a significant PMHx of morbid obesity (BMI 50), pre-diabetes, GERD, PCOS, JOSH, and seizures?.    Patient now presents for the above procedure(s).      LDA: None documented.    Prev airway: None documented.    Drips: None documented.      Patient Active Problem List   Diagnosis    PCOS (polycystic ovarian syndrome)    Female fertility problems    Morbid obesity with BMI of 45.0-49.9, adult    Vitamin D deficiency    Prediabetes    Iron deficiency anemia secondary to inadequate dietary iron intake    Chronic allergic rhinitis due to pollen    Low back pain    Difficulty walking    Thoracic back pain    Generalized weakness    Juvenile idiopathic scoliosis of thoracolumbar region       Review of patient's allergies indicates:   Allergen Reactions    Metformin Other (See Comments)     Severe low blood sugar    Sudafed pe head congestn-pain [ibuprofen-phenylephrine] Anxiety       Current Inpatient Medications:      No current facility-administered medications on file prior to encounter.      Current Outpatient Medications on File Prior to Encounter   Medication Sig Dispense Refill    ergocalciferol (ERGOCALCIFEROL) 50,000 unit Cap Take 1 capsule (50,000 Units total) by mouth twice a week. 24 capsule 0    phentermine (LOMAIRA) 8 mg Tab Take 1 each by mouth 2 (two) times daily. 60 each 0    promethazine (PHENERGAN) 25 MG tablet Take 1 tablet (25 mg total) by mouth every 6 (six) hours as needed for Nausea. 15 tablet 0       No past surgical history on file.    Social History     Socioeconomic History    Marital status: Single     Spouse name: Not on file    Number  of children: Not on file    Years of education: Not on file    Highest education level: Not on file   Occupational History    Not on file   Social Needs    Financial resource strain: Not hard at all    Food insecurity     Worry: Never true     Inability: Never true    Transportation needs     Medical: No     Non-medical: No   Tobacco Use    Smoking status: Never Smoker    Smokeless tobacco: Never Used   Substance and Sexual Activity    Alcohol use: Yes     Alcohol/week: 1.0 standard drinks     Types: 1 Glasses of wine per week     Frequency: Monthly or less     Drinks per session: 1 or 2     Binge frequency: Never    Drug use: Never    Sexual activity: Yes     Partners: Male   Lifestyle    Physical activity     Days per week: 3 days     Minutes per session: 90 min    Stress: Not at all   Relationships    Social connections     Talks on phone: More than three times a week     Gets together: More than three times a week     Attends Gnosticist service: Not on file     Active member of club or organization: No     Attends meetings of clubs or organizations: Never     Relationship status: Living with partner   Other Topics Concern    Not on file   Social History Narrative    Not on file       OBJECTIVE:     Vital Signs Range (Last 24H):         Significant Labs:  Lab Results   Component Value Date    WBC 7.09 07/29/2020    HGB 12.0 07/29/2020    HCT 40.9 07/29/2020     07/29/2020    CHOL 163 11/07/2019    TRIG 100 11/07/2019    HDL 47 11/07/2019    ALT 13 07/29/2020    AST 14 07/29/2020     07/29/2020    K 3.7 07/29/2020     07/29/2020    CREATININE 0.9 07/29/2020    BUN 11 07/29/2020    CO2 29 07/29/2020    TSH 1.839 11/07/2019    HGBA1C 5.1 07/29/2020       Diagnostic Studies: No relevant studies.    EKG:   Results for orders placed or performed in visit on 07/29/20   IN OFFICE EKG 12-LEAD (to [place])    Collection Time: 07/29/20  9:03 AM    Narrative    Test Reason : Z01.818,    Vent.  Rate : 060 BPM     Atrial Rate : 060 BPM     P-R Int : 180 ms          QRS Dur : 090 ms      QT Int : 452 ms       P-R-T Axes : 072 013 069 degrees     QTc Int : 452 ms    Normal sinus rhythm  Normal ECG  When compared with ECG of 06-NOV-2019 14:11,  No significant change was found  Confirmed by Fish Khalil MD ([id number]) on 7/29/2020 10:27:21 PM    Referred By: LINDA YANES           Confirmed By:Fish Khalil MD       2D ECHO:  TTE:  No results found for this or any previous visit.    ADRIANA:  No results found for this or any previous visit.    ASSESSMENT/PLAN:       Anesthesia Evaluation    I have reviewed the Patient Summary Reports.    I have reviewed the Nursing Notes. I have reviewed the NPO Status.   I have reviewed the Medications.     Review of Systems  Anesthesia Hx:  Denies Family Hx of Anesthesia complications.   Denies Personal Hx of Anesthesia complications.   Social:  Non-Smoker, Social Alcohol Use    Hematology/Oncology:  Hematology Normal   Oncology Normal     EENT/Dental:EENT/Dental Normal   Cardiovascular:  Cardiovascular Normal  ECG has been reviewed.    Pulmonary:  Pulmonary Normal    Renal/:  Renal/ Normal     Hepatic/GI:   GERD, well controlled    Musculoskeletal:  Musculoskeletal Normal    Neurological:   Seizures    Endocrine:   Diabetes, type 2    Psych:  Psychiatric Normal           Physical Exam  General:  Morbid Obesity    Airway/Jaw/Neck:  Airway Findings: Mouth Opening: Normal Tongue: Normal  General Airway Assessment: Adult  Mallampati: III  TM Distance: Normal, at least 6 cm  Jaw/Neck Findings:  Neck ROM: Normal ROM      Dental:  Dental Findings: In tact   Chest/Lungs:  Chest/Lungs Findings: Normal Respiratory Rate, Clear to auscultation     Heart/Vascular:  Heart Findings: Rate: Normal  Rhythm: Regular Rhythm     Abdomen:  Abdomen Findings:  Normal, Soft       Mental Status:  Mental Status Findings:  Cooperative, Alert and Oriented         Anesthesia Plan  Type of Anesthesia,  risks & benefits discussed:  Anesthesia Type:  general, regional  Patient's Preference:   Intra-op Monitoring Plan: standard ASA monitors  Intra-op Monitoring Plan Comments:   Post Op Pain Control Plan: multimodal analgesia, peripheral nerve block and per primary service following discharge from PACU  Post Op Pain Control Plan Comments:   Induction:   IV  Beta Blocker:  Patient is not currently on a Beta-Blocker (No further documentation required).       Informed Consent: Patient understands risks and agrees with Anesthesia plan.  Questions answered. Anesthesia consent signed with patient.  ASA Score: 3     Day of Surgery Review of History & Physical: I have interviewed and examined the patient. I have reviewed the patient's H&P dated:    H&P update referred to the surgeon.         Ready For Surgery From Anesthesia Perspective.

## 2020-08-19 ENCOUNTER — HOSPITAL ENCOUNTER (INPATIENT)
Facility: HOSPITAL | Age: 35
LOS: 1 days | Discharge: HOME | DRG: 620 | End: 2020-08-20
Attending: SURGERY | Admitting: SURGERY
Payer: COMMERCIAL

## 2020-08-19 ENCOUNTER — ANESTHESIA (OUTPATIENT)
Dept: SURGERY | Facility: HOSPITAL | Age: 35
DRG: 620 | End: 2020-08-19
Payer: COMMERCIAL

## 2020-08-19 DIAGNOSIS — E66.01 MORBID OBESITY WITH BMI OF 45.0-49.9, ADULT: Primary | Chronic | ICD-10-CM

## 2020-08-19 LAB
B-HCG UR QL: NEGATIVE
CTP QC/QA: YES
POCT GLUCOSE: 82 MG/DL (ref 70–110)

## 2020-08-19 PROCEDURE — 81025 URINE PREGNANCY TEST: CPT | Performed by: SURGERY

## 2020-08-19 PROCEDURE — 25000003 PHARM REV CODE 250: Performed by: STUDENT IN AN ORGANIZED HEALTH CARE EDUCATION/TRAINING PROGRAM

## 2020-08-19 PROCEDURE — D9220A PRA ANESTHESIA: Mod: ,,, | Performed by: ANESTHESIOLOGY

## 2020-08-19 PROCEDURE — S0028 INJECTION, FAMOTIDINE, 20 MG: HCPCS | Performed by: SURGERY

## 2020-08-19 PROCEDURE — 88307 TISSUE EXAM BY PATHOLOGIST: CPT | Performed by: PATHOLOGY

## 2020-08-19 PROCEDURE — 94761 N-INVAS EAR/PLS OXIMETRY MLT: CPT

## 2020-08-19 PROCEDURE — 43775 PR LAP, GAST RESTRICT PROC, LONGITUDINAL GASTRECTOMY: ICD-10-PCS | Mod: ,,, | Performed by: SURGERY

## 2020-08-19 PROCEDURE — 71000033 HC RECOVERY, INTIAL HOUR: Performed by: SURGERY

## 2020-08-19 PROCEDURE — 82962 GLUCOSE BLOOD TEST: CPT | Performed by: SURGERY

## 2020-08-19 PROCEDURE — 11000001 HC ACUTE MED/SURG PRIVATE ROOM

## 2020-08-19 PROCEDURE — 63600175 PHARM REV CODE 636 W HCPCS: Performed by: SURGERY

## 2020-08-19 PROCEDURE — 63600175 PHARM REV CODE 636 W HCPCS: Performed by: STUDENT IN AN ORGANIZED HEALTH CARE EDUCATION/TRAINING PROGRAM

## 2020-08-19 PROCEDURE — 27000221 HC OXYGEN, UP TO 24 HOURS

## 2020-08-19 PROCEDURE — D9220A PRA ANESTHESIA: ICD-10-PCS | Mod: ,,, | Performed by: ANESTHESIOLOGY

## 2020-08-19 PROCEDURE — 36000711: Performed by: SURGERY

## 2020-08-19 PROCEDURE — 25000003 PHARM REV CODE 250: Performed by: SURGERY

## 2020-08-19 PROCEDURE — 37000008 HC ANESTHESIA 1ST 15 MINUTES: Performed by: SURGERY

## 2020-08-19 PROCEDURE — 88307 TISSUE EXAM BY PATHOLOGIST: CPT | Mod: 26,,, | Performed by: PATHOLOGY

## 2020-08-19 PROCEDURE — 71000016 HC POSTOP RECOV ADDL HR: Performed by: SURGERY

## 2020-08-19 PROCEDURE — 43775 LAP SLEEVE GASTRECTOMY: CPT | Mod: ,,, | Performed by: SURGERY

## 2020-08-19 PROCEDURE — 63600175 PHARM REV CODE 636 W HCPCS

## 2020-08-19 PROCEDURE — 88307 PR  SURG PATH,LEVEL V: ICD-10-PCS | Mod: 26,,, | Performed by: PATHOLOGY

## 2020-08-19 PROCEDURE — 27201423 OPTIME MED/SURG SUP & DEVICES STERILE SUPPLY: Performed by: SURGERY

## 2020-08-19 PROCEDURE — 37000009 HC ANESTHESIA EA ADD 15 MINS: Performed by: SURGERY

## 2020-08-19 PROCEDURE — 71000015 HC POSTOP RECOV 1ST HR: Performed by: SURGERY

## 2020-08-19 PROCEDURE — 36000710: Performed by: SURGERY

## 2020-08-19 RX ORDER — HALOPERIDOL 5 MG/ML
0.5 INJECTION INTRAMUSCULAR ONCE AS NEEDED
Status: DISCONTINUED | OUTPATIENT
Start: 2020-08-19 | End: 2020-08-19 | Stop reason: HOSPADM

## 2020-08-19 RX ORDER — METOCLOPRAMIDE HYDROCHLORIDE 5 MG/ML
10 INJECTION INTRAMUSCULAR; INTRAVENOUS ONCE
Status: COMPLETED | OUTPATIENT
Start: 2020-08-19 | End: 2020-08-19

## 2020-08-19 RX ORDER — SUCCINYLCHOLINE CHLORIDE 20 MG/ML
INJECTION INTRAMUSCULAR; INTRAVENOUS
Status: DISCONTINUED | OUTPATIENT
Start: 2020-08-19 | End: 2020-08-19

## 2020-08-19 RX ORDER — NALOXONE HCL 0.4 MG/ML
0.02 VIAL (ML) INJECTION
Status: CANCELLED | OUTPATIENT
Start: 2020-08-19

## 2020-08-19 RX ORDER — ONDANSETRON 2 MG/ML
4 INJECTION INTRAMUSCULAR; INTRAVENOUS EVERY 6 HOURS
Status: DISCONTINUED | OUTPATIENT
Start: 2020-08-19 | End: 2020-08-20

## 2020-08-19 RX ORDER — HYDROMORPHONE HCL IN 0.9% NACL 6 MG/30 ML
PATIENT CONTROLLED ANALGESIA SYRINGE INTRAVENOUS
Status: COMPLETED
Start: 2020-08-19 | End: 2020-08-19

## 2020-08-19 RX ORDER — MIDAZOLAM HYDROCHLORIDE 1 MG/ML
INJECTION, SOLUTION INTRAMUSCULAR; INTRAVENOUS
Status: DISCONTINUED | OUTPATIENT
Start: 2020-08-19 | End: 2020-08-19

## 2020-08-19 RX ORDER — ENOXAPARIN SODIUM 100 MG/ML
40 INJECTION SUBCUTANEOUS EVERY 12 HOURS
Status: CANCELLED | OUTPATIENT
Start: 2020-08-19

## 2020-08-19 RX ORDER — SODIUM CITRATE AND CITRIC ACID MONOHYDRATE 334; 500 MG/5ML; MG/5ML
15 SOLUTION ORAL ONCE
Status: COMPLETED | OUTPATIENT
Start: 2020-08-19 | End: 2020-08-19

## 2020-08-19 RX ORDER — HEPARIN SODIUM 5000 [USP'U]/ML
5000 INJECTION, SOLUTION INTRAVENOUS; SUBCUTANEOUS ONCE
Status: COMPLETED | OUTPATIENT
Start: 2020-08-19 | End: 2020-08-19

## 2020-08-19 RX ORDER — KETAMINE HCL IN 0.9 % NACL 50 MG/5 ML
SYRINGE (ML) INTRAVENOUS
Status: DISCONTINUED | OUTPATIENT
Start: 2020-08-19 | End: 2020-08-19

## 2020-08-19 RX ORDER — ACETAMINOPHEN 500 MG
1000 TABLET ORAL
Status: COMPLETED | OUTPATIENT
Start: 2020-08-19 | End: 2020-08-19

## 2020-08-19 RX ORDER — FAMOTIDINE 10 MG/ML
20 INJECTION INTRAVENOUS ONCE
Status: COMPLETED | OUTPATIENT
Start: 2020-08-19 | End: 2020-08-19

## 2020-08-19 RX ORDER — HYDROCODONE BITARTRATE AND ACETAMINOPHEN 7.5; 325 MG/15ML; MG/15ML
15 SOLUTION ORAL EVERY 4 HOURS PRN
Status: DISCONTINUED | OUTPATIENT
Start: 2020-08-20 | End: 2020-08-20

## 2020-08-19 RX ORDER — LIDOCAINE HYDROCHLORIDE 20 MG/ML
INJECTION INTRAVENOUS
Status: DISCONTINUED | OUTPATIENT
Start: 2020-08-19 | End: 2020-08-19

## 2020-08-19 RX ORDER — ROCURONIUM BROMIDE 10 MG/ML
INJECTION, SOLUTION INTRAVENOUS
Status: DISCONTINUED | OUTPATIENT
Start: 2020-08-19 | End: 2020-08-19

## 2020-08-19 RX ORDER — ONDANSETRON 2 MG/ML
4 INJECTION INTRAMUSCULAR; INTRAVENOUS EVERY 6 HOURS PRN
Status: DISCONTINUED | OUTPATIENT
Start: 2020-08-19 | End: 2020-08-19

## 2020-08-19 RX ORDER — DEXAMETHASONE SODIUM PHOSPHATE 4 MG/ML
INJECTION, SOLUTION INTRA-ARTICULAR; INTRALESIONAL; INTRAMUSCULAR; INTRAVENOUS; SOFT TISSUE
Status: DISCONTINUED | OUTPATIENT
Start: 2020-08-19 | End: 2020-08-19

## 2020-08-19 RX ORDER — SODIUM CHLORIDE 9 MG/ML
INJECTION, SOLUTION INTRAVENOUS ONCE
Status: COMPLETED | OUTPATIENT
Start: 2020-08-19 | End: 2020-08-19

## 2020-08-19 RX ORDER — FENTANYL CITRATE 50 UG/ML
INJECTION, SOLUTION INTRAMUSCULAR; INTRAVENOUS
Status: DISCONTINUED | OUTPATIENT
Start: 2020-08-19 | End: 2020-08-19

## 2020-08-19 RX ORDER — KETOROLAC TROMETHAMINE 30 MG/ML
15 INJECTION, SOLUTION INTRAMUSCULAR; INTRAVENOUS EVERY 8 HOURS PRN
Status: DISCONTINUED | OUTPATIENT
Start: 2020-08-19 | End: 2020-08-19 | Stop reason: HOSPADM

## 2020-08-19 RX ORDER — FENTANYL CITRATE 50 UG/ML
25 INJECTION, SOLUTION INTRAMUSCULAR; INTRAVENOUS EVERY 5 MIN PRN
Status: DISCONTINUED | OUTPATIENT
Start: 2020-08-19 | End: 2020-08-19 | Stop reason: HOSPADM

## 2020-08-19 RX ORDER — SODIUM CHLORIDE 0.9 % (FLUSH) 0.9 %
10 SYRINGE (ML) INJECTION
Status: DISCONTINUED | OUTPATIENT
Start: 2020-08-19 | End: 2020-08-19 | Stop reason: HOSPADM

## 2020-08-19 RX ORDER — SODIUM CHLORIDE 9 MG/ML
INJECTION, SOLUTION INTRAVENOUS CONTINUOUS
Status: DISCONTINUED | OUTPATIENT
Start: 2020-08-19 | End: 2020-08-20

## 2020-08-19 RX ORDER — PROPOFOL 10 MG/ML
VIAL (ML) INTRAVENOUS
Status: DISCONTINUED | OUTPATIENT
Start: 2020-08-19 | End: 2020-08-19

## 2020-08-19 RX ORDER — LIDOCAINE HYDROCHLORIDE 10 MG/ML
1 INJECTION, SOLUTION EPIDURAL; INFILTRATION; INTRACAUDAL; PERINEURAL ONCE
Status: COMPLETED | OUTPATIENT
Start: 2020-08-19 | End: 2020-08-19

## 2020-08-19 RX ORDER — HYDROMORPHONE HCL IN 0.9% NACL 6 MG/30 ML
PATIENT CONTROLLED ANALGESIA SYRINGE INTRAVENOUS CONTINUOUS
Status: DISCONTINUED | OUTPATIENT
Start: 2020-08-19 | End: 2020-08-20

## 2020-08-19 RX ORDER — MIDAZOLAM HYDROCHLORIDE 1 MG/ML
0.5 INJECTION INTRAMUSCULAR; INTRAVENOUS
Status: DISCONTINUED | OUTPATIENT
Start: 2020-08-19 | End: 2020-08-19 | Stop reason: HOSPADM

## 2020-08-19 RX ORDER — DEXMEDETOMIDINE HYDROCHLORIDE 100 UG/ML
INJECTION, SOLUTION INTRAVENOUS
Status: DISCONTINUED | OUTPATIENT
Start: 2020-08-19 | End: 2020-08-19

## 2020-08-19 RX ORDER — ONDANSETRON 2 MG/ML
INJECTION INTRAMUSCULAR; INTRAVENOUS
Status: DISCONTINUED | OUTPATIENT
Start: 2020-08-19 | End: 2020-08-19

## 2020-08-19 RX ORDER — SODIUM CHLORIDE 9 MG/ML
INJECTION, SOLUTION INTRAVENOUS CONTINUOUS PRN
Status: DISCONTINUED | OUTPATIENT
Start: 2020-08-19 | End: 2020-08-19

## 2020-08-19 RX ORDER — ACETAMINOPHEN 10 MG/ML
1000 INJECTION, SOLUTION INTRAVENOUS ONCE
Status: COMPLETED | OUTPATIENT
Start: 2020-08-19 | End: 2020-08-19

## 2020-08-19 RX ADMIN — SODIUM CHLORIDE: 0.9 INJECTION, SOLUTION INTRAVENOUS at 09:08

## 2020-08-19 RX ADMIN — SODIUM CHLORIDE: 0.9 INJECTION, SOLUTION INTRAVENOUS at 12:08

## 2020-08-19 RX ADMIN — Medication 10 MG: at 10:08

## 2020-08-19 RX ADMIN — LIDOCAINE HYDROCHLORIDE 75 MG: 20 INJECTION, SOLUTION INTRAVENOUS at 10:08

## 2020-08-19 RX ADMIN — DEXMEDETOMIDINE HYDROCHLORIDE 4 MCG: 100 INJECTION, SOLUTION, CONCENTRATE INTRAVENOUS at 11:08

## 2020-08-19 RX ADMIN — PROPOFOL 20 MG: 10 INJECTION, EMULSION INTRAVENOUS at 10:08

## 2020-08-19 RX ADMIN — SODIUM CHLORIDE, SODIUM GLUCONATE, SODIUM ACETATE, POTASSIUM CHLORIDE, MAGNESIUM CHLORIDE, SODIUM PHOSPHATE, DIBASIC, AND POTASSIUM PHOSPHATE: .53; .5; .37; .037; .03; .012; .00082 INJECTION, SOLUTION INTRAVENOUS at 10:08

## 2020-08-19 RX ADMIN — ACETAMINOPHEN 1000 MG: 10 INJECTION, SOLUTION INTRAVENOUS at 05:08

## 2020-08-19 RX ADMIN — SODIUM CITRATE AND CITRIC ACID MONOHYDRATE 15 ML: 500; 334 SOLUTION ORAL at 09:08

## 2020-08-19 RX ADMIN — METOCLOPRAMIDE 10 MG: 5 INJECTION, SOLUTION INTRAMUSCULAR; INTRAVENOUS at 09:08

## 2020-08-19 RX ADMIN — ONDANSETRON 4 MG: 2 INJECTION, SOLUTION INTRAMUSCULAR; INTRAVENOUS at 11:08

## 2020-08-19 RX ADMIN — ROCURONIUM BROMIDE 20 MG: 10 INJECTION, SOLUTION INTRAVENOUS at 10:08

## 2020-08-19 RX ADMIN — SODIUM CHLORIDE: 9 INJECTION, SOLUTION INTRAVENOUS at 08:08

## 2020-08-19 RX ADMIN — FENTANYL CITRATE 100 MCG: 50 INJECTION, SOLUTION INTRAMUSCULAR; INTRAVENOUS at 10:08

## 2020-08-19 RX ADMIN — ONDANSETRON 4 MG: 2 INJECTION INTRAMUSCULAR; INTRAVENOUS at 05:08

## 2020-08-19 RX ADMIN — MIDAZOLAM HYDROCHLORIDE 2 MG: 1 INJECTION, SOLUTION INTRAMUSCULAR; INTRAVENOUS at 10:08

## 2020-08-19 RX ADMIN — SUGAMMADEX 500 MG: 100 INJECTION, SOLUTION INTRAVENOUS at 11:08

## 2020-08-19 RX ADMIN — Medication: at 12:08

## 2020-08-19 RX ADMIN — CEFAZOLIN 3 G: 1 INJECTION, POWDER, FOR SOLUTION INTRAVENOUS at 10:08

## 2020-08-19 RX ADMIN — ACETAMINOPHEN 1000 MG: 500 TABLET ORAL at 09:08

## 2020-08-19 RX ADMIN — ROCURONIUM BROMIDE 10 MG: 10 INJECTION, SOLUTION INTRAVENOUS at 10:08

## 2020-08-19 RX ADMIN — HEPARIN SODIUM 5000 UNITS: 5000 INJECTION, SOLUTION INTRAVENOUS; SUBCUTANEOUS at 09:08

## 2020-08-19 RX ADMIN — SUCCINYLCHOLINE CHLORIDE 140 MG: 20 INJECTION, SOLUTION INTRAMUSCULAR; INTRAVENOUS at 10:08

## 2020-08-19 RX ADMIN — LIDOCAINE HYDROCHLORIDE 1 MG: 10 INJECTION, SOLUTION EPIDURAL; INFILTRATION; INTRACAUDAL; PERINEURAL at 09:08

## 2020-08-19 RX ADMIN — PROPOFOL 180 MG: 10 INJECTION, EMULSION INTRAVENOUS at 10:08

## 2020-08-19 RX ADMIN — Medication 20 MG: at 10:08

## 2020-08-19 RX ADMIN — DEXMEDETOMIDINE HYDROCHLORIDE 8 MCG: 100 INJECTION, SOLUTION, CONCENTRATE INTRAVENOUS at 11:08

## 2020-08-19 RX ADMIN — KETOROLAC TROMETHAMINE 15 MG: 30 INJECTION, SOLUTION INTRAMUSCULAR at 12:08

## 2020-08-19 RX ADMIN — FAMOTIDINE 20 MG: 10 INJECTION INTRAVENOUS at 09:08

## 2020-08-19 RX ADMIN — DEXAMETHASONE SODIUM PHOSPHATE 4 MG: 4 INJECTION, SOLUTION INTRAMUSCULAR; INTRAVENOUS at 10:08

## 2020-08-19 RX ADMIN — ROCURONIUM BROMIDE 30 MG: 10 INJECTION, SOLUTION INTRAVENOUS at 10:08

## 2020-08-19 RX ADMIN — ONDANSETRON 4 MG: 2 INJECTION INTRAMUSCULAR; INTRAVENOUS at 11:08

## 2020-08-19 NOTE — BRIEF OP NOTE
Operative Note       Surgery Date: 8/19/2020     Surgeon(s) and Role:     * Cj Umaña MD - Primary     * Pako Dawkins MD - Resident - Assisting    Pre-op Diagnosis:  Vitamin B1 deficiency [E51.9]  Prediabetes [R73.03]  Morbid obesity [E66.01]  BMI 45.0-49.9, adult [Z68.42]    Post-op Diagnosis:  Vitamin B1 deficiency [E51.9]  Prediabetes [R73.03]  Morbid obesity [E66.01]  BMI 45.0-49.9, adult [Z68.42]    Procedure(s) (LRB):  GASTRECTOMY, SLEEVE, LAPAROSCOPIC, with intraop EGD (N/A)  EGD (ESOPHAGOGASTRODUODENOSCOPY) (N/A)    Anesthesia: General    Procedure in Detail/Findings:  Sleeve without apparent complication    Estimated Blood Loss: Minimal           Specimens (From admission, onward)     Start     Ordered    08/19/20 1135  Specimen to Pathology, Surgery General Surgery  Once     Question Answer Comment   Procedure Type: General Surgery    Specimen Class: Routine/Screening        08/19/20 1135              Implants: * No implants in log *           Disposition: PACU - hemodynamically stable.           Condition: Good    Attestation:  I was present and scrubbed for the entire procedure.

## 2020-08-19 NOTE — ANESTHESIA POSTPROCEDURE EVALUATION
Anesthesia Post Evaluation    Patient: Agustina Tillman    Procedure(s) Performed: Procedure(s) (LRB):  GASTRECTOMY, SLEEVE, LAPAROSCOPIC, with intraop EGD (N/A)  EGD (ESOPHAGOGASTRODUODENOSCOPY) (N/A)    Final Anesthesia Type: general    Patient location during evaluation: PACU  Patient participation: Yes- Able to Participate  Level of consciousness: awake and alert and oriented  Post-procedure vital signs: reviewed and stable  Pain management: adequate  Airway patency: patent    PONV status at discharge: No PONV  Anesthetic complications: no      Cardiovascular status: blood pressure returned to baseline and hemodynamically stable  Respiratory status: unassisted, spontaneous ventilation and room air  Hydration status: euvolemic  Follow-up not needed.          Vitals Value Taken Time   /68 08/19/20 1413   Temp 36.4 °C (97.6 °F) 08/19/20 1413   Pulse 88 08/19/20 1413   Resp 18 08/19/20 1413   SpO2 92 % 08/19/20 1413         Event Time   Out of Recovery 13:00:00         Pain/Carlos Score: Pain Rating Prior to Med Admin: 7 (8/19/2020 12:23 PM)  Carlos Score: 9 (8/19/2020  1:00 PM)

## 2020-08-19 NOTE — PLAN OF CARE
Patient is Independent with ADL'S. PCP and Pharmacy verified. Will d/c home with sister when medically stable. Will continue to follow.   08/19/20 1601   Discharge Assessment   Assessment Type Discharge Planning Assessment   Confirmed/corrected address and phone number on facesheet? Yes   Assessment information obtained from? Patient   Expected Length of Stay (days) 1   Communicated expected length of stay with patient/caregiver yes   Prior to hospitilization cognitive status: Alert/Oriented   Prior to hospitalization functional status: Independent   Current cognitive status: Alert/Oriented   Current Functional Status: Independent   Facility Arrived From: Home   Lives With alone   Able to Return to Prior Arrangements yes   Is patient able to care for self after discharge? Yes   Who are your caregiver(s) and their phone number(s)? Hellen Tillman (Sister) [phone]   Readmission Within the Last 30 Days no previous admission in last 30 days   Patient currently being followed by outpatient case management? No   Patient currently receives any other outside agency services? No   Equipment Currently Used at Home none   Do you have any problems affording any of your prescribed medications? No   Is the patient taking medications as prescribed? yes   Does the patient have transportation home? Yes   Transportation Anticipated family or friend will provide   Discharge Plan A Home with family   Discharge Plan B Home with family   DME Needed Upon Discharge  none   Patient/Family in Agreement with Plan yes

## 2020-08-19 NOTE — INTERVAL H&P NOTE
The patient has been examined and the H&P has been reviewed:    I concur with the findings and no changes have occurred since H&P was written.    Surgery risks, benefits and alternative options discussed and understood by patient/family.          Active Hospital Problems    Diagnosis  POA    Morbid obesity with BMI of 45.0-49.9, adult [E66.01, Z68.42]  Not Applicable     Chronic     Pending Bariatric Surgery evaluation        Resolved Hospital Problems   No resolved problems to display.

## 2020-08-19 NOTE — ANESTHESIA PROCEDURE NOTES
Intubation  Performed by: Luis Enrique Vinson MD  Authorized by: Cyn Garcia MD     Intubation:     Induction:  Intravenous    Intubated:  Postinduction    Mask Ventilation:  Easy mask    Attempts:  1    Attempted By:  Resident anesthesiologist    Method of Intubation:  Direct    Blade:  Nimisha 3    Laryngeal View Grade: Grade IIA - cords partially seen      Difficult Airway Encountered?: No      Complications:  None    Airway Device:  Oral endotracheal tube    Airway Device Size:  7.0    Style/Cuff Inflation:  Cuffed (inflated to minimal occlusive pressure)    Inflation Amount (mL):  6    Tube secured:  22    Secured at:  The lips    Placement Verified By:  Capnometry    Complicating Factors:  Obesity, short neck and oropharyngeal edema or fat    Findings Post-Intubation:  BS equal bilateral  Notes:      Used ramp to optimize intubation axes

## 2020-08-19 NOTE — OP NOTE
DATE OF PROCEDURE: 8/19/2020    PRE OP DIAGNOSIS: Vitamin B1 deficiency [E51.9]  Prediabetes [R73.03]  Morbid obesity [E66.01]  BMI 45.0-49.9, adult [Z68.42]    POST OP DIAGNOSIS: Vitamin B1 deficiency [E51.9]  Prediabetes [R73.03]  Morbid obesity [E66.01]  BMI 45.0-49.9, adult [Z68.42]    PROCEDURE: Procedure(s) (LRB):  GASTRECTOMY, SLEEVE, LAPAROSCOPIC, with intraop EGD (N/A)  EGD (ESOPHAGOGASTRODUODENOSCOPY) (N/A)    Surgeon(s) and Role:     * Cj Umaña MD - Primary     * Pako Dawkins MD - Resident - AssistingANESTHESIA: General.   INDICATIONS: A 35 y.o. with 1. Morbid Obesity with Body mass index is 49.16 kg/m². and difficulty with weight loss.  2. Co-morbidities: prediabetes, PCOS (rare menstrual periods), back pain, Iron deficiency anemia 3. Being treated for low iron, b1, vit d.  DESCRIPTION OF PROCEDURE: The patient was placed under general anesthesia. The   abdomen was prepped and draped in usual manner. Access to peritoneum was   gained through the umbilicus using Optiview trocar under direct vision.   Pneumoperitoneum to 15 mmHg with CO2 gas was obtained. Four 5 mm trocars were   placed medially, subcostally at the midclavicular and anterior axial lines   bilaterally. A 10 mm trocar was placed 1 handbreadth caudad to the right   midclavicular trocar. The liver retractor was placed. The greater curve was   taken down starting 6 cm from the pylorus going all the way to the base of the   left sheila taking all posterior gastric attachments with the Harmonic scalpel. A   42-Spanish bougie was passed towards the pylorus and the stomach was resected  along the bougie starting 6 cm from the pylorus and coming out just a   little bit at the angle of His. The staple line was oversewn with a running   Quill stitch. The bougie was removed. Endoscopy was   performed. The sleeve appeared appropriate size and configuration and there   were no air leaks seen. The air was aspirated from the endoscope  and the   endoscope was withdrawn. Santosh was placed over the surgical areas.  Pneumoperitoneum was released for one minute and re-inflated to check for areas of bleeding which were controlled. The liver retractor was removed. The gastrectomy was   removed through the primary trochar site. That incision was then closed with 0 Vicryl. The trocars removed under direct vision. Prior to   removing the last trocar, the pneumoperitoneum was allowed to escape. The skin   incisions were infiltrated with Marcaine solution, closed with 4-0 plain catgut,   and reinforced with Mastisol, Steri-Strips, and Band-Aids. The patient   tolerated procedure well and was brought to Recovery Room in stable condition.   Sponge and needle counts were correct at the end of the case.    Blood loss was min, complications are none, consent was obtained and pathology was gastrectomy.

## 2020-08-19 NOTE — TRANSFER OF CARE
"Anesthesia Transfer of Care Note    Patient: Agustina Tillman    Procedure(s) Performed: Procedure(s) (LRB):  GASTRECTOMY, SLEEVE, LAPAROSCOPIC, with intraop EGD (N/A)  EGD (ESOPHAGOGASTRODUODENOSCOPY) (N/A)    Patient location: PACU    Anesthesia Type: general    Transport from OR: Transported from OR on 6-10 L/min O2 by face mask with adequate spontaneous ventilation    Post pain: adequate analgesia    Post assessment: no apparent anesthetic complications and tolerated procedure well    Post vital signs: stable    Level of consciousness: awake and alert    Nausea/Vomiting: no nausea/vomiting    Complications: none    Transfer of care protocol was followed      Last vitals:   Visit Vitals  /75 (BP Location: Left arm, Patient Position: Lying)   Pulse 76   Temp 37 °C (98.6 °F) (Oral)   Resp 16   Ht 5' 5" (1.651 m)   Wt (!) 136.5 kg (300 lb 14.9 oz)   LMP 06/13/2020 (Exact Date)   SpO2 100%   Breastfeeding No   BMI 50.08 kg/m²     "

## 2020-08-20 VITALS
BODY MASS INDEX: 48.82 KG/M2 | WEIGHT: 293 LBS | OXYGEN SATURATION: 99 % | DIASTOLIC BLOOD PRESSURE: 68 MMHG | RESPIRATION RATE: 18 BRPM | TEMPERATURE: 98 F | HEIGHT: 65 IN | HEART RATE: 72 BPM | SYSTOLIC BLOOD PRESSURE: 122 MMHG

## 2020-08-20 LAB
ANION GAP SERPL CALC-SCNC: 7 MMOL/L (ref 8–16)
BASOPHILS # BLD AUTO: 0.01 K/UL (ref 0–0.2)
BASOPHILS NFR BLD: 0.1 % (ref 0–1.9)
BUN SERPL-MCNC: 11 MG/DL (ref 6–20)
CALCIUM SERPL-MCNC: 8.7 MG/DL (ref 8.7–10.5)
CHLORIDE SERPL-SCNC: 107 MMOL/L (ref 95–110)
CO2 SERPL-SCNC: 27 MMOL/L (ref 23–29)
CREAT SERPL-MCNC: 0.8 MG/DL (ref 0.5–1.4)
DIFFERENTIAL METHOD: ABNORMAL
EOSINOPHIL # BLD AUTO: 0 K/UL (ref 0–0.5)
EOSINOPHIL NFR BLD: 0 % (ref 0–8)
ERYTHROCYTE [DISTWIDTH] IN BLOOD BY AUTOMATED COUNT: 15.6 % (ref 11.5–14.5)
EST. GFR  (AFRICAN AMERICAN): >60 ML/MIN/1.73 M^2
EST. GFR  (NON AFRICAN AMERICAN): >60 ML/MIN/1.73 M^2
GLUCOSE SERPL-MCNC: 95 MG/DL (ref 70–110)
HCT VFR BLD AUTO: 39 % (ref 37–48.5)
HGB BLD-MCNC: 11.4 G/DL (ref 12–16)
IMM GRANULOCYTES # BLD AUTO: 0.04 K/UL (ref 0–0.04)
IMM GRANULOCYTES NFR BLD AUTO: 0.4 % (ref 0–0.5)
LYMPHOCYTES # BLD AUTO: 1.3 K/UL (ref 1–4.8)
LYMPHOCYTES NFR BLD: 11.5 % (ref 18–48)
MAGNESIUM SERPL-MCNC: 2.1 MG/DL (ref 1.6–2.6)
MCH RBC QN AUTO: 23.5 PG (ref 27–31)
MCHC RBC AUTO-ENTMCNC: 29.2 G/DL (ref 32–36)
MCV RBC AUTO: 80 FL (ref 82–98)
MONOCYTES # BLD AUTO: 0.6 K/UL (ref 0.3–1)
MONOCYTES NFR BLD: 5.6 % (ref 4–15)
NEUTROPHILS # BLD AUTO: 9.3 K/UL (ref 1.8–7.7)
NEUTROPHILS NFR BLD: 82.4 % (ref 38–73)
NRBC BLD-RTO: 0 /100 WBC
PHOSPHATE SERPL-MCNC: 2.2 MG/DL (ref 2.7–4.5)
PLATELET # BLD AUTO: 282 K/UL (ref 150–350)
PMV BLD AUTO: 11.1 FL (ref 9.2–12.9)
POTASSIUM SERPL-SCNC: 4.2 MMOL/L (ref 3.5–5.1)
RBC # BLD AUTO: 4.86 M/UL (ref 4–5.4)
SODIUM SERPL-SCNC: 141 MMOL/L (ref 136–145)
WBC # BLD AUTO: 11.23 K/UL (ref 3.9–12.7)

## 2020-08-20 PROCEDURE — 83735 ASSAY OF MAGNESIUM: CPT

## 2020-08-20 PROCEDURE — 80048 BASIC METABOLIC PNL TOTAL CA: CPT

## 2020-08-20 PROCEDURE — 84100 ASSAY OF PHOSPHORUS: CPT

## 2020-08-20 PROCEDURE — 63600175 PHARM REV CODE 636 W HCPCS: Performed by: STUDENT IN AN ORGANIZED HEALTH CARE EDUCATION/TRAINING PROGRAM

## 2020-08-20 PROCEDURE — 85025 COMPLETE CBC W/AUTO DIFF WBC: CPT

## 2020-08-20 PROCEDURE — 36415 COLL VENOUS BLD VENIPUNCTURE: CPT

## 2020-08-20 PROCEDURE — 25000003 PHARM REV CODE 250: Performed by: STUDENT IN AN ORGANIZED HEALTH CARE EDUCATION/TRAINING PROGRAM

## 2020-08-20 RX ORDER — HYDROCODONE BITARTRATE AND ACETAMINOPHEN 7.5; 325 MG/15ML; MG/15ML
10 SOLUTION ORAL EVERY 4 HOURS PRN
Status: DISCONTINUED | OUTPATIENT
Start: 2020-08-20 | End: 2020-08-20 | Stop reason: HOSPADM

## 2020-08-20 RX ORDER — ONDANSETRON 4 MG/1
4 TABLET, ORALLY DISINTEGRATING ORAL 4 TIMES DAILY
Status: DISCONTINUED | OUTPATIENT
Start: 2020-08-20 | End: 2020-08-20 | Stop reason: HOSPADM

## 2020-08-20 RX ORDER — ENOXAPARIN SODIUM 100 MG/ML
40 INJECTION SUBCUTANEOUS EVERY 24 HOURS
Status: DISCONTINUED | OUTPATIENT
Start: 2020-08-20 | End: 2020-08-20 | Stop reason: HOSPADM

## 2020-08-20 RX ORDER — HYDROCODONE BITARTRATE AND ACETAMINOPHEN 7.5; 325 MG/15ML; MG/15ML
15 SOLUTION ORAL EVERY 4 HOURS PRN
Status: DISCONTINUED | OUTPATIENT
Start: 2020-08-20 | End: 2020-08-20 | Stop reason: HOSPADM

## 2020-08-20 RX ORDER — PROMETHAZINE HYDROCHLORIDE 12.5 MG/1
12.5 TABLET ORAL EVERY 6 HOURS PRN
Status: DISCONTINUED | OUTPATIENT
Start: 2020-08-20 | End: 2020-08-20 | Stop reason: HOSPADM

## 2020-08-20 RX ADMIN — ENOXAPARIN SODIUM 40 MG: 40 INJECTION SUBCUTANEOUS at 06:08

## 2020-08-20 RX ADMIN — HYDROCODONE BITARTRATE AND ACETAMINOPHEN 15 ML: 7.5; 325 SOLUTION ORAL at 09:08

## 2020-08-20 RX ADMIN — ONDANSETRON 4 MG: 2 INJECTION INTRAMUSCULAR; INTRAVENOUS at 05:08

## 2020-08-20 RX ADMIN — Medication: at 03:08

## 2020-08-20 RX ADMIN — ONDANSETRON 4 MG: 4 TABLET, ORALLY DISINTEGRATING ORAL at 11:08

## 2020-08-20 RX ADMIN — HYDROCODONE BITARTRATE AND ACETAMINOPHEN 15 ML: 7.5; 325 SOLUTION ORAL at 03:08

## 2020-08-20 RX ADMIN — ONDANSETRON 4 MG: 4 TABLET, ORALLY DISINTEGRATING ORAL at 06:08

## 2020-08-20 NOTE — ASSESSMENT & PLAN NOTE
Ms. Tillman is a 35 y.o. female with morbid obesity now s/p laparoscopic gastric sleeve performed on 8/19.     - Tolerated water protocol. Advance to Bariatric CLD   - Transition to PO medications. Only liquid and crushed meds.   - D/C PCA and transition to liquid pain meds  - d/c IVF   - Encourage ambulation  - Encourage IS   - DVT prophylaxis   - Possible discharge this afternoon if tolerates CLD

## 2020-08-20 NOTE — HPI
Agustina Tillman is a 35 y.o. female who presents for pre-operative exam for weight loss surgery.  she has completed her pre-operative evaluation.  she has failed medical treatment for obesity.  1. Morbid Obesity with Body mass index is 49.16 kg/m². and difficulty with weight loss.  2. Co-morbidities: prediabetes, PCOS (rare menstrual periods), back pain, Iron deficiency anemia 3. Being treated for low iron, b1, vit d.

## 2020-08-20 NOTE — DISCHARGE INSTRUCTIONS
Please follow your post-sleeve bariatric diet as instructed. You have a liquid pain medication prescribed to you. Please take miralax once a day to help with constipation. Please be sure to crush all medications unless smaller than a pencil eraser.      No heavy lifting or pushing. Please do not lift anything heavier than a gallon of milk.     Skin glue will fall off on its own. Showers okay. Please no baths or soaking.     Please follow up in clinic in 2 weeks.

## 2020-08-20 NOTE — SUBJECTIVE & OBJECTIVE
Interval History:   No acute events overnight.  Minimal nausea, controlled with anti-emetics. Denies vomiting.   Tolerated water protocol this AM.   Ambulating.     Medications:  Continuous Infusions:   sodium chloride 0.9% 125 mL/hr at 08/19/20 1221     Scheduled Meds:   enoxaparin  40 mg Subcutaneous Q24H    ondansetron  4 mg Oral QID     PRN Meds:hydrocodone-apap 7.5-325 MG/15 ML, hydrocodone-apap 7.5-325 MG/15 ML, promethazine     Review of patient's allergies indicates:   Allergen Reactions    Metformin Other (See Comments)     Severe low blood sugar    Sudafed pe head congestn-pain [ibuprofen-phenylephrine] Anxiety     Objective:     Vital Signs (Most Recent):  Temp: 97.5 °F (36.4 °C) (08/20/20 0455)  Pulse: 70 (08/20/20 0455)  Resp: 18 (08/20/20 0455)  BP: 117/64 (08/20/20 0455)  SpO2: 99 % (08/20/20 0455) Vital Signs (24h Range):  Temp:  [96.2 °F (35.7 °C)-98.6 °F (37 °C)] 97.5 °F (36.4 °C)  Pulse:  [] 70  Resp:  [12-20] 18  SpO2:  [89 %-100 %] 99 %  BP: (106-137)/(64-79) 117/64     Weight: (!) 136.5 kg (300 lb 14.9 oz)  Body mass index is 50.08 kg/m².    Intake/Output - Last 3 Shifts       08/18 0700 - 08/19 0659 08/19 0700 - 08/20 0659 08/20 0700 - 08/21 0659    I.V. (mL/kg)  1350 (9.9)     Total Intake(mL/kg)  1350 (9.9)     Net  +1350                  Physical Exam  Constitutional:       General: She is not in acute distress.     Appearance: Normal appearance.   HENT:      Head: Normocephalic and atraumatic.      Right Ear: External ear normal.      Left Ear: External ear normal.      Nose: Nose normal.      Mouth/Throat:      Mouth: Mucous membranes are moist.   Eyes:      Extraocular Movements: Extraocular movements intact.      Conjunctiva/sclera: Conjunctivae normal.      Pupils: Pupils are equal, round, and reactive to light.   Neck:      Musculoskeletal: Normal range of motion.   Cardiovascular:      Rate and Rhythm: Normal rate and regular rhythm.      Pulses: Normal pulses.      Heart  sounds: Normal heart sounds.   Pulmonary:      Effort: Pulmonary effort is normal.      Breath sounds: Normal breath sounds.   Abdominal:      Palpations: Abdomen is soft.      Comments: Incisions c/d/i. Minimal tenderness.   Musculoskeletal: Normal range of motion.   Skin:     General: Skin is warm and dry.   Neurological:      General: No focal deficit present.      Mental Status: She is alert and oriented to person, place, and time.   Psychiatric:         Mood and Affect: Mood normal.         Behavior: Behavior normal.         Significant Labs:  CBC: No results for input(s): WBC, RBC, HGB, HCT, PLT, MCV, MCH, MCHC in the last 168 hours.  BMP:   Recent Labs   Lab 08/20/20  0619   GLU 95      K 4.2      CO2 27   BUN 11   CREATININE 0.8   CALCIUM 8.7   MG 2.1

## 2020-08-20 NOTE — PROGRESS NOTES
[facility]  General Surgery  Progress Note    Subjective:     History of Present Illness:  Agustina Tillman is a 35 y.o. female who presents for pre-operative exam for weight loss surgery.  she has completed her pre-operative evaluation.  she has failed medical treatment for obesity.  1. Morbid Obesity with Body mass index is 49.16 kg/m². and difficulty with weight loss.  2. Co-morbidities: prediabetes, PCOS (rare menstrual periods), back pain, Iron deficiency anemia 3. Being treated for low iron, b1, vit d.    Post-Op Info:  Procedure(s) (LRB):  GASTRECTOMY, SLEEVE, LAPAROSCOPIC, with intraop EGD (N/A)  EGD (ESOPHAGOGASTRODUODENOSCOPY) (N/A)   1 Day Post-Op     Interval History:   No acute events overnight.  Minimal nausea, controlled with anti-emetics. Denies vomiting.   Tolerated water protocol this AM.   Ambulating.     Medications:  Continuous Infusions:   sodium chloride 0.9% 125 mL/hr at 08/19/20 1221     Scheduled Meds:   enoxaparin  40 mg Subcutaneous Q24H    ondansetron  4 mg Oral QID     PRN Meds:hydrocodone-apap 7.5-325 MG/15 ML, hydrocodone-apap 7.5-325 MG/15 ML, promethazine     Review of patient's allergies indicates:   Allergen Reactions    Metformin Other (See Comments)     Severe low blood sugar    Sudafed pe head congestn-pain [ibuprofen-phenylephrine] Anxiety     Objective:     Vital Signs (Most Recent):  Temp: 97.5 °F (36.4 °C) (08/20/20 0455)  Pulse: 70 (08/20/20 0455)  Resp: 18 (08/20/20 0455)  BP: 117/64 (08/20/20 0455)  SpO2: 99 % (08/20/20 0455) Vital Signs (24h Range):  Temp:  [96.2 °F (35.7 °C)-98.6 °F (37 °C)] 97.5 °F (36.4 °C)  Pulse:  [] 70  Resp:  [12-20] 18  SpO2:  [89 %-100 %] 99 %  BP: (106-137)/(64-79) 117/64     Weight: (!) 136.5 kg (300 lb 14.9 oz)  Body mass index is 50.08 kg/m².    Intake/Output - Last 3 Shifts       08/18 0700 - 08/19 0659 08/19 0700 - 08/20 0659 08/20 0700 - 08/21 0659    I.V. (mL/kg)  1350 (9.9)     Total Intake(mL/kg)   1350 (9.9)     Net  +1350                  Physical Exam  Constitutional:       General: She is not in acute distress.     Appearance: Normal appearance.   HENT:      Head: Normocephalic and atraumatic.      Right Ear: External ear normal.      Left Ear: External ear normal.      Nose: Nose normal.      Mouth/Throat:      Mouth: Mucous membranes are moist.   Eyes:      Extraocular Movements: Extraocular movements intact.      Conjunctiva/sclera: Conjunctivae normal.      Pupils: Pupils are equal, round, and reactive to light.   Neck:      Musculoskeletal: Normal range of motion.   Cardiovascular:      Rate and Rhythm: Normal rate and regular rhythm.      Pulses: Normal pulses.      Heart sounds: Normal heart sounds.   Pulmonary:      Effort: Pulmonary effort is normal.      Breath sounds: Normal breath sounds.   Abdominal:      Palpations: Abdomen is soft.      Comments: Incisions c/d/i. Minimal tenderness.   Musculoskeletal: Normal range of motion.   Skin:     General: Skin is warm and dry.   Neurological:      General: No focal deficit present.      Mental Status: She is alert and oriented to person, place, and time.   Psychiatric:         Mood and Affect: Mood normal.         Behavior: Behavior normal.         Significant Labs:  CBC: No results for input(s): WBC, RBC, HGB, HCT, PLT, MCV, MCH, MCHC in the last 168 hours.  BMP:   Recent Labs   Lab 08/20/20  0619   GLU 95      K 4.2      CO2 27   BUN 11   CREATININE 0.8   CALCIUM 8.7   MG 2.1     Assessment/Plan:     * Morbid obesity with BMI of 45.0-49.9, adult  Ms. Tillman is a 35 y.o. female with morbid obesity now s/p laparoscopic gastric sleeve performed on 8/19.     - Tolerated water protocol. Advance to Bariatric CLD   - Transition to PO medications. Only liquid and crushed meds.   - D/C PCA and transition to liquid pain meds  - d/c IVF   - Encourage ambulation  - Encourage IS   - DVT prophylaxis   - Possible discharge this afternoon if tolerates  CLD        Aubrie Bernstein MD  General Surgery  [facility]

## 2020-08-21 ENCOUNTER — TELEPHONE (OUTPATIENT)
Dept: SURGERY | Facility: CLINIC | Age: 35
End: 2020-08-21

## 2020-08-21 ENCOUNTER — TELEPHONE (OUTPATIENT)
Dept: BARIATRICS | Facility: CLINIC | Age: 35
End: 2020-08-21

## 2020-08-21 ENCOUNTER — PATIENT OUTREACH (OUTPATIENT)
Dept: ADMINISTRATIVE | Facility: CLINIC | Age: 35
End: 2020-08-21

## 2020-08-21 NOTE — TELEPHONE ENCOUNTER
Spoke with pt, she asked if a small amount of clear red drainage was normal after surgery.  Explained the clear drainage was expected form any of the incision sites.  To please reach out to us with any increase in bleeding or any signs of infection.  Patient verbalized understanding.

## 2020-08-21 NOTE — PROGRESS NOTES
Patient would like to know if she can take Tylenol between taking Hycet. Patient states that she had severe pain rates as 10 at about 1200 and she took her Hycet. States that currently at 1525 her pain is about a 5.  Patient also states that she noticed a little blood on her belly button. Message sent to Dr Umaña.

## 2020-08-21 NOTE — DISCHARGE SUMMARY
[facility]  DISCHARGE SUMMARY  General Surgery      Admit Date:  8/19/2020    Discharge Date and Time:  8/20/2020  7:30 PM    Attending Physician: Cj Umaña MD    Discharge Provider:  Aubrie Bernstein MD     Reason for Admission:  Morbid obesity with BMI of 45.0-49.9, adult     Procedures Performed:  Procedure(s) (LRB):  GASTRECTOMY, SLEEVE, LAPAROSCOPIC, with intraop EGD (N/A)  EGD (ESOPHAGOGASTRODUODENOSCOPY) (N/A)    Hospital Course:  Please see the preoperative H&P and other available documentation for full details related to history prior to this admission.  Briefly, Agustina Tillman is a 35 y.o. female who was admitted following scheduled elective surgery for Morbid obesity with BMI of 45.0-49.9, adult    Following a complete preoperative discussion of the risks and benefits of surgery with signed informed consent, the patient was taken to the operating room on 8/19/2020 and underwent the above stated procedures.  The patient tolerated surgery well and there were no complications.  Please see the operative report for full intraoperative findings and details. Postoperatively, the patient was transferred from PACU to the floor in stable condition.  The patient's labs and vital signs remained stable and appropriate throughout their hospital stay. They tolerated advancement of their diet, pain was well controlled, bowel function had returned, patient was ambulatory and voiding appropriately. By 1 Day Post-Op  the patient was deemed okay for discharge and instructed to follow up in clinic in 2 weeks.        Consults:  None.    Significant Diagnostic Studies:   Recent Labs   Lab 08/20/20  0619   WBC 11.23   HGB 11.4*   HCT 39.0        Recent Labs   Lab 08/20/20  0619      K 4.2      CO2 27   BUN 11   CREATININE 0.8   GLU 95   CALCIUM 8.7   MG 2.1   PHOS 2.2*   No results for input(s): INR, PTT, LABHEPA, LACTATE, TROPONINI, CPK, CPKMB, MB, BNP in the last 72  hours.No results for input(s): PH, PCO2, PO2, HCO3 in the last 72 hours.      Final Diagnoses:   Principal Problem:  Morbid obesity with BMI of 45.0-49.9, adult   Secondary Diagnoses:    Active Hospital Problems    Diagnosis  POA    *Morbid obesity with BMI of 45.0-49.9, adult [E66.01, Z68.42]  Not Applicable     Chronic     Pending Bariatric Surgery evaluation        Resolved Hospital Problems   No resolved problems to display.       Discharged Condition:  Good    Disposition:  Home or Self Care    Follow Up/Patient Instructions:     Medications:  Reconciled Home Medications:    Discharge Medication List as of 8/20/2020  7:19 PM      CONTINUE these medications which have NOT CHANGED    Details   cetirizine (ZYRTEC) 10 MG tablet Take 1 tablet (10 mg total) by mouth once daily., Starting Thu 2/6/2020, Until Fri 2/5/2021, OTC      diclofenac (VOLTAREN) 50 MG EC tablet Take 1 tablet (50 mg total) by mouth 3 (three) times daily as needed (pain)., Starting Sun 1/12/2020, Print      ergocalciferol (ERGOCALCIFEROL) 50,000 unit Cap Take 1 capsule (50,000 Units total) by mouth twice a week., Starting Thu 11/7/2019, Normal      erythromycin (ROMYCIN) ophthalmic ointment Apply 1/2 inch ribbon to the inner lower eyelid and then gently massage onto the eye with the eye closed 3 times a day for 1 week., Normal      hydrocodone-acetaminophen (HYCET) solution 7.5-325 mg/15mL Take 15 mLs by mouth 4 (four) times daily as needed for Pain., Starting Tue 8/11/2020, Normal      hydrOXYzine HCL (ATARAX) 25 MG tablet TAKE 1 TABLET BY MOUTH EVERY EVENING, Normal      ketoconazole (NIZORAL) 2 % shampoo Apply topically twice a week., Starting Thu 7/9/2020, Normal      levocetirizine (XYZAL) 5 MG tablet TAKE 1 TABLET BY MOUTH EVERY DAY, Normal      medroxyPROGESTERone (PROVERA) 10 MG tablet Take 1 tablet (10 mg total) by mouth once daily. for 10 days, Starting Tue 4/14/2020, Until Fri 4/24/2020, Normal      montelukast (SINGULAIR) 10 mg  tablet TAKE 1 TABLET BY MOUTH EVERY DAY IN THE EVENING, Normal      omeprazole (PRILOSEC) 40 MG capsule Take 1 capsule (40 mg total) by mouth every morning. Open capsule and take with apple sauce, Starting Tue 8/11/2020, Normal      !! ondansetron (ZOFRAN-ODT) 8 MG TbDL Take 1 tablet (8 mg total) by mouth every 6 (six) hours as needed (nausea)., Starting Wed 2/19/2020, Print      !! ondansetron (ZOFRAN-ODT) 8 MG TbDL Dissolve 1 tablet (8 mg total) by mouth every 6 (six) hours as needed., Starting Tue 8/11/2020, Normal      phentermine (LOMAIRA) 8 mg Tab Take 1 each by mouth 2 (two) times daily., Starting Tue 10/8/2019, Print      polyethylene glycol (GLYCOLAX) 17 gram/dose powder Dissolve one capful (17 g) in liquid and take by mouth once daily., Starting Tue 8/11/2020, Normal      promethazine (PHENERGAN) 25 MG tablet Take 1 tablet (25 mg total) by mouth every 6 (six) hours as needed for Nausea., Starting Sun 12/8/2019, Print      triamcinolone acetonide 0.1% (KENALOG) 0.1 % cream AAA bid. Do not apply to the face., Normal      ursodioL (CSOTTY FORTE) 500 MG tablet Crush one tablet daily for gall bladder., Normal       !! - Potential duplicate medications found. Please discuss with provider.        Discharge Procedure Orders   Diet clear liquid   Order Comments: Please follow your post-sleeve bariatric diet as instructed.     Lifting restrictions   Order Comments: No heavy lifting, pulling or pushing. Please do not lift anything heavier than a gallon of milk.     Other restrictions (specify):   Order Comments: Showers okay, please no baths or soaking.     No driving until:   Order Comments: Please no driving until your clinic visit.     Notify your health care provider if you experience any of the following:  increased confusion or weakness     Notify your health care provider if you experience any of the following:  persistent dizziness, light-headedness, or visual disturbances     Notify your health care provider  if you experience any of the following:  severe persistent headache     Notify your health care provider if you experience any of the following:  difficulty breathing or increased cough     Notify your health care provider if you experience any of the following:  severe uncontrolled pain     Notify your health care provider if you experience any of the following:  redness, tenderness, or signs of infection (pain, swelling, redness, odor or green/yellow discharge around incision site)     Notify your health care provider if you experience any of the following:  temperature >100.4     Change dressing (specify)   Order Comments: Skin glue will fall off on its own. Showers okay, please no baths or soaking.     Follow-up Information     Cj Umaña MD In 2 weeks.    Specialties: General Surgery, Bariatrics  Why: s/p gastric sleeve  Contact information:  [facility] [address]  [facility] [address]  [phone]                   Aubrie Bernstein MD

## 2020-08-21 NOTE — TELEPHONE ENCOUNTER
Returned call and discussed alternating the tylenol with the hycet but to make sure she watches the amount of acetaminophen and make sure she does not take more that 4000 mg in a 24 hour period.  Also discussed her drainage from her belly button.  She said that it had dried up.  Informed her that a little pinkish orange drainage is okay and normal in the first 2 weeks, but if it starts bleeding to give us a call and she could hold a little gauze over the sites to stop the oozing.  She verbalized understanding.

## 2020-08-21 NOTE — TELEPHONE ENCOUNTER
----- Message from Jenny Hernadez sent at 8/21/2020  3:03 PM CDT -----  Contact: [phone]  Pt states she has blood draining from her navel since surgery.Mackenzie call back ASAP

## 2020-08-21 NOTE — PLAN OF CARE
08/21/20 1427   Final Note   Assessment Type Final Discharge Note   Anticipated Discharge Disposition Home   What phone number can be called within the next 1-3 days to see how you are doing after discharge? [phone]   Hospital Follow Up  Appt(s) scheduled? Yes   Discharge plans and expectations educations in teach back method with documentation complete? Yes     Future Appointments   Date Time Provider Department Center   9/2/2020 12:40 PM LAB[facility] [place] [facility]   9/2/2020  1:00 PM KADIE Shay alvaro   9/2/2020  1:30 PM Shanthi Spears alvaro   10/14/2020  8:20 AM LAB, APPOINTMENT [place] [facility] SERJIO [facility]   10/14/2020  8:40 AM KADIE Shay alvaro   10/14/2020  9:15 AM Dipti Wei RD [facility] LAYLA Spears alvaro

## 2020-08-21 NOTE — PATIENT INSTRUCTIONS
Evaluation for Bariatric Surgery  Bariatric surgery is a type of surgery to help you lose weight. It is a choice for some people who are obese and not able to lose weight with other methods. It may also be a choice if you are overweight and have a health problem such as diabetes. Diabetes and some other health problems can get better with weight loss.  Benefits of bariatric surgery  Bariatric surgery may help you lose a large amount of extra weight and keep it off. This can help prevent or treat health problems associated with obesity such as:  · Diabetes  · Osteoarthritis  · High blood pressure  · Heart disease  · Stroke  · Sleep apnea  · Liver disease  · Infertility  · Certain lung diseases such as asthma  · Certain cancers  · Psychological problems  In some cases, a health problem linked to weight may go away after weight loss. People who get surgery tend to lose more weight than people who get medical treatment for their weight loss. This means that surgery is more likely to help with problems linked to obesity, such as diabetes or sleep apnea. But the results are not the same for everyone. Some people do not lose as much weight as they want after surgery.  Risks of bariatric surgery  All surgery has risks. Your risks may be different according to your general health, your age, the type of surgery you choose, and the amount of weight you need to lose. Talk with your healthcare provider about the risks that most apply to you. Risks of bariatric surgery include:  · Bleeding  · Infection  · Blockage of your bowels (intestinal obstruction)  · Blood clots  · Heart attack  · Need for follow-up surgery  · Gallstones (a later problem)  · Nutritional deficiencies (a later problem)  · Psychological difficulties after the procedure  · Regain of some weight after surgery  Is bariatric surgery right for you?  Healthcare providers most often advise bariatric surgery to people with a body mass index (BMI) of 40 or more. BMI  is a method of screening for a weight category using a persons height and weight for calculation. A BMI of 25 to 29.9 means overweight. A BMI higher than 30 means obese. Your healthcare provider may also suggest bariatric surgery if your BMI is between 35 and 40 and you have a health problem that may get better with weight loss.  Bariatric surgery is not the only treatment for obesity. Your healthcare provider may want you to try other treatments first. These may include working with a dietitian or using a weight-loss medicine.  Your healthcare team will only do the surgery if they know it will give you health benefits. For the surgery to be a success, you will need to make lifelong changes to your diet and lifestyle. Your healthcare team will want to know that you are ready for the life changes that go with surgery.  The approval process  You will need to go through an in-depth process to be approved for bariatric surgery. This is done to find out if you are ready for the surgery, and if it will help you. And you will need to find out if your health insurance plan will cover the costs of the surgery.  Youll need to meet with healthcare providers such as:  · A surgeon, who must confirm that you are a good candidate for surgery  · A mental healthcare provider, who can look at your emotional readiness for the surgery  · A dietitian, who will look to see if you are ready to make the needed changes  · A primary healthcare provider, who can look at your general health and readiness for surgery  · A heart healthcare provider, who can make sure your heart is healthy for surgery  · A lung healthcare provider, who can make sure your lungs are healthy for surgery  All of these healthcare providers must agree that the surgery is safe for you.  Before your approval, you will also need to:  · Lose some weight before your surgery, to prevent problems  · Stop smoking  · Work with healthcare providers to treat medical or  emotional problems  A big decision  Bariatric surgery is a big step. It will change your eating habits for the rest of your life. Make sure you fully understand the risks and benefits. Ask for support from your partner, family, and friends.      Tests before bariatric surgery  You will need some exams and tests. These are to make sure you are healthy enough for the surgery and recovery. You may need tests such as:  · Blood tests to check for anemia, infection, hormone levels, and kidney function  · Screening for nutrient deficiencies  · Chest X-ray, to help make sure your heart and lungs are healthy  · Echocardiogram, if more information about your heart is needed  · Electrocardiogram, to look at your heart rhythm  · Pulmonary function tests, to see of your lungs are healthy  Having realistic weight loss goals  Its important to have a realistic weight-loss goal for your surgery. Most people do not lose all of their extra weight after surgery. You may lose one-half to two-thirds of your excess body weight. This depends on the type of surgery you have. Your healthcare provider can give you a more exact idea of what you can expect.  Losing this amount of weight may help any health conditions you have, even if you still have extra weight. You may be able to reduce or stop some of the medicines that you take. You may have more energy and a better self-image. Having a realistic weight-loss goal can help keep you motivated. It can help keep you on track with your good eating habits. Your healthcare providers will give you a realistic idea of how much you can expect to lose after your surgery.  Date Last Reviewed: 2/18/2016  © 7678-2957 [employer]. [address] [place] [address], [place] [address]. All rights reserved. This information is not intended as a substitute for professional medical care. Always follow your healthcare professional's instructions.        Discharge Instructions for Bariatric (Obesity)  Surgery  You have had a procedure called bariatric surgery. During this procedure, a healthcare provider surgically changed your stomach (and maybe your small intestine as well) so it can only hold a small amount of food at one time, absorb a certain amount of food at one time, or both, depending on the type of operation you had. This should help you lose weight and decrease your risk of health problems. These include diabetes, respiratory problems, and coronary artery disease. This surgery will make it difficult for you to eat large amounts of solid foods. After the surgery, you must eat very small meals. Eating too much food or too fast may cause unpleasant symptoms, such as nausea, vomiting, or pain in your upper belly. You'll need to make lifelong behavior changes to keep off the weight you lost through bariatric surgery.   Changes in how you eat  Here are suggestions to change how you eat:   · Follow the diet that was prescribed for you in the hospital. You may need to have only liquids for 2 weeks after the surgery, or you may be able to eat pureed foods.   · Drink liquids in smaller amounts than you used to. This will make it easier for your body to digest liquids. But, it is important that you continue to drink liquids (in small amounts) throughout the day so that you do not become dehydrated. Some signs of dehydration include dry mouth and dark urine.  · Eat slowly. Plan on taking at least 20 to 30 minutes to finish a meal. Eating too much or too fast will cause nausea and vomiting.  · Avoid the unpleasant effects of dumping syndrome. This can happen after a bariatric operation. The syndrome refers to stomach pain, nausea, vomiting, diarrhea, sweating, dizziness, rapid heartbeat, and bloating that happen after eating foods high in sugar, such as ice cream and milkshakes. Symptoms can happen within 10 to 30 minutes after eating, or sometimes as long as 2 to 3 hours after a meal.In some instances, fainting  can also happen. So avoid foods that could cause this.  · Take vitamin supplements as directed by your healthcare provider.    Activity  Here are suggestions for being active after surgery:   · Keep in mind that recovery takes several weeks. It is normal to feel tired. Rest as needed.  · Walk as often as you feel able. Increase your activity slowly.  · Do not lift anything heavier than 10 pounds. Avoid any stomach-straining exercises until your healthcare provider says it's OK.   · Avoid strenuous chores, such as vacuuming or lifting full bags of garbage, until your healthcare provider says its OK.  · Climb stairs slowly and pause after every few steps.  · You can benefit from simple activities, such as walking or gardening. Ask your healthcare provider how and when to get started.  · Ask your healthcare provider when you can expect to return to work.  Home care  Suggestions for taking care of yourself after surgery:   · Continue the coughing and deep breathing exercises you learned in the hospital.  · Shower as needed. But avoid baths, swimming pools, and hot tubs until your healthcare provider says it's OK. This helps prevent infection of the incision site.  · Keep the incision clean and dry. Wash the incision gently with mild soap and warm water. Then gently pat the incision dry with a towel.  · Follow your healthcare providers instructions about caring for the dressing covering your incisions.  · If your healthcare provider used small white adhesive strips to close the incision, do not remove them. Let the strips fall off on their own. If they dont come off within 2 weeks after you were sent home, call your healthcare provider.  · Take your medicines in crushed or liquid form for 3 weeks after surgery and as instructed.  · Keep in mind that your medicines will need to be adjusted as you lose weight. Ask your healthcare provider about what changes you should make in your medicines.   · If you use a CPAP or  BiPAP machine for sleep apnea, don't stop using it without talking to your healthcare provider.   · Learn to take your own pulse. Keep a record of your results. Ask your healthcare provider which readings mean that you need medical attention.  · Ask your healthcare provider when you can start driving again. Don't drive if you are taking pain medicine.  When to call your healthcare provider  Call your healthcare provider right away if you have any of the following:  · Cloudy or smelly drainage from the incision site  · Redness, pain, or increased swelling at the incision site  · Fever of 100.4°F (38°C) or higher, or shaking chills  · Fast pulse  · Night sweats  · Sudden difficulty breathing or chest pain   · Swelling or pain in your calves   · Persistent pain, nausea, or vomiting after eating  · Diarrhea beyond the first week after discharge  · Pain in your upper back, chest, or left shoulder  · Persistent hiccups  · Confusion, depression, or unusual fatigue  · Signs of bladder infection (urinating more often than usual; burning, pain, bleeding, or hesitancy when you urinate)   Date Last Reviewed: 3/23/2016  © 6891-6246 [employer]. [address] [place] [address]. All rights reserved. This information is not intended as a substitute for professional medical care. Always follow your healthcare professional's instructions.

## 2020-08-21 NOTE — TELEPHONE ENCOUNTER
----- Message from Patricia Cabrales LPN sent at 8/21/2020  3:22 PM CDT -----  Patient would like to know if she can take Tylenol between taking Hycet. Patient states that she had severe pain rates as 10 at about 1200 and she took her Hycet. States that currently at 1525 her pain is about a 5.  Patient also states that she noticed a little blood on her belly button. Please contact pt to discuss as soon as possible.      Respectfully,  Patricia Cabrales LPN  [facility]    [email or web address]       Please do not reply to this message, as this inbox is not routinely monitored.

## 2020-08-24 ENCOUNTER — TELEPHONE (OUTPATIENT)
Dept: BARIATRICS | Facility: CLINIC | Age: 35
End: 2020-08-24

## 2020-08-24 NOTE — TELEPHONE ENCOUNTER
Spoke with pt, explained she is having pain, soreness, feelings of a pulling sensation around umbilical area when standing. She verbalized she is only taking Hycet as she took Tylenol once and noticed blood when she wiped after urinating and did not take anymore. Denied any discomfort or urgency when urinating.  After discussion, I explained the Hycet has tylenol in it as well and she also verbalized discharge when wiping and concluded she likely is having a small menstrual cycle. Instructed pt to try Tylenol as directed, scheduled appointment with Dr. Umaña 8/25 for 3p if pain does not resolve or if she starts having any urinary discomfort to reach back out to us and we will see her at scheduled appointment tomorrow. Patient verbalized understanding.

## 2020-08-24 NOTE — TELEPHONE ENCOUNTER
----- Message from Sharri Vasquez MA sent at 8/24/2020  8:21 AM CDT -----    ----- Message -----  From: Liz Tavares  Sent: 8/24/2020   8:16 AM CDT  To: Leeroy Corona CLAUDIO Polanco Staff    Pt is calling stating that she need a refill on hydrocodone-acetaminophen (HYCET) solution 7.5-325 mg/15mL 200 mL 0 8/11/2020  --  Sig - Route: Take 15 mLs by mouth 4 (four) times daily as needed for Pain. - Oral  Sent to pharmacy as: hydrocodone-apap 7.5-325 MG/15 ML (HYCET) oral solution  Class: Normal  Earliest Fill Date: 8/11/2020  Notes to Pharmacy: Quantity prescribed more than 7 day supply? No     [facility] case  Order: [id number]  Date/Time Signed: 8/11/2020 14:54      Pharmacy      [facility]      Pt is requesting that the nurse give her a call back.

## 2020-08-25 ENCOUNTER — TELEPHONE (OUTPATIENT)
Dept: BARIATRICS | Facility: CLINIC | Age: 35
End: 2020-08-25

## 2020-08-25 NOTE — TELEPHONE ENCOUNTER
----- Message from Grecia Reynoso sent at 8/25/2020  9:07 AM CDT -----  Regarding: Post Op Appointment  Contact: Patient Called [phone]  Patient states that she would like to reschedule Post Op apt for today to 9/2/20. Tried to reschedule and no solutions were found Also inquiring about Paperwork for her Sister to take a two week leave from her Job.  Wanting to know if that was received from [employer], Employer

## 2020-08-25 NOTE — TELEPHONE ENCOUNTER
Spoke with pt, she reports feeling much better taking Tylenol as directed and wanted to cancel the appointment scheduled today.  Fax number of [phone] given to fax [employer] paperwork for a 2 week leave to care for her and her children while she recovers. Instructed pt to call our office with any other questions or concerns. Patient verbalized understanding.

## 2020-08-26 ENCOUNTER — TELEPHONE (OUTPATIENT)
Dept: BARIATRICS | Facility: CLINIC | Age: 35
End: 2020-08-26

## 2020-08-26 LAB
FINAL PATHOLOGIC DIAGNOSIS: NORMAL
GROSS: NORMAL

## 2020-08-26 NOTE — TELEPHONE ENCOUNTER
----- Message from Shanthi Reyes sent at 2020 10:38 AM CDT -----  Regardin wk post surgery  Call to check in 1 wk post surgery

## 2020-09-01 ENCOUNTER — OFFICE VISIT (OUTPATIENT)
Dept: BARIATRICS | Facility: CLINIC | Age: 35
End: 2020-09-01
Payer: COMMERCIAL

## 2020-09-01 ENCOUNTER — TELEPHONE (OUTPATIENT)
Dept: BARIATRICS | Facility: CLINIC | Age: 35
End: 2020-09-01

## 2020-09-01 DIAGNOSIS — R63.4 WEIGHT LOSS: ICD-10-CM

## 2020-09-01 DIAGNOSIS — Z98.890 POST-OPERATIVE STATE: Primary | ICD-10-CM

## 2020-09-01 DIAGNOSIS — Z98.84 S/P LAPAROSCOPIC SLEEVE GASTRECTOMY: ICD-10-CM

## 2020-09-01 DIAGNOSIS — R07.89 GASSY CHEST PAIN: ICD-10-CM

## 2020-09-01 PROCEDURE — 99024 POSTOP FOLLOW-UP VISIT: CPT | Mod: 95,,, | Performed by: PHYSICIAN ASSISTANT

## 2020-09-01 PROCEDURE — 99024 PR POST-OP FOLLOW-UP VISIT: ICD-10-PCS | Mod: 95,,, | Performed by: PHYSICIAN ASSISTANT

## 2020-09-01 NOTE — PROGRESS NOTES
BARIATRIC POST-OPERATIVE VISIT:  The patient location is: home  The chief complaint leading to consultation is: 2 week post op     Visit type: audiovisual    Face to Face time with patient: 15 minutes   of total time spent on the encounter, which includes face to face time and non-face to face time preparing to see the patient (eg, review of tests), Obtaining and/or reviewing separately obtained history, Documenting clinical information in the electronic or other health record, Independently interpreting results (not separately reported) and communicating results to the patient/family/caregiver, or Care coordination (not separately reported).         Each patient to whom he or she provides medical services by telemedicine is:  (1) informed of the relationship between the physician and patient and the respective role of any other health care provider with respect to management of the patient; and (2) notified that he or she may decline to receive medical services by telemedicine and may withdraw from such care at any time.    Notes:     HPI:  Agustina Tillman is a 35 y.o. year old female presents for 2 week post op visit following gastric sleeve.  she is doing well and tolerating the diet without difficulty.  she has no complaints.      [address]umpy [address] the first week states that she had bad cramps in stomach but after increasing ehr walking it went away. Has been having some issues with gas, got so bad to the point where she started having sternal pain and considered going to the Ed but after belching the pain resolved and has not come back. Still getting some gas pain but not as intense as that one episode. No chest pain shortness of breath or leg swelling associated. Nauseated this am, first time.     Had some navel discharge the first couple of days after surgery has since resolved    States had a racing heart after exercising but did not get protein in before going walking and walked around 3 blocks. After  resting resolved.        Denies: nausea, vomiting, abdominal pain, changes in bowel movement pattern, fever, chills, dysphagia, chest pain, and shortness of breath.    Review of Systems   Constitutional: Negative for fatigue and fever.   HENT: Positive for tinnitus. Negative for trouble swallowing.    Eyes: Negative for visual disturbance.   Respiratory: Negative for cough, choking, chest tightness, shortness of breath and wheezing.    Cardiovascular: Positive for chest pain (associated with gas resolves with gas relief). Negative for palpitations and leg swelling.   Gastrointestinal: Negative for abdominal pain, constipation, diarrhea, nausea and vomiting.   Genitourinary: Negative for decreased urine volume and dysuria.   Musculoskeletal: Negative for joint swelling and myalgias.   Skin: Negative for rash.   Neurological: Negative for dizziness, light-headedness and headaches.   Psychiatric/Behavioral: Negative for dysphoric mood and self-injury. The patient is not nervous/anxious.        EXERCISE & VITAMINS:  See Bariatric Assessment  Adherent to vitamin regimen     MEDICATIONS/ALLERGIES:  Have been reviewed.    DIET: Liquid Bariatric Diet.  1 protein shakes daily, ~55 grams protein.  30 - 48  fl oz SF clear    Getting tired of protein shakes   Has switched to [employer] protein powder (25g) 1/2 in am 1/2 at night along with 2 protein hobbs  beverage.  See Dietician note from today for a more detailed assessment.      Physical Exam  Constitutional:       Appearance: She is obese.   HENT:      Head: Normocephalic and atraumatic.      Nose: Nose normal.   Eyes:      Extraocular Movements: Extraocular movements intact.      Conjunctiva/sclera: Conjunctivae normal.   Neck:      Musculoskeletal: Normal range of motion and neck supple.   Pulmonary:      Effort: Pulmonary effort is normal. No respiratory distress.   Abdominal:      Tenderness: There is no abdominal tenderness.      Comments: On visual examination no signs of  infection no erythema or discharge    Musculoskeletal: Normal range of motion.         General: No swelling.   Neurological:      General: No focal deficit present.      Mental Status: She is alert and oriented to person, place, and time.   Psychiatric:         Mood and Affect: Mood normal.         Behavior: Behavior normal.         Thought Content: Thought content normal.         Judgment: Judgment normal.         ASSESSMENT:  - Morbid obesity s/p sleeve gastrectomy   - Co-morbidities: anemia and PCOS  - Ok Weight loss, 11#'s and 9% EWL  - Good Exercise routine  - Ok Diet ( increase protein)  - Good Vitamin regimen    PLAN:  - Gas x  - Ursodiol 300 mg twice daily for 6 months  - Anti-Acid medication, PPI daily for 3 months  - No lifting more than 10 lbs for 6 weeks  - Miralax daily for constipation  - Emphasized the importance of regular exercise and adherence to bariatric diet to achieve maximum weight loss.  - Encouraged patient to start/continue  regular exercise.( light with protein prior)   - Follow-up with dietician to advance diet.  - Continue daily vitamins and medications.  - RTC in 2 weeks or sooner if needed.  - Call the office for any issues.  - Check labs tomorrow

## 2020-09-01 NOTE — PATIENT INSTRUCTIONS
High Protein Pureed Diet    2 weeks after gastric bypass and sleeve you may be ready to add pureed food to your diet.  All food should be the consistency of baby food, or thinner.  Follow pureed diet for the next 2 weeks.    Protein - It is very important to pay attention to protein intake during this time.      Inadequate protein intake can cause:  ? Delayed Wound Healing  ? Hair Loss  ? Muscle Breakdown    Meal Plan - Eat 3-4 meals per day (2-4 tbsp each), with protein supplements in between to meet protein needs.  Meeting protein needs daily will help increase healing, decrease muscle loss, and increase weight loss.  Your goal is  grams of protein a day.    Protein First - Always eat the foods with the highest protein first.  Foods high in protein include milk, yogurt, cheese, egg whites, and blenderized meat, seafood, and beans.    Fluids - Keep track in your journal of how much you are drinking; you should try to drink at least 64oz of fluids every day.      Foods allowed: Portion size Protein (g)   ? Sugar-free clear liquids As desired 0   ? Skim or 1% milk ½ cup 4   ? Sugar free pudding, light yogurt, custard (use skim or 1% milk in preparation) 3 oz 2.5   ? Strained baby food meats, or home-made pureed lean meats and shrimp 1 oz 7   ? Beans (red, white, black, lima, rosas, fat free refried, hummus) and lentils ¼ cup 4   ? Low-fat/fat free cheese.(cottage cheese, mozzarella string cheese, ricotta cheese, Laughing Cow, Baby Bell, cheddar, etc) ¼ cup 7-8   ? Scrambled eggs or Egg Beaters 1 or ¼ cup 6   ? Edamame or Tofu, mashed ¼ cup 5   ? Unflavored protein powder (add to 1 scoop to  98% fat free soups or SF pudding) 3 Tbsp 9   ? *PB2: peanut powder (45 calories) 2 Tbsp 5     *PB2 powdered peanut butter: 45 calories vs. 190 calories in 2 tbsp of regular peanut butter. Purchase online at [employer], or  at various [employer], [employer], [employer], [employer] and [employer].      Bariatric Liquid/Pureed Sample  Menu    3-4 small meals plus 2-3 protein drinks per day.    8am 1 egg or ¼ cup Egg Beaters   9am 1 cup water, or decaf coffee or tea   10am Protein drink, 30g protein   11am 2 tbsp low-fat cottage cheese, and 1 tbsp pureed peaches   12pm 1 cup water, or sugar-free lemonade    1pm 2 tbsp pureed chicken, and 1 tbsp pureed carrots    2pm 1 cup water, or sugar-free lemonade   3pm Protein drink, 30g protein   5pm 1 cup water    6pm 1 cup hi-protein creamy chicken soup 14g protein (see Recipe below)   7pm 1 cup water, or sugar-free fruit punch    8pm 1 cup water     This sample menu provides approx. 80g protein and 64oz fluids.  Liquid protein supplements should contain 20-30g protein and less than 4 grams of sugar each.    ? Sip fluids continuously in between meals.  Drink at least ¼ cup every 15 minutes.  ? For fluids: ¼ cup = 2 oz = 4 tbsp       RECIPE IDEAS for Bariatric Pureed Diet:    Hi-Protein Creamy Chicken Soup: (10g protein per 1 cup serving)  Empty 1 can of 98% fat free cream of chicken soup into saucepan. Then  blend 1 scoop of unflavored protein powder with 1 can of skim milk until smooth.  Add protein milk to saucepan and heat to warm. (Note: Do NOT boil. Protein powder may clump if heated too hot).     Hi-Protein Pudding: (14g protein per ½ cup serving)  Add 2 scoops protein powder to 2 cups cold skim milk and mix well.  Stir in dry Jell-O Sugar-Free Instant Pudding mix.  Chill and Enjoy!    Tuna Mousse (12g protein per ¼ cup serving) Page 135 in book Eating Well After Weight Loss Surgery.  In a  or , combine all ingredients and pulse until smooth.  2 6-ounce cans tuna packed in water, drained  2 tbsp low-fat mayonnaise  2 tbsp fat-free sour cream  2 tbsp fat-free cream cheese, softened  ½ cup shallots, finely chopped  1 tbsp lemon juice  ¼ tsp ground pepper  ½ tsp celery seed    Chocolate Peanut Butter Mousse  (28g protein total)  6oz plain Greek yogurt  4 tbsp chocolate PB2

## 2020-09-01 NOTE — TELEPHONE ENCOUNTER
Spoke with [facility] office, they explained they only received 2 pages without any forms to fill out.  Explained this to patient.  She has appointment here at main campus tomorrow and will bring forms then.  Encouraged pt to complete as much information as possible on form.  Patient verbalized understanding.

## 2020-09-01 NOTE — TELEPHONE ENCOUNTER
----- Message from Mira Ghotra sent at 9/1/2020 11:52 AM CDT -----  Agustina Tillman MRN [id number] checking on the Leave papers that were sent for her sister Christine - who took off work to care for her sister some FMLA/leave papers did Dr. Umaña's office receive any papers?    Please contact patient for update @# [phone]

## 2020-09-02 ENCOUNTER — LAB VISIT (OUTPATIENT)
Dept: LAB | Facility: HOSPITAL | Age: 35
End: 2020-09-02
Payer: COMMERCIAL

## 2020-09-02 ENCOUNTER — CLINICAL SUPPORT (OUTPATIENT)
Dept: BARIATRICS | Facility: CLINIC | Age: 35
End: 2020-09-02
Payer: COMMERCIAL

## 2020-09-02 VITALS — WEIGHT: 282.19 LBS | BODY MASS INDEX: 46.96 KG/M2

## 2020-09-02 DIAGNOSIS — E66.01 MORBID OBESITY: ICD-10-CM

## 2020-09-02 DIAGNOSIS — R73.03 PREDIABETES: ICD-10-CM

## 2020-09-02 DIAGNOSIS — E51.9 VITAMIN B1 DEFICIENCY: ICD-10-CM

## 2020-09-02 LAB
ALBUMIN SERPL BCP-MCNC: 4.1 G/DL (ref 3.5–5.2)
ALP SERPL-CCNC: 81 U/L (ref 55–135)
ALT SERPL W/O P-5'-P-CCNC: 13 U/L (ref 10–44)
ANION GAP SERPL CALC-SCNC: 18 MMOL/L (ref 8–16)
AST SERPL-CCNC: 18 U/L (ref 10–40)
BASOPHILS # BLD AUTO: 0.07 K/UL (ref 0–0.2)
BASOPHILS NFR BLD: 1.1 % (ref 0–1.9)
BILIRUB SERPL-MCNC: 0.5 MG/DL (ref 0.1–1)
BUN SERPL-MCNC: 9 MG/DL (ref 6–20)
CALCIUM SERPL-MCNC: 9.9 MG/DL (ref 8.7–10.5)
CHLORIDE SERPL-SCNC: 102 MMOL/L (ref 95–110)
CO2 SERPL-SCNC: 21 MMOL/L (ref 23–29)
CREAT SERPL-MCNC: 1.3 MG/DL (ref 0.5–1.4)
DIFFERENTIAL METHOD: ABNORMAL
EOSINOPHIL # BLD AUTO: 0.2 K/UL (ref 0–0.5)
EOSINOPHIL NFR BLD: 2.9 % (ref 0–8)
ERYTHROCYTE [DISTWIDTH] IN BLOOD BY AUTOMATED COUNT: 16.3 % (ref 11.5–14.5)
EST. GFR  (AFRICAN AMERICAN): >60 ML/MIN/1.73 M^2
EST. GFR  (NON AFRICAN AMERICAN): 54 ML/MIN/1.73 M^2
GLUCOSE SERPL-MCNC: 77 MG/DL (ref 70–110)
HCT VFR BLD AUTO: 42.9 % (ref 37–48.5)
HGB BLD-MCNC: 12.8 G/DL (ref 12–16)
IMM GRANULOCYTES # BLD AUTO: 0.01 K/UL (ref 0–0.04)
IMM GRANULOCYTES NFR BLD AUTO: 0.2 % (ref 0–0.5)
LYMPHOCYTES # BLD AUTO: 2.3 K/UL (ref 1–4.8)
LYMPHOCYTES NFR BLD: 37.9 % (ref 18–48)
MCH RBC QN AUTO: 23.5 PG (ref 27–31)
MCHC RBC AUTO-ENTMCNC: 29.8 G/DL (ref 32–36)
MCV RBC AUTO: 79 FL (ref 82–98)
MONOCYTES # BLD AUTO: 0.5 K/UL (ref 0.3–1)
MONOCYTES NFR BLD: 8.1 % (ref 4–15)
NEUTROPHILS # BLD AUTO: 3.1 K/UL (ref 1.8–7.7)
NEUTROPHILS NFR BLD: 49.8 % (ref 38–73)
NRBC BLD-RTO: 0 /100 WBC
PLATELET # BLD AUTO: 269 K/UL (ref 150–350)
PMV BLD AUTO: 11.1 FL (ref 9.2–12.9)
POTASSIUM SERPL-SCNC: 3.6 MMOL/L (ref 3.5–5.1)
PROT SERPL-MCNC: 7.6 G/DL (ref 6–8.4)
RBC # BLD AUTO: 5.44 M/UL (ref 4–5.4)
SODIUM SERPL-SCNC: 141 MMOL/L (ref 136–145)
VIT B12 SERPL-MCNC: 1241 PG/ML (ref 210–950)
WBC # BLD AUTO: 6.17 K/UL (ref 3.9–12.7)

## 2020-09-02 PROCEDURE — 99499 NO LOS: ICD-10-PCS | Mod: S$GLB,,, | Performed by: DIETITIAN, REGISTERED

## 2020-09-02 PROCEDURE — 84425 ASSAY OF VITAMIN B-1: CPT

## 2020-09-02 PROCEDURE — 36415 COLL VENOUS BLD VENIPUNCTURE: CPT

## 2020-09-02 PROCEDURE — 99999 PR PBB SHADOW E&M-EST. PATIENT-LVL II: ICD-10-PCS | Mod: PBBFAC,,, | Performed by: DIETITIAN, REGISTERED

## 2020-09-02 PROCEDURE — 80053 COMPREHEN METABOLIC PANEL: CPT

## 2020-09-02 PROCEDURE — 99499 UNLISTED E&M SERVICE: CPT | Mod: S$GLB,,, | Performed by: DIETITIAN, REGISTERED

## 2020-09-02 PROCEDURE — 85025 COMPLETE CBC W/AUTO DIFF WBC: CPT

## 2020-09-02 PROCEDURE — 82607 VITAMIN B-12: CPT

## 2020-09-02 PROCEDURE — 99999 PR PBB SHADOW E&M-EST. PATIENT-LVL II: CPT | Mod: PBBFAC,,, | Performed by: DIETITIAN, REGISTERED

## 2020-09-02 NOTE — PROGRESS NOTES
NUTRITION NOTE    Referring Physician: Cj Umaña M.D.  Reason for MNT Referral: Follow-up 2 Weeks s/p Gastric Sleeve    Denies nausea, vomiting, constipation and diarrhea.  Reports doing well. Reports she is chewing ice all day. Hx Iron def. Only 1 dose MV with Iron and taking same time as Ca supplements.    Past Medical History:   Diagnosis Date    Allergy 2016    Sinus    Diabetes mellitus     Iron deficiency anemia secondary to inadequate dietary iron intake     PCOS (polycystic ovarian syndrome) 5/18/2017    Seizures 1995    I use to have epilepsy. But they have julieta away       CLINICAL DATA:  35 y.o. female.    Current Weight: 282 lbs  TWL: 21 lbs    CURRENT DIET:  Bariatric Liquid Diet    Diet Recall: 30 grams of protein/day; 48+ oz of fluids/day    Diet Includes:   Protein Supplements: 1 Premier shake per day - does not like the taste. Tried and liked [employer] Total Lean shakes and plans to switch once she finishes Premier shakes.    EXERCISE:  Adequate light exercise.    Restrictions to Exercise: None.    VITAMINS/MINERALS:  Multivitamin for Women 1/day  B-Complex  Calcium Citrate + Vitamin D 1/day  Vitamin B12 swallow pill    ASSESSMENT:  Doing well overall.  Inadequate protein intake.  Adequate fluid intake.    BARIATRIC DIET DISCUSSION:  Instructed and provided written materials on bariatric pureed and soft diet plans.  Reinforced post-op nutrition guidelines.    PLAN/RECOMMONDATIONS:  Advance to bariatric pureed diet.  May advance to bariatric soft diet in 2 weeks as bahman.  Increase protein intake. Aim for 80g prot/day.  - Gradually work up to 2 protein supplements per day. [employer] Total Lean shakes, Nectar powder + water.  Maintain fluid intake.  Increase light exercise.  Continue appropriate vitamins & minerals.  Adjust vitamins & minerals by: increasing MV to bid, increasing Ca to 2-3 doses per day (1200-1500mg total), add Iron tablet 65mg ferrous sulfate daily at bedtime, and switch to B12 sub  for best absorption    Return to clinic in 6 weeks.    SESSION TIME: 15 minutes

## 2020-09-02 NOTE — PATIENT INSTRUCTIONS
High Protein Pureed Diet    2 weeks after gastric bypass and sleeve you may be ready to add pureed food to your diet.  All food should be the consistency of baby food, or thinner.  Follow pureed diet for the next 2 weeks.    Protein - It is very important to pay attention to protein intake during this time.      Inadequate protein intake can cause:  ? Delayed Wound Healing  ? Hair Loss  ? Muscle Breakdown    Meal Plan - Eat 3-4 meals per day (2-4 tbsp each), with protein supplements in between to meet protein needs.  Meeting protein needs daily will help increase healing, decrease muscle loss, and increase weight loss.  Your goal is  grams of protein a day.    Protein First - Always eat the foods with the highest protein first.  Foods high in protein include milk, yogurt, cheese, egg whites, and blenderized meat, seafood, and beans.    Fluids - Keep track in your journal of how much you are drinking; you should try to drink at least 64oz of fluids every day.      Foods allowed: Portion size Protein (g)   ? Sugar-free clear liquids As desired 0   ? Skim or 1% milk ½ cup 4   ? Sugar free pudding, light yogurt, custard (use skim or 1% milk in preparation) 3 oz 2.5   ? Strained baby food meats, or home-made pureed lean meats and shrimp 1 oz 7   ? Beans (red, white, black, lima, rosas, fat free refried, hummus) and lentils ¼ cup 4   ? Low-fat/fat free cheese.(cottage cheese, mozzarella string cheese, ricotta cheese, Laughing Cow, Baby Bell, cheddar, etc) ¼ cup 7-8   ? Scrambled eggs or Egg Beaters 1 or ¼ cup 6   ? Edamame or Tofu, mashed ¼ cup 5   ? Unflavored protein powder (add to 1 scoop to  98% fat free soups or SF pudding) 3 Tbsp 9   ? *PB2: peanut powder (45 calories) 2 Tbsp 5     *PB2 powdered peanut butter: 45 calories vs. 190 calories in 2 tbsp of regular peanut butter. Purchase online at [employer], or  at various [employer], [employer], [employer], [employer] and [employer].      Bariatric Liquid/Pureed Sample  Menu    3-4 small meals plus 2-3 protein drinks per day.      8-8:30am 1 egg or ¼ cup Egg Beaters   9-9:30am 1 cup water, or decaf coffee or tea   10-10:30am Protein drink, 30g protein   11-11:30am 2 tbsp low-fat cottage cheese, and 1 tbsp pureed peaches   12-12:30pm 1 cup water, or sugar-free lemonade    1-1:30pm 2 tbsp pureed chicken, and 1 tbsp pureed carrots    2-2:30pm 1 cup water, or sugar-free lemonade   3-3:30pm Protein drink, 30g protein   5-5:30pm 1 cup water    6-6:30pm 1 cup hi-protein creamy chicken soup 14g protein (see Recipe below)   7-7:30pm 1 cup water, or sugar-free fruit punch    8-8:30pm 1 cup water       This sample menu provides approx. 80g protein and 64oz fluids.  Liquid protein supplements should contain 20-30g protein and less than 4 grams of sugar each.    ? Sip fluids continuously in between meals.  Drink at least ¼ cup every 15 minutes.  ? For fluids: ¼ cup = 2 oz = 4 tbsp       RECIPE IDEAS for Bariatric Pureed Diet:    Hi-Protein Creamy Chicken Soup: (10g protein per 1 cup serving)  Empty 1 can of 98% fat free cream of chicken soup into saucepan. Then  blend 1 scoop of unflavored protein powder with 1 can of skim milk until smooth.  Add protein milk to saucepan and heat to warm. (Note: Do NOT boil. Protein powder may clump if heated too hot).     Hi-Protein Pudding: (14g protein per ½ cup serving)  Add 2 scoops protein powder to 2 cups cold skim milk and mix well.  Stir in dry Jell-O Sugar-Free Instant Pudding mix.  Chill and Enjoy!    Tuna Mousse (12g protein per ¼ cup serving) Page 135 in book Eating Well After Weight Loss Surgery.  In a  or , combine all ingredients and pulse until smooth.  2 6-ounce cans tuna packed in water, drained  2 tbsp low-fat mayonnaise  2 tbsp fat-free sour cream  2 tbsp fat-free cream cheese, softened  ½ cup shallots, finely chopped  1 tbsp lemon juice  ¼ tsp ground pepper  ½ tsp celery seed    Chocolate Peanut Butter Mousse  (28g  protein total)  6oz plain Greek yogurt  4 tbsp chocolate PB2      Bariatric Soft Diet           Start Soft Diet 4 weeks after gastric bypass and sleeve    As your stomach heals, your doctor will progress your diet to soft foods.  This diet usually lasts for 1-2 months, but can last longer depending on each individual. Soft foods are those which can be easily mashed with a fork.    Remember these principles:   No liquids with meals. Do no drink 30 minutes before meals and wait 30 minutes to 1 hour after meals to start drinking.   Sip on water, sugar-free beverages or non-fat milk throughout the day.  You will need to continue drinking at least 1 protein drink daily to meet protein needs.   100% fruit juice (no sugar added) is allowed, but limit to 4oz a day because it is high in calories and does not contain any protein.   Chew foods slowly; one meal should take 20-30 minutes.   Eat 3-5 meals per day, without any additional snacking.   Stop eating as soon as you feel full.   Avoid using table sugar and foods made with refined sugar, which can trigger dumping syndrome.   Marinating meats with a low sugar marinade, adding low-fat salad dressing, or adding low calorie gravy (made from powder and water) can help meats to digest easier.     Adding Vegetables and Fruits:    As long as you are consuming >80g total protein daily from combination of foods and protein drinks, you may start adding small bites of fruits and vegetables to your meals. Cooked, tender vegetables and ripe fruits without the peel are tolerated best.    Avoid fruit canned in syrup, sugary fruit juices, and vegetables cooked with oil, butter or ramos.  Bariatric SOFT Diet    EAT THESE FOODS AVOID THESE FOODS   High in Protein: High in Fat/Sugar:   ? Canned tuna or chicken (packed in water)  ? Lean ground turkey breast or ground round  ? Turkey or chicken (no skin); cooked tender and cut in small pieces  ? Lean pork or beef (cook in crock pot  until very tender; cut in small pieces  ? Scrambled, poached, or boiled eggs  ? Baked, broiled, grilled or boiled fish and seafood (not fried!)  ? Silken tofu, Edamame (soybeans)  ? Beans, hummus and lentils  ? Lean deli meats (turkey and chicken breast, ham, roast beef)  ? 1% or Skim Milk, Lactaid, or Soymilk  ? Low-fat or fat-free cottage cheese, soft cheese, mozzarella string cheese, or ricotta  ? Light yogurt, Greek yogurt, SF pudding High fat milk (whole, 2%)  Butter, margarine, oil, mayonnaise  Sour cream, cream cheese, salad dressing  Ice Cream  Cakes, cookies, pies, desserts  Candy  Luncheon meats (bologna, salami, chopped ham)  Sausage, Elaine  Gravy  Fried Foods  ___________________________________  Tough/Crunchy--------------------------------  Tough or dry meats  Corn   Granola/cereal with nuts  Shredded Coconut    May add after 2 months:  Raw veggies  Lettuce  Plain, Unsalted Nuts and Seeds  Protein bars with 0-4 grams of sugar   As long as you are getting >80g PRO: Starchy Carbohydrates. At goal weight, some may include whole grains in small amounts.   Cooked tender vegetables without peel  Ripe fruits without peel  Frozen fruits with no added sugar  Fruit canned in its own juice or in water  Fat free, sugar free, frozen yogurt White and wheat Bread, Rice, Pasta   Cereals (including grits, oatmeal)   Crackers, Pretzels, Chips, Granola  Corn, Popcorn, Peas  White Potatoes, Sweet potatoes  Flour and corn tortillas     Fluids: Always Avoid:   Skim/1% milk, Lactaid, Soymilk  Water and Sugar-free beverages  (decaf and non-carbonated)  Decaf coffee & decaf tea  Sugary drinks  Carbonated drinks  Alcohol  Drinking through straws           Sample Menu for Bariatric Soft Diet  For Gastric Bypass and Sleeve            3 meals + 2 protein drinks  Remember: No drinking with meals.    Time of Day Day 1 Day 2   7a:    1 egg (or ¼ cup Egg Beaters) ¼ cup low-fat cottage cheese, 1 tbsp berries   7:30a-9:30a: 1 cup  water/SF beverage     10a:  Protein drink  Protein drink   10:30a-11:30a: 1 cup water/SF beverage     12p:    1-2 oz grilled shrimp, ¼ cup green beans   1-2oz canned chicken, shredded cheese, 1 tbsp salsa   12:30p-2:30p: 1 cup water/SF beverage     3p:  Protein drink   Protein drink   3:30p-5:30p: 1 cup water/SF beverage     6p:  ½ cup low fat chili, 1oz low-fat cheese, ¼ cup broccoli 2 oz grilled fish,  ¼ cup lima beans   6:30p-9p: 1 cup water/SF beverage       This sample menu provides approx. 80g protein total, including about 40g protein from foods and at least 40g protein from protein drinks.  Drinking protein drinks daily helps decrease muscle loss, increase weight loss, and prevent hair loss.    ? Sip fluids continuously in between meals.    ? For fluids: 1 cup = 8 oz   ? For food: ¼ cup = 4 tablespoons = 1oz  ? No drinking from 30 minutes before meals to 30 minutes after meals.  ? 3oz meat is approx. the size of a deck of cards.    ? A food scale will help you determine portion size (Can be purchased at [employer])

## 2020-09-03 ENCOUNTER — TELEPHONE (OUTPATIENT)
Dept: SURGERY | Facility: CLINIC | Age: 35
End: 2020-09-03

## 2020-09-03 NOTE — TELEPHONE ENCOUNTER
----- Message from Liz Tavares sent at 9/3/2020  4:06 PM CDT -----  Pt is calling to see if her paper was completed and would like for the nurse to give her a call back

## 2020-09-03 NOTE — TELEPHONE ENCOUNTER
Phoned patient to discuss her FMLA paperwork for her sister, Pao Browning.  Informed patient that we faxed it all over to the Disability Office and gave her the number of [phone].  She stated that she would let her sister know this information.

## 2020-09-10 ENCOUNTER — TELEPHONE (OUTPATIENT)
Dept: BARIATRICS | Facility: CLINIC | Age: 35
End: 2020-09-10

## 2020-09-10 NOTE — TELEPHONE ENCOUNTER
Returned call to patient and she stated that she had vomited earlier this morning after taking her vitamins with gatorade.  And it looked just like the gatorade, no vitamins were in the liquid.  She took a zofran and vomited that too.  This is her first and only time to vomit.  The nausea is gone now.  Now she has a headache.  She has a bowel movement EOD with the help pf miralax and they are good.  She is having trouble with the pureed foods and choices to pick, will send message to the dieticians.  She has difficulty swallowing all pills and has to break them in pieces.  Suggested that she purchase the flintstones chewable, she has tried them but hates the taste.  Instructed her to try them again and she might now find the taste so bad now and if she does to brush her teeth afterward.  She wanted to try the vitamins on the [employer] sugey and see if she tolerates them better.  Suggested taht she take liquid tylenol going forward due to the inability to swallow pills.  Suggested that this might have been an isolated event and to give it more time and see if the nausea and vomiting resolves.  She verbalized understanding.

## 2020-09-10 NOTE — TELEPHONE ENCOUNTER
Spoke with pt and recommended mashing fish if wants to try fish.  Sent via portal puree diet instructions.  Pt v/u 8/19/2020 sleeve surgery----- Message from Ethel Stiles RN sent at 9/10/2020  2:19 PM CDT -----  Regarding: pureed fod choices  Good afternoon,  Can one of you give this patient a call regarding the pureed diet.  She has several questions about it.  ThanksMayda

## 2020-09-10 NOTE — TELEPHONE ENCOUNTER
----- Message from Sharri Vasquez MA sent at 9/10/2020 12:20 PM CDT -----  Contact: self    ----- Message -----  From: Carmen Ingram  Sent: 9/10/2020  11:37 AM CDT  To: [facility] Staff    Pt is asking for a call back in regards to questions about a leave of absence     Contact info-  [phone]

## 2020-09-14 LAB — VIT B1 BLD-MCNC: 34 UG/L (ref 38–122)

## 2020-09-18 ENCOUNTER — TELEPHONE (OUTPATIENT)
Dept: BARIATRICS | Facility: CLINIC | Age: 35
End: 2020-09-18

## 2020-09-18 NOTE — TELEPHONE ENCOUNTER
----- Message from Joseph Sarmiento sent at 9/18/2020  1:51 PM CDT -----  Ryan from [employer] called to speak w/ someone regarding additional info needed for medical leave of absence for the patient(start of leave date,duration, medical reason) requesting callback [phone] Ext [phone]

## 2020-09-21 ENCOUNTER — TELEPHONE (OUTPATIENT)
Dept: BARIATRICS | Facility: CLINIC | Age: 35
End: 2020-09-21

## 2020-09-21 NOTE — TELEPHONE ENCOUNTER
----- Message from Grecia Reynoso sent at 9/21/2020  3:58 PM CDT -----  Contact: Patient Called [phone]  Patient is calling to speak with Nurse in regards to her not being able to sleep at night since having her surgery.  States that she is wanting to know if she can take a sleep medicine to help her at night.  Also states that she has been feeling depressed lately also.  Contact patient to advise and further discuss.

## 2020-09-21 NOTE — TELEPHONE ENCOUNTER
Spoke with pt, she explained she is having a hard time sleeping at night and exhausted during the day.  She asked what types of things could she take as a sleep aid? I encouraged her to try Melatonin or a sleep aid containing Tylenol, to avoid anything containing an NSAID and we reviewed what NSAIDS included.  Instructed pt to reach back out to us if continued to have problems or if worsened. Patient verbalized understanding.

## 2020-09-23 ENCOUNTER — PATIENT OUTREACH (OUTPATIENT)
Dept: ADMINISTRATIVE | Facility: OTHER | Age: 35
End: 2020-09-23

## 2020-09-24 ENCOUNTER — OFFICE VISIT (OUTPATIENT)
Dept: BARIATRICS | Facility: CLINIC | Age: 35
End: 2020-09-24
Payer: COMMERCIAL

## 2020-09-24 VITALS — WEIGHT: 276 LBS | HEIGHT: 65 IN | BODY MASS INDEX: 45.98 KG/M2

## 2020-09-24 DIAGNOSIS — R10.9 ABDOMINAL PAIN, UNSPECIFIED ABDOMINAL LOCATION: ICD-10-CM

## 2020-09-24 DIAGNOSIS — R09.A2 GLOBUS SENSATION: ICD-10-CM

## 2020-09-24 DIAGNOSIS — Z98.84 S/P LAPAROSCOPIC SLEEVE GASTRECTOMY: ICD-10-CM

## 2020-09-24 DIAGNOSIS — R11.2 NAUSEA AND VOMITING, INTRACTABILITY OF VOMITING NOT SPECIFIED, UNSPECIFIED VOMITING TYPE: ICD-10-CM

## 2020-09-24 DIAGNOSIS — Z98.890 POST-OPERATIVE STATE: Primary | ICD-10-CM

## 2020-09-24 PROCEDURE — 99999 PR PBB SHADOW E&M-EST. PATIENT-LVL V: CPT | Mod: PBBFAC,,, | Performed by: PHYSICIAN ASSISTANT

## 2020-09-24 PROCEDURE — 99024 PR POST-OP FOLLOW-UP VISIT: ICD-10-PCS | Mod: S$GLB,,, | Performed by: PHYSICIAN ASSISTANT

## 2020-09-24 PROCEDURE — 99999 PR PBB SHADOW E&M-EST. PATIENT-LVL V: ICD-10-PCS | Mod: PBBFAC,,, | Performed by: PHYSICIAN ASSISTANT

## 2020-09-24 PROCEDURE — 99024 POSTOP FOLLOW-UP VISIT: CPT | Mod: S$GLB,,, | Performed by: PHYSICIAN ASSISTANT

## 2020-09-24 NOTE — PATIENT INSTRUCTIONS
Bariatric Soft Diet           - Start Soft Diet 2 weeks after gastric banding  -   Start Soft Diet 4 weeks after gastric bypass and sleeve    As your stomach heals, your doctor will progress your diet to soft foods.  This diet usually lasts for 2-3 months, but can last longer depending on each individual. Soft foods are those which can be easily mashed with a fork.    Remember these principles:   No liquids with meals. Do no drink 30 minutes before meals and wait 30 minutes to 1 hour after meals to start drinking.   Sip on water, sugar-free beverages or non-fat milk throughout the day.  You will need to continue drinking at least 1 protein drink daily to meet protein needs.   100% fruit juice (no sugar added) is allowed, but limit to 4oz a day because it is high in calories and does not contain any protein.   Chew foods slowly; one meal should take 20-30 minutes.   Eat 3-5 meals per day, without any additional snacking.   Stop eating as soon as you feel full.   Avoid using table sugar and foods made with refined sugar, which can trigger dumping syndrome.   Marinating meats with a low sugar marinade, adding low-fat salad dressing, or adding low calorie gravy (made from powder and water) can help meats to digest easier.     Adding Vegetables and Fruits:    As long as you are consuming >80g total protein daily from combination of foods and protein drinks, you may start adding small bites of fruits and vegetables to your meals. Cooked, tender vegetables and ripe fruits without the peel are tolerated best.    Avoid fruit canned in syrup, sugary fruit juices, and vegetables cooked with oil, butter or ramos.  Bariatric SOFT Diet    EAT THESE FOODS AVOID THESE FOODS   High in Protein: High in Fat/Sugar:   ? Canned tuna or chicken (packed in water)  ? Lean ground turkey breast or ground round  ? Turkey or chicken (no skin); cooked tender and cut in small pieces  ? Lean pork or beef (cook in crock pot until very  tender; cut in small pieces  ? Scrambled, poached, or boiled eggs  ? Baked, broiled, grilled or boiled fish and seafood (not fried!)  ? Silken tofu, Edamame (soybeans)  ? Beans, hummus and lentils  ? Lean deli meats (turkey and chicken breast, ham, roast beef)  ? 1% or Skim Milk, Lactaid, or Soymilk  ? Low-fat or fat-free cottage cheese, soft cheese, mozzarella string cheese, or ricotta  ? Light yogurt, Greek yogurt, SF pudding High fat milk (whole, 2%)  Butter, margarine, oil, mayonnaise  Sour cream, cream cheese, salad dressing  Ice Cream  Cakes, cookies, pies, desserts  Candy  Luncheon meats (bologna, salami, chopped ham)  Sausage, Elaine  Gravy  Fried Foods  ___________________________________  Tough/Crunchy--------------------------------  Tough or dry meats  Corn   Granola/cereal with nuts  Shredded Coconut    May add after 3 months:  Raw veggies  Lettuce  Plain, Unsalted Nuts and Seeds  Protein bars with 0-4 grams of sugar   As long as you are getting >80g PRO: Starchy Carbohydrates. At goal weight, some may include whole grains in small amounts.   Cooked tender vegetables without peel  Ripe fruits without peel  Frozen fruits with no added sugar  Fruit canned in its own juice or in water  Fat free, sugar free, frozen yogurt White and wheat Bread, Rice, Pasta   Cereals (including grits, oatmeal)   Crackers, Pretzels, Chips, Granola  Corn, Popcorn, Peas  White Potatoes, Sweet potatoes  Flour and corn tortillas     Fluids: Always Avoid:   Skim/1% milk, Lactaid, Soymilk  Water and Sugar-free beverages  (decaf and non-carbonated)  Decaf coffee & decaf tea  Sugary drinks  Carbonated drinks  Alcohol  Drinking through straws     Protein Content of Foods Recommended after                   Weight Loss Surgery    Food Name Portion Calories Protein (gms)   Almonds (unsalted) 1/4 cup 160 6   Pine milk, unsweetened 1 cup 30  1   Beef, Roast 1 oz 46 8   Beef, Steak, sirloin, trimmed 1 oz 55 9   Catfish, broiled or baked 1  oz 30 5   Cheese, American FF 1 oz 40 6   Cheese, Cottage 1% fat ¼ cup 41 7   Cheese, Parmesan, grated ¼ cup 128 12   Cheese, Mozzarella, part skim 1 oz 78 8   Cheese, part skim Ricotta ¼ cup 90 8   Chicken, white breast w/o skin 1 oz 46 9   Chicken, leg w/o skin 1 oz 54 7   Crab, steamed ¼ cup  40 9   Crawfish tails, boiled ¼ cup 35 8   Edamame, shelled ¼ cup 50 4   Egg 1 78 6   Ham, lean 5% 1 oz 44 7   Hamburger, lean 1 oz 56 7   Hummus ¼ cup 100 5   Lobster, steamed 1 oz 26 5   Milk, skim or 1%, soy  1 cup 90 8   Pork Tenderloin 1 oz 46 7   Pudding, SF 1 serv 60 2   Red beans ¼ cup 56 4   Refried beans, fat free ¼ cup 65 4   [place], baked 1 oz 52 7   Shrimp, steamed 1 oz 28 6   Soymilk, plain ½ cup 40 3   [place]apia, white fish, cooked 1 oz 36 8   Tofu ¼ cup 47 5   Trout 1 oz 48 7   Tuna, canned in water 1 oz 37 8   Turkey, white meat 1 oz 35 7   Veal Loin 1 oz 50 7   Yogurt, SF, frozen vanilla 3 oz 72 3.5   Yogurt, Fruit, FF, light 3 oz 40 2.5   Yogurt, Greek 3 oz 70 8     *Abbreviations: SF=sugar free, LF=low fat, FF= fat free, gms=grams  *3oz of cooked meat/protein = size of deck of cards or ladies palm   *1oz cheese = 1inch cube or 1 slice American cheese    Sample Menu for Bariatric Soft Diet  For Gastric Bypass and Sleeve            3 meals + 2 protein drinks  Remember: No drinking with meals.    Time of Day Day 1 Day 2   7am:    1 egg (or ¼ cup Egg Beaters) ¼ cup low-fat cottage cheese, 1 tbsp berries   8am: 1 cup water/SF beverage     9am: 1 cup water/SF beverage     10am:  Protein drink  Protein drink   11am: 1 cup water/SF beverage     12pm:    1-2 oz grilled shrimp, ¼ cup green beans   1-2oz canned chicken, shredded cheese, 1 tbsp salsa   1pm: 1 cup water/SF beverage     3pm:  Protein drink   Protein drink   4pm: 1 cup water/SF beverage     6pm:  ½ cup low fat chili, 1oz low-fat cheese, ¼ cup broccoli 2 oz grilled fish,  ¼  cup lima beans   7pm: 1 cup water/SF beverage       This sample menu provides  approx. 80g protein total, including about 40g protein from foods and at least 40g protein from protein drinks.  Drinking protein drinks daily helps decrease muscle loss, increase weight loss, and prevent hair loss.    ? Sip fluids continuously in between meals.    ? For fluids: 1 cup = 8 oz   ? For food: ¼ cup = 4 tablespoons = 1oz  ? No drinking from 30 minutes before meals to 30 minutes after meals.  ? 3oz meat is approx. the size of a deck of cards.    ? A food scale will help you determine portion size (Can be purchased at [facility] [employer])            VALERIE hamilton [employer]

## 2020-09-24 NOTE — PROGRESS NOTES
BARIATRIC POST-OPERATIVE VISIT:            HPI:  Agustina Tillman is a 35 y.o. year old female presents for 5 week post op visit following gastric sleeve.        Pt states that around week three started to vomiting. Says that the vomiting occurs with her vitamins along with the protein shakes, she is unable to tolerate either. Also had one episode of vomiting with yogurt but unsure if she ate it too fast, did feel slightly stuck and then came up. Has tried a very limited menu. Eggs go down fine. Mild nausea but depends on her intake, slight dizzness which also depends of her intake.     Also complaining of abdominal pain located in her umbilicus. States that it is an extreme soreness and can even cause her to not get out of bed, taking Tylenol but does not work for pain.     Denies: nausea, vomiting, abdominal pain, changes in bowel movement pattern, fever, chills, dysphagia, chest pain, and shortness of breath.    Review of Systems   Constitutional: Negative for fatigue and fever.   HENT: Negative for tinnitus and trouble swallowing.         Intermittent globus sensation    Eyes: Negative for visual disturbance.   Respiratory: Negative for cough, choking, chest tightness, shortness of breath and wheezing.    Cardiovascular: Negative for chest pain, palpitations and leg swelling.   Gastrointestinal: Positive for abdominal pain (umbical pain ), nausea and vomiting. Negative for constipation and diarrhea.   Genitourinary: Negative for decreased urine volume and dysuria.   Musculoskeletal: Negative for joint swelling and myalgias.   Skin: Negative for rash.   Neurological: Negative for dizziness, light-headedness and headaches.   Psychiatric/Behavioral: Negative for dysphoric mood and self-injury. The patient is not nervous/anxious.        EXERCISE & VITAMINS:  See Bariatric Assessment  Intermittent vitamin use due to taste causing nausea and vomiting    MEDICATIONS/ALLERGIES:  Have been reviewed.    DIET:  Liquid Bariatric Diet.   30 - 48  fl oz SF clear    Egg   Yogurt       See Dietician note from today for a more detailed assessment.      Physical Exam  Constitutional:       Appearance: She is obese.   HENT:      Head: Normocephalic and atraumatic.      Nose: Nose normal.   Eyes:      Extraocular Movements: Extraocular movements intact.      Conjunctiva/sclera: Conjunctivae normal.   Neck:      Musculoskeletal: Normal range of motion and neck supple.   Pulmonary:      Effort: Pulmonary effort is normal. No respiratory distress.   Abdominal:      Tenderness: There is no abdominal tenderness.      Comments: umbilical pain no signs of infection no swelling discharge foul odor noted    Musculoskeletal: Normal range of motion.         General: No swelling.   Neurological:      General: No focal deficit present.      Mental Status: She is alert and oriented to person, place, and time.   Psychiatric:         Mood and Affect: Mood normal.         Behavior: Behavior normal.         Thought Content: Thought content normal.         Judgment: Judgment normal.         ASSESSMENT:  - Morbid obesity s/p sleeve gastrectomy   - Co-morbidities: anemia and PCOS      PLAN:  - UGI  - CT   - dietician to reiterate eating habits and soft diet   - advance as tolerated   - Ursodiol 300 mg twice daily for 6 months  - Anti-Acid medication, PPI daily for 3 months  - No lifting more than 10 lbs for 6 weeks  - Miralax daily for constipation  - Emphasized the importance of regular exercise and adherence to bariatric diet to achieve maximum weight loss.  - Encouraged patient to start/continue  regular exercise.( light with protein prior)   - Follow-up with dietician to advance diet.  - Continue daily vitamins and medications.  - RTC in 2 weeks or sooner if needed.  - Call the office for any issues.  - Check labs tomorrow

## 2020-09-24 NOTE — PROGRESS NOTES
LINKS immunization registry updated  Care Everywhere updated  Health Maintenance updated  Chart reviewed for overdue [facility]active [facility]coun[facility] (BRIGITTE) health maintenance testing (CRS, Breast Ca, Diabetic Eye Exam)   Orders entered:N/A

## 2020-09-28 ENCOUNTER — TELEPHONE (OUTPATIENT)
Dept: BARIATRICS | Facility: CLINIC | Age: 35
End: 2020-09-28

## 2020-09-28 NOTE — TELEPHONE ENCOUNTER
----- Message from Naida Flanagan PA-C sent at 9/25/2020  5:54 PM CDT -----  Regarding: CT and UGI  Hi     These images need to be moved way up like as soon as possible.     BM

## 2020-09-28 NOTE — TELEPHONE ENCOUNTER
Phoned patient and moved up her UGI series to Wednesday at [facility] and the CT will remain on Friday, 10/2/2020 here at the [facility] center.

## 2020-09-29 ENCOUNTER — TELEPHONE (OUTPATIENT)
Dept: BARIATRICS | Facility: CLINIC | Age: 35
End: 2020-09-29

## 2020-09-29 ENCOUNTER — PATIENT MESSAGE (OUTPATIENT)
Dept: BARIATRICS | Facility: CLINIC | Age: 35
End: 2020-09-29

## 2020-09-30 ENCOUNTER — HOSPITAL ENCOUNTER (OUTPATIENT)
Dept: RADIOLOGY | Facility: HOSPITAL | Age: 35
Discharge: HOME | End: 2020-09-30
Attending: PHYSICIAN ASSISTANT
Payer: COMMERCIAL

## 2020-09-30 ENCOUNTER — CLINICAL SUPPORT (OUTPATIENT)
Dept: BARIATRICS | Facility: CLINIC | Age: 35
End: 2020-09-30
Payer: COMMERCIAL

## 2020-09-30 DIAGNOSIS — Z98.84 S/P LAPAROSCOPIC SLEEVE GASTRECTOMY: ICD-10-CM

## 2020-09-30 DIAGNOSIS — R09.A2 GLOBUS SENSATION: ICD-10-CM

## 2020-09-30 PROCEDURE — 99499 UNLISTED E&M SERVICE: CPT | Mod: 95,,, | Performed by: DIETITIAN, REGISTERED

## 2020-09-30 PROCEDURE — 74240 FL UPPER GI: ICD-10-PCS | Mod: 26,,, | Performed by: RADIOLOGY

## 2020-09-30 PROCEDURE — A9698 NON-RAD CONTRAST MATERIALNOC: HCPCS | Performed by: PHYSICIAN ASSISTANT

## 2020-09-30 PROCEDURE — 99499 NO LOS: ICD-10-PCS | Mod: 95,,, | Performed by: DIETITIAN, REGISTERED

## 2020-09-30 PROCEDURE — 74240 X-RAY XM UPR GI TRC 1CNTRST: CPT | Mod: TC,FY

## 2020-09-30 PROCEDURE — 25500020 PHARM REV CODE 255: Performed by: PHYSICIAN ASSISTANT

## 2020-09-30 PROCEDURE — 74240 X-RAY XM UPR GI TRC 1CNTRST: CPT | Mod: 26,,, | Performed by: RADIOLOGY

## 2020-09-30 RX ADMIN — BARIUM SULFATE 60 ML: 0.6 SUSPENSION ORAL at 10:09

## 2020-09-30 NOTE — PROGRESS NOTES
The patient location is:  Patient Home   The chief complaint leading to consultation is: nutrition follow up after bariatric surgery Sleeve 8/19/20 with [facility]  Visit type: Virtual visit with synchronous audio and video  Total time spent with patient: 15 minutes  Each patient to whom he or she provides medical services by telemedicine is:  (1) informed of the relationship between the physician and patient and the respective role of any other health care provider with respect to management of the patient; and (2) notified that he or she may decline to receive medical services by telemedicine and may withdraw from such care at any time.  Nutrition Handout located in AVS section of pt's [facility] and/or sent as a message via [facility] portal.  Pt informed of handout and how to access this information in [facility].    NUTRITION NOTE    Referring Physician: Cj Umaña M.D.  Reason for MNT Referral: Follow-up 6 weeks s/p Gastric Sleeve    PAST MEDICAL HISTORY:    Denies nausea, vomiting, constipation and diarrhea.  Reports problems, including nausea with protein shake.constipation has not had bm for over one week.  Is taking miralax once a day. Advised to increase to 2 capfuls per day and then call us if she does not have BM in next day or two.    Past Medical History:   Diagnosis Date    Allergy 2016    Sinus    Diabetes mellitus     Iron deficiency anemia secondary to inadequate dietary iron intake     PCOS (polycystic ovarian syndrome) 5/18/2017    Seizures 1995    I use to have epilepsy. But they have julieta away       CLINICAL DATA:  35 y.o. female.    There were no vitals filed for this visit.        LABS:  No recent labs    CURRENT DIET:  Bariatric Diet.  Diet Recall: 60 grams of protein/day; 40 oz of fluids/day  Boiled eggs  Triple Zero Yogurt 1 scoop of genepro   Dinner: 2 tsp chicken breast and broccoli  2 baked fish today    Meal Pattern: 2 meal(s) + 0snack(s) + 1 protein supplement(s)-uses  genepro in everything including water  Adequate protein supplement intake.  Tried fairlife, quest, premier  Adequate dairy intake.  Adequate vegetable intake.   Adequate fruit intake.  Starchy CHO: none reported      VITAMINS / MINERALS: ordered and tried to takeBariatric Advantage and take zofran before taking vitamins and eats small amount of food before trying to take vitamins and minerals  Multivitamins: not taking - smell makes her sick  B-Complex: Taking Liberty  Gets it in- smell   Calcium Citrate + Vitamin D: not taking  Vitamin B12: Sublingual.    ASSESSMENT:  Doing fairly well overall.  Adequate calorie intake.  Adequate protein intake.  Inadequate fluid intake.  Following diet appropriately.  Inadequate vitamins & minerals.    BARIATRIC DIET DISCUSSION:  Instructed and provided written materials on bariatric diet plan.  Reinforced post-op nutrition guidelines.    PLAN / RECOMMENDATIONS:  May begin to incorporate raw vegetables, lettuce, unsalted nuts, and light popcorn as tolerated.  May begin to swallow whole pills as tolerated.  Continue to increase protein and fluids as tolerated diet plan.  Adjust diet by increasing protein to 80 gms per day and fluids to 48-64 ounces per day.  Increase protein intake.  Increase fluid intake.    Continue appropriate vitamins & minerals.  Adjust vitamins & minerals by start taking a multivitamin with 18 mg of iron twice a day.  If MVI tolerated, start adding 500 mg of calcium citrate with vitamin D once a day and continue to increase to three times per day as tolerated    Return to clinic in 2 weeks.    SESSION TIME: 15 minutes

## 2020-10-01 ENCOUNTER — TELEPHONE (OUTPATIENT)
Dept: BARIATRICS | Facility: CLINIC | Age: 35
End: 2020-10-01

## 2020-10-01 NOTE — TELEPHONE ENCOUNTER
----- Message from Shayne Gunn sent at 10/1/2020 11:16 AM CDT -----  Regarding: call back from the nurse      The Pt would like a call back from the nurse about some [facility] paperwork and would like to know if an error in her return to work date can be fixed.  The Pt states that the paperwork she dropped off wasn't even used and it was just a typed letter that was sent.  The Pt was told by Naida that she would have to be evaluated on 10/14/20 before she can return to work.  Now, after the CT Scan she had yesterday is where it got all mixed up.  Please contact the Pt to discuss.    Phone # [phone]

## 2020-10-01 NOTE — TELEPHONE ENCOUNTER
Pt had a question about her [facility] paperwork. I directed the Pt to the disability desk. Pt verbalized understanding

## 2020-10-07 ENCOUNTER — HOSPITAL ENCOUNTER (OUTPATIENT)
Dept: RADIOLOGY | Facility: HOSPITAL | Age: 35
Discharge: HOME | End: 2020-10-07
Attending: PHYSICIAN ASSISTANT
Payer: COMMERCIAL

## 2020-10-07 DIAGNOSIS — Z98.84 S/P LAPAROSCOPIC SLEEVE GASTRECTOMY: ICD-10-CM

## 2020-10-07 DIAGNOSIS — R09.A2 GLOBUS SENSATION: ICD-10-CM

## 2020-10-07 PROCEDURE — 74177 CT ABDOMEN PELVIS WITH CONTRAST: ICD-10-PCS | Mod: 26,,, | Performed by: RADIOLOGY

## 2020-10-07 PROCEDURE — 74177 CT ABD & PELVIS W/CONTRAST: CPT | Mod: TC

## 2020-10-07 PROCEDURE — 74177 CT ABD & PELVIS W/CONTRAST: CPT | Mod: 26,,, | Performed by: RADIOLOGY

## 2020-10-07 PROCEDURE — 25500020 PHARM REV CODE 255: Performed by: PHYSICIAN ASSISTANT

## 2020-10-07 RX ADMIN — IOHEXOL 100 ML: 350 INJECTION, SOLUTION INTRAVENOUS at 03:10

## 2020-10-07 RX ADMIN — IOHEXOL 15 ML: 350 INJECTION, SOLUTION INTRAVENOUS at 02:10

## 2020-10-14 ENCOUNTER — CLINICAL SUPPORT (OUTPATIENT)
Dept: BARIATRICS | Facility: CLINIC | Age: 35
End: 2020-10-14
Payer: COMMERCIAL

## 2020-10-14 ENCOUNTER — PATIENT MESSAGE (OUTPATIENT)
Dept: BARIATRICS | Facility: CLINIC | Age: 35
End: 2020-10-14

## 2020-10-14 ENCOUNTER — LAB VISIT (OUTPATIENT)
Dept: LAB | Facility: HOSPITAL | Age: 35
End: 2020-10-14
Attending: INTERNAL MEDICINE
Payer: COMMERCIAL

## 2020-10-14 ENCOUNTER — OFFICE VISIT (OUTPATIENT)
Dept: BARIATRICS | Facility: CLINIC | Age: 35
End: 2020-10-14
Payer: COMMERCIAL

## 2020-10-14 VITALS
WEIGHT: 267.19 LBS | SYSTOLIC BLOOD PRESSURE: 124 MMHG | DIASTOLIC BLOOD PRESSURE: 82 MMHG | BODY MASS INDEX: 44.46 KG/M2 | HEART RATE: 61 BPM | OXYGEN SATURATION: 99 %

## 2020-10-14 DIAGNOSIS — Z98.890 POST-OPERATIVE STATE: Primary | ICD-10-CM

## 2020-10-14 DIAGNOSIS — R73.03 PREDIABETES: ICD-10-CM

## 2020-10-14 DIAGNOSIS — E51.9 VITAMIN B1 DEFICIENCY: ICD-10-CM

## 2020-10-14 DIAGNOSIS — D50.8 IRON DEFICIENCY ANEMIA SECONDARY TO INADEQUATE DIETARY IRON INTAKE: ICD-10-CM

## 2020-10-14 DIAGNOSIS — Z98.84 S/P LAPAROSCOPIC SLEEVE GASTRECTOMY: ICD-10-CM

## 2020-10-14 DIAGNOSIS — E66.01 MORBID OBESITY: ICD-10-CM

## 2020-10-14 DIAGNOSIS — R63.4 WEIGHT LOSS: ICD-10-CM

## 2020-10-14 LAB
25(OH)D3+25(OH)D2 SERPL-MCNC: 30 NG/ML (ref 30–96)
ALBUMIN SERPL BCP-MCNC: 3.5 G/DL (ref 3.5–5.2)
ALP SERPL-CCNC: 82 U/L (ref 55–135)
ALT SERPL W/O P-5'-P-CCNC: 14 U/L (ref 10–44)
ANION GAP SERPL CALC-SCNC: 9 MMOL/L (ref 8–16)
AST SERPL-CCNC: 20 U/L (ref 10–40)
BASOPHILS # BLD AUTO: 0.06 K/UL (ref 0–0.2)
BASOPHILS NFR BLD: 0.9 % (ref 0–1.9)
BILIRUB SERPL-MCNC: 0.5 MG/DL (ref 0.1–1)
BUN SERPL-MCNC: 9 MG/DL (ref 6–20)
CALCIUM SERPL-MCNC: 9.4 MG/DL (ref 8.7–10.5)
CHLORIDE SERPL-SCNC: 103 MMOL/L (ref 95–110)
CHOLEST SERPL-MCNC: 144 MG/DL (ref 120–199)
CHOLEST/HDLC SERPL: 4.1 {RATIO} (ref 2–5)
CO2 SERPL-SCNC: 30 MMOL/L (ref 23–29)
CREAT SERPL-MCNC: 0.9 MG/DL (ref 0.5–1.4)
DIFFERENTIAL METHOD: ABNORMAL
EOSINOPHIL # BLD AUTO: 0.1 K/UL (ref 0–0.5)
EOSINOPHIL NFR BLD: 1.4 % (ref 0–8)
ERYTHROCYTE [DISTWIDTH] IN BLOOD BY AUTOMATED COUNT: 19.9 % (ref 11.5–14.5)
EST. GFR  (AFRICAN AMERICAN): >60 ML/MIN/1.73 M^2
EST. GFR  (NON AFRICAN AMERICAN): >60 ML/MIN/1.73 M^2
GLUCOSE SERPL-MCNC: 86 MG/DL (ref 70–110)
HCT VFR BLD AUTO: 40.7 % (ref 37–48.5)
HDLC SERPL-MCNC: 35 MG/DL (ref 40–75)
HDLC SERPL: 24.3 % (ref 20–50)
HGB BLD-MCNC: 12.6 G/DL (ref 12–16)
IMM GRANULOCYTES # BLD AUTO: 0.01 K/UL (ref 0–0.04)
IMM GRANULOCYTES NFR BLD AUTO: 0.2 % (ref 0–0.5)
IRON SERPL-MCNC: 45 UG/DL (ref 30–160)
LDLC SERPL CALC-MCNC: 91.6 MG/DL (ref 63–159)
LYMPHOCYTES # BLD AUTO: 3.1 K/UL (ref 1–4.8)
LYMPHOCYTES NFR BLD: 47.1 % (ref 18–48)
MCH RBC QN AUTO: 24.8 PG (ref 27–31)
MCHC RBC AUTO-ENTMCNC: 31 G/DL (ref 32–36)
MCV RBC AUTO: 80 FL (ref 82–98)
MONOCYTES # BLD AUTO: 0.4 K/UL (ref 0.3–1)
MONOCYTES NFR BLD: 6.3 % (ref 4–15)
NEUTROPHILS # BLD AUTO: 2.9 K/UL (ref 1.8–7.7)
NEUTROPHILS NFR BLD: 44.1 % (ref 38–73)
NONHDLC SERPL-MCNC: 109 MG/DL
NRBC BLD-RTO: 0 /100 WBC
PLATELET # BLD AUTO: 305 K/UL (ref 150–350)
PMV BLD AUTO: 10.7 FL (ref 9.2–12.9)
POTASSIUM SERPL-SCNC: 3.9 MMOL/L (ref 3.5–5.1)
PROT SERPL-MCNC: 7 G/DL (ref 6–8.4)
RBC # BLD AUTO: 5.08 M/UL (ref 4–5.4)
SATURATED IRON: 15 % (ref 20–50)
SODIUM SERPL-SCNC: 142 MMOL/L (ref 136–145)
TOTAL IRON BINDING CAPACITY: 296 UG/DL (ref 250–450)
TRANSFERRIN SERPL-MCNC: 200 MG/DL (ref 200–375)
TRIGL SERPL-MCNC: 87 MG/DL (ref 30–150)
VIT B12 SERPL-MCNC: 875 PG/ML (ref 210–950)
WBC # BLD AUTO: 6.49 K/UL (ref 3.9–12.7)

## 2020-10-14 PROCEDURE — 99999 PR PBB SHADOW E&M-EST. PATIENT-LVL V: ICD-10-PCS | Mod: PBBFAC,,, | Performed by: PHYSICIAN ASSISTANT

## 2020-10-14 PROCEDURE — 99999 PR PBB SHADOW E&M-EST. PATIENT-LVL I: CPT | Mod: PBBFAC,,, | Performed by: DIETITIAN, REGISTERED

## 2020-10-14 PROCEDURE — 82607 VITAMIN B-12: CPT

## 2020-10-14 PROCEDURE — 83540 ASSAY OF IRON: CPT

## 2020-10-14 PROCEDURE — 99999 PR PBB SHADOW E&M-EST. PATIENT-LVL I: ICD-10-PCS | Mod: PBBFAC,,, | Performed by: DIETITIAN, REGISTERED

## 2020-10-14 PROCEDURE — 99499 NO LOS: ICD-10-PCS | Mod: S$GLB,,, | Performed by: DIETITIAN, REGISTERED

## 2020-10-14 PROCEDURE — 99999 PR PBB SHADOW E&M-EST. PATIENT-LVL V: CPT | Mod: PBBFAC,,, | Performed by: PHYSICIAN ASSISTANT

## 2020-10-14 PROCEDURE — 99499 UNLISTED E&M SERVICE: CPT | Mod: S$GLB,,, | Performed by: DIETITIAN, REGISTERED

## 2020-10-14 PROCEDURE — 80061 LIPID PANEL: CPT

## 2020-10-14 PROCEDURE — 99024 POSTOP FOLLOW-UP VISIT: CPT | Mod: S$GLB,,, | Performed by: PHYSICIAN ASSISTANT

## 2020-10-14 PROCEDURE — 85025 COMPLETE CBC W/AUTO DIFF WBC: CPT

## 2020-10-14 PROCEDURE — 80053 COMPREHEN METABOLIC PANEL: CPT

## 2020-10-14 PROCEDURE — 82306 VITAMIN D 25 HYDROXY: CPT

## 2020-10-14 PROCEDURE — 99024 PR POST-OP FOLLOW-UP VISIT: ICD-10-PCS | Mod: S$GLB,,, | Performed by: PHYSICIAN ASSISTANT

## 2020-10-14 PROCEDURE — 84425 ASSAY OF VITAMIN B-1: CPT

## 2020-10-14 NOTE — PROGRESS NOTES
"BARIATRIC POST-OPERATIVE VISIT:      HPI:  Agustina Tillman is a 35 y.o. year old female presents for 8 week post op visit following gastric sleeve.      Pt states that she is doing well. States that vomiting is improving as she learns what she can eat. Not taking Prilosec because she has no refill. Vomited twice once after chicken breast and once after gulping water.    States that she sometimes feels "off" gets dizzy but mostly when she is standing and being active states that she has not gotten as much water as she usually does    Also states that she tripped but did not hit her abdomen or head      Changed vitamins to the patches including a separate iron     States improved from previous visit she is improving but knows it a process     Denies: nausea, abdominal pain, changes in bowel movement pattern, fever, chills, dysphagia, chest pain, and shortness of breath.    Review of Systems   Constitutional: Positive for fatigue (intermittent ). Negative for fever.   HENT: Negative for tinnitus and trouble swallowing.         Intermittent globus sensation    Eyes: Negative for visual disturbance.   Respiratory: Negative for cough, choking, chest tightness, shortness of breath and wheezing.    Cardiovascular: Negative for chest pain, palpitations and leg swelling.   Gastrointestinal: Positive for nausea (food dependent ) and vomiting (intermittent depending on what she eats ). Negative for abdominal pain, constipation and diarrhea.   Genitourinary: Negative for decreased urine volume and dysuria.   Musculoskeletal: Negative for joint swelling and myalgias.   Skin: Negative for rash.   Neurological: Positive for dizziness (with being active ). Negative for light-headedness and headaches.   Psychiatric/Behavioral: Negative for dysphoric mood and self-injury. The patient is not nervous/anxious.        EXERCISE & VITAMINS:  See Bariatric Assessment  Vitamin patches     MEDICATIONS/ALLERGIES:  Have been " reviewed.    DIET:  ~75g protein 32 oz water       Genpro ( 2 scoops)     Egg   Yogurt   Fish         See Dietician note from today for a more detailed assessment.      Physical Exam  Constitutional:       Appearance: She is obese.   HENT:      Head: Normocephalic and atraumatic.      Nose: Nose normal.   Eyes:      Extraocular Movements: Extraocular movements intact.      Conjunctiva/sclera: Conjunctivae normal.   Neck:      Musculoskeletal: Normal range of motion and neck supple.   Pulmonary:      Effort: Pulmonary effort is normal. No respiratory distress.   Abdominal:      Tenderness: There is no abdominal tenderness.      Comments: umbilical pain no signs of infection no swelling discharge foul odor noted    Musculoskeletal: Normal range of motion.         General: No swelling.   Neurological:      General: No focal deficit present.      Mental Status: She is alert and oriented to person, place, and time.   Psychiatric:         Mood and Affect: Mood normal.         Behavior: Behavior normal.         Thought Content: Thought content normal.         Judgment: Judgment normal.         ASSESSMENT:  - Morbid obesity s/p sleeve gastrectomy   - Co-morbidities: anemia and PCOS      PLAN:  - advance as tolerated   - Ursodiol 300 mg twice daily for 6 months  - Anti-Acid medication, PPI daily for 3 months  - No lifting more than 10 lbs for 6 weeks  - Miralax daily for constipation  - Emphasized the importance of regular exercise and adherence to bariatric diet to achieve maximum weight loss.  - Encouraged patient to start/continue  regular exercise.( light with protein prior)   - Follow-up with dietician to advance diet.  - Continue daily vitamins and medications.  - RTC in 2 weeks or sooner if needed.  - Call the office for any issues.  - Check labs tomorrow

## 2020-10-14 NOTE — PROGRESS NOTES
NUTRITION NOTE    Referring Physician: Cj Umaña M.D.  Reason for MNT Referral: Follow-up 8 weeks s/p Gastric Sleeve    PAST MEDICAL HISTORY:    Denies nausea, vomiting, constipation and diarrhea.  Reports problems, including chicken not staying down.    Past Medical History:   Diagnosis Date    Allergy 2016    Sinus    Diabetes mellitus     Iron deficiency anemia secondary to inadequate dietary iron intake     PCOS (polycystic ovarian syndrome) 5/18/2017    Seizures 1995    I use to have epilepsy. But they have julieta away       CLINICAL DATA:  35 y.o. female.    There were no vitals filed for this visit.    Current Weight: 267 lbs  BMI: 44.46  Total Weight Loss: 36 lbs  Excess Weight Loss: 22%    LABS:  Reviewed.   Sat iron low    CURRENT DIET:  Bariatric Diet.  Diet Recall: 80 grams of protein/day; 48+ oz of fluids/day  2ounces each timeSeafood, fish, beans and yogurt  Chicken did not go well    Meal Pattern: 2-3 meal(s) +2 snack(s) greek yogurt 15 gms + 1 protein supplement(s) 2 scoops of genepro powder and 1 premier protein  Adequate protein supplement intake.  Adequate dairy intake.  Adequate vegetable intake.   Adequate fruit intake.  Starchy CHO: none reported  Other: water, diet green tea or diet snapple  EXERCISE:  See Ba    VITAMINS / MINERALS:  See BA  Biotin    ASSESSMENT:  Doing well overall.  Weight loss.  Adequate calorie intake.  Adequate protein intake.  Adequate fluid intake.  Following diet appropriately.  Exercising.  Adequate vitamins & minerals.    BARIATRIC DIET DISCUSSION:  Instructed and provided written materials on bariatric diet plan.  Reinforced post-op nutrition guidelines.    PLAN / RECOMMENDATIONS:  May begin to incorporate raw vegetables, lettuce, unsalted nuts, and light popcorn as tolerated.  May begin to swallow whole pills as tolerated.  Continue excellent diet plan.    Maintain protein intake.  Maintain fluid intake.  Continue exercise.  Continue appropriate  vitamins & minerals.      Return to clinic in 4 months.    SESSION TIME: 15 minutes     Rhombic Flap Text: The defect edges were debeveled with a #15 scalpel blade.  Given the location of the defect and the proximity to free margins a rhombic flap was deemed most appropriate.  Using a sterile surgical marker, an appropriate rhombic flap was drawn incorporating the defect.    The area thus outlined was incised deep to adipose tissue with a #15 scalpel blade.  The skin margins were undermined to an appropriate distance in all directions utilizing iris scissors.

## 2020-10-14 NOTE — PATIENT INSTRUCTIONS
5-Day Menu Plan: 800-1000 Calories    DAY 1     Breakfast  4 slices deli turkey  Small apple    Snack  ¼ cup almonds    Lunch  Lean hamburger arash  1 cup green beans    Dinner  2 skinless chicken thighs  1 cup cooked carrots    Snack  SF jello    DAY 2    Breakfast  2 eggs with 2 tbsp salsa    Snack  2 slices deli turkey  ½ cup Peaches canned (in its own juice)    Lunch  [place]: Deli chicken and mustard   Pear cup (no sugar added)    Dinner  Baked fish  1 cup cooked yellow squash    Snack  SF popsicle            DAY 3    Breakfast   Protein drink: 1 scoop whey powder + 6oz soy milk    Snack  ¼ cup almonds    Lunch  Tuna Salad: 3oz canned tuna in water, 1 egg, and 1 tsp light shields)  Pineapple cup (no sugar added)    Dinner  Baked chicken breast  1 cup grilled eggplant    Snack  8oz Chocolate Soy Milk      DAY 4    Breakfast  2 eggs, turkey sausage arash    Snack  4 slices deli turkey    Lunch    Snack  1/4 cup almonds  Peach cup (no sugar added)    Dinner  3oz baked pork chop  1 cup cooked green beans      DAY 5    Breakfast  Protein drink: 1 scoop whey powder + 6oz soy milk    Snack   ¼ cup almonds  1 apple    Lunch  1 cup Chicken salad: (3oz canned chicken in water, 1 egg, 1 tsp light shields)    Snack  4 slices deli turkey  Fruit cup (no sugar added)    Dinner  Omelet: 2 eggs, grilled shrimp, bell pepper and onion    Snacks: (100-200 calories; >5g protein)    - 1 low-fat cheese stick with 8 cherry tomatoes or 1 serving fresh fruit  - 4 thin slices fat-free turkey breast and 1 slice low-fat cheese  - 4 thin slices fat-free honey ham with wedge of melon  - 2 slices of turkey ramos  - Boiled eggs (can buy at [employer] already boiled w/ shell removed)  - for convenience,  [place] read, snack, go (deli meat and cheese rolls)  - P3 packets (Protein packs w/ cheese, nuts, lean deli meat)  - [facility] Fit and Lean Protein Pudding (find at [employer] - per 1 cup serving = 100 calories, 15 g protein, 0 g sugar)  - 6-8  "edamame pods (equivalent to about 1/4 cup edamame without pods).   - 1/4 cup unsalted nuts with ½ cup fruit  - 6-oz container Dannon Light n Fit vanilla yogurt, topped with 1oz unsalted nuts         - apple, celery or baby carrots spread with 2 Tbsp PB2  - apple slices with 1 oz slice low-fat cheese  - Apple slices dipped in 2 Tbsp of PB2  - 2 Tbsp PB2 mixed in light or greek yogurt or sugar-free pudding  - celery, cucumber, bell pepper or baby carrots dipped in ¼ cup hummus bean spread   - celery, cucumber, baby carrots dipped in high protein greek yogurt (Mix 16 oz plain greek yogurt + 1 packet of hidden valley ranch dip mix)  - Vitor Links Beef Steak - 14g protein! (similar to beef jerky but very lean)  - 2 wedges Laughing Cow - Light Herb & Garlic Cheese with sliced cucumber or green bell pepper  - 1/2 cup low-fat cottage cheese with ¼ cup fruit or ¼ cup salsa  - 1/2 cup low fat cottage cheese with 10-15 cherry tomatoes  - 8 oz glass of FAIRLIFE fat free milk (13 g protein)  - 8 oz glass of FAIRLIFE fat free milk + 1 packet of sugar-free hot cocoa  - Add Atkins advantage Cafe Caramel shake to decaf coffee. Serve hot or blend with ice for "frappaccino" like drink  - RTD Protein drinks: Atkins, Low Carb Slim Fast, EAS light, Muscle Milk Light, etc.  - Homemade Protein drinks: GNC Soy95, Isopure, Nectar, UNJURY, Whey Gourmet, etc. Mix 1 scoop powder with 8oz skim/1% milk or light soymilk.  - Protein bars: Atkins, EAS, Pure Protein,  Quest, Think Thin, Detour, etc. Must have 0-4 grams sugar - Read the label.    ** Be CREATIVE. You can always snack on bites of grilled chicken or tuna salad made with low fat shields, if needed!     "

## 2020-10-14 NOTE — LETTER
October 14, 2020      [facility] 2nd [facility]  [address]  [facility] [address]  Phone: [phone]  Fax: [phone]       Patient: Agustina Tillman   YOB: 1985  Date of Visit: 10/14/2020    To Whom It May Concern:    Evelyn Tillman  was at [facility] on 10/14/2020. She may return to workl on 10/30/2020  with no restrictions. She is experiencing some dizziness due top low protein intake which can have some adverse medical effects if not properly treated will have follow virtually in two weeks.  If you have any questions or concerns, or if I can be of further assistance, please do not hesitate to contact me.    Sincerely,    Naida Flanagan PA-C

## 2020-10-19 LAB — VIT B1 BLD-MCNC: 55 UG/L (ref 38–122)

## 2020-10-24 ENCOUNTER — IMMUNIZATION (OUTPATIENT)
Dept: OBSTETRICS AND GYNECOLOGY | Facility: CLINIC | Age: 35
End: 2020-10-24
Payer: COMMERCIAL

## 2020-10-24 PROCEDURE — 90471 IMMUNIZATION ADMIN: CPT | Mod: S$GLB,,, | Performed by: FAMILY MEDICINE

## 2020-10-24 PROCEDURE — 90686 FLU VACCINE (QUAD) GREATER THAN OR EQUAL TO 3YO PRESERVATIVE FREE IM: ICD-10-PCS | Mod: S$GLB,,, | Performed by: FAMILY MEDICINE

## 2020-10-24 PROCEDURE — 90471 FLU VACCINE (QUAD) GREATER THAN OR EQUAL TO 3YO PRESERVATIVE FREE IM: ICD-10-PCS | Mod: S$GLB,,, | Performed by: FAMILY MEDICINE

## 2020-10-24 PROCEDURE — 90686 IIV4 VACC NO PRSV 0.5 ML IM: CPT | Mod: S$GLB,,, | Performed by: FAMILY MEDICINE

## 2020-11-09 ENCOUNTER — OFFICE VISIT (OUTPATIENT)
Dept: FAMILY MEDICINE | Facility: CLINIC | Age: 35
End: 2020-11-09
Payer: COMMERCIAL

## 2020-11-09 ENCOUNTER — OFFICE VISIT (OUTPATIENT)
Dept: INTERNAL MEDICINE | Facility: CLINIC | Age: 35
End: 2020-11-09
Payer: COMMERCIAL

## 2020-11-09 VITALS — SYSTOLIC BLOOD PRESSURE: 124 MMHG | DIASTOLIC BLOOD PRESSURE: 82 MMHG

## 2020-11-09 DIAGNOSIS — J30.9 ALLERGIC SINUSITIS: Primary | ICD-10-CM

## 2020-11-09 DIAGNOSIS — J30.9 CHRONIC ALLERGIC RHINITIS: ICD-10-CM

## 2020-11-09 DIAGNOSIS — J01.10 ACUTE NON-RECURRENT FRONTAL SINUSITIS: Primary | ICD-10-CM

## 2020-11-09 PROCEDURE — 99213 PR OFFICE/OUTPT VISIT, EST, LEVL III, 20-29 MIN: ICD-10-PCS | Mod: 95,,, | Performed by: INTERNAL MEDICINE

## 2020-11-09 PROCEDURE — 99214 OFFICE O/P EST MOD 30 MIN: CPT | Mod: 95,,, | Performed by: FAMILY MEDICINE

## 2020-11-09 PROCEDURE — 99214 PR OFFICE/OUTPT VISIT, EST, LEVL IV, 30-39 MIN: ICD-10-PCS | Mod: 95,,, | Performed by: FAMILY MEDICINE

## 2020-11-09 PROCEDURE — 99213 OFFICE O/P EST LOW 20 MIN: CPT | Mod: 95,,, | Performed by: INTERNAL MEDICINE

## 2020-11-09 NOTE — PROGRESS NOTES
Subjective:       Patient ID: Agustina Tillman is a 35 y.o. female.    Chief Complaint: No chief complaint on file.    Onset one week  A/w HA and sinus congestion  Denies loss of smell  Has tried zyrtec with no relief  Has also tried tylenol sinus and pressure with no relief  Had gastric sleeve in August and unable to take certain meds    Assessment:     1. Acute non-recurrent frontal sinusitis      Plan:     Problem List Items Addressed This Visit     None      Visit Diagnoses     Acute non-recurrent frontal sinusitis    -  Primary          Review of Systems   Constitutional: Negative for activity change, fever and unexpected weight change.   HENT: Positive for facial swelling, sinus pressure and sore throat (improved). Negative for hearing loss, rhinorrhea and trouble swallowing.    Eyes: Negative for discharge and visual disturbance.   Respiratory: Negative for chest tightness and wheezing.    Cardiovascular: Negative for chest pain and palpitations.   Gastrointestinal: Negative for blood in stool, constipation, diarrhea and vomiting.   Endocrine: Negative for polydipsia and polyuria.   Genitourinary: Negative for difficulty urinating, dysuria, hematuria and menstrual problem.   Musculoskeletal: Negative for arthralgias, joint swelling and neck pain.   Neurological: Negative for weakness and headaches.   Psychiatric/Behavioral: Negative for confusion and dysphoric mood.        Objective:   /82     BP Readings from Last 3 Encounters:   11/09/20 124/82   10/14/20 124/82   08/20/20 122/68       Lab Results   Component Value Date    HGBA1C 5.1 07/29/2020       Physical Exam  Constitutional:       General: She is not in acute distress.     Appearance: She is not ill-appearing or toxic-appearing.   HENT:      Head: Normocephalic and atraumatic.      Right Ear: Hearing normal.      Left Ear: Hearing normal.      Nose: Nose normal.      Mouth/Throat:      Lips: Pink.   Eyes:      General: Lids are normal.       Extraocular Movements: Extraocular movements intact.   Neck:      Musculoskeletal: Normal range of motion.   Pulmonary:      Effort: Pulmonary effort is normal. No respiratory distress.      Comments: Able to speak in full sentences without respiratory distress    Neurological:      Mental Status: She is alert.   Psychiatric:         Speech: Speech normal.         Behavior: Behavior normal.         Thought Content: Thought content normal.         Judgment: Judgment normal.         Follow up if symptoms worsen or fail to improve.    The patient location is: home/work  The chief complaint leading to consultation is: sinusitis, second opinion    Visit type: audiovisual      10:54-11:06 minutes of total time spent on the encounter, which includes face to face time and non-face to face time preparing to see the patient (eg, review of tests), Obtaining and/or reviewing separately obtained history, Documenting clinical information in the electronic or other health record, Independently interpreting results (not separately reported) and communicating results to the patient/family/caregiver, or Care coordination (not separately reported).         Each patient to whom he or she provides medical services by telemedicine is:  (1) informed of the relationship between the physician and patient and the respective role of any other health care provider with respect to management of the patient; and (2) notified that he or she may decline to receive medical services by telemedicine and may withdraw from such care at any time.    Notes:   Advised correct techinque for nasal spray/flonase  Call back if no improvement/worsening sx for abx  Advised to use afrin once every 3 days to avoid rebound congestion    New patient and new problem(s) to me

## 2020-11-09 NOTE — PROGRESS NOTES
[facility] Note  The patient location is: [place]  The chief complaint leading to consultation is: sinus pain    Visit type: audiovisual    Face to Face time with patient: 10  10 minutes of total time spent on the encounter, which includes face to face time and non-face to face time preparing to see the patient (eg, review of tests), Obtaining and/or reviewing separately obtained history, Documenting clinical information in the electronic or other health record, Independently interpreting results (not separately reported) and communicating results to the patient/family/caregiver, or Care coordination (not separately reported).         Each patient to whom he or she provides medical services by telemedicine is:  (1) informed of the relationship between the physician and patient and the respective role of any other health care provider with respect to management of the patient; and (2) notified that he or she may decline to receive medical services by telemedicine and may withdraw from such care at any time.    Notes:     Chief Complaint      Chief Complaint   Patient presents with    Sinusitis     History of Present Illness      Agustina Tillman is a 35 y.o. female who presents today for chief complaint sinus pain x5 days. Patient is new to me.    PCP: John Gaston MD  Patient comes to appointment telemed.     HPI   Nasal congestion and headache x5day, tylenol   Using singualr for seasonal allergies   Sinus pressure in forehead, no fever   No cough or sore throat at present  Is using daily claritin   Not on any meds at present s/p       Answers for HPI/ROS submitted by the patient on 11/9/2020   activity change: No  unexpected weight change: No  neck pain: No  hearing loss: No  rhinorrhea: No  trouble swallowing: No  eye discharge: No  visual disturbance: No  chest tightness: No  wheezing: No  chest pain: No  palpitations: No  blood in stool: No  constipation: No  vomiting:  No  diarrhea: No  polydipsia: No  polyuria: No  difficulty urinating: No  hematuria: No  dysuria: No  joint swelling: No  arthralgias: No  headaches: Yes  weakness: No  confusion: No  dysphoric mood: No    Problem list not addressed today   Active Problem List with Overview Notes    Diagnosis Date Noted    Allergic sinusitis 11/09/2020    Juvenile idiopathic scoliosis of thoracolumbar region 06/24/2020 June 2020 [facility] past medical history of thoracic and low back pain, morbid obesity, prediabetes, vitamin-D deficiency, and PCOS presenting today for evaluation of lumbar back pain.  She reports chronic back pain that he be sometimes debilitating.  She feels that she has to sit due to the severe back pain.  She has not tried physical therapy or injections.  She has severe spasms and cramping in her back that prevents her from sleeping.  She denies bowel or bladder dysfunction.    Physical examination is limited due to virtual visit the patient is able to move all extremities well  No formal spine imaging to review however on CT of the abdomen and pelvis there is rotatory scoliosis of the thoracolumbar spine.  I had an extensive conversation in the patient with discussion of her scoliosis.  This can cause spasms and pain in the lumbar spine.  I have recommended weight loss and physical activity to help with this.  I have recommended evaluation with physical therapy to help with back pain.  I have also ordered scoliosis films for measurement of Brar angles and monitoring.  We discussed treatment given the minor scoliotic deformity would be conservative.  No surgical intervention indicated.  She will follow-up with me as needed.      Low back pain 06/19/2020    Difficulty walking 06/19/2020    Thoracic back pain 06/19/2020    Generalized weakness 06/19/2020    Chronic allergic rhinitis due to pollen 05/08/2020 April 2020 Cetirizine, Singulair      Vitamin D deficiency 06/13/2017    Prediabetes  06/13/2017    Iron deficiency anemia secondary to inadequate dietary iron intake 06/13/2017    Morbid obesity with BMI of 45.0-49.9, adult 06/01/2017     Pending Bariatric Surgery evaluation      PCOS (polycystic ovarian syndrome) 05/18/2017    Female fertility problems 05/18/2017       Health Maintenance   Topic Date Due    TETANUS VACCINE  06/20/2005    Pap Smear with HPV Cotest  01/30/2025    Hepatitis C Screening  Completed    Lipid Panel  Completed       Past Medical History:   Diagnosis Date    Allergy 2016    Sinus    Diabetes mellitus     Iron deficiency anemia secondary to inadequate dietary iron intake     PCOS (polycystic ovarian syndrome) 5/18/2017    Seizures 1995    I use to have epilepsy. But they have julieta away       Past Surgical History:   Procedure Laterality Date    ESOPHAGOGASTRODUODENOSCOPY N/A 8/19/2020    Procedure: EGD (ESOPHAGOGASTRODUODENOSCOPY);  Surgeon: Cj Umaña MD;  Location: [facility] OR [address] [place] [address];  Service: General;  Laterality: N/A;    LAPAROSCOPIC SLEEVE GASTRECTOMY N/A 8/19/2020    Procedure: GASTRECTOMY, SLEEVE, LAPAROSCOPIC, with intraop EGD;  Surgeon: Cj Umaña MD;  Location: [facility] OR [address] [place] [address];  Service: General;  Laterality: N/A;       family history includes Cancer in her father; Hypertension in her mother; Lung cancer in her father.    Social History     Tobacco Use    Smoking status: Never Smoker    Smokeless tobacco: Never Used   Substance Use Topics    Alcohol use: Yes     Alcohol/week: 1.0 standard drinks     Types: 1 Glasses of wine per week     Frequency: Monthly or less     Drinks per session: 1 or 2     Binge frequency: Never    Drug use: Never       Review of Systems   Constitutional: Negative for chills, fever, malaise/fatigue and weight loss.   HENT: Positive for congestion and sinus pain. Negative for hearing loss and sore throat.    Eyes: Positive for pain. Negative for discharge.   Respiratory: Negative for cough, sputum  production, shortness of breath and wheezing.    Cardiovascular: Negative for chest pain and palpitations.   Gastrointestinal: Negative for blood in stool, constipation, diarrhea and vomiting.   Genitourinary: Negative for dysuria and hematuria.   Musculoskeletal: Negative for neck pain.   Neurological: Positive for headaches. Negative for weakness.   Endo/Heme/Allergies: Negative for polydipsia.        Outpatient Encounter Medications as of 11/9/2020   Medication Sig Note Dispense Refill    cetirizine (ZYRTEC) 10 MG tablet Take 1 tablet (10 mg total) by mouth once daily. 8/18/2020: Currently not taking  0    diclofenac (VOLTAREN) 50 MG EC tablet Take 1 tablet (50 mg total) by mouth 3 (three) times daily as needed (pain). 8/18/2020: Currently not taking 24 tablet 0    ergocalciferol (ERGOCALCIFEROL) 50,000 unit Cap Take 1 capsule (50,000 Units total) by mouth twice a week. 8/18/2020: Currently not taking 24 capsule 0    erythromycin (ROMYCIN) ophthalmic ointment Apply 1/2 inch ribbon to the inner lower eyelid and then gently massage onto the eye with the eye closed 3 times a day for 1 week.  1 Tube 0    hydrocodone-acetaminophen (HYCET) solution 7.5-325 mg/15mL Take 15 mLs by mouth 4 (four) times daily as needed for Pain. 8/18/2020: Post op medication 200 mL 0    hydrOXYzine HCL (ATARAX) 25 MG tablet TAKE 1 TABLET BY MOUTH EVERY EVENING 8/18/2020: Currently not taking 30 tablet 1    ketoconazole (NIZORAL) 2 % shampoo Apply topically twice a week.  120 mL 1    levocetirizine (XYZAL) 5 MG tablet TAKE 1 TABLET BY MOUTH EVERY DAY 8/18/2020: Currently not taking 30 tablet 1    medroxyPROGESTERone (PROVERA) 10 MG tablet Take 1 tablet (10 mg total) by mouth once daily. for 10 days 8/18/2020: Currently not taking 10 tablet 2    omeprazole (PRILOSEC) 40 MG capsule Take 1 capsule (40 mg total) by mouth every morning. Open capsule and take with apple sauce 8/18/2020: Post op medication 30 capsule 2    ondansetron  (ZOFRAN-ODT) 8 MG TbDL Take 1 tablet (8 mg total) by mouth every 6 (six) hours as needed (nausea).  12 tablet 0    ondansetron (ZOFRAN-ODT) 8 MG TbDL Dissolve 1 tablet (8 mg total) by mouth every 6 (six) hours as needed. 8/18/2020: Post op medication 30 tablet 0    phentermine (LOMAIRA) 8 mg Tab Take 1 each by mouth 2 (two) times daily. 8/18/2020: Currently not taking 60 each 0    polyethylene glycol (GLYCOLAX) 17 gram/dose powder Dissolve one capful (17 g) in liquid and take by mouth once daily. 8/18/2020: Post op medication 510 g 3    promethazine (PHENERGAN) 25 MG tablet Take 1 tablet (25 mg total) by mouth every 6 (six) hours as needed for Nausea. 8/18/2020: Currently not taking 15 tablet 0    triamcinolone acetonide 0.1% (KENALOG) 0.1 % cream AAA bid. Do not apply to the face.  454 g 0    ursodioL (SCOTTY FORTE) 500 MG tablet Crush one tablet daily for gall bladder. 8/18/2020: Post op medication 90 tablet 1    [DISCONTINUED] montelukast (SINGULAIR) 10 mg tablet TAKE 1 TABLET BY MOUTH EVERY DAY IN THE EVENING 8/18/2020: Currently not taking 90 tablet 1     No facility-administered encounter medications on file as of 11/9/2020.         Review of patient's allergies indicates:   Allergen Reactions    Metformin Other (See Comments)     Severe low blood sugar    Sudafed pe head congestn-pain [ibuprofen-phenylephrine] Anxiety           Physical Exam       ]    Physical Exam  Constitutional:       General: She is not in acute distress.     Appearance: She is well-developed. She is not diaphoretic.   HENT:      Head: Normocephalic and atraumatic.      Right Ear: External ear normal.      Left Ear: External ear normal.      Nose: Nose normal.   Eyes:      General:         Right eye: No discharge.         Left eye: No discharge.      Conjunctiva/sclera: Conjunctivae normal.   Neck:      Musculoskeletal: Normal range of motion.   Pulmonary:      Effort: Pulmonary effort is normal. No respiratory distress.    Musculoskeletal: Normal range of motion.   Skin:     Coloration: Skin is not pale.      Findings: No rash.   Neurological:      Mental Status: She is alert and oriented to person, place, and time.   Psychiatric:         Behavior: Behavior normal.         Thought Content: Thought content normal.         Judgment: Judgment normal.          Laboratory:  CBC:  Recent Labs   Lab Result Units 08/20/20  0619 09/02/20  1305 10/14/20  0853   WBC K/uL 11.23 6.17 6.49   RBC M/uL 4.86 5.44* 5.08   Hemoglobin g/dL 11.4* 12.8 12.6   Hematocrit % 39.0 42.9 40.7   Platelets K/uL 282 269 305   MCV fL 80* 79* 80*   MCH pg 23.5* 23.5* 24.8*   MCHC g/dL 29.2* 29.8* 31.0*     CMP:  Recent Labs   Lab Result Units 09/02/20  1305 10/14/20  0853   Glucose mg/dL 77 86   Calcium mg/dL 9.9 9.4   Albumin g/dL 4.1 3.5   Total Protein g/dL 7.6 7.0   Sodium mmol/L 141 142   Potassium mmol/L 3.6 3.9   CO2 mmol/L 21* 30*   Chloride mmol/L 102 103   BUN mg/dL 9 9   Alkaline Phosphatase U/L 81 82   ALT U/L 13 14   AST U/L 18 20   Total Bilirubin mg/dL 0.5 0.5     URINALYSIS:  No results for input(s): COLORU, CLARITYU, SPECGRAV, PHUR, PROTEINUA, GLUCOSEU, BILIRUBINCON, BLOODU, WBCU, RBCU, BACTERIA, MUCUS, NITRITE, LEUKOCYTESUR, UROBILINOGEN, HYALINECASTS in the last 2160 hours.   LIPIDS:  Recent Labs   Lab Result Units 10/14/20  0853   HDL mg/dL 35*   Cholesterol mg/dL 144   Triglycerides mg/dL 87   LDL Cholesterol mg/dL 91.6   HDL/Cholesterol Ratio % 24.3   Non-HDL Cholesterol mg/dL 109   Total Cholesterol/HDL Ratio  4.1     TSH:  No results for input(s): TSH in the last 2160 hours.  A1C:  No results for input(s): HGBA1C in the last 2160 hours.        Assessment/Plan     Agustina Tillman is a 35 y.o.female with:    Allergic sinusitis  OTC antihistamine (Zyrtec, Claritin, Allegra, or Xyxal) and a steroid nasal spray such as flonase   May need to change zyrtec to another  Add mucinex d  Dc singulair  If no better in 72 h consider abx for  bacterial sinusitis           No orders of the defined types were placed in this encounter.      Use of the [employer] Patient Portal discussed and encouraged during today's visit  -Continue current medications and maintain follow up with specialists.  Return to clinic PRN   No future appointments.    Citlaly Rosa MD  11/9/2020 9:09 AM    Primary Care Internal [facility] - [facility]

## 2020-11-09 NOTE — PATIENT INSTRUCTIONS
Sinusitis (No Antibiotics)    The sinuses are air-filled spaces within the bones of the face. They connect to the inside of the nose. Sinusitis is an inflammation of the tissue lining the sinus cavity. Sinus inflammation can occur during a cold. It can also be due to allergies to pollens and other particles in the air. It can cause symptoms such as sinus congestion, headache, sore throat, facial swelling and fullness. It may also cause a low-grade fever. No infection is present, and no antibiotic treatment is needed.  Home care  · Drink plenty of water, hot tea, and other liquids. This may help thin mucus. It also may promote sinus drainage.  · Heat may help soothe painful areas of the face. Use a towel soaked in hot water. Or,  the shower and direct the hot spray onto your face. Using a vaporizer along with a menthol rub at night may also help.   · An expectorant containing guaifenesin may help thin the mucus and promote drainage from the sinuses.  · Over-the-counter decongestants may be used unless a similar medicine was prescribed. Nasal sprays work the fastest. Use one that contains phenylephrine or oxymetazoline. First blow the nose gently. Then use the spray. Do not use these medicines more often than directed on the label or symptoms may get worse. You may also use tablets containing pseudoephedrine. Avoid products that combine ingredients, because side effects may be increased. Read labels. You can also ask the pharmacist for help. (NOTE: Persons with high blood pressure should not use decongestants. They can raise blood pressure.)  · Over-the-counter antihistamines may help if allergies contributed to your sinusitis.    · Use acetaminophen or ibuprofen to control pain, unless another pain medicine was prescribed. (If you have chronic liver or kidney disease or ever had a stomach ulcer, talk with your doctor before using these medicines. Aspirin should never be used in anyone under 18 years of age  who is ill with a fever. It may cause severe liver damage.)  · Use nasal rinses or irrigation as instructed by your health care provider.  · Don't smoke. This can worsen symptoms.  Follow-up care  Follow up with your healthcare provider or our staff if you are not improving within the next week.  When to seek medical advice  Call your healthcare provider if any of these occur:  · Green or yellow discharge from the nose or into the throat  · Facial pain or headache becoming more severe  · Stiff neck  · Unusual drowsiness or confusion  · Swelling of the forehead or eyelids  · Vision problems, including blurred or double vision  · Fever of 100.4ºF (38ºC) or higher, or as directed by your healthcare provider  · Seizure  · Breathing problems  · Symptoms not resolving within 10 days  Date Last Reviewed: 4/13/2015  © 6850-9548 [employer]. [address] [place] [address]. All rights reserved. This information is not intended as a substitute for professional medical care. Always follow your healthcare professional's instructions.          Fluticasone nasal spray  What is this medicine?  FLUTICASONE (floo TIK a sone) is a corticosteroid. This medicine is used to treat the symptoms of allergies like sneezing, itchy red eyes, and itchy, runny, or stuffy nose.  How should I use this medicine?  This medicine is for use in the nose. Follow the directions on your product or prescription label. This medicine works best if used at regular intervals. Do not use more often than directed. Make sure that you are using your nasal spray correctly. After 6 months of daily use without a prescription, talk to your doctor or health care professional before using it for a longer time. Ask your doctor or health care professional if you have any questions.  Talk to your pediatrician regarding the use of this medicine in children. Special care may be needed. This medicine has been used in children as young as 2 years.  After two months of daily use without a prescription in a child, talk to your pediatrician before using it for a longer time.  What side effects may I notice from receiving this medicine?  Side effects that you should report to your doctor or health care professional as soon as possible:  · allergic reactions like skin rash, itching or hives, swelling of the face, lips, or tongue  · changes in vision  · flu-like symptoms  · white patches or sores in the mouth or nose  Side effects that usually do not require medical attention (report to your doctor or health care professional if they continue or are bothersome):  · burning or irritation inside the nose or throat  · cough  · headache  · nosebleed  · unusual taste or smell  What may interact with this medicine?  · ketoconazole  · metyrapone  · some medicines for HIV  · vaccines  What if I miss a dose?  If you miss a dose, use it as soon as you remember. If it is almost time for your next dose, use only that dose and continue with your regular schedule. Do not use double or extra doses.  Where should I keep my medicine?  Keep out of the reach of children.  Store at room temperature between 15 and 30 degrees C (59 and 86 degrees F). Throw away any unused medicine after the expiration date.  What should I tell my health care provider before I take this medicine?  They need to know if you have any of these conditions:  · infection, like tuberculosis, herpes, or fungal infection  · recent surgery on nose or sinuses  · taking corticosteroid by mouth  · an unusual or allergic reaction to fluticasone, steroids, other medicines, foods, dyes, or preservatives  · pregnant or trying to get pregnant  · breast-feeding  What should I watch for while using this medicine?  Visit your doctor or health care professional for regular checks on your progress. Some symptoms may improve within 12 hours after starting use. Check with your doctor or health care professional if there is no  improvement in your condition after 3 weeks of use.  Do not come in contact with people who have chickenpox or the measles while you are taking this medicine. If you do, call your doctor right away.  NOTE:This sheet is a summary. It may not cover all possible information. If you have questions about this medicine, talk to your doctor, pharmacist, or health care provider. Copyright© 2017 [employer]          Oxymetazoline nasal spray  What is this medicine?  Oxymetazoline (OX ee me JAMEY oh jonah) is a nasal decongestant. This medicine is used to treat nasal congestion or a stuffy nose. This medicine will not treat an infection.  How should I use this medicine?  This medicine is for use in the nose. Do not take by mouth. Follow the directions on the package label. Shake well before using. Use your medicine at regular intervals or as directed by your health care provider. Do not use it more often than directed. Do not use for more than 3 days in a row without advice. Make sure that you are using your nasal spray correctly. Ask your doctor or health care provider if you have any questions.  Talk to your pediatrician regarding the use of this medicine in children. While this drug may be prescribed for children for selected conditions, precautions do apply.  What side effects may I notice from receiving this medicine?  Side effects that you should report to your doctor or health care professional as soon as possible:  · allergic reactions like skin rash, itching or hives, swelling of the face, lips, or tongue  Side effects that usually do not require medical attention (Report these to your doctor or health care professional if they continue or are bothersome.):  · burning, stinging, or irritation in the nose right after use  · increased nasal discharge  · sneezing  What may interact with this medicine?  Do not take this medicine with any of the following medications:  · MAOIs like Marplan, Nardil, and Parnate  What if I  miss a dose?  If you miss a dose, use it as soon as you can. If it is almost time for your next dose, use only that dose. Do not use double or extra doses.  Where should I keep my medicine?  Keep out of the reach of children.  Store at room temperature between 20 and 25 degrees C (68 and 77 degrees F). Throw away any unused medicine after the expiration date.  What should I tell my health care provider before I take this medicine?  They need to know if you have any of these conditions:  · diabetes  · heart disease  · high blood pressure  · thyroid disease  · trouble urinating due to an enlarged prostate gland  · an unusual or allergic reaction to oxymetazoline, other medicines, foods, dyes, or preservatives  · pregnant or trying to get pregnant  · breast-feeding  What should I watch for while using this medicine?  Tell your doctor or healthcare professional if your symptoms do not start to get better or if they get worse.  Do not share this bottle with anyone else as this may spread germs.  NOTE:This sheet is a summary. It may not cover all possible information. If you have questions about this medicine, talk to your doctor, pharmacist, or health care provider. Copyright© 2017 [employer]        Technique for using nasal spray:  1. Blow nose  2. Lean forward  3. Angle spray outward, using opposite hand to opposite nostril  4. Hold breath and spray  5. Massage nasal fold to retain medicine in nose  6. Avoid blowing nose after administration of medicine    Wrong techniques include:  1. Sitting upright  2. Inhaling quickly when administering medicine  3. Blowing nose after administering medicine  4. Feeling medicine trickle down throat    Only use afrin (oxymetazoline) once every 3 days to avoid rebound congestion

## 2020-11-09 NOTE — ASSESSMENT & PLAN NOTE
OTC antihistamine (Zyrtec, Claritin, Allegra, or Xyxal) and a steroid nasal spray such as flonase   May need to change zyrtec to another  Add mucinex d  Dc singulair  If no better in 72 h consider abx for bacterial sinusitis

## 2020-11-10 ENCOUNTER — HOSPITAL ENCOUNTER (EMERGENCY)
Facility: HOSPITAL | Age: 35
Discharge: HOME | End: 2020-11-10
Attending: EMERGENCY MEDICINE
Payer: COMMERCIAL

## 2020-11-10 VITALS
DIASTOLIC BLOOD PRESSURE: 80 MMHG | WEIGHT: 263 LBS | HEART RATE: 66 BPM | BODY MASS INDEX: 43.82 KG/M2 | OXYGEN SATURATION: 100 % | RESPIRATION RATE: 19 BRPM | HEIGHT: 65 IN | SYSTOLIC BLOOD PRESSURE: 122 MMHG | TEMPERATURE: 99 F

## 2020-11-10 DIAGNOSIS — J01.20 ACUTE ETHMOIDAL SINUSITIS, RECURRENCE NOT SPECIFIED: Primary | ICD-10-CM

## 2020-11-10 LAB
B-HCG UR QL: NEGATIVE
CTP QC/QA: YES

## 2020-11-10 PROCEDURE — 96372 THER/PROPH/DIAG INJ SC/IM: CPT | Mod: 59,ER

## 2020-11-10 PROCEDURE — 63600175 PHARM REV CODE 636 W HCPCS: Mod: ER | Performed by: NURSE PRACTITIONER

## 2020-11-10 PROCEDURE — 81025 URINE PREGNANCY TEST: CPT | Mod: ER | Performed by: EMERGENCY MEDICINE

## 2020-11-10 PROCEDURE — 99284 EMERGENCY DEPT VISIT MOD MDM: CPT | Mod: 25,ER

## 2020-11-10 PROCEDURE — 25000003 PHARM REV CODE 250: Mod: ER | Performed by: PHYSICIAN ASSISTANT

## 2020-11-10 PROCEDURE — 96365 THER/PROPH/DIAG IV INF INIT: CPT | Mod: ER

## 2020-11-10 RX ORDER — ACETAMINOPHEN 500 MG
1000 TABLET ORAL EVERY 6 HOURS PRN
Qty: 30 TABLET | Refills: 0 | Status: SHIPPED | OUTPATIENT
Start: 2020-11-10 | End: 2021-03-16

## 2020-11-10 RX ORDER — DEXAMETHASONE SODIUM PHOSPHATE 4 MG/ML
12 INJECTION, SOLUTION INTRA-ARTICULAR; INTRALESIONAL; INTRAMUSCULAR; INTRAVENOUS; SOFT TISSUE
Status: COMPLETED | OUTPATIENT
Start: 2020-11-10 | End: 2020-11-10

## 2020-11-10 RX ORDER — ACETAMINOPHEN 500 MG
1000 TABLET ORAL
Status: COMPLETED | OUTPATIENT
Start: 2020-11-10 | End: 2020-11-10

## 2020-11-10 RX ORDER — LORATADINE 10 MG/1
10 TABLET ORAL DAILY PRN
Qty: 30 TABLET | Refills: 0 | Status: SHIPPED | OUTPATIENT
Start: 2020-11-10 | End: 2021-03-16

## 2020-11-10 RX ORDER — AMOXICILLIN AND CLAVULANATE POTASSIUM 875; 125 MG/1; MG/1
1 TABLET, FILM COATED ORAL 2 TIMES DAILY
Qty: 20 TABLET | Refills: 0 | Status: SHIPPED | OUTPATIENT
Start: 2020-11-10 | End: 2020-11-13 | Stop reason: SINTOL

## 2020-11-10 RX ORDER — FLUTICASONE PROPIONATE 50 MCG
2 SPRAY, SUSPENSION (ML) NASAL DAILY
Qty: 18.2 G | Refills: 0 | Status: SHIPPED | OUTPATIENT
Start: 2020-11-10 | End: 2021-03-16

## 2020-11-10 RX ADMIN — ACETAMINOPHEN 1000 MG: 500 TABLET ORAL at 10:11

## 2020-11-10 RX ADMIN — CEFTRIAXONE 1 G: 1 INJECTION, SOLUTION INTRAVENOUS at 12:11

## 2020-11-10 RX ADMIN — DEXAMETHASONE SODIUM PHOSPHATE 12 MG: 4 INJECTION, SOLUTION INTRA-ARTICULAR; INTRALESIONAL; INTRAMUSCULAR; INTRAVENOUS; SOFT TISSUE at 12:11

## 2020-11-10 NOTE — ED PROVIDER NOTES
"Encounter Date: 11/10/2020    SCRIBE #1 NOTE: I, Angelita Cotter, am scribing for, and in the presence of,  Sherly Mobley NP. I have scribed the following portions of the note - Other sections scribed: HPI, ROS, PE.       History     Chief Complaint   Patient presents with    Headache     x 1 week, and sinus starting saturday, not relieved with tylenol and zyrtec, also using flonase with no relief     Agustina Tillman is a 35 y.o. female who presents to the ED complaining of acute, constant, gradually worsening, 10/10, "throbbing", headache x 1 week ago. Patient localizes headache to left forehead, eyebrow and behind eye with intermittent radiation to mid forehead. Per patient, "this is the worst headache of my life". Pain is reproduced with palpation. Notes nasal congestion x 5 days ago. Reports intermittent photophobia and phonophobia. Endorses watery eyes and chills. Patient [facility] x yesterday for similar symptoms. Mentions hx of seasonal allergies, but states "it's never been this bad". Denies relief s/p Tylenol, Tylenol Cold & Flu, Zyrtec and Flonase. Denies fever, rhinorrhea, PND, sneezing, sore throat and cough. Denies SOB. Denies nausea and vomiting. Denies FMHx of aneurysms.     The history is provided by the patient. No  was used.   Headache   This is a new problem. The current episode started in the past 7 days. The problem occurs constantly. The problem has been gradually worsening. The pain is located in the retro-orbital and left unilateral region. The pain quality is not similar to prior headaches. The quality of the pain is described as aching and throbbing. The pain is at a severity of 10/10. Associated symptoms include eye watering, phonophobia and photophobia. Pertinent negatives include no abdominal pain, blurred vision, coughing, drainage, ear pain, eye pain, eye redness, fever, muscle aches, nausea, rhinorrhea, sore throat, tingling, visual " change or vomiting. The symptoms are aggravated by bright light and noise. She has tried acetaminophen (Zyrtec and Flonase) for the symptoms. The treatment provided no relief. There is no history of cancer, cluster headaches, pseudotumor cerebri or recent head traumas.     Review of patient's allergies indicates:   Allergen Reactions    Metformin Other (See Comments)     Severe low blood sugar    Sudafed pe head congestn-pain [ibuprofen-phenylephrine] Anxiety     Past Medical History:   Diagnosis Date    Allergy 2016    Sinus    Iron deficiency anemia secondary to inadequate dietary iron intake     PCOS (polycystic ovarian syndrome) 5/18/2017    Seizures 1995    I use to have epilepsy. But they have julieta away     Past Surgical History:   Procedure Laterality Date    ESOPHAGOGASTRODUODENOSCOPY N/A 8/19/2020    Procedure: EGD (ESOPHAGOGASTRODUODENOSCOPY);  Surgeon: Cj Umaña MD;  Location: [facility] OR [address] [place] [address];  Service: General;  Laterality: N/A;    LAPAROSCOPIC SLEEVE GASTRECTOMY N/A 8/19/2020    Procedure: GASTRECTOMY, SLEEVE, LAPAROSCOPIC, with intraop EGD;  Surgeon: Cj Umaña MD;  Location: [facility] OR [address] [place] [address];  Service: General;  Laterality: N/A;     Family History   Problem Relation Age of Onset    Lung cancer Father     Cancer Father         Lung cancer    Hypertension Mother      Social History     Tobacco Use    Smoking status: Never Smoker    Smokeless tobacco: Never Used   Substance Use Topics    Alcohol use: Yes     Alcohol/week: 1.0 standard drinks     Types: 1 Glasses of wine per week     Frequency: Monthly or less     Drinks per session: 1 or 2     Binge frequency: Never    Drug use: Never     Review of Systems   Constitutional: Positive for chills. Negative for fever.   HENT: Positive for congestion. Negative for ear pain, postnasal drip, rhinorrhea, sneezing and sore throat.    Eyes: Positive for photophobia. Negative for blurred vision, pain, redness, itching and  visual disturbance.        + watery eyes  +phonophobia   Respiratory: Negative.  Negative for cough and shortness of breath.    Cardiovascular: Negative.  Negative for chest pain.   Gastrointestinal: Negative.  Negative for abdominal pain, diarrhea, nausea and vomiting.   Endocrine: Negative.    Genitourinary: Negative.    Musculoskeletal: Negative.  Negative for myalgias.   Skin: Negative.  Negative for rash.   Allergic/Immunologic: Negative.    Neurological: Positive for headaches. Negative for tingling.   Hematological: Negative.    Psychiatric/Behavioral: Negative.    All other systems reviewed and are negative.      Physical Exam     Initial Vitals [11/10/20 0945]   BP Pulse Resp Temp SpO2   (!) 156/90 64 18 98.6 °F (37 °C) 100 %      MAP       --         Physical Exam    Nursing note and vitals reviewed.  Constitutional: She appears well-developed. No distress.   HENT:   Right Ear: External ear normal.   Left Ear: External ear normal.   Nose: Nose normal.   Mouth/Throat: Oropharynx is clear and moist.   Eyes: Conjunctivae and EOM are normal. Pupils are equal, round, and reactive to light.   Neck: Normal range of motion. Neck supple. No neck rigidity.   Cardiovascular: Normal rate, regular rhythm, S1 normal, S2 normal, normal heart sounds and intact distal pulses. Exam reveals no gallop and no friction rub.    No murmur heard.  Pulmonary/Chest: Effort normal and breath sounds normal.   Abdominal: Soft. Bowel sounds are normal. She exhibits no distension.   Musculoskeletal: Normal range of motion. No tenderness or edema.   Neurological: She is alert and oriented to person, place, and time. She is not disoriented. No cranial nerve deficit or sensory deficit. Gait normal. GCS eye subscore is 4. GCS verbal subscore is 5. GCS motor subscore is 6.   CN2-12 intact.   Skin: Skin is warm and dry. Capillary refill takes less than 2 seconds. No rash noted.   Psychiatric: She has a normal mood and affect. Her behavior is  normal.         ED Course   Procedures  Labs Reviewed   POCT URINE PREGNANCY          Imaging Results          CT Head Without Contrast (Final result)  Result time 11/10/20 12:03:01    Final result by Jaswinder Mac MD (11/10/20 12:03:01)                 Impression:      1. No acute intracranial processes.  2. Left frontal, ethmoid and left maxillary antral sinusitis.  It is possible this could be related to obstructive sinusitis secondary to obstructive process in the middle meatus.  3. No acute intracranial hemorrhages.      Electronically signed by: Jaswinder Mac MD  Date:    11/10/2020  Time:    12:03             Narrative:    EXAMINATION:  CT HEAD WITHOUT CONTRAST    CLINICAL HISTORY:  Headache, acute, normal neuro exam;    TECHNIQUE:  Low dose axial images were obtained through the head.  Coronal and sagittal reformations were also performed. Contrast was not administered.    COMPARISON:  None.    FINDINGS:  Symmetric lateral ventricles without hydrocephalus.  No acute intracranial hemorrhages.  There is no abnormal extra-axial fluid collections.    In the supratentorial cerebral hemispheres bilaterally the gray/white matter delineation is preserved.  No parenchymal hemorrhage or signs for any neoplastic processes or infarctions.  There is no midline shift or herniations.  Basal ganglia are symmetric and within normal limits bilaterally.    In the posterior fossa the 4th ventricle is in the midline.  No acute cerebellar hemorrhages or masses or signs for acute cerebellar infarctions.    There is a significant opacification of the visualized portion of the left maxillary sinus.  Also significant opacification of the left anterior ethmoid air cells.  There is a air-fluid level in the left frontal sinus.  The visualized portions of the right maxillary sinus, the sphenoid sinuses as well as the right frontal sinus and the ethmoid sinuses demonstrate no fluid levels.  Poor pneumatization of the mastoids.    Cranial  "vault demonstrates no acute fractures.  There is hyperostosis frontalis interna.  Benign-appearing dural calcifications along the superior sagittal sinus.                                 Medical Decision Making:   History:   Old Medical Records: I decided to obtain old medical records.  Initial Assessment:   Agustina Tillman is a 35 y.o. female who presents to the ED complaining of acute, constant, gradually worsening, 10/10, "throbbing", headache x 1 week ago. Patient localizes headache to left forehead, eyebrow and behind eye with intermittent radiation to mid forehead. Per patient, "this is the worst headache of my life". Pain is reproduced with palpation. Notes nasal congestion x 5 days ago. Reports intermittent photophobia and phonophobia. Endorses watery eyes and chills. Patient [facility] x yesterday for similar symptoms. She has taken multiple OTC medications with no relief.     Differential Diagnosis:   Differential Dx includes, but is not limited acute sinusitis, tension HA and acute migraine/HA.   Clinical Tests:   Lab Tests: Ordered and Reviewed  The following lab test(s) were unremarkable: UPT  Radiological Study: Ordered and Reviewed  ED Management:  Physical exam performed.  CT of head with a left ethmoid sinusitis noted.  Discussed with Dr. Braun recommended Decadron 12 mg IM.  Ceftriaxone 1 g IV.  ENT referral.  Patient was also given Toradol 15 mg IV. Pain improved.  Patient discharged with Augmentin, Claritin and Flonase nasal spray.  Ambulatory ENT referral placed.  Patient  To follow-up with PCP in 2 days. Pt instructed to return to the emergency room for worsening of symptoms.             Scribe Attestation:   Scribe #1: I performed the above scribed service and the documentation accurately describes the services I performed. I attest to the accuracy of the note.               This document was produced by a scribe under my direction and in my presence. I agree " with the content of the note and have made any necessary edits.     OTILIA Linda    11/10/2020 8:22 PM       Clinical Impression:     ICD-10-CM ICD-9-CM   1. Acute ethmoidal sinusitis, recurrence not specified  J01.20 461.2                          ED Disposition Condition    Discharge Stable        ED Prescriptions     Medication Sig Dispense Start Date End Date Auth. Provider    amoxicillin-clavulanate 875-125mg (AUGMENTIN) 875-125 mg per tablet Take 1 tablet by mouth 2 (two) times daily. for 10 days 20 tablet 11/10/2020 11/20/2020 OTILIA Archer    loratadine (CLARITIN) 10 mg tablet Take 1 tablet (10 mg total) by mouth daily as needed (congestion). 30 tablet 11/10/2020 12/10/2020 OTILIA Archer    fluticasone propionate (FLONASE) 50 mcg/actuation nasal spray 2 sprays (100 mcg total) by Each Nostril route once daily. 18.2 g 11/10/2020  OTILIA Archer    acetaminophen (TYLENOL) 500 MG tablet Take 2 tablets (1,000 mg total) by mouth every 6 (six) hours as needed for Pain. 30 tablet 11/10/2020  OTILIA Archer        Follow-up Information     Follow up With Specialties Details Why Contact Info    Peter Gutierrez MD Otolaryngology In 2 days  [address]  [place] [address]  [phone]                                         OTILIA Archer  11/10/20 2023

## 2020-11-10 NOTE — ED NOTES
"Patient arrived with complaint of frontal sinus pressure especially over left eye. Also reports intermittent right ear pressure with occasional "popping" all with onset approx three days ago.  Patient reports virtual visits with two other providers yesterday and that she has tried Zyrtec, Tylenol flu, Flonase without relief.  "

## 2020-11-10 NOTE — FIRST PROVIDER EVALUATION
Emergency Department TeleTriage Encounter Note      CHIEF COMPLAINT    Chief Complaint   Patient presents with    Headache     x 1 week, and sinus starting saturday, not relieved with tylenol and zyrtec, also using flonase with no relief       VITAL SIGNS   Initial Vitals [11/10/20 0945]   BP Pulse Resp Temp SpO2   (!) 156/90 64 18 98.6 °F (37 °C) 100 %      MAP       --            ALLERGIES    Review of patient's allergies indicates:   Allergen Reactions    Metformin Other (See Comments)     Severe low blood sugar    Sudafed pe head congestn-pain [ibuprofen-phenylephrine] Anxiety       PROVIDER TRIAGE NOTE    Patient presents to the ER with left frontal headache x 1 week associated with nasal congestion.  Patient says pain is 10/10, she appears comfortable, no acute distress.  She denies associated vomiting, fever or neck pain.  She is taking afrin, flonase, tylenol and zyrtec without relief.  No meds today. Will order Tylenol pending ED provider evaluation.      ORDERS  Labs Reviewed   POCT URINE PREGNANCY       ED Orders (720h ago, onward)    Start Ordered     Status Ordering Provider    11/10/20 0947 11/10/20 0946  POCT urine pregnancy  Once  Completed    Final result CARLA ROMERO            Virtual Visit Note: The provider triage portion of this emergency department evaluation and documentation was performed via [employer], a HIPAA-compliant telemedicine application, in concert with a tele-presenter in the room. A face to face patient evaluation with one of my colleagues will occur once the patient is placed in an emergency department room.      DISCLAIMER: This note was prepared with [employer] voice recognition transcription software. Garbled syntax, mangled pronouns, and other bizarre constructions may be attributed to that software system.

## 2020-11-10 NOTE — Clinical Note
"Agustina Ottoralph Tillman was seen and treated in our emergency department on 11/10/2020.  She may return to work on 11/12/2020.       If you have any questions or concerns, please don't hesitate to call.      OTILIA Archer"

## 2020-11-13 ENCOUNTER — PATIENT MESSAGE (OUTPATIENT)
Dept: FAMILY MEDICINE | Facility: CLINIC | Age: 35
End: 2020-11-13

## 2020-11-13 ENCOUNTER — OFFICE VISIT (OUTPATIENT)
Dept: FAMILY MEDICINE | Facility: CLINIC | Age: 35
End: 2020-11-13
Payer: COMMERCIAL

## 2020-11-13 DIAGNOSIS — R19.7 DIARRHEA, UNSPECIFIED TYPE: Primary | ICD-10-CM

## 2020-11-13 DIAGNOSIS — E66.01 MORBID OBESITY WITH BMI OF 45.0-49.9, ADULT: Chronic | ICD-10-CM

## 2020-11-13 DIAGNOSIS — J01.20 ACUTE NON-RECURRENT ETHMOIDAL SINUSITIS: ICD-10-CM

## 2020-11-13 PROCEDURE — 99213 OFFICE O/P EST LOW 20 MIN: CPT | Mod: 95,,, | Performed by: NURSE PRACTITIONER

## 2020-11-13 PROCEDURE — 99213 PR OFFICE/OUTPT VISIT, EST, LEVL III, 20-29 MIN: ICD-10-PCS | Mod: 95,,, | Performed by: NURSE PRACTITIONER

## 2020-11-13 RX ORDER — AMOXICILLIN 875 MG/1
875 TABLET, FILM COATED ORAL EVERY 12 HOURS
Qty: 14 TABLET | Refills: 0 | Status: SHIPPED | OUTPATIENT
Start: 2020-11-13 | End: 2020-11-20

## 2020-11-13 NOTE — Clinical Note
John Gaston MD,  I saw your patient today in [facility]. If you have any questions, please do not hesitate to contact me.  Thank you!  OTILIA Lozada, [facility]

## 2020-11-13 NOTE — PATIENT INSTRUCTIONS

## 2020-11-13 NOTE — PROGRESS NOTES
Agustina Tillman is a 35 y.o. female patient of John Gaston MD who presents to the clinic today for a Virtual Visit.    The patient location is: [place], [place]  The chief complaint leading to consultation is: diarrhea  Visit type: audiovisual  Total time spent with patient: 15 minutes  Each patient to whom he or she provides medical services by telemedicine is:  (1) informed of the relationship between the physician and patient and the respective role of any other health care provider with respect to management of the patient; and (2) notified that he or she may decline to receive medical services by telemedicine and may withdraw from such care at any time.    17 minutes of total time spent on the encounter, which includes face to face time and non-face to face time preparing to see the patient (eg, review of tests), Obtaining and/or reviewing separately obtained history, Documenting clinical information in the electronic or other health record, Independently interpreting results (not separately reported) and communicating results to the patient/family/caregiver, or Care coordination (not separately reported).     HPI    Pt, who is not known to me, reports a new problem to me: diarrhea.  Seen at the ER 4 days ago for sinus infection.  Rx for Augmentin.  Yesterday she started with severe diarrhea.  Told them in the hospital that she had a gastric sleeve 8/19/2020.    Today her sinuses are feeling much better.   Didn't take the medicine since last night.  Some mucus in the stool.    At least 15 or 20 episodes in the past 24 hrs.  Also some accompanied bloating, slight gas, no pain..    These symptoms began in the last 24 hours and status is continuing today.     Pt denies the following symptoms:  blood in the stool, black stools, fever.    Aggravating factors include eating--snow crab and banana.  Avoiding fatty food.    Relieving factors include nothing    OTC Medications tried are none Pepto-no  help.    Prescription medications taken for symptoms are none.    Pertinent history:  No h/o IBS, + recent use of antibiotics in the last month--Augmentin    ROS    Constitutional: No fever, no fatigue, decrease in appetite.    GI:  No stomach ache, mild nausea, no vomiting, + diarrhea, no constipation.    Urinary:  No  urine symptoms    HEENT/Heart/Lung/MS/Skin:  No symptoms or no changes.    Past Medical History:   Diagnosis Date    Allergy 2016    Sinus    Iron deficiency anemia secondary to inadequate dietary iron intake     PCOS (polycystic ovarian syndrome) 5/18/2017    Seizures 1995    I use to have epilepsy. But they have julieta away       Current Outpatient Medications:     acetaminophen (TYLENOL) 500 MG tablet, Take 2 tablets (1,000 mg total) by mouth every 6 (six) hours as needed for Pain., Disp: 30 tablet, Rfl: 0    amoxicillin-clavulanate 875-125mg (AUGMENTIN) 875-125 mg per tablet, Take 1 tablet by mouth 2 (two) times daily. for 10 days, Disp: 20 tablet, Rfl: 0    cetirizine (ZYRTEC) 10 MG tablet, Take 1 tablet (10 mg total) by mouth once daily., Disp: , Rfl: 0    diclofenac (VOLTAREN) 50 MG EC tablet, Take 1 tablet (50 mg total) by mouth 3 (three) times daily as needed (pain)., Disp: 24 tablet, Rfl: 0    ergocalciferol (ERGOCALCIFEROL) 50,000 unit Cap, Take 1 capsule (50,000 Units total) by mouth twice a week., Disp: 24 capsule, Rfl: 0    erythromycin (ROMYCIN) ophthalmic ointment, Apply 1/2 inch ribbon to the inner lower eyelid and then gently massage onto the eye with the eye closed 3 times a day for 1 week., Disp: 1 Tube, Rfl: 0    fluticasone propionate (FLONASE) 50 mcg/actuation nasal spray, 2 sprays (100 mcg total) by Each Nostril route once daily., Disp: 18.2 g, Rfl: 0    hydrocodone-acetaminophen (HYCET) solution 7.5-325 mg/15mL, Take 15 mLs by mouth 4 (four) times daily as needed for Pain., Disp: 200 mL, Rfl: 0    hydrOXYzine HCL (ATARAX) 25 MG tablet, TAKE 1 TABLET BY MOUTH EVERY  EVENING, Disp: 30 tablet, Rfl: 1    ketoconazole (NIZORAL) 2 % shampoo, Apply topically twice a week., Disp: 120 mL, Rfl: 1    levocetirizine (XYZAL) 5 MG tablet, TAKE 1 TABLET BY MOUTH EVERY DAY, Disp: 30 tablet, Rfl: 1    loratadine (CLARITIN) 10 mg tablet, Take 1 tablet (10 mg total) by mouth daily as needed (congestion)., Disp: 30 tablet, Rfl: 0    medroxyPROGESTERone (PROVERA) 10 MG tablet, Take 1 tablet (10 mg total) by mouth once daily. for 10 days, Disp: 10 tablet, Rfl: 2    omeprazole (PRILOSEC) 40 MG capsule, Take 1 capsule (40 mg total) by mouth every morning. Open capsule and take with apple sauce, Disp: 30 capsule, Rfl: 2    ondansetron (ZOFRAN-ODT) 8 MG TbDL, Take 1 tablet (8 mg total) by mouth every 6 (six) hours as needed (nausea)., Disp: 12 tablet, Rfl: 0    ondansetron (ZOFRAN-ODT) 8 MG TbDL, Dissolve 1 tablet (8 mg total) by mouth every 6 (six) hours as needed., Disp: 30 tablet, Rfl: 0    phentermine (LOMAIRA) 8 mg Tab, Take 1 each by mouth 2 (two) times daily., Disp: 60 each, Rfl: 0    polyethylene glycol (GLYCOLAX) 17 gram/dose powder, Dissolve one capful (17 g) in liquid and take by mouth once daily., Disp: 510 g, Rfl: 3    promethazine (PHENERGAN) 25 MG tablet, Take 1 tablet (25 mg total) by mouth every 6 (six) hours as needed for Nausea., Disp: 15 tablet, Rfl: 0    triamcinolone acetonide 0.1% (KENALOG) 0.1 % cream, APPLY TO THE AFFECTED AREAA TWICE DAILY. DO NOT APPLY TO THE FACE., Disp: 80 g, Rfl: 0    ursodioL (SCOTTY FORTE) 500 MG tablet, Crush one tablet daily for gall bladder., Disp: 90 tablet, Rfl: 1    Pt is not a smoker.    ALLERGIES AND MEDICATIONS: updated and reviewed.  Review of patient's allergies indicates:   Allergen Reactions    Metformin Other (See Comments)     Severe low blood sugar    Sudafed pe head congestn-pain [ibuprofen-phenylephrine] Anxiety     Current Outpatient Medications   Medication Sig Dispense Refill    acetaminophen (TYLENOL) 500 MG tablet Take  2 tablets (1,000 mg total) by mouth every 6 (six) hours as needed for Pain. 30 tablet 0    amoxicillin-clavulanate 875-125mg (AUGMENTIN) 875-125 mg per tablet Take 1 tablet by mouth 2 (two) times daily. for 10 days 20 tablet 0    cetirizine (ZYRTEC) 10 MG tablet Take 1 tablet (10 mg total) by mouth once daily.  0    diclofenac (VOLTAREN) 50 MG EC tablet Take 1 tablet (50 mg total) by mouth 3 (three) times daily as needed (pain). 24 tablet 0    ergocalciferol (ERGOCALCIFEROL) 50,000 unit Cap Take 1 capsule (50,000 Units total) by mouth twice a week. 24 capsule 0    erythromycin (ROMYCIN) ophthalmic ointment Apply 1/2 inch ribbon to the inner lower eyelid and then gently massage onto the eye with the eye closed 3 times a day for 1 week. 1 Tube 0    fluticasone propionate (FLONASE) 50 mcg/actuation nasal spray 2 sprays (100 mcg total) by Each Nostril route once daily. 18.2 g 0    hydrocodone-acetaminophen (HYCET) solution 7.5-325 mg/15mL Take 15 mLs by mouth 4 (four) times daily as needed for Pain. 200 mL 0    hydrOXYzine HCL (ATARAX) 25 MG tablet TAKE 1 TABLET BY MOUTH EVERY EVENING 30 tablet 1    ketoconazole (NIZORAL) 2 % shampoo Apply topically twice a week. 120 mL 1    levocetirizine (XYZAL) 5 MG tablet TAKE 1 TABLET BY MOUTH EVERY DAY 30 tablet 1    loratadine (CLARITIN) 10 mg tablet Take 1 tablet (10 mg total) by mouth daily as needed (congestion). 30 tablet 0    medroxyPROGESTERone (PROVERA) 10 MG tablet Take 1 tablet (10 mg total) by mouth once daily. for 10 days 10 tablet 2    omeprazole (PRILOSEC) 40 MG capsule Take 1 capsule (40 mg total) by mouth every morning. Open capsule and take with apple sauce 30 capsule 2    ondansetron (ZOFRAN-ODT) 8 MG TbDL Take 1 tablet (8 mg total) by mouth every 6 (six) hours as needed (nausea). 12 tablet 0    ondansetron (ZOFRAN-ODT) 8 MG TbDL Dissolve 1 tablet (8 mg total) by mouth every 6 (six) hours as needed. 30 tablet 0    phentermine (LOMAIRA) 8 mg Tab  Take 1 each by mouth 2 (two) times daily. 60 each 0    polyethylene glycol (GLYCOLAX) 17 gram/dose powder Dissolve one capful (17 g) in liquid and take by mouth once daily. 510 g 3    promethazine (PHENERGAN) 25 MG tablet Take 1 tablet (25 mg total) by mouth every 6 (six) hours as needed for Nausea. 15 tablet 0    triamcinolone acetonide 0.1% (KENALOG) 0.1 % cream APPLY TO THE AFFECTED AREAA TWICE DAILY. DO NOT APPLY TO THE FACE. 80 g 0    ursodioL (SCOTTY FORTE) 500 MG tablet Crush one tablet daily for gall bladder. 90 tablet 1     No current facility-administered medications for this visit.        PHYSICAL EXAM    There were no vitals filed for this visit.  Vital signs not taken per patient.  Patient's questionnaire (if available), medications & PMH all reviewed today.    Gen:  Alert, coop 35 y.o. female patient in no acute distress, is not ill-appearing.           Well developed, well-nourished  Psych:   Behavior and affect appropriate for situation and setting.  HENT:   Normocephalic, atraumatic,  Symmetrical facial movements.    Eyes without visible discharge or swelling.  No sneezing during the visit.  Voice steady, no hoarseness noted.  Resp:   No increased respiratory effort  No retractions  No audible wheezes  Talks in full sentences.  CV:   No michelle oral cyanosis  Neuro:  Responds promptly to questions showing good insight.  MSK:  Moves head and upper extremities evenly.  Skin:   No observed rashes/bruises     Diarrhea, unspecified type  -     Clostridium difficile EIA; Future; Expected date: 11/13/2020    Acute non-recurrent ethmoidal sinusitis  -     amoxicillin (AMOXIL) 875 MG tablet; Take 1 tablet (875 mg total) by mouth every 12 (twelve) hours. for 7 days  Dispense: 14 tablet; Refill: 0      Pt today presents with report of multiple diarrhea stools 3 days after starting Augmentin for a sinus infection, which has now improved.  Stools are brown.  No blood, some mucus.  No fever, vomiting.  No blood in  the mucus.  Took the last dose of Augmentin last night.  Additionally, she had gastric sleeve placed in August and has lost 50 lbs.  Reports she's been following the diet and working out.    Physical exam shows key findings of alert coop 35 yr old female in NAD.  Responds promptly and appropriately to questions.  Resp effort unlabored.     Findings consistent with diarrhea, likely r/t augmentin treatment for sinusitis.    This is a new problem to me and the following work up is planned.    Lab/Radiological/Pulmonary/CardiacTests Ordered stool for C diff if diarrhea doesn't slow down by tomorrow.  The results are pending.    The EKG was read by me but will be over-read by a cardiologist and the patient will be contacted about this reading..    Pt advised to perform comfort measures recommended on patient instruction sheet, which were reviewed at the time of the visit..    If diarrhea doesn't subside, worse pain or black/bloody stools, she's advised to  go for an in-person/follow up evaluation.  Explained diagnosis and treatment plan to patient.  Questions answered and patient states understanding.

## 2020-11-13 NOTE — LETTER
November 13, 2020    Agustina Tillman  [address] [address]         [place] [address] [facility] [address]  Phone: [phone]  Fax: [phone] November 13, 2020     Patient: Agustina Tillman   YOB: 1985   Date of Visit: 11/13/2020       To Whom It May Concern:    It is my medical opinion that Agustina Tillman may return to work on Monday, 11/16/2020.  Please excuse her from work yesterday and today.  Thank you!.    If you have any questions or concerns, please don't hesitate to call.    Sincerely,        Ros Tillman NP

## 2021-01-18 ENCOUNTER — PATIENT MESSAGE (OUTPATIENT)
Dept: BARIATRICS | Facility: CLINIC | Age: 36
End: 2021-01-18

## 2021-01-24 ENCOUNTER — NURSE TRIAGE (OUTPATIENT)
Dept: ADMINISTRATIVE | Facility: CLINIC | Age: 36
End: 2021-01-24

## 2021-01-24 ENCOUNTER — PATIENT MESSAGE (OUTPATIENT)
Dept: BARIATRICS | Facility: CLINIC | Age: 36
End: 2021-01-24

## 2021-01-24 DIAGNOSIS — N92.0 MENORRHAGIA WITH REGULAR CYCLE: Primary | ICD-10-CM

## 2021-01-25 ENCOUNTER — OFFICE VISIT (OUTPATIENT)
Dept: OBSTETRICS AND GYNECOLOGY | Facility: CLINIC | Age: 36
End: 2021-01-25
Payer: COMMERCIAL

## 2021-01-25 ENCOUNTER — PATIENT OUTREACH (OUTPATIENT)
Dept: ADMINISTRATIVE | Facility: OTHER | Age: 36
End: 2021-01-25
Payer: COMMERCIAL

## 2021-01-25 VITALS
DIASTOLIC BLOOD PRESSURE: 79 MMHG | BODY MASS INDEX: 39.51 KG/M2 | SYSTOLIC BLOOD PRESSURE: 128 MMHG | WEIGHT: 237.44 LBS

## 2021-01-25 DIAGNOSIS — N92.1 MENORRHAGIA WITH IRREGULAR CYCLE: Primary | ICD-10-CM

## 2021-01-25 PROCEDURE — 99999 PR PBB SHADOW E&M-EST. PATIENT-LVL III: ICD-10-PCS | Mod: PBBFAC,,, | Performed by: OBSTETRICS & GYNECOLOGY

## 2021-01-25 PROCEDURE — 99213 OFFICE O/P EST LOW 20 MIN: CPT | Mod: S$GLB,,, | Performed by: OBSTETRICS & GYNECOLOGY

## 2021-01-25 PROCEDURE — 1126F AMNT PAIN NOTED NONE PRSNT: CPT | Mod: S$GLB,,, | Performed by: OBSTETRICS & GYNECOLOGY

## 2021-01-25 PROCEDURE — 3008F BODY MASS INDEX DOCD: CPT | Mod: CPTII,S$GLB,, | Performed by: OBSTETRICS & GYNECOLOGY

## 2021-01-25 PROCEDURE — 1126F PR PAIN SEVERITY QUANTIFIED, NO PAIN PRESENT: ICD-10-PCS | Mod: S$GLB,,, | Performed by: OBSTETRICS & GYNECOLOGY

## 2021-01-25 PROCEDURE — 99999 PR PBB SHADOW E&M-EST. PATIENT-LVL III: CPT | Mod: PBBFAC,,, | Performed by: OBSTETRICS & GYNECOLOGY

## 2021-01-25 PROCEDURE — 99213 PR OFFICE/OUTPT VISIT, EST, LEVL III, 20-29 MIN: ICD-10-PCS | Mod: S$GLB,,, | Performed by: OBSTETRICS & GYNECOLOGY

## 2021-01-25 PROCEDURE — 3008F PR BODY MASS INDEX (BMI) DOCUMENTED: ICD-10-PCS | Mod: CPTII,S$GLB,, | Performed by: OBSTETRICS & GYNECOLOGY

## 2021-01-25 RX ORDER — NORGESTIMATE AND ETHINYL ESTRADIOL 0.25-0.035
1 KIT ORAL DAILY
Qty: 28 TABLET | Refills: 5 | Status: SHIPPED | OUTPATIENT
Start: 2021-01-25 | End: 2021-03-29

## 2021-01-25 RX ORDER — MEDROXYPROGESTERONE ACETATE 10 MG/1
10 TABLET ORAL DAILY
Qty: 21 TABLET | Refills: 0 | Status: SHIPPED | OUTPATIENT
Start: 2021-01-25 | End: 2021-03-29

## 2021-03-01 NOTE — TELEPHONE ENCOUNTER
CHIEF COMPLAINT:  Leroy Haro is here today for a Welcome to Medicare Visit.      Medication verified and med list updated  Reports Latex allergy or symptoms of Latex sensitivity.    Refills needed today?  No          Patient would like communication of their results via:     Cell Phone:   Telephone Information:   Mobile [phone]     Okay to leave a message containing results? Yes     Social History     Tobacco Use   • Smoking status: Former Smoker     Types: Cigars     Quit date: 3/1/2012     Years since quittin.0   • Smokeless tobacco: Never Used   Substance Use Topics   • Alcohol use: Yes     Alcohol/week: 21.0 standard drinks     Types: 21 Shots of liquor per week     Frequency: 4 or more times a week     Drinks per session: 3 or 4     Binge frequency: Never     Comment: gin and tonic 3 per day   • Drug use: No     Drug use:    Drug Use:    No              Family History - Reviewed    CHOLESTEROL (mg/dL)   Date Value   2019 213 (H)     Cholesterol (mg/dL)   Date Value   2020 195     HDL (mg/dL)   Date Value   2020 101   2019 95     No components found for: CHOLHDLRATIO  TRIGLYCERIDE (mg/dL)   Date Value   2019 61     Triglycerides (mg/dL)   Date Value   2020 55     CALCULATED LDL (mg/dL)   Date Value   2019 106     LDL (mg/dL)   Date Value   2020 83      Glucose (mg/dL)   Date Value   2020 92   2019 94     PSA, Total (ng/mL)   Date Value   12/10/2019 <0.01     Prostate Specific Antigen (ng/mL)   Date Value   2020 <0.01          Health Maintenance Due   Topic Date Due   • Abdominal Aortic Aneurysm (AAA) Screening  2020   • Medicare Wellness Visit  2021       Patient is due for the topics as listed above and wishes to proceed with them.          Called and rescheduled pt to earlier time for preop visit as requested----- Message from Bhupendra Kuhn sent at 12/30/2019 11:49 AM CST -----  Contact: Pt  Pt need a earlier appt for 12/31/2019    Please advise pt at [phone]

## 2021-03-15 ENCOUNTER — IMMUNIZATION (OUTPATIENT)
Dept: PRIMARY CARE CLINIC | Facility: CLINIC | Age: 36
End: 2021-03-15
Payer: COMMERCIAL

## 2021-03-15 DIAGNOSIS — Z23 NEED FOR VACCINATION: Primary | ICD-10-CM

## 2021-03-15 PROCEDURE — 0001A COVID-19, MRNA, LNP-S, PF, 30 MCG/0.3 ML DOSE VACCINE: CPT | Mod: CV19,S$GLB,, | Performed by: INTERNAL MEDICINE

## 2021-03-15 PROCEDURE — 91300 COVID-19, MRNA, LNP-S, PF, 30 MCG/0.3 ML DOSE VACCINE: CPT | Mod: S$GLB,,, | Performed by: INTERNAL MEDICINE

## 2021-03-15 PROCEDURE — 0001A COVID-19, MRNA, LNP-S, PF, 30 MCG/0.3 ML DOSE VACCINE: ICD-10-PCS | Mod: CV19,S$GLB,, | Performed by: INTERNAL MEDICINE

## 2021-03-15 PROCEDURE — 91300 COVID-19, MRNA, LNP-S, PF, 30 MCG/0.3 ML DOSE VACCINE: ICD-10-PCS | Mod: S$GLB,,, | Performed by: INTERNAL MEDICINE

## 2021-03-16 ENCOUNTER — OFFICE VISIT (OUTPATIENT)
Dept: FAMILY MEDICINE | Facility: CLINIC | Age: 36
End: 2021-03-16
Payer: COMMERCIAL

## 2021-03-16 DIAGNOSIS — J30.9 ALLERGIC SINUSITIS: Primary | ICD-10-CM

## 2021-03-16 DIAGNOSIS — R09.81 CHRONIC NASAL CONGESTION: ICD-10-CM

## 2021-03-16 PROCEDURE — 99214 OFFICE O/P EST MOD 30 MIN: CPT | Mod: 95,,, | Performed by: FAMILY MEDICINE

## 2021-03-16 PROCEDURE — 99214 PR OFFICE/OUTPT VISIT, EST, LEVL IV, 30-39 MIN: ICD-10-PCS | Mod: 95,,, | Performed by: FAMILY MEDICINE

## 2021-03-16 RX ORDER — FEXOFENADINE HCL 60 MG
60 TABLET ORAL 2 TIMES DAILY
Qty: 60 TABLET | Refills: 2 | Status: SHIPPED | OUTPATIENT
Start: 2021-03-16 | End: 2022-07-29

## 2021-03-17 ENCOUNTER — OFFICE VISIT (OUTPATIENT)
Dept: OTOLARYNGOLOGY | Facility: CLINIC | Age: 36
End: 2021-03-17
Payer: COMMERCIAL

## 2021-03-17 VITALS
WEIGHT: 225.63 LBS | TEMPERATURE: 99 F | DIASTOLIC BLOOD PRESSURE: 72 MMHG | BODY MASS INDEX: 37.59 KG/M2 | SYSTOLIC BLOOD PRESSURE: 118 MMHG | HEIGHT: 65 IN

## 2021-03-17 DIAGNOSIS — J34.89 NASAL HYPERTROPHY: ICD-10-CM

## 2021-03-17 DIAGNOSIS — R09.81 NASAL CONGESTION: Primary | ICD-10-CM

## 2021-03-17 DIAGNOSIS — J30.89 ENVIRONMENTAL AND SEASONAL ALLERGIES: ICD-10-CM

## 2021-03-17 PROCEDURE — 99204 PR OFFICE/OUTPT VISIT, NEW, LEVL IV, 45-59 MIN: ICD-10-PCS | Mod: S$GLB,,, | Performed by: NURSE PRACTITIONER

## 2021-03-17 PROCEDURE — 99204 OFFICE O/P NEW MOD 45 MIN: CPT | Mod: S$GLB,,, | Performed by: NURSE PRACTITIONER

## 2021-03-17 RX ORDER — FLUTICASONE PROPIONATE 50 MCG
1 SPRAY, SUSPENSION (ML) NASAL DAILY
Qty: 16 G | Refills: 5 | Status: SHIPPED | OUTPATIENT
Start: 2021-03-17 | End: 2022-07-29

## 2021-03-17 RX ORDER — AZELASTINE 1 MG/ML
1 SPRAY, METERED NASAL 2 TIMES DAILY
Qty: 30 ML | Refills: 2 | Status: SHIPPED | OUTPATIENT
Start: 2021-03-17 | End: 2021-11-04 | Stop reason: SDUPTHER

## 2021-03-26 ENCOUNTER — PATIENT MESSAGE (OUTPATIENT)
Dept: OBSTETRICS AND GYNECOLOGY | Facility: CLINIC | Age: 36
End: 2021-03-26

## 2021-03-26 DIAGNOSIS — N92.1 MENORRHAGIA WITH IRREGULAR CYCLE: Primary | ICD-10-CM

## 2021-03-29 RX ORDER — DROSPIRENONE AND ETHINYL ESTRADIOL 0.03MG-3MG
1 KIT ORAL DAILY
Qty: 28 TABLET | Refills: 11 | Status: SHIPPED | OUTPATIENT
Start: 2021-03-29 | End: 2021-07-19

## 2021-04-05 ENCOUNTER — IMMUNIZATION (OUTPATIENT)
Dept: PRIMARY CARE CLINIC | Facility: CLINIC | Age: 36
End: 2021-04-05

## 2021-04-05 DIAGNOSIS — Z23 NEED FOR VACCINATION: Primary | ICD-10-CM

## 2021-04-05 PROCEDURE — 91300 COVID-19, MRNA, LNP-S, PF, 30 MCG/0.3 ML DOSE VACCINE: ICD-10-PCS | Mod: S$GLB,,, | Performed by: INTERNAL MEDICINE

## 2021-04-05 PROCEDURE — 0002A COVID-19, MRNA, LNP-S, PF, 30 MCG/0.3 ML DOSE VACCINE: CPT | Mod: CV19,S$GLB,, | Performed by: INTERNAL MEDICINE

## 2021-04-05 PROCEDURE — 91300 COVID-19, MRNA, LNP-S, PF, 30 MCG/0.3 ML DOSE VACCINE: CPT | Mod: S$GLB,,, | Performed by: INTERNAL MEDICINE

## 2021-04-05 PROCEDURE — 0002A COVID-19, MRNA, LNP-S, PF, 30 MCG/0.3 ML DOSE VACCINE: ICD-10-PCS | Mod: CV19,S$GLB,, | Performed by: INTERNAL MEDICINE

## 2021-04-16 ENCOUNTER — HOSPITAL ENCOUNTER (EMERGENCY)
Facility: HOSPITAL | Age: 36
Discharge: HOME | End: 2021-04-16
Attending: EMERGENCY MEDICINE
Payer: COMMERCIAL

## 2021-04-16 VITALS
HEART RATE: 63 BPM | WEIGHT: 225 LBS | OXYGEN SATURATION: 99 % | DIASTOLIC BLOOD PRESSURE: 74 MMHG | RESPIRATION RATE: 18 BRPM | HEIGHT: 65 IN | BODY MASS INDEX: 37.49 KG/M2 | SYSTOLIC BLOOD PRESSURE: 128 MMHG | TEMPERATURE: 98 F

## 2021-04-16 DIAGNOSIS — M25.562 LEFT KNEE PAIN: ICD-10-CM

## 2021-04-16 LAB
B-HCG UR QL: NEGATIVE
CTP QC/QA: YES

## 2021-04-16 PROCEDURE — 63600175 PHARM REV CODE 636 W HCPCS: Mod: ER | Performed by: NURSE PRACTITIONER

## 2021-04-16 PROCEDURE — 96372 THER/PROPH/DIAG INJ SC/IM: CPT | Mod: ER

## 2021-04-16 PROCEDURE — 81025 URINE PREGNANCY TEST: CPT | Mod: ER | Performed by: EMERGENCY MEDICINE

## 2021-04-16 PROCEDURE — 99284 EMERGENCY DEPT VISIT MOD MDM: CPT | Mod: 25,ER

## 2021-04-16 RX ORDER — KETOROLAC TROMETHAMINE 30 MG/ML
10 INJECTION, SOLUTION INTRAMUSCULAR; INTRAVENOUS
Status: COMPLETED | OUTPATIENT
Start: 2021-04-16 | End: 2021-04-16

## 2021-04-16 RX ADMIN — KETOROLAC TROMETHAMINE 10 MG: 30 INJECTION, SOLUTION INTRAMUSCULAR; INTRAVENOUS at 02:04

## 2021-04-19 ENCOUNTER — OFFICE VISIT (OUTPATIENT)
Dept: ORTHOPEDICS | Facility: CLINIC | Age: 36
End: 2021-04-19
Payer: COMMERCIAL

## 2021-04-19 ENCOUNTER — HOSPITAL ENCOUNTER (OUTPATIENT)
Dept: RADIOLOGY | Facility: HOSPITAL | Age: 36
Discharge: HOME | End: 2021-04-19
Attending: PHYSICIAN ASSISTANT
Payer: COMMERCIAL

## 2021-04-19 ENCOUNTER — OFFICE VISIT (OUTPATIENT)
Dept: OBSTETRICS AND GYNECOLOGY | Facility: CLINIC | Age: 36
End: 2021-04-19
Payer: COMMERCIAL

## 2021-04-19 VITALS — WEIGHT: 220.81 LBS | BODY MASS INDEX: 36.79 KG/M2 | HEIGHT: 65 IN

## 2021-04-19 VITALS
SYSTOLIC BLOOD PRESSURE: 128 MMHG | WEIGHT: 221.31 LBS | BODY MASS INDEX: 36.83 KG/M2 | DIASTOLIC BLOOD PRESSURE: 68 MMHG

## 2021-04-19 DIAGNOSIS — M25.562 ACUTE PAIN OF LEFT KNEE: Primary | ICD-10-CM

## 2021-04-19 DIAGNOSIS — Z01.419 ENCOUNTER FOR GYNECOLOGICAL EXAMINATION WITHOUT ABNORMAL FINDING: Primary | ICD-10-CM

## 2021-04-19 DIAGNOSIS — M79.89 LEFT LEG SWELLING: ICD-10-CM

## 2021-04-19 DIAGNOSIS — Z12.39 SCREENING BREAST EXAMINATION: ICD-10-CM

## 2021-04-19 DIAGNOSIS — M25.562 ACUTE PAIN OF LEFT KNEE: ICD-10-CM

## 2021-04-19 PROCEDURE — 93971 EXTREMITY STUDY: CPT | Mod: 26,LT,, | Performed by: RADIOLOGY

## 2021-04-19 PROCEDURE — 99203 PR OFFICE/OUTPT VISIT, NEW, LEVL III, 30-44 MIN: ICD-10-PCS | Mod: S$GLB,,, | Performed by: PHYSICIAN ASSISTANT

## 2021-04-19 PROCEDURE — 73565 X-RAY EXAM OF KNEES: CPT | Mod: 26,,, | Performed by: RADIOLOGY

## 2021-04-19 PROCEDURE — 99999 PR PBB SHADOW E&M-EST. PATIENT-LVL II: CPT | Mod: PBBFAC,,, | Performed by: OBSTETRICS & GYNECOLOGY

## 2021-04-19 PROCEDURE — 93971 US LOWER EXTREMITY VEINS LEFT: ICD-10-PCS | Mod: 26,LT,, | Performed by: RADIOLOGY

## 2021-04-19 PROCEDURE — 99999 PR PBB SHADOW E&M-EST. PATIENT-LVL III: CPT | Mod: PBBFAC,,, | Performed by: PHYSICIAN ASSISTANT

## 2021-04-19 PROCEDURE — 73565 X-RAY EXAM OF KNEES: CPT | Mod: TC

## 2021-04-19 PROCEDURE — 99395 PREV VISIT EST AGE 18-39: CPT | Mod: S$GLB,,, | Performed by: OBSTETRICS & GYNECOLOGY

## 2021-04-19 PROCEDURE — 73565 XR KNEE AP STANDING BILATERAL: ICD-10-PCS | Mod: 26,,, | Performed by: RADIOLOGY

## 2021-04-19 PROCEDURE — 99395 PR PREVENTIVE VISIT,EST,18-39: ICD-10-PCS | Mod: S$GLB,,, | Performed by: OBSTETRICS & GYNECOLOGY

## 2021-04-19 PROCEDURE — 99999 PR PBB SHADOW E&M-EST. PATIENT-LVL II: ICD-10-PCS | Mod: PBBFAC,,, | Performed by: OBSTETRICS & GYNECOLOGY

## 2021-04-19 PROCEDURE — 93971 EXTREMITY STUDY: CPT | Mod: TC,LT

## 2021-04-19 PROCEDURE — 99203 OFFICE O/P NEW LOW 30 MIN: CPT | Mod: S$GLB,,, | Performed by: PHYSICIAN ASSISTANT

## 2021-04-19 PROCEDURE — 99999 PR PBB SHADOW E&M-EST. PATIENT-LVL III: ICD-10-PCS | Mod: PBBFAC,,, | Performed by: PHYSICIAN ASSISTANT

## 2021-04-19 RX ORDER — TRAMADOL HYDROCHLORIDE 50 MG/1
50 TABLET ORAL EVERY 6 HOURS PRN
Qty: 15 TABLET | Refills: 0 | OUTPATIENT
Start: 2021-04-19 | End: 2021-06-20

## 2021-04-20 ENCOUNTER — TELEPHONE (OUTPATIENT)
Dept: ORTHOPEDICS | Facility: CLINIC | Age: 36
End: 2021-04-20

## 2021-04-20 RX ORDER — METHYLPREDNISOLONE 4 MG/1
TABLET ORAL
Qty: 1 PACKAGE | Refills: 0 | Status: SHIPPED | OUTPATIENT
Start: 2021-04-20 | End: 2021-05-11

## 2021-04-25 ENCOUNTER — PATIENT MESSAGE (OUTPATIENT)
Dept: ORTHOPEDICS | Facility: CLINIC | Age: 36
End: 2021-04-25

## 2021-05-04 ENCOUNTER — PATIENT OUTREACH (OUTPATIENT)
Dept: ADMINISTRATIVE | Facility: OTHER | Age: 36
End: 2021-05-04

## 2021-05-11 ENCOUNTER — HOSPITAL ENCOUNTER (OUTPATIENT)
Dept: RADIOLOGY | Facility: HOSPITAL | Age: 36
Discharge: HOME | End: 2021-05-11
Attending: ORTHOPAEDIC SURGERY
Payer: COMMERCIAL

## 2021-05-11 ENCOUNTER — OFFICE VISIT (OUTPATIENT)
Dept: ORTHOPEDICS | Facility: CLINIC | Age: 36
End: 2021-05-11
Payer: COMMERCIAL

## 2021-05-11 VITALS — BODY MASS INDEX: 36.85 KG/M2 | HEIGHT: 65 IN | WEIGHT: 221.19 LBS

## 2021-05-11 DIAGNOSIS — M25.562 ACUTE PAIN OF LEFT KNEE: Primary | ICD-10-CM

## 2021-05-11 DIAGNOSIS — M71.22 BAKER'S CYST OF KNEE, LEFT: ICD-10-CM

## 2021-05-11 DIAGNOSIS — M25.562 ACUTE PAIN OF LEFT KNEE: ICD-10-CM

## 2021-05-11 PROCEDURE — 73560 X-RAY EXAM OF KNEE 1 OR 2: CPT | Mod: 26,50,, | Performed by: RADIOLOGY

## 2021-05-11 PROCEDURE — 99214 PR OFFICE/OUTPT VISIT, EST, LEVL IV, 30-39 MIN: ICD-10-PCS | Mod: 25,S$GLB,, | Performed by: ORTHOPAEDIC SURGERY

## 2021-05-11 PROCEDURE — 20610 DRAIN/INJ JOINT/BURSA W/O US: CPT | Mod: LT,S$GLB,, | Performed by: ORTHOPAEDIC SURGERY

## 2021-05-11 PROCEDURE — 99214 OFFICE O/P EST MOD 30 MIN: CPT | Mod: 25,S$GLB,, | Performed by: ORTHOPAEDIC SURGERY

## 2021-05-11 PROCEDURE — 20610 LARGE JOINT ASPIRATION/INJECTION: L KNEE: ICD-10-PCS | Mod: LT,S$GLB,, | Performed by: ORTHOPAEDIC SURGERY

## 2021-05-11 PROCEDURE — 73560 X-RAY EXAM OF KNEE 1 OR 2: CPT | Mod: TC,50

## 2021-05-11 PROCEDURE — 99999 PR PBB SHADOW E&M-EST. PATIENT-LVL III: ICD-10-PCS | Mod: PBBFAC,,, | Performed by: ORTHOPAEDIC SURGERY

## 2021-05-11 PROCEDURE — 99999 PR PBB SHADOW E&M-EST. PATIENT-LVL III: CPT | Mod: PBBFAC,,, | Performed by: ORTHOPAEDIC SURGERY

## 2021-05-11 PROCEDURE — 73560 XR KNEE 1 OR 2 VIEW BILATERAL: ICD-10-PCS | Mod: 26,50,, | Performed by: RADIOLOGY

## 2021-05-11 RX ADMIN — TRIAMCINOLONE ACETONIDE 40 MG: 40 INJECTION, SUSPENSION INTRA-ARTICULAR; INTRAMUSCULAR at 01:05

## 2021-05-15 RX ORDER — TRIAMCINOLONE ACETONIDE 40 MG/ML
40 INJECTION, SUSPENSION INTRA-ARTICULAR; INTRAMUSCULAR
Status: DISCONTINUED | OUTPATIENT
Start: 2021-05-11 | End: 2021-05-15 | Stop reason: HOSPADM

## 2021-06-20 ENCOUNTER — HOSPITAL ENCOUNTER (EMERGENCY)
Facility: HOSPITAL | Age: 36
Discharge: HOME | End: 2021-06-20
Attending: EMERGENCY MEDICINE
Payer: COMMERCIAL

## 2021-06-20 VITALS
WEIGHT: 203 LBS | DIASTOLIC BLOOD PRESSURE: 65 MMHG | SYSTOLIC BLOOD PRESSURE: 118 MMHG | TEMPERATURE: 98 F | OXYGEN SATURATION: 100 % | RESPIRATION RATE: 18 BRPM | BODY MASS INDEX: 33.82 KG/M2 | HEART RATE: 62 BPM | HEIGHT: 65 IN
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
## 2021-06-20 DIAGNOSIS — M79.601 PAIN OF RIGHT UPPER EXTREMITY: ICD-10-CM

## 2021-06-20 LAB
B-HCG UR QL: NEGATIVE
CTP QC/QA: YES

## 2021-06-20 PROCEDURE — 99284 EMERGENCY DEPT VISIT MOD MDM: CPT | Mod: 25,ER

## 2021-06-20 PROCEDURE — 25000003 PHARM REV CODE 250: Mod: ER | Performed by: EMERGENCY MEDICINE

## 2021-06-20 PROCEDURE — 81025 URINE PREGNANCY TEST: CPT | Mod: ER | Performed by: EMERGENCY MEDICINE

## 2021-06-20 RX ORDER — DIAZEPAM 5 MG/1
5 TABLET ORAL
Status: COMPLETED | OUTPATIENT
Start: 2021-06-20 | End: 2021-06-20

## 2021-06-20 RX ORDER — DIAZEPAM 5 MG/1
5 TABLET ORAL EVERY 12 HOURS PRN
Qty: 10 TABLET | Refills: 0 | Status: SHIPPED | OUTPATIENT
Start: 2021-06-20 | End: 2021-07-19

## 2021-06-20 RX ORDER — TRAMADOL HYDROCHLORIDE 50 MG/1
50 TABLET ORAL EVERY 6 HOURS PRN
Qty: 20 TABLET | Refills: 0 | Status: SHIPPED | OUTPATIENT
Start: 2021-06-20 | End: 2021-07-19

## 2021-06-20 RX ADMIN — DIAZEPAM 5 MG: 5 TABLET ORAL at 07:06

## 2021-06-23 ENCOUNTER — HOSPITAL ENCOUNTER (OUTPATIENT)
Dept: RADIOLOGY | Facility: HOSPITAL | Age: 36
Discharge: HOME | End: 2021-06-23
Attending: PHYSICIAN ASSISTANT
Payer: COMMERCIAL

## 2021-06-23 ENCOUNTER — OFFICE VISIT (OUTPATIENT)
Dept: ORTHOPEDICS | Facility: CLINIC | Age: 36
End: 2021-06-23
Payer: COMMERCIAL

## 2021-06-23 ENCOUNTER — PATIENT OUTREACH (OUTPATIENT)
Dept: ADMINISTRATIVE | Facility: OTHER | Age: 36
End: 2021-06-23

## 2021-06-23 VITALS
HEART RATE: 54 BPM | WEIGHT: 218.81 LBS | BODY MASS INDEX: 36.41 KG/M2 | DIASTOLIC BLOOD PRESSURE: 75 MMHG | SYSTOLIC BLOOD PRESSURE: 112 MMHG

## 2021-06-23 DIAGNOSIS — M77.11 EPICONDYLITIS, LATERAL, RIGHT: ICD-10-CM

## 2021-06-23 DIAGNOSIS — M79.641 HAND PAIN, RIGHT: ICD-10-CM

## 2021-06-23 DIAGNOSIS — M25.521 PAIN IN RIGHT ELBOW: ICD-10-CM

## 2021-06-23 DIAGNOSIS — M79.631 PAIN IN RIGHT FOREARM: ICD-10-CM

## 2021-06-23 DIAGNOSIS — M25.521 PAIN IN RIGHT ELBOW: Primary | ICD-10-CM

## 2021-06-23 PROCEDURE — 99213 PR OFFICE/OUTPT VISIT, EST, LEVL III, 20-29 MIN: ICD-10-PCS | Mod: S$GLB,,, | Performed by: PHYSICIAN ASSISTANT

## 2021-06-23 PROCEDURE — 73090 X-RAY EXAM OF FOREARM: CPT | Mod: 26,RT,, | Performed by: RADIOLOGY

## 2021-06-23 PROCEDURE — 73080 X-RAY EXAM OF ELBOW: CPT | Mod: 26,RT,, | Performed by: RADIOLOGY

## 2021-06-23 PROCEDURE — 99999 PR PBB SHADOW E&M-EST. PATIENT-LVL III: ICD-10-PCS | Mod: PBBFAC,,, | Performed by: PHYSICIAN ASSISTANT

## 2021-06-23 PROCEDURE — 73090 XR FOREARM RIGHT: ICD-10-PCS | Mod: 26,RT,, | Performed by: RADIOLOGY

## 2021-06-23 PROCEDURE — 73130 X-RAY EXAM OF HAND: CPT | Mod: TC,RT

## 2021-06-23 PROCEDURE — 73130 XR HAND COMPLETE 3 VIEW RIGHT: ICD-10-PCS | Mod: 26,RT,, | Performed by: RADIOLOGY

## 2021-06-23 PROCEDURE — 73130 X-RAY EXAM OF HAND: CPT | Mod: 26,RT,, | Performed by: RADIOLOGY

## 2021-06-23 PROCEDURE — 99213 OFFICE O/P EST LOW 20 MIN: CPT | Mod: S$GLB,,, | Performed by: PHYSICIAN ASSISTANT

## 2021-06-23 PROCEDURE — 99999 PR PBB SHADOW E&M-EST. PATIENT-LVL III: CPT | Mod: PBBFAC,,, | Performed by: PHYSICIAN ASSISTANT

## 2021-06-23 PROCEDURE — 73090 X-RAY EXAM OF FOREARM: CPT | Mod: TC,RT

## 2021-06-23 PROCEDURE — 73080 XR ELBOW COMPLETE 3 VIEW RIGHT: ICD-10-PCS | Mod: 26,RT,, | Performed by: RADIOLOGY

## 2021-06-23 PROCEDURE — 73080 X-RAY EXAM OF ELBOW: CPT | Mod: TC,RT

## 2021-06-23 RX ORDER — METHYLPREDNISOLONE 4 MG/1
TABLET ORAL
Qty: 1 PACKAGE | Refills: 0 | Status: SHIPPED | OUTPATIENT
Start: 2021-06-23 | End: 2021-07-19

## 2021-06-28 ENCOUNTER — TELEPHONE (OUTPATIENT)
Dept: ORTHOPEDICS | Facility: CLINIC | Age: 36
End: 2021-06-28

## 2021-06-30 ENCOUNTER — TELEPHONE (OUTPATIENT)
Dept: ORTHOPEDICS | Facility: CLINIC | Age: 36
End: 2021-06-30

## 2021-07-01 ENCOUNTER — OFFICE VISIT (OUTPATIENT)
Dept: ORTHOPEDICS | Facility: CLINIC | Age: 36
End: 2021-07-01
Payer: COMMERCIAL

## 2021-07-01 ENCOUNTER — PATIENT MESSAGE (OUTPATIENT)
Dept: ADMINISTRATIVE | Facility: OTHER | Age: 36
End: 2021-07-01

## 2021-07-01 VITALS
DIASTOLIC BLOOD PRESSURE: 72 MMHG | HEIGHT: 65 IN | WEIGHT: 218.69 LBS | SYSTOLIC BLOOD PRESSURE: 113 MMHG | BODY MASS INDEX: 36.44 KG/M2 | HEART RATE: 62 BPM

## 2021-07-01 DIAGNOSIS — M77.11 RIGHT LATERAL EPICONDYLITIS: Primary | ICD-10-CM

## 2021-07-01 PROCEDURE — 99999 PR PBB SHADOW E&M-EST. PATIENT-LVL III: CPT | Mod: PBBFAC,,, | Performed by: ORTHOPAEDIC SURGERY

## 2021-07-01 PROCEDURE — 20605 INTERMEDIATE JOINT ASPIRATION/INJECTION: ICD-10-PCS | Mod: RT,S$GLB,, | Performed by: ORTHOPAEDIC SURGERY

## 2021-07-01 PROCEDURE — 99999 PR PBB SHADOW E&M-EST. PATIENT-LVL III: ICD-10-PCS | Mod: PBBFAC,,, | Performed by: ORTHOPAEDIC SURGERY

## 2021-07-01 PROCEDURE — 99204 PR OFFICE/OUTPT VISIT, NEW, LEVL IV, 45-59 MIN: ICD-10-PCS | Mod: 25,S$GLB,, | Performed by: ORTHOPAEDIC SURGERY

## 2021-07-01 PROCEDURE — 20605 DRAIN/INJ JOINT/BURSA W/O US: CPT | Mod: RT,S$GLB,, | Performed by: ORTHOPAEDIC SURGERY

## 2021-07-01 PROCEDURE — 99204 OFFICE O/P NEW MOD 45 MIN: CPT | Mod: 25,S$GLB,, | Performed by: ORTHOPAEDIC SURGERY

## 2021-07-01 RX ADMIN — TRIAMCINOLONE ACETONIDE 40 MG: 40 INJECTION, SUSPENSION INTRA-ARTICULAR; INTRAMUSCULAR at 10:07

## 2021-07-02 ENCOUNTER — OFFICE VISIT (OUTPATIENT)
Dept: OBSTETRICS AND GYNECOLOGY | Facility: CLINIC | Age: 36
End: 2021-07-02
Payer: COMMERCIAL

## 2021-07-02 VITALS
BODY MASS INDEX: 35.81 KG/M2 | WEIGHT: 215.19 LBS | DIASTOLIC BLOOD PRESSURE: 90 MMHG | SYSTOLIC BLOOD PRESSURE: 120 MMHG

## 2021-07-02 DIAGNOSIS — E28.2 PCOS (POLYCYSTIC OVARIAN SYNDROME): Primary | ICD-10-CM

## 2021-07-02 PROCEDURE — 99213 OFFICE O/P EST LOW 20 MIN: CPT | Mod: S$GLB,,, | Performed by: OBSTETRICS & GYNECOLOGY

## 2021-07-02 PROCEDURE — 99999 PR PBB SHADOW E&M-EST. PATIENT-LVL II: CPT | Mod: PBBFAC,,, | Performed by: OBSTETRICS & GYNECOLOGY

## 2021-07-02 PROCEDURE — 99999 PR PBB SHADOW E&M-EST. PATIENT-LVL II: ICD-10-PCS | Mod: PBBFAC,,, | Performed by: OBSTETRICS & GYNECOLOGY

## 2021-07-02 PROCEDURE — 99213 PR OFFICE/OUTPT VISIT, EST, LEVL III, 20-29 MIN: ICD-10-PCS | Mod: S$GLB,,, | Performed by: OBSTETRICS & GYNECOLOGY

## 2021-07-08 ENCOUNTER — TELEPHONE (OUTPATIENT)
Dept: ORTHOPEDICS | Facility: CLINIC | Age: 36
End: 2021-07-08

## 2021-07-09 ENCOUNTER — TELEPHONE (OUTPATIENT)
Dept: ORTHOPEDICS | Facility: CLINIC | Age: 36
End: 2021-07-09

## 2021-07-12 RX ORDER — TRIAMCINOLONE ACETONIDE 40 MG/ML
40 INJECTION, SUSPENSION INTRA-ARTICULAR; INTRAMUSCULAR
Status: DISCONTINUED | OUTPATIENT
Start: 2021-07-01 | End: 2021-07-12 | Stop reason: HOSPADM

## 2021-07-19 ENCOUNTER — OFFICE VISIT (OUTPATIENT)
Dept: OBSTETRICS AND GYNECOLOGY | Facility: CLINIC | Age: 36
End: 2021-07-19
Payer: COMMERCIAL

## 2021-07-19 VITALS — WEIGHT: 216.5 LBS | BODY MASS INDEX: 36.03 KG/M2 | DIASTOLIC BLOOD PRESSURE: 70 MMHG | SYSTOLIC BLOOD PRESSURE: 108 MMHG

## 2021-07-19 DIAGNOSIS — N92.6 IRREGULAR MENSES: Primary | ICD-10-CM

## 2021-07-19 LAB
B-HCG UR QL: NEGATIVE
CTP QC/QA: YES

## 2021-07-19 PROCEDURE — 81025 POCT URINE PREGNANCY: ICD-10-PCS | Mod: S$GLB,,, | Performed by: NURSE PRACTITIONER

## 2021-07-19 PROCEDURE — 99999 PR PBB SHADOW E&M-EST. PATIENT-LVL III: ICD-10-PCS | Mod: PBBFAC,,, | Performed by: NURSE PRACTITIONER

## 2021-07-19 PROCEDURE — 81025 URINE PREGNANCY TEST: CPT | Mod: S$GLB,,, | Performed by: NURSE PRACTITIONER

## 2021-07-19 PROCEDURE — 99213 PR OFFICE/OUTPT VISIT, EST, LEVL III, 20-29 MIN: ICD-10-PCS | Mod: 25,S$GLB,, | Performed by: NURSE PRACTITIONER

## 2021-07-19 PROCEDURE — 99213 OFFICE O/P EST LOW 20 MIN: CPT | Mod: 25,S$GLB,, | Performed by: NURSE PRACTITIONER

## 2021-07-19 PROCEDURE — 99999 PR PBB SHADOW E&M-EST. PATIENT-LVL III: CPT | Mod: PBBFAC,,, | Performed by: NURSE PRACTITIONER

## 2021-07-23 ENCOUNTER — PATIENT MESSAGE (OUTPATIENT)
Dept: OBSTETRICS AND GYNECOLOGY | Facility: CLINIC | Age: 36
End: 2021-07-23

## 2021-07-23 ENCOUNTER — HOSPITAL ENCOUNTER (OUTPATIENT)
Dept: RADIOLOGY | Facility: OTHER | Age: 36
Discharge: HOME | End: 2021-07-23
Attending: NURSE PRACTITIONER
Payer: COMMERCIAL

## 2021-07-23 ENCOUNTER — TELEPHONE (OUTPATIENT)
Dept: OBSTETRICS AND GYNECOLOGY | Facility: CLINIC | Age: 36
End: 2021-07-23

## 2021-07-23 DIAGNOSIS — N92.6 IRREGULAR MENSES: ICD-10-CM

## 2021-07-23 PROCEDURE — 76856 US PELVIS COMP WITH TRANSVAG NON-OB (XPD): ICD-10-PCS | Mod: 26,,, | Performed by: RADIOLOGY

## 2021-07-23 PROCEDURE — 76830 US PELVIS COMP WITH TRANSVAG NON-OB (XPD): ICD-10-PCS | Mod: 26,,, | Performed by: RADIOLOGY

## 2021-07-23 PROCEDURE — 76856 US EXAM PELVIC COMPLETE: CPT | Mod: TC

## 2021-07-23 PROCEDURE — 76856 US EXAM PELVIC COMPLETE: CPT | Mod: 26,,, | Performed by: RADIOLOGY

## 2021-07-23 PROCEDURE — 76830 TRANSVAGINAL US NON-OB: CPT | Mod: 26,,, | Performed by: RADIOLOGY

## 2021-07-27 ENCOUNTER — PATIENT MESSAGE (OUTPATIENT)
Dept: OBSTETRICS AND GYNECOLOGY | Facility: CLINIC | Age: 36
End: 2021-07-27

## 2021-07-27 ENCOUNTER — TELEPHONE (OUTPATIENT)
Dept: OBSTETRICS AND GYNECOLOGY | Facility: CLINIC | Age: 36
End: 2021-07-27

## 2021-07-28 ENCOUNTER — TELEPHONE (OUTPATIENT)
Dept: OBSTETRICS AND GYNECOLOGY | Facility: CLINIC | Age: 36
End: 2021-07-28

## 2021-07-28 ENCOUNTER — PATIENT MESSAGE (OUTPATIENT)
Dept: OBSTETRICS AND GYNECOLOGY | Facility: CLINIC | Age: 36
End: 2021-07-28

## 2021-08-03 ENCOUNTER — PATIENT OUTREACH (OUTPATIENT)
Dept: ADMINISTRATIVE | Facility: OTHER | Age: 36
End: 2021-08-03

## 2021-08-05 ENCOUNTER — OFFICE VISIT (OUTPATIENT)
Dept: OBSTETRICS AND GYNECOLOGY | Facility: CLINIC | Age: 36
End: 2021-08-05
Payer: COMMERCIAL

## 2021-08-05 VITALS
WEIGHT: 218.5 LBS | DIASTOLIC BLOOD PRESSURE: 62 MMHG | SYSTOLIC BLOOD PRESSURE: 108 MMHG | HEIGHT: 65 IN | BODY MASS INDEX: 36.4 KG/M2

## 2021-08-05 DIAGNOSIS — N92.6 IRREGULAR MENSTRUAL CYCLE: Primary | ICD-10-CM

## 2021-08-05 DIAGNOSIS — Z31.9 PROCREATIVE MANAGEMENT: ICD-10-CM

## 2021-08-05 PROCEDURE — 99999 PR PBB SHADOW E&M-EST. PATIENT-LVL III: CPT | Mod: PBBFAC,,, | Performed by: OBSTETRICS & GYNECOLOGY

## 2021-08-05 PROCEDURE — 99214 PR OFFICE/OUTPT VISIT, EST, LEVL IV, 30-39 MIN: ICD-10-PCS | Mod: S$GLB,,, | Performed by: OBSTETRICS & GYNECOLOGY

## 2021-08-05 PROCEDURE — 99999 PR PBB SHADOW E&M-EST. PATIENT-LVL III: ICD-10-PCS | Mod: PBBFAC,,, | Performed by: OBSTETRICS & GYNECOLOGY

## 2021-08-05 PROCEDURE — 99214 OFFICE O/P EST MOD 30 MIN: CPT | Mod: S$GLB,,, | Performed by: OBSTETRICS & GYNECOLOGY

## 2021-08-06 ENCOUNTER — LAB VISIT (OUTPATIENT)
Dept: LAB | Facility: HOSPITAL | Age: 36
End: 2021-08-06
Attending: OBSTETRICS & GYNECOLOGY
Payer: COMMERCIAL

## 2021-08-06 DIAGNOSIS — N92.6 IRREGULAR MENSTRUAL CYCLE: ICD-10-CM

## 2021-08-06 LAB
DHEA-S SERPL-MCNC: 34.9 UG/DL (ref 95.8–511.7)
FSH SERPL-ACNC: 1.44 MIU/ML
LH SERPL-ACNC: 4 MIU/ML
PROLACTIN SERPL IA-MCNC: 26.4 NG/ML (ref 5.2–26.5)
TSH SERPL DL<=0.005 MIU/L-ACNC: 2.57 UIU/ML (ref 0.4–4)

## 2021-08-06 PROCEDURE — 83001 ASSAY OF GONADOTROPIN (FSH): CPT | Performed by: OBSTETRICS & GYNECOLOGY

## 2021-08-06 PROCEDURE — 84403 ASSAY OF TOTAL TESTOSTERONE: CPT | Performed by: OBSTETRICS & GYNECOLOGY

## 2021-08-06 PROCEDURE — 84146 ASSAY OF PROLACTIN: CPT | Performed by: OBSTETRICS & GYNECOLOGY

## 2021-08-06 PROCEDURE — 82627 DEHYDROEPIANDROSTERONE: CPT | Performed by: OBSTETRICS & GYNECOLOGY

## 2021-08-06 PROCEDURE — 84443 ASSAY THYROID STIM HORMONE: CPT | Performed by: OBSTETRICS & GYNECOLOGY

## 2021-08-06 PROCEDURE — 83002 ASSAY OF GONADOTROPIN (LH): CPT | Performed by: OBSTETRICS & GYNECOLOGY

## 2021-08-06 PROCEDURE — 36415 COLL VENOUS BLD VENIPUNCTURE: CPT | Mod: PO | Performed by: OBSTETRICS & GYNECOLOGY

## 2021-08-07 ENCOUNTER — PATIENT MESSAGE (OUTPATIENT)
Dept: OBSTETRICS AND GYNECOLOGY | Facility: HOSPITAL | Age: 36
End: 2021-08-07

## 2021-08-11 LAB
ALBUMIN SERPL-MCNC: 3.8 G/DL (ref 3.6–5.1)
SHBG SERPL-SCNC: 116 NMOL/L (ref 17–124)
TESTOST FREE SERPL-MCNC: 1.8 PG/ML (ref 0.2–5)
TESTOST SERPL-MCNC: 44 NG/DL (ref 2–45)
TESTOSTERONE.FREE+WB SERPL-MCNC: 3.1 NG/DL (ref 0.5–8.5)

## 2021-08-26 ENCOUNTER — OFFICE VISIT (OUTPATIENT)
Dept: OBSTETRICS AND GYNECOLOGY | Facility: CLINIC | Age: 36
End: 2021-08-26
Payer: COMMERCIAL

## 2021-08-26 VITALS
SYSTOLIC BLOOD PRESSURE: 124 MMHG | WEIGHT: 217.13 LBS | BODY MASS INDEX: 36.18 KG/M2 | DIASTOLIC BLOOD PRESSURE: 76 MMHG | HEIGHT: 65 IN

## 2021-08-26 DIAGNOSIS — N92.6 IRREGULAR BLEEDING: ICD-10-CM

## 2021-08-26 DIAGNOSIS — E28.2 PCOS (POLYCYSTIC OVARIAN SYNDROME): ICD-10-CM

## 2021-08-26 DIAGNOSIS — Z31.9 PROCREATIVE MANAGEMENT: Primary | ICD-10-CM

## 2021-08-26 PROCEDURE — 99214 OFFICE O/P EST MOD 30 MIN: CPT | Mod: S$GLB,,, | Performed by: OBSTETRICS & GYNECOLOGY

## 2021-08-26 PROCEDURE — 99999 PR PBB SHADOW E&M-EST. PATIENT-LVL III: CPT | Mod: PBBFAC,,, | Performed by: OBSTETRICS & GYNECOLOGY

## 2021-08-26 PROCEDURE — 99214 PR OFFICE/OUTPT VISIT, EST, LEVL IV, 30-39 MIN: ICD-10-PCS | Mod: S$GLB,,, | Performed by: OBSTETRICS & GYNECOLOGY

## 2021-08-26 PROCEDURE — 99999 PR PBB SHADOW E&M-EST. PATIENT-LVL III: ICD-10-PCS | Mod: PBBFAC,,, | Performed by: OBSTETRICS & GYNECOLOGY

## 2021-08-26 RX ORDER — LETROZOLE 2.5 MG/1
2.5 TABLET, FILM COATED ORAL DAILY
Qty: 5 TABLET | Refills: 2 | Status: SHIPPED | OUTPATIENT
Start: 2021-08-26 | End: 2021-11-17 | Stop reason: SDUPTHER

## 2021-08-26 RX ORDER — PROGESTERONE 100 MG/1
100 CAPSULE ORAL NIGHTLY
Qty: 12 CAPSULE | Refills: 11 | Status: SHIPPED | OUTPATIENT
Start: 2021-08-26 | End: 2023-12-07

## 2021-09-08 ENCOUNTER — PATIENT OUTREACH (OUTPATIENT)
Dept: ADMINISTRATIVE | Facility: OTHER | Age: 36
End: 2021-09-08

## 2021-10-06 ENCOUNTER — PATIENT MESSAGE (OUTPATIENT)
Dept: OBSTETRICS AND GYNECOLOGY | Facility: CLINIC | Age: 36
End: 2021-10-06

## 2021-10-25 ENCOUNTER — IMMUNIZATION (OUTPATIENT)
Dept: PRIMARY CARE CLINIC | Facility: CLINIC | Age: 36
End: 2021-10-25
Payer: COMMERCIAL

## 2021-10-25 ENCOUNTER — PATIENT MESSAGE (OUTPATIENT)
Dept: OBSTETRICS AND GYNECOLOGY | Facility: CLINIC | Age: 36
End: 2021-10-25
Payer: COMMERCIAL

## 2021-10-25 DIAGNOSIS — Z23 NEED FOR VACCINATION: Primary | ICD-10-CM

## 2021-10-25 PROCEDURE — 0003A COVID-19, MRNA, LNP-S, PF, 30 MCG/0.3 ML DOSE VACCINE: CPT | Mod: CV19,PBBFAC | Performed by: INTERNAL MEDICINE

## 2021-10-25 PROCEDURE — 91300 COVID-19, MRNA, LNP-S, PF, 30 MCG/0.3 ML DOSE VACCINE: CPT | Mod: PBBFAC | Performed by: INTERNAL MEDICINE

## 2021-10-27 ENCOUNTER — PATIENT MESSAGE (OUTPATIENT)
Dept: OBSTETRICS AND GYNECOLOGY | Facility: CLINIC | Age: 36
End: 2021-10-27
Payer: COMMERCIAL

## 2021-10-27 ENCOUNTER — NURSE TRIAGE (OUTPATIENT)
Dept: ADMINISTRATIVE | Facility: CLINIC | Age: 36
End: 2021-10-27
Payer: COMMERCIAL

## 2021-10-28 ENCOUNTER — OFFICE VISIT (OUTPATIENT)
Dept: FAMILY MEDICINE | Facility: CLINIC | Age: 36
End: 2021-10-28
Payer: COMMERCIAL

## 2021-10-28 ENCOUNTER — OFFICE VISIT (OUTPATIENT)
Dept: DERMATOLOGY | Facility: CLINIC | Age: 36
End: 2021-10-28
Payer: COMMERCIAL

## 2021-10-28 VITALS
SYSTOLIC BLOOD PRESSURE: 119 MMHG | HEIGHT: 65 IN | TEMPERATURE: 99 F | BODY MASS INDEX: 36.07 KG/M2 | HEART RATE: 62 BPM | DIASTOLIC BLOOD PRESSURE: 66 MMHG | WEIGHT: 216.5 LBS | OXYGEN SATURATION: 96 %

## 2021-10-28 DIAGNOSIS — L03.114 CELLULITIS OF LEFT UPPER EXTREMITY: Primary | ICD-10-CM

## 2021-10-28 DIAGNOSIS — T50.Z95A ADVERSE EFFECT OF VACCINE, INITIAL ENCOUNTER: Primary | ICD-10-CM

## 2021-10-28 DIAGNOSIS — L81.5 LEUKODERMA: ICD-10-CM

## 2021-10-28 PROCEDURE — 99214 OFFICE O/P EST MOD 30 MIN: CPT | Mod: S$GLB,,, | Performed by: STUDENT IN AN ORGANIZED HEALTH CARE EDUCATION/TRAINING PROGRAM

## 2021-10-28 PROCEDURE — 99214 PR OFFICE/OUTPT VISIT, EST, LEVL IV, 30-39 MIN: ICD-10-PCS | Mod: S$GLB,,, | Performed by: STUDENT IN AN ORGANIZED HEALTH CARE EDUCATION/TRAINING PROGRAM

## 2021-10-28 PROCEDURE — 99999 PR PBB SHADOW E&M-EST. PATIENT-LVL III: CPT | Mod: PBBFAC,,, | Performed by: STUDENT IN AN ORGANIZED HEALTH CARE EDUCATION/TRAINING PROGRAM

## 2021-10-28 PROCEDURE — 99999 PR PBB SHADOW E&M-EST. PATIENT-LVL IV: ICD-10-PCS | Mod: PBBFAC,,, | Performed by: STUDENT IN AN ORGANIZED HEALTH CARE EDUCATION/TRAINING PROGRAM

## 2021-10-28 PROCEDURE — 99999 PR PBB SHADOW E&M-EST. PATIENT-LVL IV: CPT | Mod: PBBFAC,,, | Performed by: STUDENT IN AN ORGANIZED HEALTH CARE EDUCATION/TRAINING PROGRAM

## 2021-10-28 PROCEDURE — 99999 PR PBB SHADOW E&M-EST. PATIENT-LVL III: ICD-10-PCS | Mod: PBBFAC,,, | Performed by: STUDENT IN AN ORGANIZED HEALTH CARE EDUCATION/TRAINING PROGRAM

## 2021-10-28 RX ORDER — PREDNISONE 20 MG/1
TABLET ORAL
Qty: 46 TABLET | Refills: 0 | Status: SHIPPED | OUTPATIENT
Start: 2021-10-28 | End: 2021-11-25

## 2021-10-28 RX ORDER — CEPHALEXIN 500 MG/1
500 CAPSULE ORAL EVERY 6 HOURS
Qty: 20 CAPSULE | Refills: 0 | Status: SHIPPED | OUTPATIENT
Start: 2021-10-28 | End: 2021-11-02

## 2021-10-28 RX ORDER — CLOBETASOL PROPIONATE 0.5 MG/G
OINTMENT TOPICAL 2 TIMES DAILY
Qty: 45 G | Refills: 3 | Status: SHIPPED | OUTPATIENT
Start: 2021-10-28 | End: 2021-12-02 | Stop reason: SDUPTHER

## 2021-11-08 ENCOUNTER — PATIENT OUTREACH (OUTPATIENT)
Dept: ADMINISTRATIVE | Facility: OTHER | Age: 36
End: 2021-11-08
Payer: COMMERCIAL

## 2021-11-09 ENCOUNTER — TELEPHONE (OUTPATIENT)
Dept: OTOLARYNGOLOGY | Facility: CLINIC | Age: 36
End: 2021-11-09
Payer: COMMERCIAL

## 2021-11-10 ENCOUNTER — TELEPHONE (OUTPATIENT)
Dept: OTOLARYNGOLOGY | Facility: CLINIC | Age: 36
End: 2021-11-10
Payer: COMMERCIAL

## 2021-11-17 ENCOUNTER — PATIENT MESSAGE (OUTPATIENT)
Dept: OBSTETRICS AND GYNECOLOGY | Facility: CLINIC | Age: 36
End: 2021-11-17
Payer: COMMERCIAL

## 2021-11-17 DIAGNOSIS — Z31.9 PROCREATIVE MANAGEMENT: ICD-10-CM

## 2021-11-17 RX ORDER — LETROZOLE 2.5 MG/1
5 TABLET, FILM COATED ORAL DAILY
Qty: 10 TABLET | Refills: 2 | Status: ON HOLD | OUTPATIENT
Start: 2021-11-17 | End: 2022-05-10 | Stop reason: HOSPADM

## 2021-11-18 ENCOUNTER — OFFICE VISIT (OUTPATIENT)
Dept: ENDOCRINOLOGY | Facility: CLINIC | Age: 36
End: 2021-11-18
Payer: COMMERCIAL

## 2021-11-18 VITALS
HEART RATE: 71 BPM | SYSTOLIC BLOOD PRESSURE: 100 MMHG | BODY MASS INDEX: 37.04 KG/M2 | WEIGHT: 222.31 LBS | DIASTOLIC BLOOD PRESSURE: 67 MMHG | HEIGHT: 65 IN | OXYGEN SATURATION: 99 %

## 2021-11-18 DIAGNOSIS — E66.01 MORBID OBESITY WITH BMI OF 45.0-49.9, ADULT: Chronic | ICD-10-CM

## 2021-11-18 DIAGNOSIS — E28.2 PCOS (POLYCYSTIC OVARIAN SYNDROME): ICD-10-CM

## 2021-11-18 PROCEDURE — 99204 OFFICE O/P NEW MOD 45 MIN: CPT | Mod: S$GLB,,, | Performed by: INTERNAL MEDICINE

## 2021-11-18 PROCEDURE — 99204 PR OFFICE/OUTPT VISIT, NEW, LEVL IV, 45-59 MIN: ICD-10-PCS | Mod: S$GLB,,, | Performed by: INTERNAL MEDICINE

## 2021-11-18 PROCEDURE — 99999 PR PBB SHADOW E&M-EST. PATIENT-LVL IV: CPT | Mod: PBBFAC,,, | Performed by: INTERNAL MEDICINE

## 2021-11-18 PROCEDURE — 99999 PR PBB SHADOW E&M-EST. PATIENT-LVL IV: ICD-10-PCS | Mod: PBBFAC,,, | Performed by: INTERNAL MEDICINE

## 2021-11-22 ENCOUNTER — OFFICE VISIT (OUTPATIENT)
Dept: FAMILY MEDICINE | Facility: CLINIC | Age: 36
End: 2021-11-22
Payer: COMMERCIAL

## 2021-11-22 VITALS
DIASTOLIC BLOOD PRESSURE: 72 MMHG | BODY MASS INDEX: 36.73 KG/M2 | WEIGHT: 220.44 LBS | OXYGEN SATURATION: 99 % | TEMPERATURE: 99 F | HEIGHT: 65 IN | SYSTOLIC BLOOD PRESSURE: 106 MMHG | HEART RATE: 96 BPM

## 2021-11-22 DIAGNOSIS — J06.9 UPPER RESPIRATORY TRACT INFECTION, UNSPECIFIED TYPE: Primary | ICD-10-CM

## 2021-11-22 DIAGNOSIS — E66.01 SEVERE OBESITY (BMI 35.0-39.9) WITH COMORBIDITY: ICD-10-CM

## 2021-11-22 LAB
CTP QC/QA: YES
POC MOLECULAR INFLUENZA A AGN: NEGATIVE
POC MOLECULAR INFLUENZA B AGN: NEGATIVE

## 2021-11-22 PROCEDURE — 87502 POCT INFLUENZA A/B MOLECULAR: ICD-10-PCS | Mod: QW,S$GLB,, | Performed by: FAMILY MEDICINE

## 2021-11-22 PROCEDURE — 99999 PR PBB SHADOW E&M-EST. PATIENT-LVL IV: ICD-10-PCS | Mod: PBBFAC,,, | Performed by: FAMILY MEDICINE

## 2021-11-22 PROCEDURE — 99999 PR PBB SHADOW E&M-EST. PATIENT-LVL IV: CPT | Mod: PBBFAC,,, | Performed by: FAMILY MEDICINE

## 2021-11-22 PROCEDURE — 87502 INFLUENZA DNA AMP PROBE: CPT | Mod: QW,S$GLB,, | Performed by: FAMILY MEDICINE

## 2021-11-22 PROCEDURE — 96372 PR INJECTION,THERAP/PROPH/DIAG2ST, IM OR SUBCUT: ICD-10-PCS | Mod: S$GLB,,, | Performed by: FAMILY MEDICINE

## 2021-11-22 PROCEDURE — 96372 THER/PROPH/DIAG INJ SC/IM: CPT | Mod: S$GLB,,, | Performed by: FAMILY MEDICINE

## 2021-11-22 PROCEDURE — 99214 PR OFFICE/OUTPT VISIT, EST, LEVL IV, 30-39 MIN: ICD-10-PCS | Mod: 25,S$GLB,, | Performed by: FAMILY MEDICINE

## 2021-11-22 PROCEDURE — 99214 OFFICE O/P EST MOD 30 MIN: CPT | Mod: 25,S$GLB,, | Performed by: FAMILY MEDICINE

## 2021-11-22 RX ORDER — DEXAMETHASONE SODIUM PHOSPHATE 4 MG/ML
4 INJECTION, SOLUTION INTRA-ARTICULAR; INTRALESIONAL; INTRAMUSCULAR; INTRAVENOUS; SOFT TISSUE ONCE
Status: COMPLETED | OUTPATIENT
Start: 2021-11-22 | End: 2021-11-22

## 2021-11-22 RX ORDER — CODEINE PHOSPHATE AND GUAIFENESIN 10; 100 MG/5ML; MG/5ML
5 SOLUTION ORAL EVERY 12 HOURS PRN
Qty: 180 ML | Refills: 0 | Status: SHIPPED | OUTPATIENT
Start: 2021-11-22 | End: 2021-12-02

## 2021-11-22 RX ORDER — DEXAMETHASONE SODIUM PHOSPHATE 4 MG/ML
8 INJECTION, SOLUTION INTRA-ARTICULAR; INTRALESIONAL; INTRAMUSCULAR; INTRAVENOUS; SOFT TISSUE ONCE
Status: DISCONTINUED | OUTPATIENT
Start: 2021-11-22 | End: 2021-11-22

## 2021-11-22 RX ADMIN — DEXAMETHASONE SODIUM PHOSPHATE 4 MG: 4 INJECTION, SOLUTION INTRA-ARTICULAR; INTRALESIONAL; INTRAMUSCULAR; INTRAVENOUS; SOFT TISSUE at 10:11

## 2021-11-23 ENCOUNTER — TELEPHONE (OUTPATIENT)
Dept: OBSTETRICS AND GYNECOLOGY | Facility: CLINIC | Age: 36
End: 2021-11-23
Payer: COMMERCIAL

## 2021-11-24 ENCOUNTER — PATIENT MESSAGE (OUTPATIENT)
Dept: FAMILY MEDICINE | Facility: CLINIC | Age: 36
End: 2021-11-24
Payer: COMMERCIAL

## 2021-11-24 RX ORDER — BENZONATATE 100 MG/1
100 CAPSULE ORAL 3 TIMES DAILY PRN
Qty: 30 CAPSULE | Refills: 0 | Status: SHIPPED | OUTPATIENT
Start: 2021-11-24 | End: 2021-12-04

## 2021-11-24 RX ORDER — AZITHROMYCIN 250 MG/1
TABLET, FILM COATED ORAL
Qty: 6 TABLET | Refills: 0 | Status: SHIPPED | OUTPATIENT
Start: 2021-11-24 | End: 2021-11-29

## 2021-12-02 ENCOUNTER — OFFICE VISIT (OUTPATIENT)
Dept: DERMATOLOGY | Facility: CLINIC | Age: 36
End: 2021-12-02
Payer: COMMERCIAL

## 2021-12-02 DIAGNOSIS — L81.5 LEUKODERMA: Primary | ICD-10-CM

## 2021-12-02 DIAGNOSIS — T50.Z95A ADVERSE EFFECT OF VACCINE, INITIAL ENCOUNTER: ICD-10-CM

## 2021-12-02 PROCEDURE — 99999 PR PBB SHADOW E&M-EST. PATIENT-LVL III: CPT | Mod: PBBFAC,,, | Performed by: STUDENT IN AN ORGANIZED HEALTH CARE EDUCATION/TRAINING PROGRAM

## 2021-12-02 PROCEDURE — 99213 PR OFFICE/OUTPT VISIT, EST, LEVL III, 20-29 MIN: ICD-10-PCS | Mod: S$GLB,,, | Performed by: STUDENT IN AN ORGANIZED HEALTH CARE EDUCATION/TRAINING PROGRAM

## 2021-12-02 PROCEDURE — 99213 OFFICE O/P EST LOW 20 MIN: CPT | Mod: S$GLB,,, | Performed by: STUDENT IN AN ORGANIZED HEALTH CARE EDUCATION/TRAINING PROGRAM

## 2021-12-02 PROCEDURE — 99999 PR PBB SHADOW E&M-EST. PATIENT-LVL III: ICD-10-PCS | Mod: PBBFAC,,, | Performed by: STUDENT IN AN ORGANIZED HEALTH CARE EDUCATION/TRAINING PROGRAM

## 2021-12-02 RX ORDER — CLOBETASOL PROPIONATE 0.5 MG/G
OINTMENT TOPICAL 2 TIMES DAILY
Qty: 45 G | Refills: 3 | Status: SHIPPED | OUTPATIENT
Start: 2021-12-02 | End: 2023-12-07

## 2021-12-02 RX ORDER — TACROLIMUS 1 MG/G
OINTMENT TOPICAL 2 TIMES DAILY
Qty: 60 G | Refills: 1 | Status: ON HOLD | OUTPATIENT
Start: 2021-12-02 | End: 2022-05-10 | Stop reason: HOSPADM

## 2021-12-03 ENCOUNTER — TELEPHONE (OUTPATIENT)
Dept: OBSTETRICS AND GYNECOLOGY | Facility: CLINIC | Age: 36
End: 2021-12-03
Payer: COMMERCIAL

## 2021-12-03 ENCOUNTER — PATIENT MESSAGE (OUTPATIENT)
Dept: OBSTETRICS AND GYNECOLOGY | Facility: CLINIC | Age: 36
End: 2021-12-03
Payer: COMMERCIAL

## 2021-12-06 ENCOUNTER — OFFICE VISIT (OUTPATIENT)
Dept: OBSTETRICS AND GYNECOLOGY | Facility: CLINIC | Age: 36
End: 2021-12-06
Payer: COMMERCIAL

## 2021-12-06 ENCOUNTER — LAB VISIT (OUTPATIENT)
Dept: LAB | Facility: HOSPITAL | Age: 36
End: 2021-12-06
Attending: OBSTETRICS & GYNECOLOGY
Payer: COMMERCIAL

## 2021-12-06 VITALS — DIASTOLIC BLOOD PRESSURE: 80 MMHG | SYSTOLIC BLOOD PRESSURE: 130 MMHG | WEIGHT: 222 LBS | BODY MASS INDEX: 36.94 KG/M2

## 2021-12-06 DIAGNOSIS — Z32.01 PREGNANCY CONFIRMED BY POSITIVE URINE TEST: ICD-10-CM

## 2021-12-06 DIAGNOSIS — Z32.01 PREGNANCY CONFIRMED BY POSITIVE URINE TEST: Primary | ICD-10-CM

## 2021-12-06 DIAGNOSIS — Z90.3 S/P GASTRIC SLEEVE PROCEDURE: ICD-10-CM

## 2021-12-06 DIAGNOSIS — N89.8 VAGINAL DISCHARGE: ICD-10-CM

## 2021-12-06 DIAGNOSIS — O99.210 OBESITY AFFECTING PREGNANCY, ANTEPARTUM: ICD-10-CM

## 2021-12-06 LAB
ABO + RH BLD: NORMAL
BILIRUB UR QL STRIP: NEGATIVE
BLD GP AB SCN CELLS X3 SERPL QL: NORMAL
CLARITY UR: CLEAR
COLOR UR: YELLOW
ERYTHROCYTE [DISTWIDTH] IN BLOOD BY AUTOMATED COUNT: 15.6 % (ref 11.5–14.5)
GLUCOSE UR QL STRIP: NEGATIVE
HCT VFR BLD AUTO: 37.4 % (ref 37–48.5)
HGB BLD-MCNC: 11.5 G/DL (ref 12–16)
HGB UR QL STRIP: NEGATIVE
KETONES UR QL STRIP: NEGATIVE
LEUKOCYTE ESTERASE UR QL STRIP: NEGATIVE
MCH RBC QN AUTO: 25.4 PG (ref 27–31)
MCHC RBC AUTO-ENTMCNC: 30.7 G/DL (ref 32–36)
MCV RBC AUTO: 83 FL (ref 82–98)
NITRITE UR QL STRIP: NEGATIVE
PH UR STRIP: 6 [PH] (ref 5–8)
PLATELET # BLD AUTO: 312 K/UL (ref 150–450)
PMV BLD AUTO: 9.4 FL (ref 9.2–12.9)
PROT UR QL STRIP: NEGATIVE
RBC # BLD AUTO: 4.52 M/UL (ref 4–5.4)
SP GR UR STRIP: 1.02 (ref 1–1.03)
URN SPEC COLLECT METH UR: ABNORMAL
UROBILINOGEN UR STRIP-ACNC: ABNORMAL EU/DL
WBC # BLD AUTO: 9.52 K/UL (ref 3.9–12.7)

## 2021-12-06 PROCEDURE — 87340 HEPATITIS B SURFACE AG IA: CPT | Performed by: OBSTETRICS & GYNECOLOGY

## 2021-12-06 PROCEDURE — 87086 URINE CULTURE/COLONY COUNT: CPT | Performed by: OBSTETRICS & GYNECOLOGY

## 2021-12-06 PROCEDURE — 99999 PR PBB SHADOW E&M-EST. PATIENT-LVL III: ICD-10-PCS | Mod: PBBFAC,,, | Performed by: OBSTETRICS & GYNECOLOGY

## 2021-12-06 PROCEDURE — 81003 URINALYSIS AUTO W/O SCOPE: CPT | Performed by: OBSTETRICS & GYNECOLOGY

## 2021-12-06 PROCEDURE — 81220 CFTR GENE COM VARIANTS: CPT | Performed by: OBSTETRICS & GYNECOLOGY

## 2021-12-06 PROCEDURE — 87591 N.GONORRHOEAE DNA AMP PROB: CPT | Mod: 59 | Performed by: OBSTETRICS & GYNECOLOGY

## 2021-12-06 PROCEDURE — 99999 PR PBB SHADOW E&M-EST. PATIENT-LVL III: CPT | Mod: PBBFAC,,, | Performed by: OBSTETRICS & GYNECOLOGY

## 2021-12-06 PROCEDURE — 87661 TRICHOMONAS VAGINALIS AMPLIF: CPT | Mod: 59 | Performed by: OBSTETRICS & GYNECOLOGY

## 2021-12-06 PROCEDURE — 87491 CHLMYD TRACH DNA AMP PROBE: CPT | Mod: 59 | Performed by: OBSTETRICS & GYNECOLOGY

## 2021-12-06 PROCEDURE — 99214 OFFICE O/P EST MOD 30 MIN: CPT | Mod: S$GLB,,, | Performed by: OBSTETRICS & GYNECOLOGY

## 2021-12-06 PROCEDURE — 86592 SYPHILIS TEST NON-TREP QUAL: CPT | Performed by: OBSTETRICS & GYNECOLOGY

## 2021-12-06 PROCEDURE — 87481 CANDIDA DNA AMP PROBE: CPT | Mod: 59 | Performed by: OBSTETRICS & GYNECOLOGY

## 2021-12-06 PROCEDURE — 36415 COLL VENOUS BLD VENIPUNCTURE: CPT | Performed by: OBSTETRICS & GYNECOLOGY

## 2021-12-06 PROCEDURE — 86762 RUBELLA ANTIBODY: CPT | Performed by: OBSTETRICS & GYNECOLOGY

## 2021-12-06 PROCEDURE — 83020 HEMOGLOBIN ELECTROPHORESIS: CPT | Mod: 91 | Performed by: OBSTETRICS & GYNECOLOGY

## 2021-12-06 PROCEDURE — 86900 BLOOD TYPING SEROLOGIC ABO: CPT | Performed by: OBSTETRICS & GYNECOLOGY

## 2021-12-06 PROCEDURE — 87389 HIV-1 AG W/HIV-1&-2 AB AG IA: CPT | Performed by: OBSTETRICS & GYNECOLOGY

## 2021-12-06 PROCEDURE — 99214 PR OFFICE/OUTPT VISIT, EST, LEVL IV, 30-39 MIN: ICD-10-PCS | Mod: S$GLB,,, | Performed by: OBSTETRICS & GYNECOLOGY

## 2021-12-06 PROCEDURE — 85027 COMPLETE CBC AUTOMATED: CPT | Performed by: OBSTETRICS & GYNECOLOGY

## 2021-12-08 ENCOUNTER — PATIENT MESSAGE (OUTPATIENT)
Dept: OBSTETRICS AND GYNECOLOGY | Facility: CLINIC | Age: 36
End: 2021-12-08
Payer: COMMERCIAL

## 2021-12-08 LAB
BACTERIA UR CULT: NORMAL
BACTERIAL VAGINOSIS DNA: POSITIVE
CANDIDA GLABRATA DNA: NEGATIVE
CANDIDA KRUSEI DNA: NEGATIVE
CANDIDA RRNA VAG QL PROBE: NEGATIVE
HBV SURFACE AG SERPL QL IA: NEGATIVE
HIV 1+2 AB+HIV1 P24 AG SERPL QL IA: NEGATIVE
RPR SER QL: NORMAL
RUBV IGG SER-ACNC: 14.3 IU/ML
RUBV IGG SER-IMP: REACTIVE
T VAGINALIS RRNA GENITAL QL PROBE: NEGATIVE

## 2021-12-09 LAB
CFTR MUT ANL BLD/T: NORMAL
HB ELECTROPHORESIS INTERP CANCEL: NORMAL
HB ELECTROPHORESIS INTERPRETATION: NORMAL
HGB A MFR BLD ELPH: 97.3 % (ref 95.8–98)
HGB A2 MFR BLD: 2.7 % (ref 2–3.3)
HGB A2+XXX MFR BLD ELPH: NORMAL %
HGB F MFR BLD: 0 % (ref 0–0.9)
HGB XXX MFR BLD ELPH: NORMAL %
HPLC HB VARIANT: NORMAL

## 2021-12-10 LAB
C TRACH DNA SPEC QL NAA+PROBE: NOT DETECTED
N GONORRHOEA DNA SPEC QL NAA+PROBE: NOT DETECTED

## 2021-12-10 RX ORDER — METRONIDAZOLE 500 MG/1
500 TABLET ORAL EVERY 12 HOURS
Qty: 14 TABLET | Refills: 0 | Status: SHIPPED | OUTPATIENT
Start: 2021-12-10 | End: 2021-12-17

## 2021-12-10 RX ORDER — PROGESTERONE 200 MG/1
400 CAPSULE ORAL DAILY
Qty: 30 CAPSULE | Refills: 12 | Status: SHIPPED | OUTPATIENT
Start: 2021-12-10 | End: 2021-12-20

## 2021-12-27 ENCOUNTER — PATIENT MESSAGE (OUTPATIENT)
Dept: MATERNAL FETAL MEDICINE | Facility: CLINIC | Age: 36
End: 2021-12-27
Payer: COMMERCIAL

## 2021-12-28 ENCOUNTER — PROCEDURE VISIT (OUTPATIENT)
Dept: MATERNAL FETAL MEDICINE | Facility: CLINIC | Age: 36
End: 2021-12-28
Payer: COMMERCIAL

## 2021-12-28 DIAGNOSIS — Z32.01 PREGNANCY CONFIRMED BY POSITIVE URINE TEST: ICD-10-CM

## 2021-12-28 DIAGNOSIS — Z36.89 ENCOUNTER FOR FETAL ANATOMIC SURVEY: Primary | ICD-10-CM

## 2021-12-28 PROCEDURE — 76801 OB US < 14 WKS SINGLE FETUS: CPT | Mod: S$GLB,,, | Performed by: OBSTETRICS & GYNECOLOGY

## 2021-12-28 PROCEDURE — 76801 US OB/GYN EXTENDED PROCEDURE (VIEWPOINT): ICD-10-PCS | Mod: S$GLB,,, | Performed by: OBSTETRICS & GYNECOLOGY

## 2022-01-05 ENCOUNTER — TELEPHONE (OUTPATIENT)
Dept: OBSTETRICS AND GYNECOLOGY | Facility: CLINIC | Age: 37
End: 2022-01-05
Payer: COMMERCIAL

## 2022-01-05 NOTE — TELEPHONE ENCOUNTER
Returned pt call, no answer. No voicemail set up          ----- Message from Gela Ramos sent at 1/4/2022  2:45 PM CST -----  Type: Patient Call Back    Who called: self    What is the request in detail: patient would like to know what's over the counter medication can she take for sinus. Patient's 10 weeks    Can the clinic reply by [facility]?no    Would the patient rather a call back or a response via [facility]? call    Best call back number: .[phone] (home)

## 2022-01-05 NOTE — PROGRESS NOTES
Ronnie Frye   Your ultrasound looks good, there is a cyst on your left ovary that is about 6 cm.   We will monitor closely in pregnancy.  However if you are experiencing severe pain, I do advise to to go the ER as this cyst can rupture

## 2022-01-13 ENCOUNTER — INITIAL PRENATAL (OUTPATIENT)
Dept: OBSTETRICS AND GYNECOLOGY | Facility: CLINIC | Age: 37
End: 2022-01-13
Payer: COMMERCIAL

## 2022-01-13 VITALS
BODY MASS INDEX: 37.68 KG/M2 | WEIGHT: 226.44 LBS | SYSTOLIC BLOOD PRESSURE: 120 MMHG | DIASTOLIC BLOOD PRESSURE: 66 MMHG

## 2022-01-13 DIAGNOSIS — N83.209 OVARIAN CYST AFFECTING PREGNANCY, ANTEPARTUM: ICD-10-CM

## 2022-01-13 DIAGNOSIS — Z90.3 S/P GASTRIC SLEEVE PROCEDURE: ICD-10-CM

## 2022-01-13 DIAGNOSIS — G40.909 SEIZURE DISORDER IN PREGNANCY, ANTEPARTUM: ICD-10-CM

## 2022-01-13 DIAGNOSIS — O99.210 OBESITY AFFECTING PREGNANCY, ANTEPARTUM: Primary | ICD-10-CM

## 2022-01-13 DIAGNOSIS — Z3A.11 11 WEEKS GESTATION OF PREGNANCY: ICD-10-CM

## 2022-01-13 DIAGNOSIS — O99.350 SEIZURE DISORDER IN PREGNANCY, ANTEPARTUM: ICD-10-CM

## 2022-01-13 DIAGNOSIS — O34.80 OVARIAN CYST AFFECTING PREGNANCY, ANTEPARTUM: ICD-10-CM

## 2022-01-13 PROCEDURE — 99999 PR PBB SHADOW E&M-EST. PATIENT-LVL II: CPT | Mod: PBBFAC,,, | Performed by: OBSTETRICS & GYNECOLOGY

## 2022-01-13 PROCEDURE — 99999 PR PBB SHADOW E&M-EST. PATIENT-LVL II: ICD-10-PCS | Mod: PBBFAC,,, | Performed by: OBSTETRICS & GYNECOLOGY

## 2022-01-13 PROCEDURE — 0500F INITIAL PRENATAL CARE VISIT: CPT | Mod: CPTII,S$GLB,, | Performed by: OBSTETRICS & GYNECOLOGY

## 2022-01-13 PROCEDURE — 0500F PR INITIAL PRENATAL CARE VISIT: ICD-10-PCS | Mod: CPTII,S$GLB,, | Performed by: OBSTETRICS & GYNECOLOGY

## 2022-01-13 RX ORDER — METRONIDAZOLE 500 MG/1
500 TABLET ORAL EVERY 12 HOURS
Qty: 14 TABLET | Refills: 0 | Status: SHIPPED | OUTPATIENT
Start: 2022-01-13 | End: 2022-01-20

## 2022-01-13 RX ORDER — FOLIC ACID 1 MG/1
4 TABLET ORAL DAILY
Qty: 120 TABLET | Refills: 7 | Status: SHIPPED | OUTPATIENT
Start: 2022-01-13 | End: 2022-07-24

## 2022-01-13 NOTE — PROGRESS NOTES
Patient presented today for new Ob appt.    Planned pregnancy: yes    ROS: NEG except    VB?: no  Cramping?:  no  Nausea/vomitting?: no   But has insomnia - advised melatonin      /66   Wt 102.7 kg (226 lb 6.6 oz)   LMP 10/24/2021 (Exact Date)   BMI 37.68 kg/m²   Phys Exam:  Abdomen: soft,NT, ND  Vulva and vagina appear normal. Bimanual exam reveals normal uterus and adnexa.      1.  Early IUP:       -prenatal labs today       -GC/CT, affirm and papsmear UTD.  BV found on affirm: rx for flagyl       -Bleeding precautions       -Aneuploidy screen offered. Patient desires aneuploidy screening, integrated screen done       -Diet, exercise, and social habits d/w patients. Patient was counseled today on  proper weight gain based on the [place] [employer]'s recommendations based  on her pre-pregnancy BMI of 35, total pregnancy weight gain of 11-19 lbs.       -Discussed foods to avoid in pregnancy (i.e. sushi, fish that are high in mercury,  deli meat, and unpasteurized cheeses). Discussed  prenatal vitamin options and  safe medications in pregnancy.     2. Pcos:  Early diabetic testing at 16 week  3.  Obesity:  Early gtt, pt with gastric bypass, NSAIDS was advised to avoid,  Hold off on asa 81mg  4.  H/o seizure:  rx for folic acid  5.  Let ovarian cyst:  Simple and 6cm, strict ER/pain. Given  6. Discussed how call group function, we do most of our deliveries however other providers will be covering and possibility will be there at the time of deliveries.  Pt voiced understanding.  7. RTC in 4 weeks for Sara visit

## 2022-01-16 ENCOUNTER — PATIENT MESSAGE (OUTPATIENT)
Dept: ADMINISTRATIVE | Facility: OTHER | Age: 37
End: 2022-01-16
Payer: COMMERCIAL

## 2022-01-23 ENCOUNTER — PATIENT MESSAGE (OUTPATIENT)
Dept: OBSTETRICS AND GYNECOLOGY | Facility: CLINIC | Age: 37
End: 2022-01-23
Payer: COMMERCIAL

## 2022-01-28 ENCOUNTER — PATIENT MESSAGE (OUTPATIENT)
Dept: OBSTETRICS AND GYNECOLOGY | Facility: CLINIC | Age: 37
End: 2022-01-28
Payer: COMMERCIAL

## 2022-01-28 RX ORDER — ONDANSETRON 4 MG/1
4 TABLET, FILM COATED ORAL DAILY PRN
Qty: 30 TABLET | Refills: 1 | Status: ON HOLD | OUTPATIENT
Start: 2022-01-28 | End: 2022-05-10 | Stop reason: HOSPADM

## 2022-01-31 ENCOUNTER — LAB VISIT (OUTPATIENT)
Dept: LAB | Facility: HOSPITAL | Age: 37
End: 2022-01-31
Attending: OBSTETRICS & GYNECOLOGY
Payer: COMMERCIAL

## 2022-01-31 DIAGNOSIS — O99.210 OBESITY AFFECTING PREGNANCY, ANTEPARTUM: ICD-10-CM

## 2022-01-31 DIAGNOSIS — Z3A.11 11 WEEKS GESTATION OF PREGNANCY: ICD-10-CM

## 2022-01-31 DIAGNOSIS — O99.350 SEIZURE DISORDER IN PREGNANCY, ANTEPARTUM: ICD-10-CM

## 2022-01-31 DIAGNOSIS — G40.909 SEIZURE DISORDER IN PREGNANCY, ANTEPARTUM: ICD-10-CM

## 2022-01-31 PROCEDURE — 83036 HEMOGLOBIN GLYCOSYLATED A1C: CPT | Performed by: OBSTETRICS & GYNECOLOGY

## 2022-01-31 PROCEDURE — 84163 PAPPA SERUM: CPT | Performed by: OBSTETRICS & GYNECOLOGY

## 2022-02-01 LAB
ESTIMATED AVG GLUCOSE: 88 MG/DL (ref 68–131)
HBA1C MFR BLD: 4.7 % (ref 4–5.6)

## 2022-02-03 ENCOUNTER — PATIENT MESSAGE (OUTPATIENT)
Dept: MATERNAL FETAL MEDICINE | Facility: CLINIC | Age: 37
End: 2022-02-03
Payer: COMMERCIAL

## 2022-02-04 LAB — INTEGRATED SCN PATIENT-IMP NAR: NORMAL

## 2022-02-10 ENCOUNTER — LAB VISIT (OUTPATIENT)
Dept: LAB | Facility: HOSPITAL | Age: 37
End: 2022-02-10
Attending: OBSTETRICS & GYNECOLOGY
Payer: COMMERCIAL

## 2022-02-10 ENCOUNTER — ROUTINE PRENATAL (OUTPATIENT)
Dept: OBSTETRICS AND GYNECOLOGY | Facility: CLINIC | Age: 37
End: 2022-02-10
Payer: COMMERCIAL

## 2022-02-10 VITALS
DIASTOLIC BLOOD PRESSURE: 56 MMHG | BODY MASS INDEX: 37.75 KG/M2 | SYSTOLIC BLOOD PRESSURE: 112 MMHG | WEIGHT: 226.88 LBS

## 2022-02-10 DIAGNOSIS — O09.519 ADVANCED MATERNAL AGE, PRIMIGRAVIDA, ANTEPARTUM: Primary | ICD-10-CM

## 2022-02-10 DIAGNOSIS — O99.350 SEIZURE DISORDER IN PREGNANCY, ANTEPARTUM: ICD-10-CM

## 2022-02-10 DIAGNOSIS — N83.209 OVARIAN CYST AFFECTING PREGNANCY, ANTEPARTUM: ICD-10-CM

## 2022-02-10 DIAGNOSIS — G40.909 SEIZURE DISORDER IN PREGNANCY, ANTEPARTUM: ICD-10-CM

## 2022-02-10 DIAGNOSIS — O99.210 OBESITY AFFECTING PREGNANCY, ANTEPARTUM: ICD-10-CM

## 2022-02-10 DIAGNOSIS — Z3A.15 15 WEEKS GESTATION OF PREGNANCY: ICD-10-CM

## 2022-02-10 DIAGNOSIS — Z90.3 S/P GASTRIC SLEEVE PROCEDURE: ICD-10-CM

## 2022-02-10 DIAGNOSIS — O34.80 OVARIAN CYST AFFECTING PREGNANCY, ANTEPARTUM: ICD-10-CM

## 2022-02-10 DIAGNOSIS — O09.519 ADVANCED MATERNAL AGE, PRIMIGRAVIDA, ANTEPARTUM: ICD-10-CM

## 2022-02-10 PROCEDURE — 82105 ALPHA-FETOPROTEIN SERUM: CPT | Performed by: OBSTETRICS & GYNECOLOGY

## 2022-02-10 PROCEDURE — 0502F SUBSEQUENT PRENATAL CARE: CPT | Mod: CPTII,S$GLB,, | Performed by: OBSTETRICS & GYNECOLOGY

## 2022-02-10 PROCEDURE — 36415 COLL VENOUS BLD VENIPUNCTURE: CPT | Performed by: OBSTETRICS & GYNECOLOGY

## 2022-02-10 PROCEDURE — 99999 PR PBB SHADOW E&M-EST. PATIENT-LVL II: CPT | Mod: PBBFAC,,, | Performed by: OBSTETRICS & GYNECOLOGY

## 2022-02-10 PROCEDURE — 99999 PR PBB SHADOW E&M-EST. PATIENT-LVL II: ICD-10-PCS | Mod: PBBFAC,,, | Performed by: OBSTETRICS & GYNECOLOGY

## 2022-02-10 PROCEDURE — 0502F PR SUBSEQUENT PRENATAL CARE: ICD-10-PCS | Mod: CPTII,S$GLB,, | Performed by: OBSTETRICS & GYNECOLOGY

## 2022-02-10 NOTE — PROGRESS NOTES
Here for routine OB visit  Denies VB/ctx/LOF. Active FM  Denies PreE symptoms/UTI symptoms  ROS: Negative, appetite is good, rarely feels nauseated, she has been eating healthy    General:  NAD, AxO x 3  Abdomen: soft, gravid, non-tender  Ext: peripheral pulses normal, no pedal edema, no clubbing or cyanosis  Skin: normal, good color and good turgor    1. AMA:  NIPT ordered, AFP today, no ASA due to h/o gastric bypass  2. Pcos:  Early diabetic testing at next visit  3.  Obesity:  Early gtt, pt with gastric bypass, NSAIDS was advised to avoid,  Hold off on asa 81mg  4.  H/o seizure:  rx for folic acid  5.  Let ovarian cyst:  Simple and 6cm, strict ER/pain precautions given.    Strict bleeding precautions    Next visit: 4 weeks

## 2022-02-14 LAB
# FETUSES US: NORMAL
AFP INTERPRETATION: NORMAL
AFP MOM SERPL: 1.04
AFP SERPL-MCNC: 29.6 NG/ML
AFP SERPL-MCNC: NEGATIVE NG/ML
AGE AT DELIVERY: 35
GA (DAYS): 3 D
GA (WEEKS): 15 WK
GESTATIONAL AGE METHOD: NORMAL
IDDM PATIENT QL: NORMAL
SMOKING STATUS FTND: NO

## 2022-02-14 NOTE — PROGRESS NOTES
Part of your genetic test was negative. This means that your baby is at low risk for  spina bifida.  Please feel free to call my office with any questions or concerns.

## 2022-02-16 ENCOUNTER — PATIENT MESSAGE (OUTPATIENT)
Dept: OBSTETRICS AND GYNECOLOGY | Facility: CLINIC | Age: 37
End: 2022-02-16
Payer: COMMERCIAL

## 2022-02-18 ENCOUNTER — TELEPHONE (OUTPATIENT)
Dept: OBSTETRICS AND GYNECOLOGY | Facility: CLINIC | Age: 37
End: 2022-02-18
Payer: COMMERCIAL

## 2022-02-18 NOTE — TELEPHONE ENCOUNTER
Patient is aware that her AFP is negative and her MT21 is neg as well with the Gender of male. Patient is excited        ----- Message from Donna Alcaraz sent at 2/18/2022 10:52 AM CST -----  Regarding: / test results from 2/10  Type:  Test Results    Who Called: Agustina     Name of Test (Lab/Mammo/Etc): Maternal Screen AFP     Date of Test: 2/10     Ordering Provider: Dr. Aldana     Where the test was performed: [facility]     Would the patient rather a call back or a response via [facility]? Call back     Best Call Back Number: [phone]    Additional Information:  the patient is requesting a call back from the staff in regards to her last test results from 2/10. Please return call at earliest convenience.

## 2022-02-22 ENCOUNTER — PATIENT MESSAGE (OUTPATIENT)
Dept: BARIATRICS | Facility: CLINIC | Age: 37
End: 2022-02-22
Payer: COMMERCIAL

## 2022-02-23 ENCOUNTER — NURSE TRIAGE (OUTPATIENT)
Dept: ADMINISTRATIVE | Facility: CLINIC | Age: 37
End: 2022-02-23
Payer: COMMERCIAL

## 2022-02-24 NOTE — TELEPHONE ENCOUNTER
Pt calling asking what medication she can take for her sinus because she lost the paper her OB gave her. Advised her per protocol. Pt verbalized understanding.will route message.     Reason for Disposition   Caller requesting information about medicine during pregnancy; adult is not ill AND triager answers question    Protocols used: MEDICATION QUESTION CALL-A-

## 2022-03-03 ENCOUNTER — OFFICE VISIT (OUTPATIENT)
Dept: ORTHOPEDICS | Facility: CLINIC | Age: 37
End: 2022-03-03
Payer: COMMERCIAL

## 2022-03-03 VITALS — HEIGHT: 65 IN | WEIGHT: 233.25 LBS | BODY MASS INDEX: 38.86 KG/M2

## 2022-03-03 DIAGNOSIS — M77.11 LATERAL EPICONDYLITIS, RIGHT ELBOW: Primary | ICD-10-CM

## 2022-03-03 PROCEDURE — 1160F PR REVIEW ALL MEDS BY PRESCRIBER/CLIN PHARMACIST DOCUMENTED: ICD-10-PCS | Mod: CPTII,S$GLB,, | Performed by: PHYSICIAN ASSISTANT

## 2022-03-03 PROCEDURE — 99999 PR PBB SHADOW E&M-EST. PATIENT-LVL III: CPT | Mod: PBBFAC,,, | Performed by: PHYSICIAN ASSISTANT

## 2022-03-03 PROCEDURE — 1160F RVW MEDS BY RX/DR IN RCRD: CPT | Mod: CPTII,S$GLB,, | Performed by: PHYSICIAN ASSISTANT

## 2022-03-03 PROCEDURE — 3008F PR BODY MASS INDEX (BMI) DOCUMENTED: ICD-10-PCS | Mod: CPTII,S$GLB,, | Performed by: PHYSICIAN ASSISTANT

## 2022-03-03 PROCEDURE — 3044F HG A1C LEVEL LT 7.0%: CPT | Mod: CPTII,S$GLB,, | Performed by: PHYSICIAN ASSISTANT

## 2022-03-03 PROCEDURE — 1159F MED LIST DOCD IN RCRD: CPT | Mod: CPTII,S$GLB,, | Performed by: PHYSICIAN ASSISTANT

## 2022-03-03 PROCEDURE — 99213 PR OFFICE/OUTPT VISIT, EST, LEVL III, 20-29 MIN: ICD-10-PCS | Mod: S$GLB,,, | Performed by: PHYSICIAN ASSISTANT

## 2022-03-03 PROCEDURE — 3044F PR MOST RECENT HEMOGLOBIN A1C LEVEL <7.0%: ICD-10-PCS | Mod: CPTII,S$GLB,, | Performed by: PHYSICIAN ASSISTANT

## 2022-03-03 PROCEDURE — 3008F BODY MASS INDEX DOCD: CPT | Mod: CPTII,S$GLB,, | Performed by: PHYSICIAN ASSISTANT

## 2022-03-03 PROCEDURE — 1159F PR MEDICATION LIST DOCUMENTED IN MEDICAL RECORD: ICD-10-PCS | Mod: CPTII,S$GLB,, | Performed by: PHYSICIAN ASSISTANT

## 2022-03-03 PROCEDURE — 99999 PR PBB SHADOW E&M-EST. PATIENT-LVL III: ICD-10-PCS | Mod: PBBFAC,,, | Performed by: PHYSICIAN ASSISTANT

## 2022-03-03 PROCEDURE — 99213 OFFICE O/P EST LOW 20 MIN: CPT | Mod: S$GLB,,, | Performed by: PHYSICIAN ASSISTANT

## 2022-03-04 NOTE — PROGRESS NOTES
"Patient ID: Agustina Tillman is a 36 y.o. female.    Chief Complaint: Pain of the Right Elbow      HISTORY:  Agustina Tillman is a 36 y.o. female who returns to me today for evalution of right elbow pain.  She was previously seen by Dr. Nunez for this problem in July 2021- at that time her pain was severe and she did receive a steroid injection.  Her symptoms resolved until about 3 weeks ago.  She states the pain is not as severe as previously.  She has pain on the outside of her elbow.  This is worse with activity and lifting.  She is currently pregnant so she understands that medications are limited.  She has tried rest and tylenol without much improvement.       PMH/PSH/FamHx/SocHx:    Unchanged from prior visit.    ROS:  Constitution: Negative for chills, fever and weakness.   Respiratory: Negative for cough and shortness of breath.   Musculoskeletal: Positive for right elbow pain  Psychiatric/Behavioral: The patient is not nervous/anxious.       PHYSICAL EXAM:   Ht 5' 5" (1.651 m)   Wt 105.8 kg (233 lb 4 oz)   LMP 10/24/2021 (Exact Date)   BMI 38.81 kg/m²   Right elbow  Skin intact  No erythema or warmth  TTP lateral epicondyle  Pain worse with resisted extension and supination  + long finger test  ROM 0-120  Sensation intact  NVI    IMAGING: No new imaging- previous x-rays show well maintained joint space, no acute abnormality    ASSESSMENT/PLAN:    Agustina was seen today for pain.    Diagnoses and all orders for this visit:    Lateral epicondylitis, right elbow  -     Ambulatory referral/consult to Physical/Occupational Therapy; Future      - Discussed rest, activity modification- no lifting over 1-2 lbs  - Ice daily  - Brace- states she has one she wore previously  - Referral to OT  - Follow up 6-8 weeks or sooner as needed      "

## 2022-03-07 ENCOUNTER — PATIENT MESSAGE (OUTPATIENT)
Dept: BARIATRICS | Facility: CLINIC | Age: 37
End: 2022-03-07
Payer: COMMERCIAL

## 2022-03-09 ENCOUNTER — ROUTINE PRENATAL (OUTPATIENT)
Dept: OBSTETRICS AND GYNECOLOGY | Facility: CLINIC | Age: 37
End: 2022-03-09
Payer: COMMERCIAL

## 2022-03-09 VITALS
WEIGHT: 233.69 LBS | DIASTOLIC BLOOD PRESSURE: 60 MMHG | SYSTOLIC BLOOD PRESSURE: 112 MMHG | BODY MASS INDEX: 38.89 KG/M2

## 2022-03-09 DIAGNOSIS — O34.80 OVARIAN CYST AFFECTING PREGNANCY, ANTEPARTUM: ICD-10-CM

## 2022-03-09 DIAGNOSIS — G40.909 SEIZURE DISORDER IN PREGNANCY, ANTEPARTUM: Primary | ICD-10-CM

## 2022-03-09 DIAGNOSIS — Z3A.19 19 WEEKS GESTATION OF PREGNANCY: ICD-10-CM

## 2022-03-09 DIAGNOSIS — O99.350 SEIZURE DISORDER IN PREGNANCY, ANTEPARTUM: Primary | ICD-10-CM

## 2022-03-09 DIAGNOSIS — O99.210 OBESITY AFFECTING PREGNANCY, ANTEPARTUM: ICD-10-CM

## 2022-03-09 DIAGNOSIS — N83.209 OVARIAN CYST AFFECTING PREGNANCY, ANTEPARTUM: ICD-10-CM

## 2022-03-09 PROCEDURE — 0502F SUBSEQUENT PRENATAL CARE: CPT | Mod: CPTII,S$GLB,, | Performed by: OBSTETRICS & GYNECOLOGY

## 2022-03-09 PROCEDURE — 99999 PR PBB SHADOW E&M-EST. PATIENT-LVL II: CPT | Mod: PBBFAC,,, | Performed by: OBSTETRICS & GYNECOLOGY

## 2022-03-09 PROCEDURE — 99999 PR PBB SHADOW E&M-EST. PATIENT-LVL II: ICD-10-PCS | Mod: PBBFAC,,, | Performed by: OBSTETRICS & GYNECOLOGY

## 2022-03-09 PROCEDURE — 0502F PR SUBSEQUENT PRENATAL CARE: ICD-10-PCS | Mod: CPTII,S$GLB,, | Performed by: OBSTETRICS & GYNECOLOGY

## 2022-03-09 NOTE — PROGRESS NOTES
Here for routine OB visit  Denies VB/ctx/LOF. Active FM  Denies PreE symptoms/UTI symptoms  ROS: Negative, appetite is good, rarely feels nauseated, she has been eating healthy    General:  NAD, AxO x 3  Abdomen: soft, gravid, non-tender  Ext: peripheral pulses normal, no pedal edema, no clubbing or cyanosis  Skin: normal, good color and good turgor    1. AMA:  NIPT -XY, AFP NEG,  no ASA due to h/o gastric bypass  2. Pcos:    3.  Obesity:   pt with gastric bypass, NSAIDS was advised to avoid,  Hold off on asa 81mg  4.  H/o seizure:  on folic acid  5.  Let ovarian cyst:  Simple and 6cm, strict ER/pain precautions given.    Strict bleeding precautions    Next visit: 4 weeks

## 2022-03-10 PROBLEM — M77.11 LATERAL EPICONDYLITIS, RIGHT ELBOW: Status: ACTIVE | Noted: 2022-03-10

## 2022-03-14 ENCOUNTER — PATIENT MESSAGE (OUTPATIENT)
Dept: MATERNAL FETAL MEDICINE | Facility: CLINIC | Age: 37
End: 2022-03-14
Payer: COMMERCIAL

## 2022-03-15 ENCOUNTER — PATIENT MESSAGE (OUTPATIENT)
Dept: BARIATRICS | Facility: CLINIC | Age: 37
End: 2022-03-15
Payer: COMMERCIAL

## 2022-03-15 ENCOUNTER — PROCEDURE VISIT (OUTPATIENT)
Dept: MATERNAL FETAL MEDICINE | Facility: CLINIC | Age: 37
End: 2022-03-15
Payer: COMMERCIAL

## 2022-03-15 DIAGNOSIS — Z36.89 ENCOUNTER FOR FETAL ANATOMIC SURVEY: ICD-10-CM

## 2022-03-15 PROCEDURE — 76811 OB US DETAILED SNGL FETUS: CPT | Mod: S$GLB,,, | Performed by: OBSTETRICS & GYNECOLOGY

## 2022-03-15 PROCEDURE — 76811 US MFM PROCEDURE (VIEWPOINT): ICD-10-PCS | Mod: S$GLB,,, | Performed by: OBSTETRICS & GYNECOLOGY

## 2022-03-23 ENCOUNTER — PATIENT MESSAGE (OUTPATIENT)
Dept: FAMILY MEDICINE | Facility: CLINIC | Age: 37
End: 2022-03-23

## 2022-03-23 ENCOUNTER — TELEPHONE (OUTPATIENT)
Dept: OBSTETRICS AND GYNECOLOGY | Facility: CLINIC | Age: 37
End: 2022-03-23
Payer: COMMERCIAL

## 2022-03-23 ENCOUNTER — PATIENT MESSAGE (OUTPATIENT)
Dept: ORTHOPEDICS | Facility: CLINIC | Age: 37
End: 2022-03-23
Payer: COMMERCIAL

## 2022-03-23 NOTE — TELEPHONE ENCOUNTER
----- Message from Bere Liz Mai, MD sent at 3/22/2022  4:03 PM CDT -----  Regarding: RE: self  Pt can take zyrtec/allegra/claritin for sinus.  She can take cepacol for sore throat    ----- Message -----  From: Do Olga MA  Sent: 3/22/2022   3:01 PM CDT  To: Bere Liz Mai, MD  Subject: FW: self                                         Please advise.   ----- Message -----  From: Antionette Fung  Sent: 3/22/2022   2:49 PM CDT  To: Katya Bryan Staff  Subject: self                                             .Type: Patient Call Back    Who called: self     What is the request in detail: patient is asking what kind of meds she can take for sinus and sore throat. Started today     Can the clinic reply by [facility]?    Would the patient rather a call back or a response via [facility]? Call     Best call back number: .[phone]    .  [id number] [place] - [address] AT [facility] [address]  [address]  [place] [address]  Phone: [phone] Fax: [phone]

## 2022-04-06 ENCOUNTER — PATIENT MESSAGE (OUTPATIENT)
Dept: OBSTETRICS AND GYNECOLOGY | Facility: CLINIC | Age: 37
End: 2022-04-06

## 2022-04-06 ENCOUNTER — ROUTINE PRENATAL (OUTPATIENT)
Dept: OBSTETRICS AND GYNECOLOGY | Facility: CLINIC | Age: 37
End: 2022-04-06
Payer: COMMERCIAL

## 2022-04-06 ENCOUNTER — LAB VISIT (OUTPATIENT)
Dept: LAB | Facility: HOSPITAL | Age: 37
End: 2022-04-06
Attending: OBSTETRICS & GYNECOLOGY
Payer: COMMERCIAL

## 2022-04-06 VITALS
SYSTOLIC BLOOD PRESSURE: 114 MMHG | DIASTOLIC BLOOD PRESSURE: 80 MMHG | BODY MASS INDEX: 39.22 KG/M2 | WEIGHT: 235.69 LBS

## 2022-04-06 DIAGNOSIS — O09.519 ADVANCED MATERNAL AGE, PRIMIGRAVIDA, ANTEPARTUM: ICD-10-CM

## 2022-04-06 DIAGNOSIS — O99.350 SEIZURE DISORDER IN PREGNANCY, ANTEPARTUM: Primary | ICD-10-CM

## 2022-04-06 DIAGNOSIS — Z3A.23 23 WEEKS GESTATION OF PREGNANCY: ICD-10-CM

## 2022-04-06 DIAGNOSIS — G40.909 SEIZURE DISORDER IN PREGNANCY, ANTEPARTUM: Primary | ICD-10-CM

## 2022-04-06 DIAGNOSIS — Z90.3 S/P GASTRIC SLEEVE PROCEDURE: ICD-10-CM

## 2022-04-06 DIAGNOSIS — O99.210 OBESITY AFFECTING PREGNANCY, ANTEPARTUM: ICD-10-CM

## 2022-04-06 DIAGNOSIS — O26.819 FATIGUE DURING PREGNANCY, ANTEPARTUM: ICD-10-CM

## 2022-04-06 LAB
BASOPHILS # BLD AUTO: 0.04 K/UL (ref 0–0.2)
BASOPHILS NFR BLD: 0.4 % (ref 0–1.9)
DIFFERENTIAL METHOD: ABNORMAL
EOSINOPHIL # BLD AUTO: 0.1 K/UL (ref 0–0.5)
EOSINOPHIL NFR BLD: 1.1 % (ref 0–8)
ERYTHROCYTE [DISTWIDTH] IN BLOOD BY AUTOMATED COUNT: 14.1 % (ref 11.5–14.5)
HCT VFR BLD AUTO: 35.4 % (ref 37–48.5)
HGB BLD-MCNC: 11.4 G/DL (ref 12–16)
IMM GRANULOCYTES # BLD AUTO: 0.03 K/UL (ref 0–0.04)
IMM GRANULOCYTES NFR BLD AUTO: 0.3 % (ref 0–0.5)
LYMPHOCYTES # BLD AUTO: 2.1 K/UL (ref 1–4.8)
LYMPHOCYTES NFR BLD: 21 % (ref 18–48)
MCH RBC QN AUTO: 26.8 PG (ref 27–31)
MCHC RBC AUTO-ENTMCNC: 32.2 G/DL (ref 32–36)
MCV RBC AUTO: 83 FL (ref 82–98)
MONOCYTES # BLD AUTO: 0.7 K/UL (ref 0.3–1)
MONOCYTES NFR BLD: 7.4 % (ref 4–15)
NEUTROPHILS # BLD AUTO: 6.9 K/UL (ref 1.8–7.7)
NEUTROPHILS NFR BLD: 69.8 % (ref 38–73)
NRBC BLD-RTO: 0 /100 WBC
PLATELET # BLD AUTO: 188 K/UL (ref 150–450)
PMV BLD AUTO: 9.3 FL (ref 9.2–12.9)
RBC # BLD AUTO: 4.26 M/UL (ref 4–5.4)
WBC # BLD AUTO: 9.93 K/UL (ref 3.9–12.7)

## 2022-04-06 PROCEDURE — 0502F PR SUBSEQUENT PRENATAL CARE: ICD-10-PCS | Mod: CPTII,S$GLB,, | Performed by: OBSTETRICS & GYNECOLOGY

## 2022-04-06 PROCEDURE — 0502F SUBSEQUENT PRENATAL CARE: CPT | Mod: CPTII,S$GLB,, | Performed by: OBSTETRICS & GYNECOLOGY

## 2022-04-06 PROCEDURE — 99999 PR PBB SHADOW E&M-EST. PATIENT-LVL II: ICD-10-PCS | Mod: PBBFAC,,, | Performed by: OBSTETRICS & GYNECOLOGY

## 2022-04-06 PROCEDURE — 85025 COMPLETE CBC W/AUTO DIFF WBC: CPT | Performed by: OBSTETRICS & GYNECOLOGY

## 2022-04-06 PROCEDURE — 99999 PR PBB SHADOW E&M-EST. PATIENT-LVL II: CPT | Mod: PBBFAC,,, | Performed by: OBSTETRICS & GYNECOLOGY

## 2022-04-06 PROCEDURE — 36415 COLL VENOUS BLD VENIPUNCTURE: CPT | Performed by: OBSTETRICS & GYNECOLOGY

## 2022-04-06 NOTE — PROGRESS NOTES
Here for routine OB visit  Denies VB/ctx/LOF. Active FM  Denies PreE symptoms/UTI symptoms  ROS: Negative but feel exhausted sometimes. Eating well. She does go for walks, sleeping sometimes 12 hours but some night wake up in middle of the night and cant fall back to sleep    General:  NAD, AxO x 3  Abdomen: soft, gravid, non-tender  Ext: peripheral pulses normal, no pedal edema, no clubbing or cyanosis  Skin: normal, good color and good turgor    1. AMA:  NIPT -XY, AFP NEG,  no ASA due to h/o gastric bypass  2. Pcos:    3.  Obesity:   pt with gastric bypass, NSAIDS was advised to avoid,  Hold off on asa 81mg  4.  H/o seizure:  on folic acid  5.  Let ovarian cyst:  Was 6 cm and now resolved  6.  Fatigue pregnancy: check cbc, discussed good sleep hygienes, discussed cardio/walks/jog if possible 30 mins a day    Strict bleeding precautions    Next visit: 3rd trim lab/tdap

## 2022-04-07 ENCOUNTER — PATIENT MESSAGE (OUTPATIENT)
Dept: BARIATRICS | Facility: CLINIC | Age: 37
End: 2022-04-07
Payer: COMMERCIAL

## 2022-04-12 ENCOUNTER — PATIENT MESSAGE (OUTPATIENT)
Dept: BARIATRICS | Facility: CLINIC | Age: 37
End: 2022-04-12
Payer: COMMERCIAL

## 2022-04-13 ENCOUNTER — PATIENT MESSAGE (OUTPATIENT)
Dept: OBSTETRICS AND GYNECOLOGY | Facility: CLINIC | Age: 37
End: 2022-04-13
Payer: COMMERCIAL

## 2022-04-13 RX ORDER — FAMOTIDINE 20 MG/1
20 TABLET, FILM COATED ORAL 2 TIMES DAILY
Qty: 60 TABLET | Refills: 3 | Status: SHIPPED | OUTPATIENT
Start: 2022-04-13 | End: 2022-07-29

## 2022-04-18 ENCOUNTER — PATIENT MESSAGE (OUTPATIENT)
Dept: ADMINISTRATIVE | Facility: OTHER | Age: 37
End: 2022-04-18
Payer: COMMERCIAL

## 2022-04-26 ENCOUNTER — PATIENT MESSAGE (OUTPATIENT)
Dept: OBSTETRICS AND GYNECOLOGY | Facility: CLINIC | Age: 37
End: 2022-04-26
Payer: COMMERCIAL

## 2022-05-04 ENCOUNTER — ROUTINE PRENATAL (OUTPATIENT)
Dept: OBSTETRICS AND GYNECOLOGY | Facility: CLINIC | Age: 37
End: 2022-05-04
Payer: COMMERCIAL

## 2022-05-04 ENCOUNTER — LAB VISIT (OUTPATIENT)
Dept: LAB | Facility: HOSPITAL | Age: 37
End: 2022-05-04
Attending: OBSTETRICS & GYNECOLOGY
Payer: COMMERCIAL

## 2022-05-04 VITALS
SYSTOLIC BLOOD PRESSURE: 110 MMHG | WEIGHT: 237.44 LBS | DIASTOLIC BLOOD PRESSURE: 64 MMHG | BODY MASS INDEX: 39.51 KG/M2

## 2022-05-04 DIAGNOSIS — O99.350 SEIZURE DISORDER IN PREGNANCY, ANTEPARTUM: ICD-10-CM

## 2022-05-04 DIAGNOSIS — Z3A.27 27 WEEKS GESTATION OF PREGNANCY: ICD-10-CM

## 2022-05-04 DIAGNOSIS — Z90.3 S/P GASTRIC SLEEVE PROCEDURE: ICD-10-CM

## 2022-05-04 DIAGNOSIS — O99.210 OBESITY AFFECTING PREGNANCY, ANTEPARTUM: ICD-10-CM

## 2022-05-04 DIAGNOSIS — O09.519 ADVANCED MATERNAL AGE, PRIMIGRAVIDA, ANTEPARTUM: Primary | ICD-10-CM

## 2022-05-04 DIAGNOSIS — G40.909 SEIZURE DISORDER IN PREGNANCY, ANTEPARTUM: ICD-10-CM

## 2022-05-04 LAB
BASOPHILS # BLD AUTO: 0.04 K/UL (ref 0–0.2)
BASOPHILS NFR BLD: 0.4 % (ref 0–1.9)
DIFFERENTIAL METHOD: ABNORMAL
EOSINOPHIL # BLD AUTO: 0.1 K/UL (ref 0–0.5)
EOSINOPHIL NFR BLD: 1 % (ref 0–8)
ERYTHROCYTE [DISTWIDTH] IN BLOOD BY AUTOMATED COUNT: 14.6 % (ref 11.5–14.5)
GLUCOSE SERPL-MCNC: 93 MG/DL (ref 70–140)
HCT VFR BLD AUTO: 37.2 % (ref 37–48.5)
HGB BLD-MCNC: 12 G/DL (ref 12–16)
IMM GRANULOCYTES # BLD AUTO: 0.03 K/UL (ref 0–0.04)
IMM GRANULOCYTES NFR BLD AUTO: 0.3 % (ref 0–0.5)
LYMPHOCYTES # BLD AUTO: 2.3 K/UL (ref 1–4.8)
LYMPHOCYTES NFR BLD: 22.2 % (ref 18–48)
MCH RBC QN AUTO: 27.3 PG (ref 27–31)
MCHC RBC AUTO-ENTMCNC: 32.3 G/DL (ref 32–36)
MCV RBC AUTO: 85 FL (ref 82–98)
MONOCYTES # BLD AUTO: 0.6 K/UL (ref 0.3–1)
MONOCYTES NFR BLD: 6 % (ref 4–15)
NEUTROPHILS # BLD AUTO: 7.1 K/UL (ref 1.8–7.7)
NEUTROPHILS NFR BLD: 70.1 % (ref 38–73)
NRBC BLD-RTO: 0 /100 WBC
PLATELET # BLD AUTO: 221 K/UL (ref 150–450)
PMV BLD AUTO: 9.9 FL (ref 9.2–12.9)
RBC # BLD AUTO: 4.4 M/UL (ref 4–5.4)
WBC # BLD AUTO: 10.13 K/UL (ref 3.9–12.7)

## 2022-05-04 PROCEDURE — 36415 COLL VENOUS BLD VENIPUNCTURE: CPT | Performed by: OBSTETRICS & GYNECOLOGY

## 2022-05-04 PROCEDURE — 85025 COMPLETE CBC W/AUTO DIFF WBC: CPT | Performed by: OBSTETRICS & GYNECOLOGY

## 2022-05-04 PROCEDURE — 86592 SYPHILIS TEST NON-TREP QUAL: CPT | Performed by: OBSTETRICS & GYNECOLOGY

## 2022-05-04 PROCEDURE — 82950 GLUCOSE TEST: CPT | Performed by: OBSTETRICS & GYNECOLOGY

## 2022-05-04 PROCEDURE — 0502F SUBSEQUENT PRENATAL CARE: CPT | Mod: CPTII,S$GLB,, | Performed by: OBSTETRICS & GYNECOLOGY

## 2022-05-04 PROCEDURE — 82608 B-12 BINDING CAPACITY: CPT | Performed by: OBSTETRICS & GYNECOLOGY

## 2022-05-04 PROCEDURE — 87389 HIV-1 AG W/HIV-1&-2 AB AG IA: CPT | Performed by: OBSTETRICS & GYNECOLOGY

## 2022-05-04 PROCEDURE — 82746 ASSAY OF FOLIC ACID SERUM: CPT | Performed by: OBSTETRICS & GYNECOLOGY

## 2022-05-04 PROCEDURE — 82607 VITAMIN B-12: CPT | Performed by: OBSTETRICS & GYNECOLOGY

## 2022-05-04 PROCEDURE — 0502F PR SUBSEQUENT PRENATAL CARE: ICD-10-PCS | Mod: CPTII,S$GLB,, | Performed by: OBSTETRICS & GYNECOLOGY

## 2022-05-04 PROCEDURE — 99999 PR PBB SHADOW E&M-EST. PATIENT-LVL II: ICD-10-PCS | Mod: PBBFAC,,, | Performed by: OBSTETRICS & GYNECOLOGY

## 2022-05-04 PROCEDURE — 99999 PR PBB SHADOW E&M-EST. PATIENT-LVL II: CPT | Mod: PBBFAC,,, | Performed by: OBSTETRICS & GYNECOLOGY

## 2022-05-04 NOTE — PROGRESS NOTES
Here for routine OB visit  Denies VB/ctx/LOF. Active FM  Denies PreE symptoms/UTI symptoms  ROS: Negative , fatigue resolved after taking B12 supplement    General:  NAD, AxO x 3  Abdomen: soft, gravid, non-tender  Ext: peripheral pulses normal, no pedal edema, no clubbing or cyanosis  Skin: normal, good color and good turgor    1. AMA:  NIPT -XY, AFP NEG,  no ASA due to h/o gastric sleeve  2. Pcos:    3.  Obesity:   TWG 22lb. pt with gastric bypass, NSAIDS was advised to avoid,  Hold off on asa 81mg  4.  H/o seizure:  on folic acid  5.  Let ovarian cyst:  Was 6 cm and now resolved  6.  Reflux:  On pepcid  7. H/o gastric sleeve: discussed option of GTT vs. BS check, pt elected to try GTT  8.  rd sandra ordered    Strict bleeding precautions    Next visit: tdap, 3 week OB visit

## 2022-05-05 ENCOUNTER — PATIENT MESSAGE (OUTPATIENT)
Dept: BARIATRICS | Facility: CLINIC | Age: 37
End: 2022-05-05
Payer: COMMERCIAL

## 2022-05-05 LAB
FOLATE SERPL-MCNC: >40 NG/ML (ref 4–24)
VIT B12 SERPL-MCNC: 402 PG/ML (ref 210–950)

## 2022-05-06 LAB — RPR SER QL: NORMAL

## 2022-05-09 LAB — HIV 1+2 AB+HIV1 P24 AG SERPL QL IA: NEGATIVE

## 2022-05-10 ENCOUNTER — HOSPITAL ENCOUNTER (OUTPATIENT)
Facility: HOSPITAL | Age: 37
Discharge: HOME | End: 2022-05-10
Attending: OBSTETRICS & GYNECOLOGY | Admitting: OBSTETRICS & GYNECOLOGY
Payer: COMMERCIAL

## 2022-05-10 ENCOUNTER — PATIENT MESSAGE (OUTPATIENT)
Dept: BARIATRICS | Facility: CLINIC | Age: 37
End: 2022-05-10
Payer: COMMERCIAL

## 2022-05-10 VITALS
SYSTOLIC BLOOD PRESSURE: 113 MMHG | HEART RATE: 86 BPM | RESPIRATION RATE: 16 BRPM | TEMPERATURE: 99 F | OXYGEN SATURATION: 100 % | DIASTOLIC BLOOD PRESSURE: 62 MMHG

## 2022-05-10 DIAGNOSIS — O21.9 NAUSEA/VOMITING IN PREGNANCY: ICD-10-CM

## 2022-05-10 LAB
ALBUMIN SERPL BCP-MCNC: 2.6 G/DL (ref 3.5–5.2)
ALP SERPL-CCNC: 74 U/L (ref 55–135)
ALT SERPL W/O P-5'-P-CCNC: 16 U/L (ref 10–44)
AMYLASE SERPL-CCNC: 36 U/L (ref 20–110)
ANION GAP SERPL CALC-SCNC: 7 MMOL/L (ref 8–16)
AST SERPL-CCNC: 15 U/L (ref 10–40)
BASOPHILS # BLD AUTO: 0.01 K/UL (ref 0–0.2)
BASOPHILS NFR BLD: 0.2 % (ref 0–1.9)
BILIRUB SERPL-MCNC: 0.5 MG/DL (ref 0.1–1)
BILIRUB UR QL STRIP: NEGATIVE
BUN SERPL-MCNC: 8 MG/DL (ref 6–20)
CALCIUM SERPL-MCNC: 8.6 MG/DL (ref 8.7–10.5)
CHLORIDE SERPL-SCNC: 110 MMOL/L (ref 95–110)
CLARITY UR: ABNORMAL
CO2 SERPL-SCNC: 21 MMOL/L (ref 23–29)
COLOR UR: YELLOW
CREAT SERPL-MCNC: 0.8 MG/DL (ref 0.5–1.4)
DIFFERENTIAL METHOD: ABNORMAL
EOSINOPHIL # BLD AUTO: 0 K/UL (ref 0–0.5)
EOSINOPHIL NFR BLD: 0.2 % (ref 0–8)
ERYTHROCYTE [DISTWIDTH] IN BLOOD BY AUTOMATED COUNT: 14.5 % (ref 11.5–14.5)
EST. GFR  (AFRICAN AMERICAN): >60 ML/MIN/1.73 M^2
EST. GFR  (NON AFRICAN AMERICAN): >60 ML/MIN/1.73 M^2
GLUCOSE SERPL-MCNC: 152 MG/DL (ref 70–110)
GLUCOSE UR QL STRIP: NEGATIVE
HCT VFR BLD AUTO: 35.8 % (ref 37–48.5)
HGB BLD-MCNC: 11.4 G/DL (ref 12–16)
HGB UR QL STRIP: NEGATIVE
IMM GRANULOCYTES # BLD AUTO: 0.03 K/UL (ref 0–0.04)
IMM GRANULOCYTES NFR BLD AUTO: 0.6 % (ref 0–0.5)
KETONES UR QL STRIP: ABNORMAL
LEUKOCYTE ESTERASE UR QL STRIP: NEGATIVE
LIPASE SERPL-CCNC: 19 U/L (ref 4–60)
LYMPHOCYTES # BLD AUTO: 0.4 K/UL (ref 1–4.8)
LYMPHOCYTES NFR BLD: 6.4 % (ref 18–48)
MCH RBC QN AUTO: 26.6 PG (ref 27–31)
MCHC RBC AUTO-ENTMCNC: 31.8 G/DL (ref 32–36)
MCV RBC AUTO: 84 FL (ref 82–98)
MONOCYTES # BLD AUTO: 0.3 K/UL (ref 0.3–1)
MONOCYTES NFR BLD: 4.8 % (ref 4–15)
NEUTROPHILS # BLD AUTO: 4.8 K/UL (ref 1.8–7.7)
NEUTROPHILS NFR BLD: 87.8 % (ref 38–73)
NITRITE UR QL STRIP: NEGATIVE
NRBC BLD-RTO: 0 /100 WBC
PH UR STRIP: 6 [PH] (ref 5–8)
PLATELET # BLD AUTO: 182 K/UL (ref 150–450)
PMV BLD AUTO: 10.5 FL (ref 9.2–12.9)
POTASSIUM SERPL-SCNC: 3.3 MMOL/L (ref 3.5–5.1)
PROT SERPL-MCNC: 5.6 G/DL (ref 6–8.4)
PROT UR QL STRIP: ABNORMAL
RBC # BLD AUTO: 4.28 M/UL (ref 4–5.4)
SODIUM SERPL-SCNC: 138 MMOL/L (ref 136–145)
SP GR UR STRIP: 1.02 (ref 1–1.03)
URN SPEC COLLECT METH UR: ABNORMAL
UROBILINOGEN UR STRIP-ACNC: NEGATIVE EU/DL
WBC # BLD AUTO: 5.44 K/UL (ref 3.9–12.7)

## 2022-05-10 PROCEDURE — 99211 OFF/OP EST MAY X REQ PHY/QHP: CPT | Mod: 25

## 2022-05-10 PROCEDURE — 82150 ASSAY OF AMYLASE: CPT | Performed by: OBSTETRICS & GYNECOLOGY

## 2022-05-10 PROCEDURE — 81003 URINALYSIS AUTO W/O SCOPE: CPT | Performed by: OBSTETRICS & GYNECOLOGY

## 2022-05-10 PROCEDURE — 85025 COMPLETE CBC W/AUTO DIFF WBC: CPT | Performed by: OBSTETRICS & GYNECOLOGY

## 2022-05-10 PROCEDURE — 80053 COMPREHEN METABOLIC PANEL: CPT | Performed by: OBSTETRICS & GYNECOLOGY

## 2022-05-10 PROCEDURE — 36415 COLL VENOUS BLD VENIPUNCTURE: CPT | Performed by: OBSTETRICS & GYNECOLOGY

## 2022-05-10 PROCEDURE — 96360 HYDRATION IV INFUSION INIT: CPT

## 2022-05-10 PROCEDURE — 25000003 PHARM REV CODE 250: Performed by: OBSTETRICS & GYNECOLOGY

## 2022-05-10 PROCEDURE — 83690 ASSAY OF LIPASE: CPT | Performed by: OBSTETRICS & GYNECOLOGY

## 2022-05-10 PROCEDURE — 96361 HYDRATE IV INFUSION ADD-ON: CPT

## 2022-05-10 PROCEDURE — 63600175 PHARM REV CODE 636 W HCPCS: Performed by: OBSTETRICS & GYNECOLOGY

## 2022-05-10 RX ORDER — ONDANSETRON 8 MG/1
8 TABLET, ORALLY DISINTEGRATING ORAL EVERY 8 HOURS PRN
Status: DISCONTINUED | OUTPATIENT
Start: 2022-05-10 | End: 2022-05-10 | Stop reason: HOSPADM

## 2022-05-10 RX ORDER — ACETAMINOPHEN 500 MG
500 TABLET ORAL EVERY 6 HOURS PRN
Status: DISCONTINUED | OUTPATIENT
Start: 2022-05-10 | End: 2022-05-10 | Stop reason: HOSPADM

## 2022-05-10 RX ORDER — ONDANSETRON 4 MG/1
4 TABLET, FILM COATED ORAL EVERY 4 HOURS PRN
Qty: 30 TABLET | Refills: 1 | Status: SHIPPED | OUTPATIENT
Start: 2022-05-10 | End: 2022-07-29

## 2022-05-10 RX ORDER — SODIUM CHLORIDE, SODIUM LACTATE, POTASSIUM CHLORIDE, CALCIUM CHLORIDE 600; 310; 30; 20 MG/100ML; MG/100ML; MG/100ML; MG/100ML
INJECTION, SOLUTION INTRAVENOUS CONTINUOUS
Status: DISCONTINUED | OUTPATIENT
Start: 2022-05-10 | End: 2022-05-10 | Stop reason: HOSPADM

## 2022-05-10 RX ORDER — PROCHLORPERAZINE EDISYLATE 5 MG/ML
5 INJECTION INTRAMUSCULAR; INTRAVENOUS EVERY 6 HOURS PRN
Status: DISCONTINUED | OUTPATIENT
Start: 2022-05-10 | End: 2022-05-10 | Stop reason: HOSPADM

## 2022-05-10 RX ADMIN — SODIUM CHLORIDE, SODIUM LACTATE, POTASSIUM CHLORIDE, AND CALCIUM CHLORIDE: .6; .31; .03; .02 INJECTION, SOLUTION INTRAVENOUS at 10:05

## 2022-05-10 RX ADMIN — ONDANSETRON 8 MG: 8 TABLET, ORALLY DISINTEGRATING ORAL at 10:05

## 2022-05-10 NOTE — NURSING
Admit reporting nausea and vomiting since 0100.Abd soft.Reports active fetus.Denies any change in vaginal discharge.Plan of care reviewed. at bedside.

## 2022-05-13 LAB — VIT B12 USB SERPL-MCNC: 1597 PG/ML (ref 800–2600)

## 2022-05-18 ENCOUNTER — PATIENT MESSAGE (OUTPATIENT)
Dept: OBSTETRICS AND GYNECOLOGY | Facility: CLINIC | Age: 37
End: 2022-05-18

## 2022-05-18 ENCOUNTER — ROUTINE PRENATAL (OUTPATIENT)
Dept: OBSTETRICS AND GYNECOLOGY | Facility: CLINIC | Age: 37
End: 2022-05-18
Payer: COMMERCIAL

## 2022-05-18 ENCOUNTER — CLINICAL SUPPORT (OUTPATIENT)
Dept: OBSTETRICS AND GYNECOLOGY | Facility: CLINIC | Age: 37
End: 2022-05-18
Payer: COMMERCIAL

## 2022-05-18 VITALS — DIASTOLIC BLOOD PRESSURE: 82 MMHG | SYSTOLIC BLOOD PRESSURE: 120 MMHG | WEIGHT: 235 LBS | BODY MASS INDEX: 39.11 KG/M2

## 2022-05-18 DIAGNOSIS — O99.350 SEIZURE DISORDER IN PREGNANCY, ANTEPARTUM: ICD-10-CM

## 2022-05-18 DIAGNOSIS — O99.619 GASTROESOPHAGEAL REFLUX IN PREGNANCY: ICD-10-CM

## 2022-05-18 DIAGNOSIS — O99.210 OBESITY AFFECTING PREGNANCY, ANTEPARTUM: ICD-10-CM

## 2022-05-18 DIAGNOSIS — O09.519 ADVANCED MATERNAL AGE, PRIMIGRAVIDA, ANTEPARTUM: ICD-10-CM

## 2022-05-18 DIAGNOSIS — Z23 NEED FOR TDAP VACCINATION: Primary | ICD-10-CM

## 2022-05-18 DIAGNOSIS — Z3A.29 29 WEEKS GESTATION OF PREGNANCY: ICD-10-CM

## 2022-05-18 DIAGNOSIS — Z90.3 S/P GASTRIC SLEEVE PROCEDURE: Primary | ICD-10-CM

## 2022-05-18 DIAGNOSIS — K21.9 GASTROESOPHAGEAL REFLUX IN PREGNANCY: ICD-10-CM

## 2022-05-18 DIAGNOSIS — G40.909 SEIZURE DISORDER IN PREGNANCY, ANTEPARTUM: ICD-10-CM

## 2022-05-18 PROCEDURE — 99999 PR PBB SHADOW E&M-EST. PATIENT-LVL II: ICD-10-PCS | Mod: PBBFAC,,, | Performed by: OBSTETRICS & GYNECOLOGY

## 2022-05-18 PROCEDURE — 90471 TDAP VACCINE GREATER THAN OR EQUAL TO 7YO IM: ICD-10-PCS | Mod: S$GLB,,, | Performed by: OBSTETRICS & GYNECOLOGY

## 2022-05-18 PROCEDURE — 0502F PR SUBSEQUENT PRENATAL CARE: ICD-10-PCS | Mod: CPTII,S$GLB,, | Performed by: OBSTETRICS & GYNECOLOGY

## 2022-05-18 PROCEDURE — 99999 PR PBB SHADOW E&M-EST. PATIENT-LVL II: CPT | Mod: PBBFAC,,, | Performed by: OBSTETRICS & GYNECOLOGY

## 2022-05-18 PROCEDURE — 90715 TDAP VACCINE 7 YRS/> IM: CPT | Mod: S$GLB,,, | Performed by: OBSTETRICS & GYNECOLOGY

## 2022-05-18 PROCEDURE — 99999 PR PBB SHADOW E&M-EST. PATIENT-LVL I: CPT | Mod: PBBFAC,,,

## 2022-05-18 PROCEDURE — 0502F SUBSEQUENT PRENATAL CARE: CPT | Mod: CPTII,S$GLB,, | Performed by: OBSTETRICS & GYNECOLOGY

## 2022-05-18 PROCEDURE — 99999 PR PBB SHADOW E&M-EST. PATIENT-LVL I: ICD-10-PCS | Mod: PBBFAC,,,

## 2022-05-18 PROCEDURE — 90715 TDAP VACCINE GREATER THAN OR EQUAL TO 7YO IM: ICD-10-PCS | Mod: S$GLB,,, | Performed by: OBSTETRICS & GYNECOLOGY

## 2022-05-18 PROCEDURE — 90471 IMMUNIZATION ADMIN: CPT | Mod: S$GLB,,, | Performed by: OBSTETRICS & GYNECOLOGY

## 2022-05-18 NOTE — PROGRESS NOTES
Here for routine OB visit  Denies VB/ctx/LOF. Active FM  Denies PreE symptoms/UTI symptoms  ROS: Negative except for right sided lower back spasm x few days - often in the morning and resolved, denies UTI symptoms. Pain does not radiate    General:  NAD, AxO x 3  Abdomen: soft, gravid, non-tender  Ext: peripheral pulses normal, no pedal edema, no clubbing or cyanosis  Skin: normal, good color and good turgor    1. AMA:  NIPT -XY, AFP NEG,  no ASA due to h/o gastric sleeve  2. Pcos:    3.  Obesity:   TWG 20lb. pt with gastric bypass, NSAIDS was advised to avoid,  Hold off on asa 81mg  4.  H/o seizure:  on folic acid  5.  Let ovarian cyst:  Was 6 cm and now resolved  6.  Reflux:  worsening, On pepcid 20mg BID, advised prilosec OTC, small frequent meals  7. H/o gastric sleeve: GTT NL  8. 3rd trim lab NL, s/p tdap  9.  Discussed lower back/prenatal massages    Strict Labor precautions, FKCs    Next visit: 2 weeks

## 2022-06-01 ENCOUNTER — ROUTINE PRENATAL (OUTPATIENT)
Dept: OBSTETRICS AND GYNECOLOGY | Facility: CLINIC | Age: 37
End: 2022-06-01
Payer: COMMERCIAL

## 2022-06-01 VITALS
DIASTOLIC BLOOD PRESSURE: 76 MMHG | BODY MASS INDEX: 39.36 KG/M2 | SYSTOLIC BLOOD PRESSURE: 116 MMHG | WEIGHT: 236.56 LBS

## 2022-06-01 DIAGNOSIS — Z36.89 ENCOUNTER FOR ULTRASOUND TO ASSESS FETAL GROWTH: ICD-10-CM

## 2022-06-01 DIAGNOSIS — O99.619 GASTROESOPHAGEAL REFLUX IN PREGNANCY: ICD-10-CM

## 2022-06-01 DIAGNOSIS — O09.519 ADVANCED MATERNAL AGE, PRIMIGRAVIDA, ANTEPARTUM: ICD-10-CM

## 2022-06-01 DIAGNOSIS — O99.350 SEIZURE DISORDER IN PREGNANCY, ANTEPARTUM: Primary | ICD-10-CM

## 2022-06-01 DIAGNOSIS — K21.9 GASTROESOPHAGEAL REFLUX IN PREGNANCY: ICD-10-CM

## 2022-06-01 DIAGNOSIS — Z3A.31 31 WEEKS GESTATION OF PREGNANCY: ICD-10-CM

## 2022-06-01 DIAGNOSIS — O99.210 OBESITY AFFECTING PREGNANCY, ANTEPARTUM: ICD-10-CM

## 2022-06-01 DIAGNOSIS — Z90.3 S/P GASTRIC SLEEVE PROCEDURE: ICD-10-CM

## 2022-06-01 DIAGNOSIS — G40.909 SEIZURE DISORDER IN PREGNANCY, ANTEPARTUM: Primary | ICD-10-CM

## 2022-06-01 DIAGNOSIS — Z36.2 ENCOUNTER FOR FOLLOW-UP ULTRASOUND OF FETAL ANATOMY: Primary | ICD-10-CM

## 2022-06-01 PROCEDURE — 99999 PR PBB SHADOW E&M-EST. PATIENT-LVL III: CPT | Mod: PBBFAC,,, | Performed by: OBSTETRICS & GYNECOLOGY

## 2022-06-01 PROCEDURE — 0502F PR SUBSEQUENT PRENATAL CARE: ICD-10-PCS | Mod: CPTII,S$GLB,, | Performed by: OBSTETRICS & GYNECOLOGY

## 2022-06-01 PROCEDURE — 99999 PR PBB SHADOW E&M-EST. PATIENT-LVL III: ICD-10-PCS | Mod: PBBFAC,,, | Performed by: OBSTETRICS & GYNECOLOGY

## 2022-06-01 PROCEDURE — 0502F SUBSEQUENT PRENATAL CARE: CPT | Mod: CPTII,S$GLB,, | Performed by: OBSTETRICS & GYNECOLOGY

## 2022-06-01 NOTE — PROGRESS NOTES
Here for routine OB visit  Denies VB/LOF. Had janell rhoades ctx every 30 min x few hours yesterday, then resolved, she is hydrating, denies UTI symptoms, denies vaginal discharge  Denies PreE symptoms/UTI symptoms  ROS: neg except hpi    General:  NAD, AxO x 3  Abdomen: soft, gravid, non-tender  Ext: peripheral pulses normal, no pedal edema, no clubbing or cyanosis  Skin: normal, good color and good turgor    1. AMA:  NIPT -XY, AFP NEG,  no ASA due to h/o gastric sleeve  2. Pcos:    3.  Obesity:   TWG 20lb. pt with gastric bypass, NSAIDS was advised to avoid,    4.  H/o seizure:  on folic acid, no meds  5.  Let ovarian cyst:  Was 6 cm and now resolved  6.  Reflux:   doing better on prilosec OTC, small frequent meals. improving  7. H/o gastric sleeve: GTT NL, on supplemental vitamins  8. 3rd trim lab NL, s/p tdap  9.  Discussed lower back/prenatal massages    Strict Labor precautions, FKCs    Next visit: 2 weeks

## 2022-06-06 ENCOUNTER — PATIENT MESSAGE (OUTPATIENT)
Dept: MATERNAL FETAL MEDICINE | Facility: CLINIC | Age: 37
End: 2022-06-06
Payer: COMMERCIAL

## 2022-06-07 ENCOUNTER — PROCEDURE VISIT (OUTPATIENT)
Dept: MATERNAL FETAL MEDICINE | Facility: CLINIC | Age: 37
End: 2022-06-07
Payer: COMMERCIAL

## 2022-06-07 DIAGNOSIS — O99.210 OBESITY AFFECTING PREGNANCY, ANTEPARTUM: ICD-10-CM

## 2022-06-07 DIAGNOSIS — Z36.2 ENCOUNTER FOR FOLLOW-UP ULTRASOUND OF FETAL ANATOMY: ICD-10-CM

## 2022-06-07 DIAGNOSIS — Z36.89 ENCOUNTER FOR ULTRASOUND TO ASSESS FETAL GROWTH: ICD-10-CM

## 2022-06-07 PROCEDURE — 76816 US MFM PROCEDURE (VIEWPOINT): ICD-10-PCS | Mod: S$GLB,,, | Performed by: OBSTETRICS & GYNECOLOGY

## 2022-06-07 PROCEDURE — 76816 OB US FOLLOW-UP PER FETUS: CPT | Mod: S$GLB,,, | Performed by: OBSTETRICS & GYNECOLOGY

## 2022-06-08 ENCOUNTER — PATIENT MESSAGE (OUTPATIENT)
Dept: OBSTETRICS AND GYNECOLOGY | Facility: CLINIC | Age: 37
End: 2022-06-08
Payer: COMMERCIAL

## 2022-06-09 ENCOUNTER — PATIENT MESSAGE (OUTPATIENT)
Dept: MATERNAL FETAL MEDICINE | Facility: CLINIC | Age: 37
End: 2022-06-09
Payer: COMMERCIAL

## 2022-06-09 DIAGNOSIS — Z36.89 ENCOUNTER FOR ULTRASOUND TO ASSESS FETAL GROWTH: Primary | ICD-10-CM

## 2022-06-10 ENCOUNTER — PATIENT MESSAGE (OUTPATIENT)
Dept: BARIATRICS | Facility: CLINIC | Age: 37
End: 2022-06-10
Payer: COMMERCIAL

## 2022-06-13 ENCOUNTER — PATIENT MESSAGE (OUTPATIENT)
Dept: OBSTETRICS AND GYNECOLOGY | Facility: CLINIC | Age: 37
End: 2022-06-13
Payer: COMMERCIAL

## 2022-06-14 ENCOUNTER — PATIENT MESSAGE (OUTPATIENT)
Dept: BARIATRICS | Facility: CLINIC | Age: 37
End: 2022-06-14
Payer: COMMERCIAL

## 2022-06-15 ENCOUNTER — ROUTINE PRENATAL (OUTPATIENT)
Dept: OBSTETRICS AND GYNECOLOGY | Facility: CLINIC | Age: 37
End: 2022-06-15
Payer: COMMERCIAL

## 2022-06-15 VITALS — WEIGHT: 239 LBS | SYSTOLIC BLOOD PRESSURE: 112 MMHG | DIASTOLIC BLOOD PRESSURE: 68 MMHG | BODY MASS INDEX: 39.77 KG/M2

## 2022-06-15 DIAGNOSIS — O99.210 OBESITY AFFECTING PREGNANCY, ANTEPARTUM: ICD-10-CM

## 2022-06-15 DIAGNOSIS — Z90.3 S/P GASTRIC SLEEVE PROCEDURE: ICD-10-CM

## 2022-06-15 DIAGNOSIS — G40.909 SEIZURE DISORDER IN PREGNANCY, ANTEPARTUM: ICD-10-CM

## 2022-06-15 DIAGNOSIS — K21.9 GASTROESOPHAGEAL REFLUX IN PREGNANCY: ICD-10-CM

## 2022-06-15 DIAGNOSIS — O09.519 ADVANCED MATERNAL AGE, PRIMIGRAVIDA, ANTEPARTUM: Primary | ICD-10-CM

## 2022-06-15 DIAGNOSIS — O99.619 GASTROESOPHAGEAL REFLUX IN PREGNANCY: ICD-10-CM

## 2022-06-15 DIAGNOSIS — Z3A.33 33 WEEKS GESTATION OF PREGNANCY: ICD-10-CM

## 2022-06-15 DIAGNOSIS — O99.350 SEIZURE DISORDER IN PREGNANCY, ANTEPARTUM: ICD-10-CM

## 2022-06-15 PROCEDURE — 99999 PR PBB SHADOW E&M-EST. PATIENT-LVL III: ICD-10-PCS | Mod: PBBFAC,,, | Performed by: OBSTETRICS & GYNECOLOGY

## 2022-06-15 PROCEDURE — 99999 PR PBB SHADOW E&M-EST. PATIENT-LVL III: CPT | Mod: PBBFAC,,, | Performed by: OBSTETRICS & GYNECOLOGY

## 2022-06-15 PROCEDURE — 0502F PR SUBSEQUENT PRENATAL CARE: ICD-10-PCS | Mod: CPTII,S$GLB,, | Performed by: OBSTETRICS & GYNECOLOGY

## 2022-06-15 PROCEDURE — 0502F SUBSEQUENT PRENATAL CARE: CPT | Mod: CPTII,S$GLB,, | Performed by: OBSTETRICS & GYNECOLOGY

## 2022-06-15 NOTE — PROGRESS NOTES
Here for routine OB visit  Denies VB/LOF/ctx.  Active FM  Denies PreE symptoms/UTI symptoms  ROS: negative besides ankle/ foot arch swelling at the end of the day    General:  NAD, AxO x 3  Abdomen: soft, gravid, non-tender  Ext: peripheral pulses normal, trace pedal edema, no clubbing or cyanosis  Skin: normal, good color and good turgor    1. AMA:  NIPT -XY, AFP NEG,  no ASA due to h/o gastric sleeve  2. Pcos:    3.  Obesity:   TWG 23lb. pt with gastric bypass, NSAIDS was advised to avoid,    4.  H/o seizure:  on folic acid, no meds  5.  Let ovarian cyst:  Was 6 cm and now resolved  6.  Reflux:   doing better on prilosec OTC, small frequent meals. improving  7. H/o gastric sleeve: GTT NL, on supplemental vitamins  8. 3rd trim lab NL, s/p tdap    Strict Labor precautions, FKCs    Next visit: gbs/consent

## 2022-06-21 ENCOUNTER — PATIENT MESSAGE (OUTPATIENT)
Dept: OBSTETRICS AND GYNECOLOGY | Facility: CLINIC | Age: 37
End: 2022-06-21
Payer: COMMERCIAL

## 2022-06-29 ENCOUNTER — ROUTINE PRENATAL (OUTPATIENT)
Dept: OBSTETRICS AND GYNECOLOGY | Facility: CLINIC | Age: 37
End: 2022-06-29
Payer: COMMERCIAL

## 2022-06-29 VITALS
SYSTOLIC BLOOD PRESSURE: 112 MMHG | WEIGHT: 240.75 LBS | DIASTOLIC BLOOD PRESSURE: 62 MMHG | BODY MASS INDEX: 40.06 KG/M2

## 2022-06-29 DIAGNOSIS — O99.619 GASTROESOPHAGEAL REFLUX IN PREGNANCY: ICD-10-CM

## 2022-06-29 DIAGNOSIS — G40.909 SEIZURE DISORDER IN PREGNANCY, ANTEPARTUM: Primary | ICD-10-CM

## 2022-06-29 DIAGNOSIS — Z3A.35 35 WEEKS GESTATION OF PREGNANCY: ICD-10-CM

## 2022-06-29 DIAGNOSIS — Z90.3 S/P GASTRIC SLEEVE PROCEDURE: ICD-10-CM

## 2022-06-29 DIAGNOSIS — O99.350 SEIZURE DISORDER IN PREGNANCY, ANTEPARTUM: Primary | ICD-10-CM

## 2022-06-29 DIAGNOSIS — O99.210 OBESITY AFFECTING PREGNANCY, ANTEPARTUM: ICD-10-CM

## 2022-06-29 DIAGNOSIS — K21.9 GASTROESOPHAGEAL REFLUX IN PREGNANCY: ICD-10-CM

## 2022-06-29 DIAGNOSIS — O09.519 ADVANCED MATERNAL AGE, PRIMIGRAVIDA, ANTEPARTUM: ICD-10-CM

## 2022-06-29 PROCEDURE — 87081 CULTURE SCREEN ONLY: CPT | Performed by: OBSTETRICS & GYNECOLOGY

## 2022-06-29 PROCEDURE — 99999 PR PBB SHADOW E&M-EST. PATIENT-LVL III: CPT | Mod: PBBFAC,,, | Performed by: OBSTETRICS & GYNECOLOGY

## 2022-06-29 PROCEDURE — 0502F SUBSEQUENT PRENATAL CARE: CPT | Mod: CPTII,S$GLB,, | Performed by: OBSTETRICS & GYNECOLOGY

## 2022-06-29 PROCEDURE — 0502F PR SUBSEQUENT PRENATAL CARE: ICD-10-PCS | Mod: CPTII,S$GLB,, | Performed by: OBSTETRICS & GYNECOLOGY

## 2022-06-29 PROCEDURE — 99999 PR PBB SHADOW E&M-EST. PATIENT-LVL III: ICD-10-PCS | Mod: PBBFAC,,, | Performed by: OBSTETRICS & GYNECOLOGY

## 2022-06-29 NOTE — PROGRESS NOTES
Here for routine OB visit. Pt has stopped working as of last Saturday due to round ligament pain, feet swelling and she could not stand for 8 hours.  Makes her varicose pain worse. Will need to fill out FMLA paper  Denies VB/LOF/ctx.  Active FM  Denies PreE symptoms/UTI symptoms  ROS: negative besides ankle/ foot arch swelling at the end of the day    General:  NAD, AxO x 3.   Abdomen: soft, gravid, non-tender  Ext: peripheral pulses normal, 1+ pedal edema, + varicose veins. no clubbing or cyanosis  Skin: normal, good color and good turgor    1. AMA:  NIPT -XY, AFP NEG,  no ASA due to h/o gastric sleeve  2. Pcos:    3.  Obesity:   TWG 23lb. pt with gastric bypass, NSAIDS was advised to avoid,    4.  H/o seizure:  on folic acid, no meds  5.  Let ovarian cyst:  Was 6 cm and now resolved  6.  Reflux: on pepcid/prilosac  7. H/o gastric sleeve: GTT NL, on supplemental vitamins  8. 3rd trim lab NL, s/p tdap  9.  gbs/consent    Strict Labor precautions, FKCs    Next visit: 2 weeks

## 2022-06-30 ENCOUNTER — PATIENT MESSAGE (OUTPATIENT)
Dept: MATERNAL FETAL MEDICINE | Facility: CLINIC | Age: 37
End: 2022-06-30
Payer: COMMERCIAL

## 2022-07-01 ENCOUNTER — PATIENT MESSAGE (OUTPATIENT)
Dept: ADMINISTRATIVE | Facility: OTHER | Age: 37
End: 2022-07-01
Payer: COMMERCIAL

## 2022-07-01 LAB — BACTERIA SPEC AEROBE CULT: NORMAL

## 2022-07-05 ENCOUNTER — PATIENT MESSAGE (OUTPATIENT)
Dept: BARIATRICS | Facility: CLINIC | Age: 37
End: 2022-07-05
Payer: COMMERCIAL

## 2022-07-05 ENCOUNTER — PROCEDURE VISIT (OUTPATIENT)
Dept: MATERNAL FETAL MEDICINE | Facility: CLINIC | Age: 37
End: 2022-07-05
Payer: COMMERCIAL

## 2022-07-05 DIAGNOSIS — Z3A.36 36 WEEKS GESTATION OF PREGNANCY: ICD-10-CM

## 2022-07-05 DIAGNOSIS — Z36.89 ENCOUNTER FOR ULTRASOUND TO ASSESS FETAL GROWTH: ICD-10-CM

## 2022-07-05 DIAGNOSIS — O99.210 OBESITY IN PREGNANCY: ICD-10-CM

## 2022-07-05 DIAGNOSIS — Z36.2 ENCOUNTER FOR FOLLOW-UP ULTRASOUND OF FETAL ANATOMY: ICD-10-CM

## 2022-07-05 PROCEDURE — 76816 OB US FOLLOW-UP PER FETUS: CPT | Mod: S$GLB,,, | Performed by: OBSTETRICS & GYNECOLOGY

## 2022-07-05 PROCEDURE — 76816 PR  US,PREGNANT UTERUS,F/U,TRANSABD APP: ICD-10-PCS | Mod: S$GLB,,, | Performed by: OBSTETRICS & GYNECOLOGY

## 2022-07-13 ENCOUNTER — ROUTINE PRENATAL (OUTPATIENT)
Dept: OBSTETRICS AND GYNECOLOGY | Facility: CLINIC | Age: 37
End: 2022-07-13
Payer: COMMERCIAL

## 2022-07-13 VITALS
DIASTOLIC BLOOD PRESSURE: 60 MMHG | WEIGHT: 244.06 LBS | SYSTOLIC BLOOD PRESSURE: 112 MMHG | BODY MASS INDEX: 40.61 KG/M2

## 2022-07-13 DIAGNOSIS — Z90.3 S/P GASTRIC SLEEVE PROCEDURE: ICD-10-CM

## 2022-07-13 DIAGNOSIS — Z3A.37 37 WEEKS GESTATION OF PREGNANCY: ICD-10-CM

## 2022-07-13 DIAGNOSIS — K21.9 GASTROESOPHAGEAL REFLUX IN PREGNANCY: ICD-10-CM

## 2022-07-13 DIAGNOSIS — G40.909 SEIZURE DISORDER IN PREGNANCY, ANTEPARTUM: ICD-10-CM

## 2022-07-13 DIAGNOSIS — O09.519 ADVANCED MATERNAL AGE, PRIMIGRAVIDA, ANTEPARTUM: Primary | ICD-10-CM

## 2022-07-13 DIAGNOSIS — O99.350 SEIZURE DISORDER IN PREGNANCY, ANTEPARTUM: ICD-10-CM

## 2022-07-13 DIAGNOSIS — O99.210 OBESITY AFFECTING PREGNANCY, ANTEPARTUM: ICD-10-CM

## 2022-07-13 DIAGNOSIS — O99.619 GASTROESOPHAGEAL REFLUX IN PREGNANCY: ICD-10-CM

## 2022-07-13 PROCEDURE — 0502F PR SUBSEQUENT PRENATAL CARE: ICD-10-PCS | Mod: CPTII,S$GLB,, | Performed by: OBSTETRICS & GYNECOLOGY

## 2022-07-13 PROCEDURE — 0502F SUBSEQUENT PRENATAL CARE: CPT | Mod: CPTII,S$GLB,, | Performed by: OBSTETRICS & GYNECOLOGY

## 2022-07-13 PROCEDURE — 99999 PR PBB SHADOW E&M-EST. PATIENT-LVL III: ICD-10-PCS | Mod: PBBFAC,,, | Performed by: OBSTETRICS & GYNECOLOGY

## 2022-07-13 PROCEDURE — 99999 PR PBB SHADOW E&M-EST. PATIENT-LVL III: CPT | Mod: PBBFAC,,, | Performed by: OBSTETRICS & GYNECOLOGY

## 2022-07-13 NOTE — PROGRESS NOTES
Here for routine OB visit  Denies VB/LOF/ctx.  Active FM   Denies PreE symptoms/UTI symptoms  ROS: negative    General:  NAD, AxO x 3.   Abdomen: soft, gravid, non-tender  Ext: peripheral pulses normal, 1+ pedal edema, + varicose veins. no clubbing or cyanosis  Skin: normal, good color and good turgor  Sve: closed, cephalic on US    1. AMA:  NIPT -XY, AFP NEG,  no ASA due to h/o gastric sleeve  2. Pcos:    3.  Obesity:   TWG 23lb. pt with gastric bypass, NSAIDS was advised to avoid,    4.  H/o seizure:  on folic acid, no meds  5.  Let ovarian cyst:  Was 6 cm and now resolved  6.  Reflux: on pepcid/prilosac  7. H/o gastric sleeve: GTT NL, on supplemental vitamins  8. 3rd trim lab NL, s/p tdap  9.  gbs neg  10. Desires IOL: scheduled for mon 7/25/22 @ 2 pm cytotec  Strict Labor precautions, FKCs    Next visit: 1 weeks

## 2022-07-20 ENCOUNTER — ROUTINE PRENATAL (OUTPATIENT)
Dept: OBSTETRICS AND GYNECOLOGY | Facility: CLINIC | Age: 37
End: 2022-07-20
Payer: COMMERCIAL

## 2022-07-20 VITALS — SYSTOLIC BLOOD PRESSURE: 114 MMHG | DIASTOLIC BLOOD PRESSURE: 64 MMHG | BODY MASS INDEX: 40.8 KG/M2 | WEIGHT: 245.13 LBS

## 2022-07-20 DIAGNOSIS — G40.909 SEIZURE DISORDER IN PREGNANCY, ANTEPARTUM: ICD-10-CM

## 2022-07-20 DIAGNOSIS — Z90.3 S/P GASTRIC SLEEVE PROCEDURE: ICD-10-CM

## 2022-07-20 DIAGNOSIS — K21.9 GASTROESOPHAGEAL REFLUX IN PREGNANCY: ICD-10-CM

## 2022-07-20 DIAGNOSIS — O09.519 ADVANCED MATERNAL AGE, PRIMIGRAVIDA, ANTEPARTUM: Primary | ICD-10-CM

## 2022-07-20 DIAGNOSIS — O99.350 SEIZURE DISORDER IN PREGNANCY, ANTEPARTUM: ICD-10-CM

## 2022-07-20 DIAGNOSIS — O99.619 GASTROESOPHAGEAL REFLUX IN PREGNANCY: ICD-10-CM

## 2022-07-20 DIAGNOSIS — O99.210 OBESITY AFFECTING PREGNANCY, ANTEPARTUM: ICD-10-CM

## 2022-07-20 DIAGNOSIS — Z3A.38 38 WEEKS GESTATION OF PREGNANCY: ICD-10-CM

## 2022-07-20 PROCEDURE — 0502F SUBSEQUENT PRENATAL CARE: CPT | Mod: CPTII,S$GLB,, | Performed by: OBSTETRICS & GYNECOLOGY

## 2022-07-20 PROCEDURE — 99999 PR PBB SHADOW E&M-EST. PATIENT-LVL III: CPT | Mod: PBBFAC,,, | Performed by: OBSTETRICS & GYNECOLOGY

## 2022-07-20 PROCEDURE — 0502F PR SUBSEQUENT PRENATAL CARE: ICD-10-PCS | Mod: CPTII,S$GLB,, | Performed by: OBSTETRICS & GYNECOLOGY

## 2022-07-20 PROCEDURE — 99999 PR PBB SHADOW E&M-EST. PATIENT-LVL III: ICD-10-PCS | Mod: PBBFAC,,, | Performed by: OBSTETRICS & GYNECOLOGY

## 2022-07-20 RX ORDER — SODIUM CHLORIDE, SODIUM LACTATE, POTASSIUM CHLORIDE, CALCIUM CHLORIDE 600; 310; 30; 20 MG/100ML; MG/100ML; MG/100ML; MG/100ML
INJECTION, SOLUTION INTRAVENOUS CONTINUOUS
Status: CANCELLED | OUTPATIENT
Start: 2022-07-20

## 2022-07-20 RX ORDER — CARBOPROST TROMETHAMINE 250 UG/ML
250 INJECTION, SOLUTION INTRAMUSCULAR
Status: CANCELLED | OUTPATIENT
Start: 2022-07-20

## 2022-07-20 RX ORDER — PROCHLORPERAZINE EDISYLATE 5 MG/ML
5 INJECTION INTRAMUSCULAR; INTRAVENOUS EVERY 6 HOURS PRN
Status: CANCELLED | OUTPATIENT
Start: 2022-07-20

## 2022-07-20 RX ORDER — MISOPROSTOL 100 MCG
25 TABLET ORAL EVERY 4 HOURS PRN
Status: CANCELLED | OUTPATIENT
Start: 2022-07-20

## 2022-07-20 RX ORDER — BUTORPHANOL TARTRATE 1 MG/ML
1 INJECTION INTRAMUSCULAR; INTRAVENOUS
Status: CANCELLED | OUTPATIENT
Start: 2022-07-20

## 2022-07-20 RX ORDER — MISOPROSTOL 100 UG/1
800 TABLET ORAL
Status: CANCELLED | OUTPATIENT
Start: 2022-07-20

## 2022-07-20 RX ORDER — SIMETHICONE 80 MG
1 TABLET,CHEWABLE ORAL 4 TIMES DAILY PRN
Status: CANCELLED | OUTPATIENT
Start: 2022-07-20

## 2022-07-20 RX ORDER — DIPHENOXYLATE HYDROCHLORIDE AND ATROPINE SULFATE 2.5; .025 MG/1; MG/1
1 TABLET ORAL 4 TIMES DAILY PRN
Status: CANCELLED | OUTPATIENT
Start: 2022-07-20

## 2022-07-20 RX ORDER — OXYTOCIN/RINGER'S LACTATE 30/500 ML
0-30 PLASTIC BAG, INJECTION (ML) INTRAVENOUS CONTINUOUS
Status: CANCELLED | OUTPATIENT
Start: 2022-07-20

## 2022-07-20 RX ORDER — CALCIUM CARBONATE 200(500)MG
500 TABLET,CHEWABLE ORAL 3 TIMES DAILY PRN
Status: CANCELLED | OUTPATIENT
Start: 2022-07-20

## 2022-07-20 RX ORDER — LIDOCAINE HYDROCHLORIDE 10 MG/ML
10 INJECTION INFILTRATION; PERINEURAL ONCE AS NEEDED
Status: CANCELLED | OUTPATIENT
Start: 2022-07-20 | End: 2033-12-16

## 2022-07-20 RX ORDER — OXYTOCIN/RINGER'S LACTATE 30/500 ML
334 PLASTIC BAG, INJECTION (ML) INTRAVENOUS ONCE
Status: CANCELLED | OUTPATIENT
Start: 2022-07-20 | End: 2022-07-20

## 2022-07-20 RX ORDER — ONDANSETRON 4 MG/1
8 TABLET, ORALLY DISINTEGRATING ORAL EVERY 8 HOURS PRN
Status: CANCELLED | OUTPATIENT
Start: 2022-07-20

## 2022-07-20 RX ORDER — MUPIROCIN 20 MG/G
OINTMENT TOPICAL
Status: CANCELLED | OUTPATIENT
Start: 2022-07-20

## 2022-07-20 RX ORDER — SODIUM CHLORIDE 9 MG/ML
INJECTION, SOLUTION INTRAVENOUS
Status: CANCELLED | OUTPATIENT
Start: 2022-07-20

## 2022-07-20 RX ORDER — OXYTOCIN/RINGER'S LACTATE 30/500 ML
95 PLASTIC BAG, INJECTION (ML) INTRAVENOUS ONCE
Status: CANCELLED | OUTPATIENT
Start: 2022-07-20 | End: 2022-07-20

## 2022-07-20 RX ORDER — METHYLERGONOVINE MALEATE 0.2 MG/ML
200 INJECTION INTRAVENOUS
Status: CANCELLED | OUTPATIENT
Start: 2022-07-20

## 2022-07-20 RX ORDER — TERBUTALINE SULFATE 1 MG/ML
0.25 INJECTION SUBCUTANEOUS
Status: CANCELLED | OUTPATIENT
Start: 2022-07-20

## 2022-07-20 NOTE — PROGRESS NOTES
Here for routine OB visit  Denies VB/ctx/LOF. Active FM  Denies PreE symptoms/UTI symptoms  ROS: Negative except for round ligament pain    General:  NAD, AxO x 3  Abdomen: soft, gravid, non-tender  Ext: peripheral pulses normal, no pedal edema, no clubbing or cyanosis  Skin: normal and good color  Sve:   1/th/-3 vertex, confirm US last visit    1. AMA:  NIPT -XY, AFP NEG,  no ASA due to h/o gastric sleeve  2. Pcos:    3.  Obesity:   TWG 23lb. pt with gastric bypass, NSAIDS was advised to avoid,    4.  H/o seizure:  on folic acid, no meds  5.  Let ovarian cyst:  Was 6 cm and now resolved  6.  Reflux: on pepcid/prilosac  7.  H/o gastric sleeve: GTT NL, on supplemental vitamins  8.  3rd trim lab NL, s/p tdap  9.  gbs neg  10. Desires IOL: scheduled for mon 7/25/22 @ 2 pm cytotec    Strict Labor precautions, FKCs

## 2022-07-21 ENCOUNTER — ANESTHESIA (OUTPATIENT)
Dept: OBSTETRICS AND GYNECOLOGY | Facility: HOSPITAL | Age: 37
End: 2022-07-21
Payer: COMMERCIAL

## 2022-07-21 ENCOUNTER — HOSPITAL ENCOUNTER (INPATIENT)
Facility: HOSPITAL | Age: 37
LOS: 3 days | Discharge: HOME | End: 2022-07-24
Attending: OBSTETRICS & GYNECOLOGY | Admitting: OBSTETRICS & GYNECOLOGY
Payer: COMMERCIAL

## 2022-07-21 ENCOUNTER — ANESTHESIA EVENT (OUTPATIENT)
Dept: OBSTETRICS AND GYNECOLOGY | Facility: HOSPITAL | Age: 37
End: 2022-07-21
Payer: COMMERCIAL

## 2022-07-21 ENCOUNTER — NURSE TRIAGE (OUTPATIENT)
Dept: ADMINISTRATIVE | Facility: CLINIC | Age: 37
End: 2022-07-21
Payer: COMMERCIAL

## 2022-07-21 DIAGNOSIS — O99.210 OBESITY AFFECTING PREGNANCY, ANTEPARTUM: ICD-10-CM

## 2022-07-21 DIAGNOSIS — O09.519 ADVANCED MATERNAL AGE, PRIMIGRAVIDA, ANTEPARTUM: ICD-10-CM

## 2022-07-21 LAB
ABO + RH BLD: NORMAL
ALBUMIN SERPL BCP-MCNC: 2.6 G/DL (ref 3.5–5.2)
ALP SERPL-CCNC: 153 U/L (ref 55–135)
ALT SERPL W/O P-5'-P-CCNC: 23 U/L (ref 10–44)
ANION GAP SERPL CALC-SCNC: 9 MMOL/L (ref 8–16)
AST SERPL-CCNC: 27 U/L (ref 10–40)
BASOPHILS # BLD AUTO: 0.05 K/UL (ref 0–0.2)
BASOPHILS NFR BLD: 0.6 % (ref 0–1.9)
BILIRUB SERPL-MCNC: 0.3 MG/DL (ref 0.1–1)
BLD GP AB SCN CELLS X3 SERPL QL: NORMAL
BUN SERPL-MCNC: 8 MG/DL (ref 6–20)
CALCIUM SERPL-MCNC: 9.4 MG/DL (ref 8.7–10.5)
CHLORIDE SERPL-SCNC: 104 MMOL/L (ref 95–110)
CO2 SERPL-SCNC: 24 MMOL/L (ref 23–29)
CREAT SERPL-MCNC: 0.9 MG/DL (ref 0.5–1.4)
DIFFERENTIAL METHOD: ABNORMAL
EOSINOPHIL # BLD AUTO: 0.1 K/UL (ref 0–0.5)
EOSINOPHIL NFR BLD: 1 % (ref 0–8)
ERYTHROCYTE [DISTWIDTH] IN BLOOD BY AUTOMATED COUNT: 15.3 % (ref 11.5–14.5)
EST. GFR  (AFRICAN AMERICAN): >60 ML/MIN/1.73 M^2
EST. GFR  (NON AFRICAN AMERICAN): >60 ML/MIN/1.73 M^2
GLUCOSE SERPL-MCNC: 76 MG/DL (ref 70–110)
HCT VFR BLD AUTO: 37.1 % (ref 37–48.5)
HGB BLD-MCNC: 12.1 G/DL (ref 12–16)
IMM GRANULOCYTES # BLD AUTO: 0.02 K/UL (ref 0–0.04)
IMM GRANULOCYTES NFR BLD AUTO: 0.2 % (ref 0–0.5)
LYMPHOCYTES # BLD AUTO: 2.5 K/UL (ref 1–4.8)
LYMPHOCYTES NFR BLD: 28.5 % (ref 18–48)
MCH RBC QN AUTO: 26.7 PG (ref 27–31)
MCHC RBC AUTO-ENTMCNC: 32.6 G/DL (ref 32–36)
MCV RBC AUTO: 82 FL (ref 82–98)
MONOCYTES # BLD AUTO: 0.8 K/UL (ref 0.3–1)
MONOCYTES NFR BLD: 8.6 % (ref 4–15)
NEUTROPHILS # BLD AUTO: 5.4 K/UL (ref 1.8–7.7)
NEUTROPHILS NFR BLD: 61.1 % (ref 38–73)
NRBC BLD-RTO: 0 /100 WBC
PLATELET # BLD AUTO: 176 K/UL (ref 150–450)
PMV BLD AUTO: 10.8 FL (ref 9.2–12.9)
POTASSIUM SERPL-SCNC: 4.5 MMOL/L (ref 3.5–5.1)
PROT SERPL-MCNC: 6.1 G/DL (ref 6–8.4)
RBC # BLD AUTO: 4.54 M/UL (ref 4–5.4)
SODIUM SERPL-SCNC: 137 MMOL/L (ref 136–145)
WBC # BLD AUTO: 8.84 K/UL (ref 3.9–12.7)

## 2022-07-21 PROCEDURE — 87340 HEPATITIS B SURFACE AG IA: CPT | Performed by: OBSTETRICS & GYNECOLOGY

## 2022-07-21 PROCEDURE — 63600175 PHARM REV CODE 636 W HCPCS: Performed by: OBSTETRICS & GYNECOLOGY

## 2022-07-21 PROCEDURE — 99211 OFF/OP EST MAY X REQ PHY/QHP: CPT | Mod: 25

## 2022-07-21 PROCEDURE — 01968 PR INSERT CATH,ART,PERCUT,SHORTTERM: ICD-10-PCS | Mod: QX,CRNA,, | Performed by: NURSE ANESTHETIST, CERTIFIED REGISTERED

## 2022-07-21 PROCEDURE — 86592 SYPHILIS TEST NON-TREP QUAL: CPT | Performed by: OBSTETRICS & GYNECOLOGY

## 2022-07-21 PROCEDURE — 87389 HIV-1 AG W/HIV-1&-2 AB AG IA: CPT | Performed by: OBSTETRICS & GYNECOLOGY

## 2022-07-21 PROCEDURE — 01968 PR INSERT CATH,ART,PERCUT,SHORTTERM: ICD-10-PCS | Mod: QY,ANES,, | Performed by: ANESTHESIOLOGY

## 2022-07-21 PROCEDURE — 01968 ANES/ANALG CS DLVR NEURAXIAL: CPT | Mod: QX,CRNA,, | Performed by: NURSE ANESTHETIST, CERTIFIED REGISTERED

## 2022-07-21 PROCEDURE — 72100002 HC LABOR CARE, 1ST 8 HOURS

## 2022-07-21 PROCEDURE — 85025 COMPLETE CBC W/AUTO DIFF WBC: CPT | Performed by: OBSTETRICS & GYNECOLOGY

## 2022-07-21 PROCEDURE — 80053 COMPREHEN METABOLIC PANEL: CPT | Performed by: OBSTETRICS & GYNECOLOGY

## 2022-07-21 PROCEDURE — 59025 FETAL NON-STRESS TEST: CPT

## 2022-07-21 PROCEDURE — 62326 NJX INTERLAMINAR LMBR/SAC: CPT | Performed by: ANESTHESIOLOGY

## 2022-07-21 PROCEDURE — 59510 CESAREAN DELIVERY: CPT | Mod: QY,ANES,, | Performed by: ANESTHESIOLOGY

## 2022-07-21 PROCEDURE — C1751 CATH, INF, PER/CENT/MIDLINE: HCPCS | Performed by: ANESTHESIOLOGY

## 2022-07-21 PROCEDURE — 59510 CESAREAN DELIVERY: CPT | Mod: QX,CRNA,, | Performed by: NURSE ANESTHETIST, CERTIFIED REGISTERED

## 2022-07-21 PROCEDURE — 11000001 HC ACUTE MED/SURG PRIVATE ROOM

## 2022-07-21 PROCEDURE — 59510 PRA FULL ROUT OBSTE CARE,CESAREAN DELIV: ICD-10-PCS | Mod: QX,CRNA,, | Performed by: NURSE ANESTHETIST, CERTIFIED REGISTERED

## 2022-07-21 PROCEDURE — 25000003 PHARM REV CODE 250: Performed by: ANESTHESIOLOGY

## 2022-07-21 PROCEDURE — 51702 INSERT TEMP BLADDER CATH: CPT

## 2022-07-21 PROCEDURE — 25000003 PHARM REV CODE 250: Performed by: OBSTETRICS & GYNECOLOGY

## 2022-07-21 PROCEDURE — 59510 PRA FULL ROUT OBSTE CARE,CESAREAN DELIV: ICD-10-PCS | Mod: QY,ANES,, | Performed by: ANESTHESIOLOGY

## 2022-07-21 PROCEDURE — 01968 ANES/ANALG CS DLVR NEURAXIAL: CPT | Mod: QY,ANES,, | Performed by: ANESTHESIOLOGY

## 2022-07-21 PROCEDURE — 86850 RBC ANTIBODY SCREEN: CPT | Performed by: OBSTETRICS & GYNECOLOGY

## 2022-07-21 PROCEDURE — 27200710 HC EPIDURAL INFUSION PUMP SET: Performed by: ANESTHESIOLOGY

## 2022-07-21 PROCEDURE — 36415 COLL VENOUS BLD VENIPUNCTURE: CPT | Performed by: OBSTETRICS & GYNECOLOGY

## 2022-07-21 RX ORDER — TRANEXAMIC ACID 100 MG/ML
1000 INJECTION, SOLUTION INTRAVENOUS ONCE AS NEEDED
Status: DISCONTINUED | OUTPATIENT
Start: 2022-07-21 | End: 2022-07-24 | Stop reason: HOSPADM

## 2022-07-21 RX ORDER — EPHEDRINE SULFATE 50 MG/ML
INJECTION, SOLUTION INTRAVENOUS
Status: DISCONTINUED | OUTPATIENT
Start: 2022-07-21 | End: 2022-07-22

## 2022-07-21 RX ORDER — TERBUTALINE SULFATE 1 MG/ML
0.25 INJECTION SUBCUTANEOUS
Status: DISCONTINUED | OUTPATIENT
Start: 2022-07-21 | End: 2022-07-24 | Stop reason: HOSPADM

## 2022-07-21 RX ORDER — BUTORPHANOL TARTRATE 1 MG/ML
1 INJECTION INTRAMUSCULAR; INTRAVENOUS
Status: DISCONTINUED | OUTPATIENT
Start: 2022-07-21 | End: 2022-07-23

## 2022-07-21 RX ORDER — MISOPROSTOL 100 MCG
25 TABLET ORAL EVERY 4 HOURS PRN
Status: COMPLETED | OUTPATIENT
Start: 2022-07-21 | End: 2022-07-21

## 2022-07-21 RX ORDER — MUPIROCIN 20 MG/G
OINTMENT TOPICAL
Status: DISCONTINUED | OUTPATIENT
Start: 2022-07-21 | End: 2022-07-22

## 2022-07-21 RX ORDER — LIDOCAINE HYDROCHLORIDE 10 MG/ML
10 INJECTION INFILTRATION; PERINEURAL ONCE AS NEEDED
Status: DISCONTINUED | OUTPATIENT
Start: 2022-07-21 | End: 2022-07-24 | Stop reason: HOSPADM

## 2022-07-21 RX ORDER — OXYTOCIN/RINGER'S LACTATE 30/500 ML
0-30 PLASTIC BAG, INJECTION (ML) INTRAVENOUS CONTINUOUS
Status: DISCONTINUED | OUTPATIENT
Start: 2022-07-21 | End: 2022-07-24 | Stop reason: HOSPADM

## 2022-07-21 RX ORDER — PROCHLORPERAZINE EDISYLATE 5 MG/ML
5 INJECTION INTRAMUSCULAR; INTRAVENOUS EVERY 6 HOURS PRN
Status: DISCONTINUED | OUTPATIENT
Start: 2022-07-21 | End: 2022-07-22

## 2022-07-21 RX ORDER — OXYTOCIN/RINGER'S LACTATE 30/500 ML
95 PLASTIC BAG, INJECTION (ML) INTRAVENOUS ONCE
Status: COMPLETED | OUTPATIENT
Start: 2022-07-21 | End: 2022-07-22

## 2022-07-21 RX ORDER — BUPIVACAINE HYDROCHLORIDE 2.5 MG/ML
INJECTION, SOLUTION EPIDURAL; INFILTRATION; INTRACAUDAL
Status: DISCONTINUED | OUTPATIENT
Start: 2022-07-21 | End: 2022-07-22

## 2022-07-21 RX ORDER — SODIUM CHLORIDE 9 MG/ML
INJECTION, SOLUTION INTRAVENOUS
Status: DISCONTINUED | OUTPATIENT
Start: 2022-07-21 | End: 2022-07-24 | Stop reason: HOSPADM

## 2022-07-21 RX ORDER — SODIUM CHLORIDE, SODIUM LACTATE, POTASSIUM CHLORIDE, CALCIUM CHLORIDE 600; 310; 30; 20 MG/100ML; MG/100ML; MG/100ML; MG/100ML
INJECTION, SOLUTION INTRAVENOUS CONTINUOUS
Status: DISCONTINUED | OUTPATIENT
Start: 2022-07-21 | End: 2022-07-22

## 2022-07-21 RX ORDER — DIPHENOXYLATE HYDROCHLORIDE AND ATROPINE SULFATE 2.5; .025 MG/1; MG/1
1 TABLET ORAL 4 TIMES DAILY PRN
Status: DISCONTINUED | OUTPATIENT
Start: 2022-07-21 | End: 2022-07-24 | Stop reason: HOSPADM

## 2022-07-21 RX ORDER — ONDANSETRON 8 MG/1
8 TABLET, ORALLY DISINTEGRATING ORAL EVERY 8 HOURS PRN
Status: DISCONTINUED | OUTPATIENT
Start: 2022-07-21 | End: 2022-07-24 | Stop reason: HOSPADM

## 2022-07-21 RX ORDER — FENTANYL/BUPIVACAINE/NS/PF 2MCG/ML-.1
PLASTIC BAG, INJECTION (ML) INJECTION CONTINUOUS PRN
Status: DISCONTINUED | OUTPATIENT
Start: 2022-07-21 | End: 2022-07-22

## 2022-07-21 RX ORDER — METHYLERGONOVINE MALEATE 0.2 MG/ML
200 INJECTION INTRAVENOUS
Status: DISCONTINUED | OUTPATIENT
Start: 2022-07-21 | End: 2022-07-24 | Stop reason: HOSPADM

## 2022-07-21 RX ORDER — CARBOPROST TROMETHAMINE 250 UG/ML
250 INJECTION, SOLUTION INTRAMUSCULAR
Status: DISCONTINUED | OUTPATIENT
Start: 2022-07-21 | End: 2022-07-24 | Stop reason: HOSPADM

## 2022-07-21 RX ORDER — OXYTOCIN/RINGER'S LACTATE 30/500 ML
334 PLASTIC BAG, INJECTION (ML) INTRAVENOUS ONCE
Status: DISCONTINUED | OUTPATIENT
Start: 2022-07-21 | End: 2022-07-24 | Stop reason: HOSPADM

## 2022-07-21 RX ORDER — MISOPROSTOL 200 UG/1
800 TABLET ORAL
Status: DISCONTINUED | OUTPATIENT
Start: 2022-07-21 | End: 2022-07-24 | Stop reason: HOSPADM

## 2022-07-21 RX ORDER — SIMETHICONE 80 MG
1 TABLET,CHEWABLE ORAL 4 TIMES DAILY PRN
Status: DISCONTINUED | OUTPATIENT
Start: 2022-07-21 | End: 2022-07-22

## 2022-07-21 RX ORDER — CALCIUM CARBONATE 200(500)MG
500 TABLET,CHEWABLE ORAL 3 TIMES DAILY PRN
Status: DISCONTINUED | OUTPATIENT
Start: 2022-07-21 | End: 2022-07-24 | Stop reason: HOSPADM

## 2022-07-21 RX ADMIN — EPHEDRINE SULFATE 10 MG: 50 INJECTION INTRAVENOUS at 11:07

## 2022-07-21 RX ADMIN — Medication 25 MCG: at 07:07

## 2022-07-21 RX ADMIN — Medication 10 ML/HR: at 10:07

## 2022-07-21 RX ADMIN — Medication 25 MCG: at 11:07

## 2022-07-21 RX ADMIN — SODIUM CHLORIDE, SODIUM LACTATE, POTASSIUM CHLORIDE, AND CALCIUM CHLORIDE: .6; .31; .03; .02 INJECTION, SOLUTION INTRAVENOUS at 06:07

## 2022-07-21 RX ADMIN — BUPIVACAINE HYDROCHLORIDE 3 ML: 2.5 INJECTION, SOLUTION EPIDURAL; INFILTRATION; INTRACAUDAL; PERINEURAL at 10:07

## 2022-07-21 NOTE — TELEPHONE ENCOUNTER
"Patient c/o moderate to severe contractions every 8 minutes since 3am. Patient also c/o back pain. Advised to go to LD per protocol. Verbalized understanding. Encounter routed to provider.     Reason for Disposition   [1] Having contractions or other symptoms of labor (such as vaginal pressure) AND [2] 37 or more weeks pregnant (i.e., term pregnancy)   MODERATE-SEVERE abdominal pain    Additional Information   Negative: Passed out (i.e., lost consciousness, collapsed and was not responding)   Negative: Shock suspected (e.g., cold/pale/clammy skin, too weak to stand, low BP, rapid pulse)   Negative: Difficult to awaken or acting confused (e.g., disoriented, slurred speech)   Negative: [1] SEVERE abdominal pain (e.g., excruciating) AND [2] constant AND [3] present > 1 hour   Negative: Severe bleeding (e.g., continuous red blood from vagina, or large blood clots)   Negative: Umbilical cord hanging out of the vagina (shiny, white, curled appearance, "like telephone cord")   Negative: Uncontrollable urge to push (i.e., feels like baby is coming out now)   Negative: Can see baby   Negative: Sounds like a life-threatening emergency to the triager   Negative: [1] First baby (primipara) AND [2] contractions < 6 minutes apart  AND [3] present 2 hours   Negative: [1] History of prior delivery (multipara) AND [2] contractions < 10 minutes apart AND [3] present 1 hour   Negative: [1] History of rapid prior delivery AND [2] contractions < 10 minutes apart   Negative: [1] Leakage of fluid from vagina AND [2] green or brown in color   Negative: [1] Leakage of fluid from vagina AND [2] leakage started > 4 hours ago   Negative: Vaginal bleeding or spotting (Exception: brief spotting after intercourse or pelvic exam)   Negative: Baby moving less today (e.g., kick count < 5 in 1 hour or < 10 in 2 hours)   Negative: Severe headache or headache that won't go away   Negative: New blurred vision or vision " changes    Protocols used: PREGNANCY - ABDOMINAL PAIN GREATER THAN 20 WEEKS EGA-A-AH, PREGNANCY - LABOR-A-AH

## 2022-07-21 NOTE — NURSING
Admit reporting ctx since last night.Mod ctx palpated during assessment.SVE no blood or fluid on glove.Cvx posterior and soft.PO hydration initiated

## 2022-07-22 PROBLEM — O09.523 MULTIGRAVIDA OF ADVANCED MATERNAL AGE IN THIRD TRIMESTER: Status: RESOLVED | Noted: 2022-07-22 | Resolved: 2022-07-22

## 2022-07-22 PROBLEM — O09.523 MULTIGRAVIDA OF ADVANCED MATERNAL AGE IN THIRD TRIMESTER: Status: ACTIVE | Noted: 2022-07-22

## 2022-07-22 PROBLEM — O09.513 AMA (ADVANCED MATERNAL AGE) PRIMIGRAVIDA 35+, THIRD TRIMESTER: Status: ACTIVE | Noted: 2022-07-22

## 2022-07-22 LAB
HBV SURFACE AG SERPL QL IA: NEGATIVE
HIV 1+2 AB+HIV1 P24 AG SERPL QL IA: NEGATIVE

## 2022-07-22 PROCEDURE — 11000001 HC ACUTE MED/SURG PRIVATE ROOM

## 2022-07-22 PROCEDURE — 59510 PR FULL ROUT OBSTE CARE,CESAREAN DELIV: ICD-10-PCS | Mod: ,,, | Performed by: OBSTETRICS & GYNECOLOGY

## 2022-07-22 PROCEDURE — 59514 PR CESAREAN DELIVERY ONLY: ICD-10-PCS | Mod: AS,,, | Performed by: PHYSICIAN ASSISTANT

## 2022-07-22 PROCEDURE — 63600175 PHARM REV CODE 636 W HCPCS: Performed by: OBSTETRICS & GYNECOLOGY

## 2022-07-22 PROCEDURE — 25000003 PHARM REV CODE 250: Performed by: ANESTHESIOLOGY

## 2022-07-22 PROCEDURE — 25000003 PHARM REV CODE 250: Performed by: OBSTETRICS & GYNECOLOGY

## 2022-07-22 PROCEDURE — 25000003 PHARM REV CODE 250: Performed by: PHYSICIAN ASSISTANT

## 2022-07-22 PROCEDURE — 59025 FETAL NON-STRESS TEST: CPT | Mod: 26,,, | Performed by: OBSTETRICS & GYNECOLOGY

## 2022-07-22 PROCEDURE — 59025 PR FETAL 2N-STRESS TEST: ICD-10-PCS | Mod: 26,,, | Performed by: OBSTETRICS & GYNECOLOGY

## 2022-07-22 PROCEDURE — 71000033 HC RECOVERY, INTIAL HOUR: Performed by: OBSTETRICS & GYNECOLOGY

## 2022-07-22 PROCEDURE — 88307 PR  SURG PATH,LEVEL V: ICD-10-PCS | Mod: 26,,, | Performed by: PATHOLOGY

## 2022-07-22 PROCEDURE — 36004724 HC OB OR TIME LEV III - 1ST 15 MIN: Performed by: OBSTETRICS & GYNECOLOGY

## 2022-07-22 PROCEDURE — 88307 TISSUE EXAM BY PATHOLOGIST: CPT | Mod: 26,,, | Performed by: PATHOLOGY

## 2022-07-22 PROCEDURE — 63600175 PHARM REV CODE 636 W HCPCS: Performed by: NURSE ANESTHETIST, CERTIFIED REGISTERED

## 2022-07-22 PROCEDURE — 37000009 HC ANESTHESIA EA ADD 15 MINS: Performed by: OBSTETRICS & GYNECOLOGY

## 2022-07-22 PROCEDURE — 63600175 PHARM REV CODE 636 W HCPCS: Performed by: ANESTHESIOLOGY

## 2022-07-22 PROCEDURE — 59510 CESAREAN DELIVERY: CPT | Mod: ,,, | Performed by: OBSTETRICS & GYNECOLOGY

## 2022-07-22 PROCEDURE — 59514 CESAREAN DELIVERY ONLY: CPT | Mod: AS,,, | Performed by: PHYSICIAN ASSISTANT

## 2022-07-22 PROCEDURE — 36004725 HC OB OR TIME LEV III - EA ADD 15 MIN: Performed by: OBSTETRICS & GYNECOLOGY

## 2022-07-22 PROCEDURE — 72100003 HC LABOR CARE, EA. ADDL. 8 HRS

## 2022-07-22 PROCEDURE — 71000039 HC RECOVERY, EACH ADD'L HOUR: Performed by: OBSTETRICS & GYNECOLOGY

## 2022-07-22 PROCEDURE — 37000008 HC ANESTHESIA 1ST 15 MINUTES: Performed by: OBSTETRICS & GYNECOLOGY

## 2022-07-22 PROCEDURE — 25000003 PHARM REV CODE 250: Performed by: NURSE ANESTHETIST, CERTIFIED REGISTERED

## 2022-07-22 PROCEDURE — 88307 TISSUE EXAM BY PATHOLOGIST: CPT | Performed by: PATHOLOGY

## 2022-07-22 RX ORDER — MISOPROSTOL 200 UG/1
800 TABLET ORAL
Status: DISCONTINUED | OUTPATIENT
Start: 2022-07-22 | End: 2022-07-24 | Stop reason: HOSPADM

## 2022-07-22 RX ORDER — MUPIROCIN 20 MG/G
OINTMENT TOPICAL 2 TIMES DAILY
Status: DISCONTINUED | OUTPATIENT
Start: 2022-07-22 | End: 2022-07-24 | Stop reason: HOSPADM

## 2022-07-22 RX ORDER — ADHESIVE BANDAGE
30 BANDAGE TOPICAL 2 TIMES DAILY PRN
Status: DISCONTINUED | OUTPATIENT
Start: 2022-07-23 | End: 2022-07-24 | Stop reason: HOSPADM

## 2022-07-22 RX ORDER — CARBOPROST TROMETHAMINE 250 UG/ML
250 INJECTION, SOLUTION INTRAMUSCULAR
Status: DISCONTINUED | OUTPATIENT
Start: 2022-07-22 | End: 2022-07-24 | Stop reason: HOSPADM

## 2022-07-22 RX ORDER — MORPHINE SULFATE 1 MG/ML
INJECTION, SOLUTION EPIDURAL; INTRATHECAL; INTRAVENOUS
Status: DISCONTINUED | OUTPATIENT
Start: 2022-07-22 | End: 2022-07-22

## 2022-07-22 RX ORDER — SIMETHICONE 80 MG
1 TABLET,CHEWABLE ORAL EVERY 6 HOURS PRN
Status: DISCONTINUED | OUTPATIENT
Start: 2022-07-22 | End: 2022-07-24 | Stop reason: HOSPADM

## 2022-07-22 RX ORDER — OXYTOCIN/RINGER'S LACTATE 30/500 ML
95 PLASTIC BAG, INJECTION (ML) INTRAVENOUS ONCE
Status: DISCONTINUED | OUTPATIENT
Start: 2022-07-22 | End: 2022-07-24 | Stop reason: HOSPADM

## 2022-07-22 RX ORDER — ONDANSETRON 2 MG/ML
4 INJECTION INTRAMUSCULAR; INTRAVENOUS EVERY 6 HOURS PRN
Status: DISPENSED | OUTPATIENT
Start: 2022-07-22 | End: 2022-07-23

## 2022-07-22 RX ORDER — KETOROLAC TROMETHAMINE 30 MG/ML
30 INJECTION, SOLUTION INTRAMUSCULAR; INTRAVENOUS EVERY 6 HOURS
Status: DISPENSED | OUTPATIENT
Start: 2022-07-22 | End: 2022-07-23

## 2022-07-22 RX ORDER — OXYTOCIN/RINGER'S LACTATE 30/500 ML
334 PLASTIC BAG, INJECTION (ML) INTRAVENOUS ONCE
Status: DISCONTINUED | OUTPATIENT
Start: 2022-07-22 | End: 2022-07-24 | Stop reason: HOSPADM

## 2022-07-22 RX ORDER — DOCUSATE SODIUM 100 MG/1
200 CAPSULE, LIQUID FILLED ORAL 2 TIMES DAILY
Status: DISCONTINUED | OUTPATIENT
Start: 2022-07-22 | End: 2022-07-24 | Stop reason: HOSPADM

## 2022-07-22 RX ORDER — BUPIVACAINE HYDROCHLORIDE 5 MG/ML
INJECTION, SOLUTION EPIDURAL; INTRACAUDAL
Status: DISCONTINUED | OUTPATIENT
Start: 2022-07-22 | End: 2022-07-22

## 2022-07-22 RX ORDER — MUPIROCIN 20 MG/G
OINTMENT TOPICAL
Status: DISCONTINUED | OUTPATIENT
Start: 2022-07-22 | End: 2022-07-24 | Stop reason: HOSPADM

## 2022-07-22 RX ORDER — FAMOTIDINE 10 MG/ML
20 INJECTION INTRAVENOUS
Status: DISCONTINUED | OUTPATIENT
Start: 2022-07-22 | End: 2022-07-24 | Stop reason: HOSPADM

## 2022-07-22 RX ORDER — OXYTOCIN/RINGER'S LACTATE 30/500 ML
PLASTIC BAG, INJECTION (ML) INTRAVENOUS CONTINUOUS PRN
Status: DISCONTINUED | OUTPATIENT
Start: 2022-07-22 | End: 2022-07-22

## 2022-07-22 RX ORDER — DIPHENHYDRAMINE HCL 25 MG
25 CAPSULE ORAL EVERY 4 HOURS PRN
Status: DISCONTINUED | OUTPATIENT
Start: 2022-07-22 | End: 2022-07-24 | Stop reason: HOSPADM

## 2022-07-22 RX ORDER — FAMOTIDINE 10 MG/ML
20 INJECTION INTRAVENOUS ONCE
Status: COMPLETED | OUTPATIENT
Start: 2022-07-22 | End: 2022-07-22

## 2022-07-22 RX ORDER — CEFAZOLIN SODIUM 2 G/50ML
2 SOLUTION INTRAVENOUS ONCE AS NEEDED
Status: COMPLETED | OUTPATIENT
Start: 2022-07-22 | End: 2022-07-22

## 2022-07-22 RX ORDER — ONDANSETRON 8 MG/1
8 TABLET, ORALLY DISINTEGRATING ORAL EVERY 8 HOURS PRN
Status: DISCONTINUED | OUTPATIENT
Start: 2022-07-22 | End: 2022-07-24 | Stop reason: HOSPADM

## 2022-07-22 RX ORDER — SODIUM CHLORIDE 0.9 % (FLUSH) 0.9 %
10 SYRINGE (ML) INJECTION
Status: DISCONTINUED | OUTPATIENT
Start: 2022-07-22 | End: 2022-07-24 | Stop reason: HOSPADM

## 2022-07-22 RX ORDER — OXYTOCIN 10 [USP'U]/ML
INJECTION, SOLUTION INTRAMUSCULAR; INTRAVENOUS
Status: DISCONTINUED | OUTPATIENT
Start: 2022-07-22 | End: 2022-07-22

## 2022-07-22 RX ORDER — HYDROCODONE BITARTRATE AND ACETAMINOPHEN 5; 325 MG/1; MG/1
1 TABLET ORAL EVERY 4 HOURS PRN
Status: DISCONTINUED | OUTPATIENT
Start: 2022-07-23 | End: 2022-07-22

## 2022-07-22 RX ORDER — OXYCODONE AND ACETAMINOPHEN 10; 325 MG/1; MG/1
1 TABLET ORAL EVERY 4 HOURS PRN
Status: DISCONTINUED | OUTPATIENT
Start: 2022-07-23 | End: 2022-07-24 | Stop reason: HOSPADM

## 2022-07-22 RX ORDER — ACETAMINOPHEN 325 MG/1
975 TABLET ORAL EVERY 8 HOURS
Status: DISCONTINUED | OUTPATIENT
Start: 2022-07-22 | End: 2022-07-22

## 2022-07-22 RX ORDER — AMOXICILLIN 250 MG
1 CAPSULE ORAL NIGHTLY PRN
Status: DISCONTINUED | OUTPATIENT
Start: 2022-07-22 | End: 2022-07-24 | Stop reason: HOSPADM

## 2022-07-22 RX ORDER — OXYCODONE HYDROCHLORIDE 5 MG/1
10 TABLET ORAL EVERY 4 HOURS PRN
Status: DISCONTINUED | OUTPATIENT
Start: 2022-07-22 | End: 2022-07-22

## 2022-07-22 RX ORDER — METHYLERGONOVINE MALEATE 0.2 MG/ML
200 INJECTION INTRAVENOUS
Status: DISCONTINUED | OUTPATIENT
Start: 2022-07-22 | End: 2022-07-24 | Stop reason: HOSPADM

## 2022-07-22 RX ORDER — SODIUM CHLORIDE, SODIUM LACTATE, POTASSIUM CHLORIDE, CALCIUM CHLORIDE 600; 310; 30; 20 MG/100ML; MG/100ML; MG/100ML; MG/100ML
INJECTION, SOLUTION INTRAVENOUS CONTINUOUS
Status: DISCONTINUED | OUTPATIENT
Start: 2022-07-22 | End: 2022-07-24 | Stop reason: HOSPADM

## 2022-07-22 RX ORDER — OXYCODONE HYDROCHLORIDE 5 MG/1
5 TABLET ORAL EVERY 4 HOURS PRN
Status: DISPENSED | OUTPATIENT
Start: 2022-07-22 | End: 2022-07-23

## 2022-07-22 RX ORDER — NALBUPHINE HYDROCHLORIDE 10 MG/ML
2.5 INJECTION, SOLUTION INTRAMUSCULAR; INTRAVENOUS; SUBCUTANEOUS ONCE
Status: COMPLETED | OUTPATIENT
Start: 2022-07-22 | End: 2022-07-22

## 2022-07-22 RX ORDER — LIDOCAINE HCL/EPINEPHRINE/PF 2%-1:200K
VIAL (ML) INJECTION
Status: DISCONTINUED | OUTPATIENT
Start: 2022-07-22 | End: 2022-07-22

## 2022-07-22 RX ORDER — FENTANYL CITRATE 50 UG/ML
INJECTION, SOLUTION INTRAMUSCULAR; INTRAVENOUS
Status: DISCONTINUED | OUTPATIENT
Start: 2022-07-22 | End: 2022-07-22

## 2022-07-22 RX ORDER — PROCHLORPERAZINE EDISYLATE 5 MG/ML
5 INJECTION INTRAMUSCULAR; INTRAVENOUS EVERY 6 HOURS PRN
Status: DISCONTINUED | OUTPATIENT
Start: 2022-07-22 | End: 2022-07-22

## 2022-07-22 RX ORDER — ACETAMINOPHEN 10 MG/ML
INJECTION, SOLUTION INTRAVENOUS
Status: DISCONTINUED | OUTPATIENT
Start: 2022-07-22 | End: 2022-07-22

## 2022-07-22 RX ORDER — ACETAMINOPHEN 325 MG/1
650 TABLET ORAL EVERY 6 HOURS
Status: COMPLETED | OUTPATIENT
Start: 2022-07-22 | End: 2022-07-23

## 2022-07-22 RX ORDER — BISACODYL 10 MG
10 SUPPOSITORY, RECTAL RECTAL ONCE AS NEEDED
Status: DISCONTINUED | OUTPATIENT
Start: 2022-07-22 | End: 2022-07-24 | Stop reason: HOSPADM

## 2022-07-22 RX ORDER — ONDANSETRON HYDROCHLORIDE 2 MG/ML
INJECTION, SOLUTION INTRAMUSCULAR; INTRAVENOUS
Status: DISCONTINUED | OUTPATIENT
Start: 2022-07-22 | End: 2022-07-22

## 2022-07-22 RX ORDER — PROCHLORPERAZINE EDISYLATE 5 MG/ML
5 INJECTION INTRAMUSCULAR; INTRAVENOUS EVERY 6 HOURS PRN
Status: DISCONTINUED | OUTPATIENT
Start: 2022-07-22 | End: 2022-07-24 | Stop reason: HOSPADM

## 2022-07-22 RX ORDER — SODIUM CITRATE AND CITRIC ACID MONOHYDRATE 334; 500 MG/5ML; MG/5ML
30 SOLUTION ORAL ONCE
Status: COMPLETED | OUTPATIENT
Start: 2022-07-22 | End: 2022-07-22

## 2022-07-22 RX ORDER — PRENATAL WITH FERROUS FUM AND FOLIC ACID 3080; 920; 120; 400; 22; 1.84; 3; 20; 10; 1; 12; 200; 27; 25; 2 [IU]/1; [IU]/1; MG/1; [IU]/1; MG/1; MG/1; MG/1; MG/1; MG/1; MG/1; UG/1; MG/1; MG/1; MG/1; MG/1
1 TABLET ORAL DAILY
Status: DISCONTINUED | OUTPATIENT
Start: 2022-07-22 | End: 2022-07-24 | Stop reason: HOSPADM

## 2022-07-22 RX ORDER — PHENYLEPHRINE HYDROCHLORIDE 10 MG/ML
INJECTION INTRAVENOUS
Status: DISCONTINUED | OUTPATIENT
Start: 2022-07-22 | End: 2022-07-22

## 2022-07-22 RX ORDER — DEXAMETHASONE SODIUM PHOSPHATE 4 MG/ML
INJECTION, SOLUTION INTRA-ARTICULAR; INTRALESIONAL; INTRAMUSCULAR; INTRAVENOUS; SOFT TISSUE
Status: DISCONTINUED | OUTPATIENT
Start: 2022-07-22 | End: 2022-07-22

## 2022-07-22 RX ADMIN — DIPHENHYDRAMINE HYDROCHLORIDE 25 MG: 25 CAPSULE ORAL at 07:07

## 2022-07-22 RX ADMIN — Medication 95 MILLI-UNITS/MIN: at 12:07

## 2022-07-22 RX ADMIN — MUPIROCIN: 20 OINTMENT TOPICAL at 09:07

## 2022-07-22 RX ADMIN — SODIUM CHLORIDE, SODIUM LACTATE, POTASSIUM CHLORIDE, AND CALCIUM CHLORIDE: .6; .31; .03; .02 INJECTION, SOLUTION INTRAVENOUS at 08:07

## 2022-07-22 RX ADMIN — LIDOCAINE HYDROCHLORIDE,EPINEPHRINE BITARTRATE 3 ML: 20; .005 INJECTION, SOLUTION EPIDURAL; INFILTRATION; INTRACAUDAL; PERINEURAL at 07:07

## 2022-07-22 RX ADMIN — TERBUTALINE SULFATE 0.25 MG: 1 INJECTION SUBCUTANEOUS at 03:07

## 2022-07-22 RX ADMIN — Medication 95 ML/HR: at 08:07

## 2022-07-22 RX ADMIN — PHENYLEPHRINE HYDROCHLORIDE 200 MCG: 10 INJECTION INTRAVENOUS at 08:07

## 2022-07-22 RX ADMIN — ONDANSETRON 4 MG: 2 INJECTION, SOLUTION INTRAMUSCULAR; INTRAVENOUS at 08:07

## 2022-07-22 RX ADMIN — ACETAMINOPHEN 1000 MG: 10 INJECTION, SOLUTION INTRAVENOUS at 08:07

## 2022-07-22 RX ADMIN — FAMOTIDINE 20 MG: 10 INJECTION, SOLUTION INTRAVENOUS at 07:07

## 2022-07-22 RX ADMIN — MUPIROCIN: 20 OINTMENT TOPICAL at 07:07

## 2022-07-22 RX ADMIN — OXYTOCIN 40 UNITS: 10 INJECTION, SOLUTION INTRAMUSCULAR; INTRAVENOUS at 08:07

## 2022-07-22 RX ADMIN — KETOROLAC TROMETHAMINE 30 MG: 30 INJECTION, SOLUTION INTRAMUSCULAR; INTRAVENOUS at 09:07

## 2022-07-22 RX ADMIN — Medication 1.5 MG: at 08:07

## 2022-07-22 RX ADMIN — SODIUM CHLORIDE, SODIUM LACTATE, POTASSIUM CHLORIDE, AND CALCIUM CHLORIDE: .6; .31; .03; .02 INJECTION, SOLUTION INTRAVENOUS at 03:07

## 2022-07-22 RX ADMIN — BUPIVACAINE HYDROCHLORIDE 4 ML: 5 INJECTION, SOLUTION EPIDURAL; INTRACAUDAL; PERINEURAL at 07:07

## 2022-07-22 RX ADMIN — OXYCODONE 5 MG: 5 TABLET ORAL at 05:07

## 2022-07-22 RX ADMIN — SODIUM CHLORIDE, SODIUM LACTATE, POTASSIUM CHLORIDE, AND CALCIUM CHLORIDE: .6; .31; .03; .02 INJECTION, SOLUTION INTRAVENOUS at 06:07

## 2022-07-22 RX ADMIN — Medication 2 MILLI-UNITS/MIN: at 05:07

## 2022-07-22 RX ADMIN — CEFAZOLIN SODIUM 2 G: 2 SOLUTION INTRAVENOUS at 07:07

## 2022-07-22 RX ADMIN — ONDANSETRON 4 MG: 2 INJECTION INTRAMUSCULAR; INTRAVENOUS at 01:07

## 2022-07-22 RX ADMIN — ACETAMINOPHEN 650 MG: 325 TABLET ORAL at 05:07

## 2022-07-22 RX ADMIN — NALBUPHINE HYDROCHLORIDE 2.5 MG: 10 INJECTION, SOLUTION INTRAMUSCULAR; INTRAVENOUS; SUBCUTANEOUS at 02:07

## 2022-07-22 RX ADMIN — AZITHROMYCIN MONOHYDRATE 500 MG: 500 INJECTION, POWDER, LYOPHILIZED, FOR SOLUTION INTRAVENOUS at 07:07

## 2022-07-22 RX ADMIN — FENTANYL CITRATE 100 MCG: 50 INJECTION, SOLUTION INTRAMUSCULAR; INTRAVENOUS at 08:07

## 2022-07-22 RX ADMIN — TERBUTALINE SULFATE 0.25 MG: 1 INJECTION SUBCUTANEOUS at 07:07

## 2022-07-22 RX ADMIN — DEXAMETHASONE SODIUM PHOSPHATE 4 MG: 4 INJECTION, SOLUTION INTRA-ARTICULAR; INTRALESIONAL; INTRAMUSCULAR; INTRAVENOUS; SOFT TISSUE at 08:07

## 2022-07-22 RX ADMIN — SODIUM CHLORIDE, SODIUM LACTATE, POTASSIUM CHLORIDE, AND CALCIUM CHLORIDE: .6; .31; .03; .02 INJECTION, SOLUTION INTRAVENOUS at 05:07

## 2022-07-22 RX ADMIN — SODIUM CITRATE AND CITRIC ACID MONOHYDRATE 30 ML: 500; 334 SOLUTION ORAL at 07:07

## 2022-07-22 RX ADMIN — LIDOCAINE HYDROCHLORIDE,EPINEPHRINE BITARTRATE 5 ML: 20; .005 INJECTION, SOLUTION EPIDURAL; INFILTRATION; INTRACAUDAL; PERINEURAL at 07:07

## 2022-07-22 RX ADMIN — DOCUSATE SODIUM 200 MG: 100 CAPSULE, LIQUID FILLED ORAL at 09:07

## 2022-07-22 NOTE — HPI
Pt is a 38 yo  @ 38w4d presented to L&D with ctx yesterday.  There was no ctx on monitor and SVE unchanged from previous exam in office  However NST shows minimal to moderate variability, no accels no decels.  Given pt at term she was admitted for IOL for concerning FHT  Pt reported active FM otherwise, denies LOF

## 2022-07-22 NOTE — PROGRESS NOTES
07/22/22 0650   [facility] Note - Strip   Strip Reviewed by [facility] Nurse? Yes   [facility] Note - Communication   [place] Nurse Communicated with Bedside Nurse Regarding: Fetal Status   [facility] Note - Notification   Nurse Notified? Yes   Name of Nurse Amy

## 2022-07-22 NOTE — HOSPITAL COURSE
Pt received 2 doses of cytotec for cervical ripening   Pitocin was started and discontinue a few times due to repetitive late decel, pit was only up at 2 mU/min  FHT overnight was overall cat 2 overnight, very rare accelerations.

## 2022-07-22 NOTE — DISCHARGE INSTRUCTIONS
After a Cesearean Birth    General Discharge Instructions  May follow a regular diet, unless otherwise discussed with physician.  Take showers, not baths unless otherwise discussed with physician.  Activity as tolerated.  No lifting or heavy exercise for 6 weeks, no driving for 2 weeks, no sexual intercourse, douching or tampons for 6 weeks  May return to work/school as discussed with physician  Rhogam injection given  _____  Rubella vaccine given _____  (avoid pregnancy for 3 mos. after injection)  Discuss birth control with physician  Breast care support bra worn at all times  Lactation consultant referral number ( [phone] or [phone])    Call Your Healthcare Provider Right Away If You Have:  A temperature of 100.4°F or higher.  If your blood pressure is over 155/105.  You have difficulty catching your breath or trouble breathing.  Heavy vaginal bleeding, clots, or vaginal discharge with foul odor. (heavier than menses)  Persistent nausea or vomiting.  You gain more than 3 pounds in 3 days.  Severe headaches not relieved by Tylenol (acetaminophen) or Motrin (ibuprofen)  Blurry or double vision, see spots or flashing lights.  Dizziness or fainting.  New onset swelling or worsening of existing swelling.  Burning or pain when you urinate.  No bowel movement for 5 days.  Redness, warmth, swelling, or pain in the lower leg.  Redness, discharge, or pain worse than you had in the hospital.  Burning, pain, red streaks, or lumpy areas in your breasts.  Cracks, blisters, or blood on your nipples.  Feelings of extreme sadness or anxiety, or a feeling that you dont want to be with your baby.  If you have any new or unusual symptoms or have questions or concerns    Incision Care  You will be able to shower and pat the incision dry.  Watch your incision for signs of infection, such as increasing redness or drainage.  For ease of movement, hold a pillow against the incision when you get up from a lying or sitting  position, and when you laugh or cough.  Avoid heavy lifting--nothing heavier than your baby until your doctor instructs you otherwise.     Follow-Up  Schedule a  follow-up exam with your healthcare provider for about 6 weeks after delivery. During this exam, your uterus and vaginal area will be checked. Contact your healthcare provider if you think you or your baby are having any problems.           After a Cesearean Birth    General Discharge Instructions  May follow a regular diet, unless otherwise discussed with physician.  Take showers, not baths unless otherwise discussed with physician.  Activity as tolerated.  No lifting or heavy exercise for 6 weeks, no driving for 2 weeks, no sexual intercourse, douching or tampons for 6 weeks  May return to work/school as discussed with physician  Discuss birth control with physician  Breast care support bra worn at all times  Lactation consultant referral number ( [phone] or [phone])    Call Your Healthcare Provider Right Away If You Have:  A temperature of 100.4°F or higher.  If your blood pressure is over 155/105.  You have difficulty catching your breath or trouble breathing.  Heavy vaginal bleeding, clots, or vaginal discharge with foul odor. (heavier than menses)  Persistent nausea or vomiting.  You gain more than 3 pounds in 3 days.  Severe headaches not relieved by Tylenol (acetaminophen) or Motrin (ibuprofen)  Blurry or double vision, see spots or flashing lights.  Dizziness or fainting.  New onset swelling or worsening of existing swelling.  Burning or pain when you urinate.  No bowel movement for 5 days.  Redness, warmth, swelling, or pain in the lower leg.  Redness, discharge, or pain worse than you had in the hospital.  Burning, pain, red streaks, or lumpy areas in your breasts.  Cracks, blisters, or blood on your nipples.  Feelings of extreme sadness or anxiety, or a feeling that you dont want to be with your baby.  If you have  any new or unusual symptoms or have questions or concerns    Incision Care  You will be able to shower and pat the incision dry.  Watch your incision for signs of infection, such as increasing redness or drainage.  For ease of movement, hold a pillow against the incision when you get up from a lying or sitting position, and when you laugh or cough.  Avoid heavy lifting--nothing heavier than your baby until your doctor instructs you otherwise.     Follow-Up  Schedule a  follow-up exam with your healthcare provider for about 6 weeks after delivery. During this exam, your uterus and vaginal area will be checked. Contact your healthcare provider if you think you or your baby are having any problems.         Breastfeeding discharge instructions given with First Alert form and reviewed. Please complete First Alert within 3-5 days after the baby's birth. ( Please call the Breastfeeding Warmline ( [phone]) or the baby's pediatrician if you have any concerns.     Also discussed:  AAP recommendation of exclusive breastfeeding for the first 6 months of life and continued breastfeeding with the introduction of supplemental foods beyond the first year of life. The AAP recommends breastfeeding for for at least a year or longer. Instructed on the recommendation to delay all bottle and pacifier use until after 4 weeks of age and breastfeeding is well established.  Discussed the benefits of exclusive breastfeeding for both mother and baby.  Discussed the risks of supplementation/pacifier use on the exclusivity of breastfeeding in the first 6 months. Feed the baby at the earliest sign of hunger or comfort  Hands to mouth, sucking motions  Rooting or searching for something to suck on  Dont wait for crying - it is a not a late sign of hunger; it is a sign of distress    The feedings may be 8-12 times per 24hrs and will not follow a schedule  Alternate the breast you start the feeding with, or start with the  breast that feels the fullest  Switch breasts when the baby takes himself off the breast or falls asleep  Keep offering breasts until the baby looks full, no longer gives hunger signs, and stays asleep when placed on his back in the crib  If the baby is sleepy and wont wake for a feeding, put the baby skin-to-skin dressed in a diaper against the mothers bare chest  Sleep near your baby  The baby should be positioned and latched on to the breast correctly  Chest-to-chest, chin in the breast  Babys lips are flipped outward  Babys mouth is stretched open wide like a shout  Babys sucking should feel like tugging to the mother  The baby should be drinking at the breast:  You should hear swallowing or gulping throughout the feeding  You should see milk on the babys lips when he comes off the breast  Your breasts should be softer when the baby is finished feeding  The baby should look relaxed at the end of feedings  After the 4th day and your milk is in:  The babys poop should turn bright yellow and be loose, watery, and seedy  The baby should have at least 3-4 poops the size of the palm of your hand per day  The baby should have at least 6-8 wet diapers per day  The urine should be light yellow in color  You should drink when you are thirsty and eat a healthy diet when you are    hungry.     Take naps to get the rest you need.   Take medications and/or drink alcohol only with permission of your obstetrician    or the babys pediatrician.  You can also call the [facility],   ([phone]), Monday-Friday, 8am-5pm Central time, to get the most   up-to-date evidence-based information on the use of medications during   pregnancy and breastfeeding.      The baby should be examined by a pediatrician at 3-5 days of age and again at 2 weeks of age unless ordered sooner by the pediatrician.  Once your milk production increases the baby should gain at least 1/2-1oz each day and should be back to birth weight no  later than 10-14 days of age.If this is not the case, please call the Breastfeeding Warmline ( [phone]) for assistance and support. Instructed on primary engorgement and precautions.  Discussed:    Typical timing of the onset of engorgement  Signs and symptoms of engorgement  If the milk is flowing, use wet or dry heat applied to the breasts for approximately 10min prior to each feeding as a comfort measure to facilitate the milk ejection reflex    Follow heat treatment with breast massage to soften hard/lumpy areas of the breast    Use unrestricted, frequent, effective feedings    Wake baby to feed if necessary    Avoid pacifier and bottle feedings    Hand express or pump breasts to the point of comfort as needed    Use cold treatments in the form of ice packs/gel packs/ frozen vegetables wrapped in a soft thin cloth and applied to the breasts for approximately 20min after each feeding until engorgement is resolved    Wear comfortable, supportive bra    Take pain medicine as needed    Use anti-inflammatory medications if prescribed by physician

## 2022-07-22 NOTE — H&P
[facility] - [place] - Labor & Delivery  Obstetrics  History & Physical    Patient Name: Agustina Tillman  MRN: [id number]  Admission Date: 2022  Primary Care Provider: John Gaston MD    Subjective:     Principal Problem:Obesity affecting pregnancy, antepartum    History of Present Illness:  Pt is a 36 yo  @ 38w4d presented to L&D with ctx yesterday.  There was no ctx on monitor and SVE unchanged from previous exam in office  However NST shows minimal to moderate variability, no accels no decels.  Given pt at term she was admitted for IOL for concerning FHT  Pt reported active FM otherwise, denies LOF      Obstetric HPI:  Patient reports now comfortable with contractions, active fetal movement, No vaginal bleeding , No loss of fluid     This pregnancy has been complicated by obesity, gatric bypass, AMA, h/o seizure disoroder    OB History    Para Term  AB Living   1 0 0 0 0 0   SAB IAB Ectopic Multiple Live Births   0 0 0 0 0      # Outcome Date GA Lbr Horace/2nd Weight Sex Delivery Anes PTL Lv   1 Current               Obstetric Comments   PCOS?   Gynhx:  Reg then irregular, then became regular after weight lost surgery   Denies std   Denies abnl pap, pap  neg     Past Medical History:   Diagnosis Date    Allergy 2016    Sinus    Iron deficiency anemia secondary to inadequate dietary iron intake     Lateral epicondylitis, right elbow 3/10/2022    PCOS (polycystic ovarian syndrome) 2017    Seizures     I use to have epilepsy. But they have julieta away     Past Surgical History:   Procedure Laterality Date    ESOPHAGOGASTRODUODENOSCOPY N/A 2020    Procedure: EGD (ESOPHAGOGASTRODUODENOSCOPY);  Surgeon: Cj Umaña MD;  Location: [facility] OR [address] [place] [address];  Service: General;  Laterality: N/A;    LAPAROSCOPIC SLEEVE GASTRECTOMY N/A 2020    Procedure: GASTRECTOMY, SLEEVE, LAPAROSCOPIC, with intraop EGD;  Surgeon: Cj Umaña MD;  Location: [facility] OR [address] [place] [address];   Service: General;  Laterality: N/A;       PTA Medications   Medication Sig    famotidine (PEPCID) 20 MG tablet Take 1 tablet (20 mg total) by mouth 2 (two) times daily. (Patient not taking: No sig reported)    fexofenadine (ALLEGRA) 60 MG tablet Take 1 tablet (60 mg total) by mouth 2 (two) times daily. (Patient not taking: No sig reported)    fluticasone propionate (FLONASE) 50 mcg/actuation nasal spray 1 spray (50 mcg total) by Each Nostril route once daily. (Patient not taking: No sig reported)    folic acid (FOLVITE) 1 MG tablet Take 4 tablets (4 mg total) by mouth once daily.    ondansetron (ZOFRAN) 4 MG tablet Take 1 tablet (4 mg total) by mouth every 4 (four) hours as needed for Nausea. (Patient not taking: No sig reported)    PNV,calcium 72-iron-folic acid (PRENATAL VITAMIN PLUS LOW IRON) 27 mg iron- 1 mg Tab Take one tablet daily.  Prescribe prenatal covered by insurance       Review of patient's allergies indicates:   Allergen Reactions    Augmentin [amoxicillin-pot clavulanate] Diarrhea    Metformin Other (See Comments)     Severe low blood sugar    Nsaids (non-steroidal anti-inflammatory drug)      Had gastric sleeve cannot take    Sudafed pe head congestn-pain [ibuprofen-phenylephrine] Anxiety        Family History       Problem Relation (Age of Onset)    Cancer Father    Hypertension Mother    Lung cancer Father          Tobacco Use    Smoking status: Never Smoker    Smokeless tobacco: Never Used   Substance and Sexual Activity    Alcohol use: Not Currently     Alcohol/week: 1.0 standard drink     Types: 1 Glasses of wine per week    Drug use: Never    Sexual activity: Yes     Partners: Male     Birth control/protection: None     Review of Systems   Constitutional:  Negative for appetite change, chills and fever.   Respiratory:  Negative for shortness of breath.    Cardiovascular:  Negative for chest pain.   Gastrointestinal:  Negative for abdominal pain, blood in stool, constipation,  diarrhea, nausea and vomiting.   Endocrine: Negative for hair loss and hot flashes.   Genitourinary:  Negative for decreased libido, dysmenorrhea, dyspareunia, dysuria, genital sores, menorrhagia, menstrual problem, pelvic pain, vaginal discharge, vaginal pain, urinary incontinence and vaginal odor.   Musculoskeletal:  Negative for back pain.   Integumentary:  Negative for rash, acne, hair changes, breast mass, nipple discharge and breast skin changes.   Breast: Negative for mass, mastodynia, nipple discharge and skin changes   Objective:     Vital Signs (Most Recent):  Temp: 98.4 °F (36.9 °C) (07/21/22 1929)  Pulse: 78 (07/22/22 0709)  Resp: 18 (07/21/22 1929)  BP: 113/73 (07/22/22 0706)  SpO2: 98 % (07/22/22 0709) Vital Signs (24h Range):  Temp:  [98.4 °F (36.9 °C)-99 °F (37.2 °C)] 98.4 °F (36.9 °C)  Pulse:  [0-106] 78  Resp:  [18] 18  SpO2:  [93 %-100 %] 98 %  BP: ()/(44-90) 113/73     Weight: 111.2 kg (245 lb 2.4 oz)  Body mass index is 40.8 kg/m².    FHT: 140 bpm, mod jaylen, rare accels, repetitive late decel even with pitocin off. Cat 2 (reassuring)  TOCO:  Q 3-4 min    Physical Exam    Cervix:  Dilation:  2  Effacement:  75%  Station: -3  Presentation: Vertex     Significant Labs:  Lab Results   Component Value Date    GROUPTRH B POS 07/21/2022    HEPBSAG Negative 12/06/2021    STREPBCULT No Group B Streptococcus isolated 06/29/2022       I have personallly reviewed all pertinent lab results from the last 24 hours.  Recent Lab Results         07/21/22  1835        Albumin 2.6       Alkaline Phosphatase 153       ALT 23       Anion Gap 9       AST 27       Baso # 0.05       Basophil % 0.6       BILIRUBIN TOTAL 0.3  Comment: For infants and newborns, interpretation of results should be based  on gestational age, weight and in agreement with clinical  observations.    Premature Infant recommended reference ranges:  Up to 24 hours.............<8.0 mg/dL  Up to 48 hours............<12.0 mg/dL  3-5  days..................<15.0 mg/dL  6-29 days.................<15.0 mg/dL         BUN 8       Calcium 9.4       Chloride 104       CO2 24       Creatinine 0.9       Differential Method Automated       eGFR if  >60       eGFR if non  >60  Comment: Calculation used to obtain the estimated glomerular filtration  rate (eGFR) is the CKD-EPI equation.          Eos # 0.1       Eosinophil % 1.0       Glucose 76       Gran # (ANC) 5.4       Gran % 61.1       Group & Rh B POS       Hematocrit 37.1       Hemoglobin 12.1       Immature Grans (Abs) 0.02  Comment: Mild elevation in immature granulocytes is non specific and   can be seen in a variety of conditions including stress response,   acute inflammation, trauma and pregnancy. Correlation with other   laboratory and clinical findings is essential.         Immature Granulocytes 0.2       INDIRECT FERNY NEG       Lymph # 2.5       Lymph % 28.5       MCH 26.7       MCHC 32.6       MCV 82       Mono # 0.8       Mono % 8.6       MPV 10.8       nRBC 0       Platelets 176       Potassium 4.5       PROTEIN TOTAL 6.1       RBC 4.54       RDW 15.3       Sodium 137       WBC 8.84             Assessment/Plan:     37 y.o. female  at 38w4d for:    Seizure disorder in pregnancy, antepartum  Controlled without meds    Obesity affecting pregnancy, antepartum  Latent labor  Given pt with repetitive late decel and fetal intolerance to labor and remote from delivery, discussed with pt will proceed with primary CD. Pt agreed        Bere Aldaan MD  Obstetrics  [facility] [place] - Labor & Delivery

## 2022-07-22 NOTE — ANESTHESIA PROCEDURE NOTES
Epidural    Patient location during procedure: OB   Reason for block: primary anesthetic   Reason for block: labor analgesia requested by patient and obstetrician  Diagnosis: IUP   Start time: 7/21/2022 9:46 PM  Timeout: 7/21/2022 9:45 PM  End time: 7/21/2022 10:06 PM    Staffing  Performing Provider: August Waterman MD  Authorizing Provider: August Waterman MD        Preanesthetic Checklist  Completed: patient identified, IV checked, site marked, risks and benefits discussed, surgical consent, monitors and equipment checked, pre-op evaluation, timeout performed, anesthesia consent given, hand hygiene performed and patient being monitored  Preparation  Patient position: sitting  Prep: ChloraPrep  Patient monitoring: ECG, Pulse Ox, continuous capnometry and Blood Pressure  Reason for block: primary anesthetic   Epidural  Skin Anesthetic: lidocaine 1%  Skin Wheal: 5 mL  Administration type: continuous  Approach: midline  Interspace: L3-4    Injection technique: ADAM saline  Needle and Epidural Catheter  Needle type: Tuohy   Needle gauge: 17  Needle length: 5.0 inches  Needle insertion depth: 14 cm  Catheter type: end hole and springwound  Catheter size: 19 G  Catheter at skin depth: 19 cm  Insertion Attempts: 2  Test dose: 3 mL of lidocaine 1.5% with Epi 1-to-200,000  Additional Documentation: incremental injection, negative aspiration for heme and CSF, no paresthesia on injection, no signs/symptoms of IV or SA injection, no significant complaints from patient and no significant pain on injection  Needle localization: anatomical landmarks  Assessment  Ease of block: moderate  Patient's tolerance of the procedure: comfortable throughout block and no complaints  Additional Notes  Attempted L4/5 interspace first and continuous bone contact. Decision to proceed with 15cm needle at L3/4 interspace and loss of resistance at 14cm. No inadvertent dural puncture with Tuohy.  Dural puncture not performed with spinal  needle

## 2022-07-22 NOTE — TREATMENT PLAN
MD updated again on strip.  Notified that pitocin turned off due to late decels. MD on her way to the hospital now to assess the pt.

## 2022-07-22 NOTE — LACTATION NOTE
07/22/22 0955   Maternal Assessment   Breast Density Bilateral:;soft   Areola Bilateral:;elastic   Nipples Bilateral:;everted   Maternal Infant Feeding   Maternal Emotional State assist needed   Infant Positioning cross-cradle;clutch/football   Signs of Milk Transfer audible swallow;infant jaw motion present   Pain with Feeding no   Latch Assistance yes   Mother with baby skin to skin and sucking on fingers-assistance given with postioning and latch -baby with strong sucking and swallows noted -mother denies discomfort with feeding -able to easily hand express colostrum for assist with latch -review basic breastfeeding information and all questions answered -encouraged call for any assistance

## 2022-07-22 NOTE — TREATMENT PLAN
MD Aldana called unit to discuss POC after reviewing the strip - wants to do c/s now.     Anesthesia notified.

## 2022-07-22 NOTE — PLAN OF CARE
Problem: Change in Fetal Wellbeing (Labor)  Goal: Stable Fetal Wellbeing  Outcome: Ongoing, Progressing   Information reviewed re:fetal intolerance to labor and decision by MD for c/s.Verb understanding.Terbutaline adm. As ordered with improved FHR tracing

## 2022-07-22 NOTE — TRANSFER OF CARE
"Anesthesia Transfer of Care Note    Patient: Agustina Tillman    Procedure(s) Performed: Procedure(s) (LRB):   SECTION (N/A)    Patient location: Labor and Delivery    Anesthesia Type: epidural    Transport from OR: Transported from OR on room air with adequate spontaneous ventilation    Post pain: adequate analgesia    Post assessment: no apparent anesthetic complications    Post vital signs: stable    Level of consciousness: awake, alert and oriented    Nausea/Vomiting: no nausea/vomiting    Complications: none    Transfer of care protocol was followed      Last vitals:   Visit Vitals  /78   Pulse 78   Temp 36.9 °C (98.4 °F)   Resp 18   Ht 5' 5" (1.651 m)   Wt 111.2 kg (245 lb 2.4 oz)   LMP 10/24/2021 (Exact Date)   SpO2 100%   Breastfeeding No   BMI 40.80 kg/m²     "

## 2022-07-22 NOTE — ANESTHESIA POSTPROCEDURE EVALUATION
Anesthesia Post Evaluation    Patient: Agustina Tillman    Procedure(s) Performed: Procedure(s) (LRB):   SECTION (N/A)    Final Anesthesia Type: CSE      Patient location during evaluation: labor & delivery  Patient participation: Yes- Able to Participate  Level of consciousness: awake and alert, oriented and awake  Post-procedure vital signs: reviewed and stable  Airway patency: patent    PONV status at discharge: No PONV  Anesthetic complications: no      Cardiovascular status: blood pressure returned to baseline  Respiratory status: unassisted, spontaneous ventilation and room air  Hydration status: euvolemic  Follow-up not needed.          Vitals Value Taken Time   /64 22 1109   Temp 36.9 °C (98.4 °F) 22 0913   Pulse 69 22 1109   Resp 18 22 0913   SpO2 93 % 22 1002   Vitals shown include unvalidated device data.      No case tracking events are documented in the log.      Pain/Carlos Score: Pain Rating Prior to Med Admin: 7 (2022  9:57 AM)

## 2022-07-22 NOTE — ASSESSMENT & PLAN NOTE
Latent labor  Given pt with repetitive late decel and fetal intolerance to labor and remote from delivery, discussed with pt will proceed with primary CD. Pt agreed

## 2022-07-22 NOTE — L&D DELIVERY NOTE
[facility] - Labor & Delivery   Section   Operative Note    SUMMARY     Date of Procedure: 2022     Procedure: Procedure(s) (LRB):   SECTION (N/A)    Surgeon(s) and Role:     * Pamela-Yovanny Liz Mai, MD - Primary    Assisting Surgeon: Renae Buck    Pre-Operative Diagnosis: 1.  Term IUP at 38 weeks   2. Fetal Intolerance of Labor  3. Obesity   4. AMA.    5. H/o seizure disorder    Post-Operative Diagnosis: Post-Op Diagnosis Codes:     * Pregnant [Z34.90]    Anesthesia: Spinal/Epidural    Technical Procedures Used:   Primary Low Transverse  Delivery           Description of the Findings of the Procedure:  Primary Low Transverse  Delivery      Complications: No    Blood Loss: * No values recorded between 2022  8:18 AM and 2022  9:05 AM *     With patient in supine position, the legs are  and Shannon Catheter placed and positioning to supine done.   Abdomen prepped with Chloroprep and 3 minute drying time allowed prior to draping of the abdomen.   Time out taken with OR team members.  Pfannenstiel Incision made through the skin, transverse fascial incision developed, rectus muscles  in the midline and the peritoneum entered.   no adhesions noted.  The lower uterine segment and position of the fetus identified.   Bladder flap taken down through transverse peritoneal incision.    Low Transverse Incision made through well developer lower uterine segment and extended laterally with blunt dissection.   Clear fluid noted.  Infant delivered from vertex presentation.  Cord clamped after one minute and  handed to attending nurse.  Cord blood taken, placenta delivered.  The uterus was exteriorized.  The edges of the uterine incision are grasped with Coley clamps at the angles and the inferior and superior midline edges of the incision.    There were venous sinuous noted around the hysterotomy incision, care was taken to avoid the sinuous during closure.    Closure with running lock 1 monocryl, starting at left and angle and tied at the right angle  An imbricating layer was done.  No bleeding noted, no hematoma noted.   Observation for bleeding with suture of any bleeding along the hysterotomy line.   With good hemostasis noted, the anterior pelvis is rinsed with sterile saline.   Right and left adnexa with normal anatomy.     Closure of the abdomen with 2-0 chromic running of the peritoneum, fascial closure with 0 vicryl starting at each angle and tying the midline   Skin closure with insorb staple.   Wound dressed with aquacele dressing.          Specimens:   Specimen (24h ago, onward)             Start     Ordered    22  Specimen to Pathology, Surgery Gynecology and Obstetrics  Once        Comments: Pre-op Diagnosis: Fetal Intolerance of LaborProcedure(s): SECTION Number of specimens: 1Name of specimens:placenta     References:    Click here for ordering Quick Tip   Question Answer Comment   Procedure Type: Gynecology and Obstetrics    Specimen Class: Routine/Screening    Which provider would you like to cc? SHARRON JAIMES    Release to patient Immediate        22       Pending                Condition: Good    Disposition: PACU - hemodynamically stable.    Attestation: Good         Delivery Information for Ulices Tillman    Birth information:  YOB: 2020   Time of birth: 8:25 AM   Sex: male   Head Delivery Date/Time: 2022  8:25 AM   Delivery type: , Low Transverse   Gestational Age: 38w4d    Delivery Providers    Delivering clinician: Sharron Liz Mai, MD   Provider Role    DONALD Arias RN Emily L. Estopinal, PA-C Tammy Francipane, NP             Measurements    Weight: 2610 g  Weight (lbs): 5 lb 12.1 oz  Length:          Apgars    Living status: Living  Apgars:  1 min.:  5 min.:  10 min.:  15 min.:  20 min.:    Skin color:  1  1        Heart rate:  2  2       Reflex irritability:  2  2       Muscle tone:  2  2       Respiratory effort:  2  2       Total:  9  9       Apgars assigned by: DWIGHT ALEXIS         Operative Delivery    Forceps attempted?: No  Vacuum extractor attempted?: No         Shoulder Dystocia    Shoulder dystocia present?: No           Presentation    Presentation: Vertex  Position: Left Occiput Anterior           Interventions/Resuscitation    Method: Bulb Suctioning, Tactile Stimulation, NICU Attended       Cord    Vessels: 3 vessels  Complications: None  Delayed Cord Clamping?: No  Cord Clamped Date/Time: 2022  8:25 AM  Cord Blood Disposition: Sent with Baby  Gases Sent?: No  Stem Cell Collection (by MD): No       Placenta    Placenta delivery date/time: 2022 0826  Placenta removal: Manual removal  Placenta appearance: Intact  Placenta disposition: pathology           Labor Events:       labor: No     Labor Onset Date/Time: 2022 16:56     Dilation Complete Date/Time:         Start Pushing Date/Time:         Start Pushing Date/Time:       Rupture Date/Time:            Rupture type:          Fluid Amount:       Fluid Color:       Fluid Odor:       Membrane Status: INT (Intact)               steroids:       Antibiotics given for GBS:       Induction:       Indications for induction:        Augmentation:       Indications for augmentation:       Labor complications: Fetal Intolerance     Additional complications:          Cervical ripening:                     Delivery:      Episiotomy:       Indication for Episiotomy:       Perineal Lacerations:   Repaired:      Periurethral Laceration:   Repaired:     Labial Laceration:   Repaired:     Sulcus Laceration:   Repaired:     Vaginal Laceration:   Repaired:     Cervical Laceration:   Repaired:     Repair suture:       Repair # of packets:       Last Value - EBL - Nursing (mL):       Sum - EBL - Nursing (mL): 0     Last Value - EBL - Anesthesia  (mL):      Calculated QBL (mL):       Vaginal Sweep Performed:       Surgicount Correct:         Other providers:       Anesthesia    Method: Epidural          Details (if applicable):  Trial of Labor Yes    Categorization: Primary    Priority: Urgent   Indications for : Failed induction   Incision Type: low transverse     Additional  information:  Forceps:    Vacuum:    Breech:    Observed anomalies    Other (Comments):

## 2022-07-22 NOTE — TREATMENT PLAN
2254 - Pt called out with c/o numbness in her chest with feeling SOB.  Pt immediately put in high fowlers.  Pt denies heaviness in arms,  strength assessed and is WNL.  O2 sat 100%.  Pt reassured.  BP noted to be low.    2257 - MD Waterman notified of hypotension and pt's c/o numbness in chest with interventions. MD on his way to see pt.    2259 - MD at bedside to assess pt. Pt feeling better since sitting in high fowlers.  Hypotension treated per MD, epidural pump rate lowered.  Pt reassured.  She is feeling better.

## 2022-07-22 NOTE — SUBJECTIVE & OBJECTIVE
Obstetric HPI:  Patient reports now comfortable with contractions, active fetal movement, No vaginal bleeding , No loss of fluid     This pregnancy has been complicated by obesity, gatric bypass, AMA, h/o seizure disoroder    OB History    Para Term  AB Living   1 0 0 0 0 0   SAB IAB Ectopic Multiple Live Births   0 0 0 0 0      # Outcome Date GA Lbr Horace/2nd Weight Sex Delivery Anes PTL Lv   1 Current               Obstetric Comments   PCOS?   Gynhx:  Reg then irregular, then became regular after weight lost surgery   Denies std   Denies abnl pap, pap  neg     Past Medical History:   Diagnosis Date    Allergy 2016    Sinus    Iron deficiency anemia secondary to inadequate dietary iron intake     Lateral epicondylitis, right elbow 3/10/2022    PCOS (polycystic ovarian syndrome) 2017    Seizures     I use to have epilepsy. But they have julieta away     Past Surgical History:   Procedure Laterality Date    ESOPHAGOGASTRODUODENOSCOPY N/A 2020    Procedure: EGD (ESOPHAGOGASTRODUODENOSCOPY);  Surgeon: Cj Umaña MD;  Location: [facility] OR [address] [place] [address];  Service: General;  Laterality: N/A;    LAPAROSCOPIC SLEEVE GASTRECTOMY N/A 2020    Procedure: GASTRECTOMY, SLEEVE, LAPAROSCOPIC, with intraop EGD;  Surgeon: Cj Umaña MD;  Location: [facility] OR [address] [place] [address];  Service: General;  Laterality: N/A;       PTA Medications   Medication Sig    famotidine (PEPCID) 20 MG tablet Take 1 tablet (20 mg total) by mouth 2 (two) times daily. (Patient not taking: No sig reported)    fexofenadine (ALLEGRA) 60 MG tablet Take 1 tablet (60 mg total) by mouth 2 (two) times daily. (Patient not taking: No sig reported)    fluticasone propionate (FLONASE) 50 mcg/actuation nasal spray 1 spray (50 mcg total) by Each Nostril route once daily. (Patient not taking: No sig reported)    folic acid (FOLVITE) 1 MG tablet Take 4 tablets (4 mg total) by mouth once daily.    ondansetron (ZOFRAN) 4 MG tablet Take 1  tablet (4 mg total) by mouth every 4 (four) hours as needed for Nausea. (Patient not taking: No sig reported)    PNV,calcium 72-iron-folic acid (PRENATAL VITAMIN PLUS LOW IRON) 27 mg iron- 1 mg Tab Take one tablet daily.  Prescribe prenatal covered by insurance       Review of patient's allergies indicates:   Allergen Reactions    Augmentin [amoxicillin-pot clavulanate] Diarrhea    Metformin Other (See Comments)     Severe low blood sugar    Nsaids (non-steroidal anti-inflammatory drug)      Had gastric sleeve cannot take    Sudafed pe head congestn-pain [ibuprofen-phenylephrine] Anxiety        Family History       Problem Relation (Age of Onset)    Cancer Father    Hypertension Mother    Lung cancer Father          Tobacco Use    Smoking status: Never Smoker    Smokeless tobacco: Never Used   Substance and Sexual Activity    Alcohol use: Not Currently     Alcohol/week: 1.0 standard drink     Types: 1 Glasses of wine per week    Drug use: Never    Sexual activity: Yes     Partners: Male     Birth control/protection: None     Review of Systems   Constitutional:  Negative for appetite change, chills and fever.   Respiratory:  Negative for shortness of breath.    Cardiovascular:  Negative for chest pain.   Gastrointestinal:  Negative for abdominal pain, blood in stool, constipation, diarrhea, nausea and vomiting.   Endocrine: Negative for hair loss and hot flashes.   Genitourinary:  Negative for decreased libido, dysmenorrhea, dyspareunia, dysuria, genital sores, menorrhagia, menstrual problem, pelvic pain, vaginal discharge, vaginal pain, urinary incontinence and vaginal odor.   Musculoskeletal:  Negative for back pain.   Integumentary:  Negative for rash, acne, hair changes, breast mass, nipple discharge and breast skin changes.   Breast: Negative for mass, mastodynia, nipple discharge and skin changes   Objective:     Vital Signs (Most Recent):  Temp: 98.4 °F (36.9 °C) (07/21/22 1929)  Pulse: 78 (07/22/22  0709)  Resp: 18 (07/21/22 1929)  BP: 113/73 (07/22/22 0706)  SpO2: 98 % (07/22/22 0709) Vital Signs (24h Range):  Temp:  [98.4 °F (36.9 °C)-99 °F (37.2 °C)] 98.4 °F (36.9 °C)  Pulse:  [0-106] 78  Resp:  [18] 18  SpO2:  [93 %-100 %] 98 %  BP: ()/(44-90) 113/73     Weight: 111.2 kg (245 lb 2.4 oz)  Body mass index is 40.8 kg/m².    FHT: 140 bpm, mod jaylen, rare accels, repetitive late decel even with pitocin off. Cat 2 (reassuring)  TOCO:  Q 3-4 min    Physical Exam    Cervix:  Dilation:  2  Effacement:  75%  Station: -3  Presentation: Vertex     Significant Labs:  Lab Results   Component Value Date    GROUPTRH B POS 07/21/2022    HEPBSAG Negative 12/06/2021    STREPBCULT No Group B Streptococcus isolated 06/29/2022       I have personallly reviewed all pertinent lab results from the last 24 hours.  Recent Lab Results         07/21/22  1835        Albumin 2.6       Alkaline Phosphatase 153       ALT 23       Anion Gap 9       AST 27       Baso # 0.05       Basophil % 0.6       BILIRUBIN TOTAL 0.3  Comment: For infants and newborns, interpretation of results should be based  on gestational age, weight and in agreement with clinical  observations.    Premature Infant recommended reference ranges:  Up to 24 hours.............<8.0 mg/dL  Up to 48 hours............<12.0 mg/dL  3-5 days..................<15.0 mg/dL  6-29 days.................<15.0 mg/dL         BUN 8       Calcium 9.4       Chloride 104       CO2 24       Creatinine 0.9       Differential Method Automated       eGFR if  >60       eGFR if non  >60  Comment: Calculation used to obtain the estimated glomerular filtration  rate (eGFR) is the CKD-EPI equation.          Eos # 0.1       Eosinophil % 1.0       Glucose 76       Gran # (ANC) 5.4       Gran % 61.1       Group & Rh B POS       Hematocrit 37.1       Hemoglobin 12.1       Immature Grans (Abs) 0.02  Comment: Mild elevation in immature granulocytes is non specific and    can be seen in a variety of conditions including stress response,   acute inflammation, trauma and pregnancy. Correlation with other   laboratory and clinical findings is essential.         Immature Granulocytes 0.2       INDIRECT FERNY NEG       Lymph # 2.5       Lymph % 28.5       MCH 26.7       MCHC 32.6       MCV 82       Mono # 0.8       Mono % 8.6       MPV 10.8       nRBC 0       Platelets 176       Potassium 4.5       PROTEIN TOTAL 6.1       RBC 4.54       RDW 15.3       Sodium 137       WBC 8.84

## 2022-07-22 NOTE — ANESTHESIA PREPROCEDURE EVALUATION
07/21/2022  Agustina Tillman is a 37 y.o., female.  To undergo epidural.      Past Medical History:  Past Medical History:   Diagnosis Date    Allergy 2016    Sinus    Iron deficiency anemia secondary to inadequate dietary iron intake     Lateral epicondylitis, right elbow 3/10/2022    PCOS (polycystic ovarian syndrome) 5/18/2017    Seizures 1995    I use to have epilepsy. But they have julieta away       Past Surgical History:  Past Surgical History:   Procedure Laterality Date    ESOPHAGOGASTRODUODENOSCOPY N/A 8/19/2020    Procedure: EGD (ESOPHAGOGASTRODUODENOSCOPY);  Surgeon: Cj Umaña MD;  Location: [address];  Service: General;  Laterality: N/A;    LAPAROSCOPIC SLEEVE GASTRECTOMY N/A 8/19/2020    Procedure: GASTRECTOMY, SLEEVE, LAPAROSCOPIC, with intraop EGD;  Surgeon: Cj Umaña MD;  Location: [facility] OR [address] [place] [address];  Service: General;  Laterality: N/A;       Social History:  Social History     Socioeconomic History    Marital status: Single   Tobacco Use    Smoking status: Never Smoker    Smokeless tobacco: Never Used   Substance and Sexual Activity    Alcohol use: Not Currently     Alcohol/week: 1.0 standard drink     Types: 1 Glasses of wine per week    Drug use: Never    Sexual activity: Yes     Partners: Male     Birth control/protection: None   Other Topics Concern    Are you pregnant or think you may be? No    Breast-feeding No     Social Determinants of Health     Financial Resource Strain: Unknown    Difficulty of Paying Living Expenses: Patient refused   Food Insecurity: Unknown    Worried About Running Out of Food in the Last Year: Patient refused    Ran Out of Food in the Last Year: Patient refused   Transportation Needs: Unknown    Lack of Transportation (Medical): Patient refused    Lack of Transportation (Non-Medical): Patient refused    Physical Activity: Unknown    Days of Exercise per Week: Patient refused    Minutes of Exercise per Session: 20 min   Stress: Unknown    Feeling of Stress : Patient refused   Social Connections: Unknown    Frequency of Communication with Friends and Family: Patient refused    Frequency of Social Gatherings with Friends and Family: Patient refused    Active Member of Clubs or Organizations: Patient refused    Attends Club or Organization Meetings: Patient refused    Marital Status: Patient refused   Housing Stability: Unknown    Unable to Pay for Housing in the Last Year: Patient refused    Number of Places Lived in the Last Year: 1    Unstable Housing in the Last Year: Patient refused       Medications:  No current facility-administered medications on file prior to encounter.     Current Outpatient Medications on File Prior to Encounter   Medication Sig Dispense Refill    famotidine (PEPCID) 20 MG tablet Take 1 tablet (20 mg total) by mouth 2 (two) times daily. (Patient not taking: No sig reported) 60 tablet 3    fexofenadine (ALLEGRA) 60 MG tablet Take 1 tablet (60 mg total) by mouth 2 (two) times daily. (Patient not taking: No sig reported) 60 tablet 2    fluticasone propionate (FLONASE) 50 mcg/actuation nasal spray 1 spray (50 mcg total) by Each Nostril route once daily. (Patient not taking: No sig reported) 16 g 5    folic acid (FOLVITE) 1 MG tablet Take 4 tablets (4 mg total) by mouth once daily. 120 tablet 7    ondansetron (ZOFRAN) 4 MG tablet Take 1 tablet (4 mg total) by mouth every 4 (four) hours as needed for Nausea. (Patient not taking: No sig reported) 30 tablet 1    PNV,calcium 72-iron-folic acid (PRENATAL VITAMIN PLUS LOW IRON) 27 mg iron- 1 mg Tab Take one tablet daily.  Prescribe prenatal covered by insurance 90 tablet 11    [DISCONTINUED] azelastine (ASTELIN) 137 mcg (0.1 %) nasal spray 1 spray (137 mcg total) by Nasal route 2 (two) times daily. 30 mL 8    [DISCONTINUED]  clobetasol 0.05% (TEMOVATE) 0.05 % Oint Apply topically 2 (two) times daily. 45 g 3    [DISCONTINUED] progesterone (PROMETRIUM) 100 MG capsule Take 1 capsule (100 mg total) by mouth nightly. 12 capsule 11       Allergies:  Review of patient's allergies indicates:   Allergen Reactions    Augmentin [amoxicillin-pot clavulanate] Diarrhea    Metformin Other (See Comments)     Severe low blood sugar    Nsaids (non-steroidal anti-inflammatory drug)      Had gastric sleeve cannot take    Sudafed pe head congestn-pain [ibuprofen-phenylephrine] Anxiety       Active Problems:  Patient Active Problem List   Diagnosis    PCOS (polycystic ovarian syndrome)    Female fertility problems    Morbid obesity with BMI of 45.0-49.9, adult    Vitamin D deficiency    Prediabetes    Iron deficiency anemia secondary to inadequate dietary iron intake    Chronic allergic rhinitis due to pollen    Low back pain    Difficulty walking    Thoracic back pain    Generalized weakness    Juvenile idiopathic scoliosis of thoracolumbar region    Allergic sinusitis    Obesity affecting pregnancy, antepartum    Seizure disorder in pregnancy, antepartum    S/P gastric sleeve procedure    Ovarian cyst affecting pregnancy, antepartum    Lateral epicondylitis, right elbow    Nausea/vomiting in pregnancy       Diagnostic Studies:    CBC:  Recent Labs   Lab 07/21/22  1835   WBC 8.84   RBC 4.54   HGB 12.1   HCT 37.1      MCV 82   MCH 26.7*   MCHC 32.6        CMP:  Recent Labs   Lab 07/21/22  1835   CALCIUM 9.4   PROT 6.1      K 4.5   CO2 24      BUN 8   CREATININE 0.9   ALKPHOS 153*   ALT 23   AST 27   BILITOT 0.3     24 Hour Vitals:  Temp:  [36.9 °C (98.4 °F)-37.2 °C (99 °F)] 36.9 °C (98.4 °F)  Pulse:  [61-78] 67  Resp:  [18] 18  SpO2:  [99 %-100 %] 100 %  BP: (110-126)/(72-80) 126/79   See Nursing Charting For Additional Vitals      Pre-op Assessment    I have reviewed the Patient Summary Reports.     I have reviewed  the Nursing Notes.       Review of Systems  Anesthesia Hx:  No problems with previous Anesthesia   Denies Personal Hx of Anesthesia complications.   Social:  Non-Smoker, No Alcohol Use    Cardiovascular:  Cardiovascular Normal Exercise tolerance: good     Pulmonary:  Pulmonary Normal    Hepatic/GI:  Hepatic/GI Normal    Neurological:   Seizures, well controlled    Endocrine:  Endocrine Normal  Morbid Obesity / BMI > 40      Physical Exam  General: Well nourished    Airway:  Mallampati: III   Mouth Opening: Normal  TM Distance: Normal      Dental:  Intact    Chest/Lungs:  Clear to auscultation    Heart:  Rate: Normal  Rhythm: Regular Rhythm        Anesthesia Plan  Type of Anesthesia, risks & benefits discussed:    Anesthesia Type: Gen ETT, Epidural, Spinal  Intra-op Monitoring Plan: Standard ASA Monitors  Post Op Pain Control Plan: multimodal analgesia  Informed Consent: Informed consent signed with the Patient and all parties understand the risks and agree with anesthesia plan.  All questions answered.   ASA Score: 3    Ready For Surgery From Anesthesia Perspective.     .

## 2022-07-23 LAB
BASOPHILS # BLD AUTO: 0.04 K/UL (ref 0–0.2)
BASOPHILS NFR BLD: 0.3 % (ref 0–1.9)
DIFFERENTIAL METHOD: ABNORMAL
EOSINOPHIL # BLD AUTO: 0 K/UL (ref 0–0.5)
EOSINOPHIL NFR BLD: 0.2 % (ref 0–8)
ERYTHROCYTE [DISTWIDTH] IN BLOOD BY AUTOMATED COUNT: 15.6 % (ref 11.5–14.5)
HCT VFR BLD AUTO: 32 % (ref 37–48.5)
HGB BLD-MCNC: 10.4 G/DL (ref 12–16)
IMM GRANULOCYTES # BLD AUTO: 0.06 K/UL (ref 0–0.04)
IMM GRANULOCYTES NFR BLD AUTO: 0.4 % (ref 0–0.5)
LYMPHOCYTES # BLD AUTO: 2.8 K/UL (ref 1–4.8)
LYMPHOCYTES NFR BLD: 19.9 % (ref 18–48)
MCH RBC QN AUTO: 26.7 PG (ref 27–31)
MCHC RBC AUTO-ENTMCNC: 32.5 G/DL (ref 32–36)
MCV RBC AUTO: 82 FL (ref 82–98)
MONOCYTES # BLD AUTO: 0.9 K/UL (ref 0.3–1)
MONOCYTES NFR BLD: 6.6 % (ref 4–15)
NEUTROPHILS # BLD AUTO: 10.4 K/UL (ref 1.8–7.7)
NEUTROPHILS NFR BLD: 72.6 % (ref 38–73)
NRBC BLD-RTO: 0 /100 WBC
PLATELET # BLD AUTO: 174 K/UL (ref 150–450)
PMV BLD AUTO: 10.8 FL (ref 9.2–12.9)
RBC # BLD AUTO: 3.89 M/UL (ref 4–5.4)
RPR SER QL: NORMAL
WBC # BLD AUTO: 14.26 K/UL (ref 3.9–12.7)

## 2022-07-23 PROCEDURE — 85025 COMPLETE CBC W/AUTO DIFF WBC: CPT | Performed by: PHYSICIAN ASSISTANT

## 2022-07-23 PROCEDURE — 99232 SBSQ HOSP IP/OBS MODERATE 35: CPT | Mod: ,,, | Performed by: OBSTETRICS & GYNECOLOGY

## 2022-07-23 PROCEDURE — 36415 COLL VENOUS BLD VENIPUNCTURE: CPT | Performed by: PHYSICIAN ASSISTANT

## 2022-07-23 PROCEDURE — 25000003 PHARM REV CODE 250: Performed by: PHYSICIAN ASSISTANT

## 2022-07-23 PROCEDURE — 25000003 PHARM REV CODE 250: Performed by: ANESTHESIOLOGY

## 2022-07-23 PROCEDURE — 99232 PR SUBSEQUENT HOSPITAL CARE,LEVL II: ICD-10-PCS | Mod: ,,, | Performed by: OBSTETRICS & GYNECOLOGY

## 2022-07-23 PROCEDURE — 11000001 HC ACUTE MED/SURG PRIVATE ROOM

## 2022-07-23 RX ORDER — OXYCODONE AND ACETAMINOPHEN 5; 325 MG/1; MG/1
1 TABLET ORAL EVERY 4 HOURS PRN
Status: DISCONTINUED | OUTPATIENT
Start: 2022-07-23 | End: 2022-07-24 | Stop reason: HOSPADM

## 2022-07-23 RX ORDER — IBUPROFEN 600 MG/1
600 TABLET ORAL ONCE
Status: DISCONTINUED | OUTPATIENT
Start: 2022-07-23 | End: 2022-07-24 | Stop reason: HOSPADM

## 2022-07-23 RX ADMIN — MUPIROCIN: 20 OINTMENT TOPICAL at 08:07

## 2022-07-23 RX ADMIN — OXYCODONE 5 MG: 5 TABLET ORAL at 05:07

## 2022-07-23 RX ADMIN — ACETAMINOPHEN 650 MG: 325 TABLET ORAL at 12:07

## 2022-07-23 RX ADMIN — ACETAMINOPHEN 650 MG: 325 TABLET ORAL at 11:07

## 2022-07-23 RX ADMIN — DOCUSATE SODIUM 200 MG: 100 CAPSULE, LIQUID FILLED ORAL at 08:07

## 2022-07-23 RX ADMIN — OXYCODONE AND ACETAMINOPHEN 1 TABLET: 10; 325 TABLET ORAL at 10:07

## 2022-07-23 RX ADMIN — ACETAMINOPHEN 650 MG: 325 TABLET ORAL at 05:07

## 2022-07-23 RX ADMIN — OXYCODONE AND ACETAMINOPHEN 1 TABLET: 10; 325 TABLET ORAL at 11:07

## 2022-07-23 RX ADMIN — MUPIROCIN: 20 OINTMENT TOPICAL at 09:07

## 2022-07-23 RX ADMIN — DOCUSATE SODIUM 200 MG: 100 CAPSULE, LIQUID FILLED ORAL at 09:07

## 2022-07-23 RX ADMIN — OXYCODONE AND ACETAMINOPHEN 1 TABLET: 10; 325 TABLET ORAL at 06:07

## 2022-07-23 RX ADMIN — PRENATAL VIT W/ FE FUMARATE-FA TAB 27-0.8 MG 1 TABLET: 27-0.8 TAB at 09:07

## 2022-07-23 NOTE — LACTATION NOTE
Preparation and Hygiene:  1. Shower daily.  2. Wear a clean bra each day and wash daily in warm soapy water.  3. Change wet or moist breast pads frequently.  Moist pads can promote growth of germs.  Actively wash your hands, paying close attention to the area around and under your fingernails, thoroughly with soap and water for 15 seconds before pumping or handling your milk.  Re-wash your hands if you touch anything (scratching your nose, answering the phone, etc) while pumping or handling your milk.   4. Before pumping your breasts, assemble the pump collection kit and have ready the sterile container and labels.  Place these items on a clean surface next to the breast pump.  5. Each time after you have finished pumping, take apart all of the parts of the breast pump collection kit and place them in a separate cleaning container (do not place them in the sink).  Be sure to remove the yellow valve from the breast shield and separate the white membrane from the yellow valve.  Rinse all of these parts with cool water.  Then use a new sponge and/or bottle brush and dishwashing detergent to clean the parts.  Rinse off the soapy water with cool water and air dry on a clean towel covered with a clean cloth.  All parts may also be washed after each use in the top rack of a .  6. Once each day, sanitize all of the parts of the breast pump collection kit.  This can be done by boiling the kit parts for 10 minutes or by using a Quick Clean Micro-Steam Bag made by [employer]  7. If condensation appears in the tubing, continue to run the pump with the tubing attached for 1-2 minutes or until the tubing is dry.   8. Notify your babys nurse or doctor if you become ill or need to take any medication, even over-the-counter medicines.  Collection and Storage of Expressed Breast milk:         1. Pump your breasts at least 8 or more times every 24 hours.  Double pump (both breasts at the same time) for at least 15-20  minutes using the most suction that is comfortable.    2. Write the date and time of pumping and the name of any medications you are taking on the babys pre-printed hospital identification label.   3.    Do not touch the inside of the storage containers or lids.  4.        Tightly screw the lid onto the container and place immediately into the refrigerator for daily use.  5.    Expressed breast milk should be refrigerated or frozen within 4 hours of pumping.  6.        Do not store expressed breast milk on the door of your refrigerator or freezer             where the temperature is warmer.   7.        Refrigerated milk may be stored for up to 7 days.  At this point it can be moved to the freezer for 6 -12 months.  8.        Thaw frozen breast milk in overnight in the refrigerator.  Once milk is thawed it must be used within 24 hours.  9.        Refrigerated breast milk needs to be warmed to room temperature.  Warm by leaving unrefrigerated until it reached room temperature, or place sealed bottle into a cup of warm (not boiling) water or use a bottle warmer.               Never warm breast milk in a microwave or boiling water.    For any questions or concerns call the Lactation Department

## 2022-07-23 NOTE — PLAN OF CARE
Mom bonding well with baby. Feeding every 2-3 hours.  Ambulating to restroom without difficulty.  Family at bedside participating with care.  Fundus firm with light lochia. Pain managed with scheduled meds. Questions encouraged and addressed.

## 2022-07-23 NOTE — PLAN OF CARE
Problem: Adult Inpatient Plan of Care  Goal: Plan of Care Review  Outcome: Ongoing, Progressing  Goal: Patient-Specific Goal (Individualized)  Outcome: Ongoing, Progressing  Goal: Absence of Hospital-Acquired Illness or Injury  Outcome: Ongoing, Progressing  Goal: Optimal Comfort and Wellbeing  Outcome: Ongoing, Progressing  Goal: Readiness for Transition of Care  Outcome: Ongoing, Progressing     Problem: Bariatric Environmental Safety  Goal: Safety Maintained with Care  Outcome: Ongoing, Progressing     Problem:  Fall Injury Risk  Goal: Absence of Fall, Infant Drop and Related Injury  Outcome: Ongoing, Progressing     Problem: Infection  Goal: Absence of Infection Signs and Symptoms  Outcome: Ongoing, Progressing     Problem: Adjustment to Role Transition (Postpartum  Delivery)  Goal: Successful Maternal Role Transition  Outcome: Ongoing, Progressing     Problem: Bleeding (Postpartum  Delivery)  Goal: Hemostasis  Outcome: Ongoing, Progressing     Problem: Infection (Postpartum  Delivery)  Goal: Absence of Infection Signs and Symptoms  Outcome: Ongoing, Progressing     Problem: Pain (Postpartum  Delivery)  Goal: Acceptable Pain Control  Outcome: Ongoing, Progressing     Problem: Postoperative Nausea and Vomiting (Postpartum  Delivery)  Goal: Nausea and Vomiting Relief  Outcome: Ongoing, Progressing     Problem: Postoperative Urinary Retention (Postpartum  Delivery)  Goal: Effective Urinary Elimination  Outcome: Ongoing, Progressing     Problem: Breastfeeding  Goal: Effective Breastfeeding  Outcome: Ongoing, Progressing

## 2022-07-23 NOTE — PROGRESS NOTES
Mommy meal sheet and mother baby care guide given to the patient and her fiancee and discussed. They were informed of visiting hours and the number of visitors allowed in the room are a max of 4 with the support person remaining after 6 pm.

## 2022-07-23 NOTE — PLAN OF CARE
Problem: Adult Inpatient Plan of Care  Goal: Plan of Care Review  Outcome: Ongoing, Progressing  Flowsheets (Taken 2022 174)  Plan of Care Reviewed With:   patient   significant other  Goal: Patient-Specific Goal (Individualized)  Outcome: Ongoing, Progressing  Goal: Absence of Hospital-Acquired Illness or Injury  Outcome: Ongoing, Progressing  Intervention: Identify and Manage Fall Risk  Flowsheets (Taken 2022 174)  Safety Promotion/Fall Prevention:   assistive device/personal item within reach   side rails raised x 2  Intervention: Prevent Skin Injury  Flowsheets (Taken 2022 174)  Body Position: position changed independently  Intervention: Prevent and Manage VTE (Venous Thromboembolism) Risk  Flowsheets (Taken 2022 174)  Activity Management:   Ambulated in [place] - L4   Ambulated in room - L4  VTE Prevention/Management: fluids promoted  Intervention: Prevent Infection  Flowsheets (Taken 2022 174)  Infection Prevention:   environmental surveillance performed   personal protective equipment utilized   single patient room provided   visitors restricted/screened   hand hygiene promoted  Goal: Optimal Comfort and Wellbeing  Outcome: Ongoing, Progressing  Intervention: Monitor Pain and Promote Comfort  Flowsheets (Taken 2022 174)  Pain Management Interventions: pain management plan reviewed with patient/caregiver  Intervention: Provide Person-Centered Care  Flowsheets (Taken 2022 174)  Trust Relationship/Rapport:   care explained   choices provided   emotional support provided   empathic listening provided   questions answered   questions encouraged   reassurance provided   thoughts/feelings acknowledged  Goal: Readiness for Transition of Care  Outcome: Ongoing, Progressing     Problem: Bariatric Environmental Safety  Goal: Safety Maintained with Care  Outcome: Ongoing, Progressing     Problem:  Fall Injury Risk  Goal: Absence of Fall, Infant Drop and Related  Injury  Outcome: Ongoing, Progressing  Intervention: Identify and Manage Contributors  Flowsheets (Taken 2022 174)  Self-Care Promotion: independence encouraged  Medication Review/Management: medications reviewed  Intervention: Prevent [place] Drop or Fall  Flowsheets (Taken 2022 174)  Infant Drop Prevention: safety rounds completed  Intervention: Promote Injury-Free Environment  Flowsheets (Taken 2022 174)  Safety Promotion/Fall Prevention:   assistive device/personal item within reach   side rails raised x 2     Problem: Infection  Goal: Absence of Infection Signs and Symptoms  Outcome: Ongoing, Progressing  Intervention: Prevent or Manage Infection  Flowsheets (Taken 2022 1744)  Isolation Precautions: precautions maintained     Problem: Adjustment to Role Transition (Postpartum  Delivery)  Goal: Successful Maternal Role Transition  Outcome: Ongoing, Progressing  Intervention: Support Maternal Role Transition  Flowsheets (Taken 2022 174)  Supportive Measures:   active listening utilized   goal-setting facilitated   positive reinforcement provided   self-care encouraged  Parent/Child Attachment Promotion:   caring behavior modeled   cue recognition promoted   face-to-face positioning promoted   interaction encouraged   parent/caregiver presence encouraged   participation in care promoted   positive reinforcement provided   rooming-in promoted   skin-to-skin contact encouraged   strengths emphasized     Problem: Bleeding (Postpartum  Delivery)  Goal: Hemostasis  Outcome: Ongoing, Progressing     Problem: Infection (Postpartum  Delivery)  Goal: Absence of Infection Signs and Symptoms  Outcome: Ongoing, Progressing     Problem: Pain (Postpartum  Delivery)  Goal: Acceptable Pain Control  Outcome: Ongoing, Progressing  Intervention: Prevent or Manage Pain  Flowsheets (Taken 2022 1744)  Pain Management Interventions: pain management plan reviewed with  patient/caregiver     Problem: Breastfeeding  Goal: Effective Breastfeeding  Outcome: Ongoing, Progressing  Intervention: Promote Effective Breastfeeding  Flowsheets (Taken 7/23/2022 1744)  Breastfeeding Assistance:   hand expression verified   feeding cue recognition promoted   infant latch-on verified   infant stimulated to wakeful state   infant suck/swallow verified  Parent/Child Attachment Promotion:   caring behavior modeled   cue recognition promoted   face-to-face positioning promoted   interaction encouraged   parent/caregiver presence encouraged   participation in care promoted   positive reinforcement provided   rooming-in promoted   skin-to-skin contact encouraged   strengths emphasized  Intervention: Support Exclusive Breastfeeding Success  Flowsheets (Taken 7/23/2022 1744)  Supportive Measures:   active listening utilized   goal-setting facilitated   positive reinforcement provided   self-care encouraged  Breastfeeding Support:   encouragement provided   diary/feeding log utilized

## 2022-07-23 NOTE — PROGRESS NOTES
[facility]  Obstetrics  Postpartum Progress Note    Patient Name: Agustina Tillman  MRN: [id number]  Admission Date: 2022  Hospital Length of Stay: 2 days  Attending Physician: Bere Liz Mai, MD  Primary Care Provider: John Gaston MD    Subjective:     Principal Problem:Obesity affecting pregnancy, antepartum    Hospital Course:  Pt received 2 doses of cytotec for cervical ripening   Pitocin was started and discontinue a few times due to repetitive late decel, pit was only up at 2 mU/min  FHT overnight was overall cat 2 overnight, very rare accelerations.        POD 1 S/P PRIMARY LTCS FOR NRHRT, DOING WELL WITH UNEVENTFUL NIGHT LAST NIGHT    Obstetric HPI:  Patient reports now comfortable with contractions, active fetal movement, No vaginal bleeding , No loss of fluid   This pregnancy has been complicated by obesity, gatric bypass, AMA, h/o seizure disoroder    OB History    Para Term  AB Living   1 1 1 0 0 1   SAB IAB Ectopic Multiple Live Births   0 0 0 0 1      # Outcome Date GA Lbr Horace/2nd Weight Sex Delivery Anes PTL Lv   1 Term 22 38w4d  2.61 kg (5 lb 12.1 oz) M CS-LTranv EPI N [place]      Complications: Fetal Intolerance      Name: MARGIE TILLMAN      Apgar1: 9  Apgar5: 9      Obstetric Comments   PCOS?   Gynhx:  Reg then irregular, then became regular after weight lost surgery   Denies std   Denies abnl pap, pap  neg     Past Medical History:   Diagnosis Date    Allergy 2016    Sinus    Iron deficiency anemia secondary to inadequate dietary iron intake     Lateral epicondylitis, right elbow 3/10/2022    PCOS (polycystic ovarian syndrome) 2017    Seizures 1995    I use to have epilepsy. But they have julieta away     Past Surgical History:   Procedure Laterality Date     SECTION N/A 2022    Procedure:  SECTION;  Surgeon: Bere Liz Mai, MD;  Location: [facility] L&D OR;  Service: OB/GYN;  Laterality: N/A;     ESOPHAGOGASTRODUODENOSCOPY N/A 8/19/2020    Procedure: EGD (ESOPHAGOGASTRODUODENOSCOPY);  Surgeon: Cj Umaña MD;  Location: [facility] OR [address] [place] [address];  Service: General;  Laterality: N/A;    LAPAROSCOPIC SLEEVE GASTRECTOMY N/A 8/19/2020    Procedure: GASTRECTOMY, SLEEVE, LAPAROSCOPIC, with intraop EGD;  Surgeon: Cj Umaña MD;  Location: [facility] OR [address] [place] [address];  Service: General;  Laterality: N/A;       PTA Medications   Medication Sig    famotidine (PEPCID) 20 MG tablet Take 1 tablet (20 mg total) by mouth 2 (two) times daily. (Patient not taking: No sig reported)    fexofenadine (ALLEGRA) 60 MG tablet Take 1 tablet (60 mg total) by mouth 2 (two) times daily. (Patient not taking: No sig reported)    fluticasone propionate (FLONASE) 50 mcg/actuation nasal spray 1 spray (50 mcg total) by Each Nostril route once daily. (Patient not taking: No sig reported)    folic acid (FOLVITE) 1 MG tablet Take 4 tablets (4 mg total) by mouth once daily.    ondansetron (ZOFRAN) 4 MG tablet Take 1 tablet (4 mg total) by mouth every 4 (four) hours as needed for Nausea. (Patient not taking: No sig reported)    PNV,calcium 72-iron-folic acid (PRENATAL VITAMIN PLUS LOW IRON) 27 mg iron- 1 mg Tab Take one tablet daily.  Prescribe prenatal covered by insurance       Review of patient's allergies indicates:   Allergen Reactions    Augmentin [amoxicillin-pot clavulanate] Diarrhea    Metformin Other (See Comments)     Severe low blood sugar    Nsaids (non-steroidal anti-inflammatory drug)      Had gastric sleeve cannot take    Sudafed pe head congestn-pain [ibuprofen-phenylephrine] Anxiety        Family History       Problem Relation (Age of Onset)    Cancer Father    Hypertension Mother    Lung cancer Father          Tobacco Use    Smoking status: Never Smoker    Smokeless tobacco: Never Used   Substance and Sexual Activity    Alcohol use: Not Currently     Alcohol/week: 1.0 standard drink     Types: 1 Glasses of wine  per week    Drug use: Never    Sexual activity: Yes     Partners: Male     Birth control/protection: None     Review of Systems   Constitutional:  Negative for appetite change, chills and fever.   Respiratory:  Negative for shortness of breath.    Cardiovascular:  Negative for chest pain.   Gastrointestinal:  Negative for abdominal pain, blood in stool, constipation, diarrhea, nausea and vomiting.   Endocrine: Negative for hair loss and hot flashes.   Genitourinary:  Negative for decreased libido, dysmenorrhea, dyspareunia, dysuria, genital sores, menorrhagia, menstrual problem, pelvic pain, vaginal discharge, vaginal pain, urinary incontinence and vaginal odor.   Musculoskeletal:  Negative for back pain.   Integumentary:  Negative for rash, acne, hair changes, breast mass, nipple discharge and breast skin changes.   Breast: Negative for mass, mastodynia, nipple discharge and skin changes   Objective:     Vital Signs (Most Recent):  Temp: 98.6 °F (37 °C) (07/23/22 1131)  Pulse: 70 (07/23/22 1131)  Resp: 18 (07/23/22 1140)  BP: 112/66 (07/23/22 1131)  SpO2: 99 % (07/23/22 1131) Vital Signs (24h Range):  Temp:  [96.6 °F (35.9 °C)-98.8 °F (37.1 °C)] 98.6 °F (37 °C)  Pulse:  [52-70] 70  Resp:  [18-22] 18  SpO2:  [95 %-99 %] 99 %  BP: (102-119)/(58-68) 112/66     Weight: 111.2 kg (245 lb 2.4 oz)  Body mass index is 40.8 kg/m².    FHT: 140 bpm, mod jaylen, rare accels, repetitive late decel even with pitocin off. Cat 2 (reassuring)  TOCO:  Q 3-4 min    Physical Exam:   Constitutional: She appears well-developed and well-nourished. No distress.    HENT:   Head: Normocephalic.     Neck: No thyromegaly present.    Cardiovascular:  Normal rate and regular rhythm.      Exam reveals no edema.        Pulmonary/Chest: Effort normal.        Abdominal: Soft. Bowel sounds are normal. She exhibits no distension. There is no abdominal tenderness. There is no rebound and no guarding.   Fundus firm at umbilicus, nontender  DRESSING CDI      Genitourinary:    Uterus normal.             Musculoskeletal: No edema.         Psychiatric: She has a normal mood and affect.       Significant Labs:  Lab Results   Component Value Date    GROUPTRH B POS 2022    HEPBSAG Negative 2022    STREPBCULT No Group B Streptococcus isolated 2022       I have personallly reviewed all pertinent lab results from the last 24 hours.  Recent Lab Results         22  0559        Baso # 0.04       Basophil % 0.3       Differential Method Automated       Eos # 0.0       Eosinophil % 0.2       Gran # (ANC) 10.4       Gran % 72.6       Hematocrit 32.0       Hemoglobin 10.4       Immature Grans (Abs) 0.06  Comment: Mild elevation in immature granulocytes is non specific and   can be seen in a variety of conditions including stress response,   acute inflammation, trauma and pregnancy. Correlation with other   laboratory and clinical findings is essential.         Immature Granulocytes 0.4       Lymph # 2.8       Lymph % 19.9       MCH 26.7       MCHC 32.5       MCV 82       Mono # 0.9       Mono % 6.6       MPV 10.8       nRBC 0       Platelets 174       RBC 3.89       RDW 15.6       WBC 14.26             Assessment/Plan:     37 y.o. female  for:    * Obesity affecting pregnancy, antepartum  Latent labor  Given pt with repetitive late decel and fetal intolerance to labor and remote from delivery, discussed with pt will proceed with primary CD. Pt agreed     delivery delivered  LIVEBORN MALE 2022 NRFHT PRIMARY LTCS  CONT ROUTINE POSTOP CARE    Seizure disorder in pregnancy, antepartum  Controlled without meds        Disposition: As patient meets milestones, will plan to discharge POD 2-4.    Brenda Latham MD  Obstetrics  [facility]

## 2022-07-23 NOTE — SUBJECTIVE & OBJECTIVE
POD 1 S/P PRIMARY LTCS FOR NRHRT, DOING WELL WITH UNEVENTFUL NIGHT LAST NIGHT    Obstetric HPI:  Patient reports now comfortable with contractions, active fetal movement, No vaginal bleeding , No loss of fluid   This pregnancy has been complicated by obesity, gatric bypass, AMA, h/o seizure disoroder    OB History    Para Term  AB Living   1 1 1 0 0 1   SAB IAB Ectopic Multiple Live Births   0 0 0 0 1      # Outcome Date GA Lbr Horace/2nd Weight Sex Delivery Anes PTL Lv   1 Term 22 38w4d  2.61 kg (5 lb 12.1 oz) M CS-LTranv EPI N [place]      Complications: Fetal Intolerance      Name: MARGIE SANDERS      Apgar1: 9  Apgar5: 9      Obstetric Comments   PCOS?   Gynhx:  Reg then irregular, then became regular after weight lost surgery   Denies std   Denies abnl pap, pap  neg     Past Medical History:   Diagnosis Date    Allergy     Sinus    Iron deficiency anemia secondary to inadequate dietary iron intake     Lateral epicondylitis, right elbow 3/10/2022    PCOS (polycystic ovarian syndrome) 2017    Seizures     I use to have epilepsy. But they have julieta away     Past Surgical History:   Procedure Laterality Date     SECTION N/A 2022    Procedure:  SECTION;  Surgeon: Bere Liz Mai, MD;  Location: [facility] L&D OR;  Service: OB/GYN;  Laterality: N/A;    ESOPHAGOGASTRODUODENOSCOPY N/A 2020    Procedure: EGD (ESOPHAGOGASTRODUODENOSCOPY);  Surgeon: Cj Umaña MD;  Location: [address];  Service: General;  Laterality: N/A;    LAPAROSCOPIC SLEEVE GASTRECTOMY N/A 2020    Procedure: GASTRECTOMY, SLEEVE, LAPAROSCOPIC, with intraop EGD;  Surgeon: Cj Umaña MD;  Location: [facility] OR [address] [place] [address];  Service: General;  Laterality: N/A;       PTA Medications   Medication Sig    famotidine (PEPCID) 20 MG tablet Take 1 tablet (20 mg total) by mouth 2 (two) times daily. (Patient not taking: No sig reported)    fexofenadine (ALLEGRA) 60 MG tablet Take 1  tablet (60 mg total) by mouth 2 (two) times daily. (Patient not taking: No sig reported)    fluticasone propionate (FLONASE) 50 mcg/actuation nasal spray 1 spray (50 mcg total) by Each Nostril route once daily. (Patient not taking: No sig reported)    folic acid (FOLVITE) 1 MG tablet Take 4 tablets (4 mg total) by mouth once daily.    ondansetron (ZOFRAN) 4 MG tablet Take 1 tablet (4 mg total) by mouth every 4 (four) hours as needed for Nausea. (Patient not taking: No sig reported)    PNV,calcium 72-iron-folic acid (PRENATAL VITAMIN PLUS LOW IRON) 27 mg iron- 1 mg Tab Take one tablet daily.  Prescribe prenatal covered by insurance       Review of patient's allergies indicates:   Allergen Reactions    Augmentin [amoxicillin-pot clavulanate] Diarrhea    Metformin Other (See Comments)     Severe low blood sugar    Nsaids (non-steroidal anti-inflammatory drug)      Had gastric sleeve cannot take    Sudafed pe head congestn-pain [ibuprofen-phenylephrine] Anxiety        Family History       Problem Relation (Age of Onset)    Cancer Father    Hypertension Mother    Lung cancer Father          Tobacco Use    Smoking status: Never Smoker    Smokeless tobacco: Never Used   Substance and Sexual Activity    Alcohol use: Not Currently     Alcohol/week: 1.0 standard drink     Types: 1 Glasses of wine per week    Drug use: Never    Sexual activity: Yes     Partners: Male     Birth control/protection: None     Review of Systems   Constitutional:  Negative for appetite change, chills and fever.   Respiratory:  Negative for shortness of breath.    Cardiovascular:  Negative for chest pain.   Gastrointestinal:  Negative for abdominal pain, blood in stool, constipation, diarrhea, nausea and vomiting.   Endocrine: Negative for hair loss and hot flashes.   Genitourinary:  Negative for decreased libido, dysmenorrhea, dyspareunia, dysuria, genital sores, menorrhagia, menstrual problem, pelvic pain, vaginal discharge, vaginal pain, urinary  incontinence and vaginal odor.   Musculoskeletal:  Negative for back pain.   Integumentary:  Negative for rash, acne, hair changes, breast mass, nipple discharge and breast skin changes.   Breast: Negative for mass, mastodynia, nipple discharge and skin changes   Objective:     Vital Signs (Most Recent):  Temp: 98.6 °F (37 °C) (07/23/22 1131)  Pulse: 70 (07/23/22 1131)  Resp: 18 (07/23/22 1140)  BP: 112/66 (07/23/22 1131)  SpO2: 99 % (07/23/22 1131) Vital Signs (24h Range):  Temp:  [96.6 °F (35.9 °C)-98.8 °F (37.1 °C)] 98.6 °F (37 °C)  Pulse:  [52-70] 70  Resp:  [18-22] 18  SpO2:  [95 %-99 %] 99 %  BP: (102-119)/(58-68) 112/66     Weight: 111.2 kg (245 lb 2.4 oz)  Body mass index is 40.8 kg/m².    FHT: 140 bpm, mod jaylen, rare accels, repetitive late decel even with pitocin off. Cat 2 (reassuring)  TOCO:  Q 3-4 min    Physical Exam:   Constitutional: She appears well-developed and well-nourished. No distress.    HENT:   Head: Normocephalic.     Neck: No thyromegaly present.    Cardiovascular:  Normal rate and regular rhythm.      Exam reveals no edema.        Pulmonary/Chest: Effort normal.        Abdominal: Soft. Bowel sounds are normal. She exhibits no distension. There is no abdominal tenderness. There is no rebound and no guarding.   Fundus firm at umbilicus, nontender  DRESSING CDI     Genitourinary:    Uterus normal.             Musculoskeletal: No edema.         Psychiatric: She has a normal mood and affect.       Significant Labs:  Lab Results   Component Value Date    GROUPTRH B POS 07/21/2022    HEPBSAG Negative 07/21/2022    STREPBCULT No Group B Streptococcus isolated 06/29/2022       I have personallly reviewed all pertinent lab results from the last 24 hours.  Recent Lab Results         07/23/22  0559        Baso # 0.04       Basophil % 0.3       Differential Method Automated       Eos # 0.0       Eosinophil % 0.2       Gran # (ANC) 10.4       Gran % 72.6       Hematocrit 32.0       Hemoglobin 10.4        Immature Grans (Abs) 0.06  Comment: Mild elevation in immature granulocytes is non specific and   can be seen in a variety of conditions including stress response,   acute inflammation, trauma and pregnancy. Correlation with other   laboratory and clinical findings is essential.         Immature Granulocytes 0.4       Lymph # 2.8       Lymph % 19.9       MCH 26.7       MCHC 32.5       MCV 82       Mono # 0.9       Mono % 6.6       MPV 10.8       nRBC 0       Platelets 174       RBC 3.89       RDW 15.6       WBC 14.26

## 2022-07-23 NOTE — LACTATION NOTE
Safe formula feeding handout given and reviewed.  Discussed proper hand washing, expiration time of formula, position of baby, position of nipple and bottle while feeding, baby led feeding and fullness cues.  Pt verbalized understanding and verbalized appropriate recall. Discussed supplementing  after breastfeeding via syringe after  had a repeat glucose level of 43. Mom reported that she was not able to use the syringe with her  so her  obtained a standard nipple to feed.  ate 6 ml of formula.

## 2022-07-23 NOTE — LACTATION NOTE
This note was copied from a baby's chart.  Infant with inconsistent suck, tongue not falling into place.  Latches but does not stay, pushes nipple out or chews on it.  Bottle feed is the same, will get 1-2 good sucks then chews nipple.  Discussed suck training, pacifier provided.

## 2022-07-24 VITALS
HEIGHT: 65 IN | OXYGEN SATURATION: 97 % | WEIGHT: 245.13 LBS | DIASTOLIC BLOOD PRESSURE: 72 MMHG | HEART RATE: 80 BPM | SYSTOLIC BLOOD PRESSURE: 115 MMHG | TEMPERATURE: 98 F | RESPIRATION RATE: 20 BRPM | BODY MASS INDEX: 40.84 KG/M2

## 2022-07-24 PROCEDURE — 99238 PR HOSPITAL DISCHARGE DAY,<30 MIN: ICD-10-PCS | Mod: ,,, | Performed by: OBSTETRICS & GYNECOLOGY

## 2022-07-24 PROCEDURE — 25000003 PHARM REV CODE 250: Performed by: PHYSICIAN ASSISTANT

## 2022-07-24 PROCEDURE — 25000003 PHARM REV CODE 250: Performed by: OBSTETRICS & GYNECOLOGY

## 2022-07-24 PROCEDURE — 99238 HOSP IP/OBS DSCHRG MGMT 30/<: CPT | Mod: ,,, | Performed by: OBSTETRICS & GYNECOLOGY

## 2022-07-24 RX ORDER — OXYCODONE AND ACETAMINOPHEN 5; 325 MG/1; MG/1
1 TABLET ORAL EVERY 4 HOURS PRN
Qty: 20 TABLET | Refills: 0 | Status: ON HOLD | OUTPATIENT
Start: 2022-07-24 | End: 2023-10-12 | Stop reason: HOSPADM

## 2022-07-24 RX ADMIN — SIMETHICONE 80 MG: 80 TABLET, CHEWABLE ORAL at 04:07

## 2022-07-24 RX ADMIN — OXYCODONE AND ACETAMINOPHEN 1 TABLET: 5; 325 TABLET ORAL at 12:07

## 2022-07-24 RX ADMIN — DOCUSATE SODIUM 200 MG: 100 CAPSULE, LIQUID FILLED ORAL at 08:07

## 2022-07-24 RX ADMIN — MAGNESIUM HYDROXIDE 2400 MG: 400 SUSPENSION ORAL at 05:07

## 2022-07-24 RX ADMIN — OXYCODONE AND ACETAMINOPHEN 1 TABLET: 10; 325 TABLET ORAL at 07:07

## 2022-07-24 RX ADMIN — MUPIROCIN: 20 OINTMENT TOPICAL at 08:07

## 2022-07-24 RX ADMIN — PRENATAL VIT W/ FE FUMARATE-FA TAB 27-0.8 MG 1 TABLET: 27-0.8 TAB at 08:07

## 2022-07-24 RX ADMIN — CALCIUM CARBONATE (ANTACID) CHEW TAB 500 MG 500 MG: 500 CHEW TAB at 04:07

## 2022-07-24 NOTE — DISCHARGE SUMMARY
[facility]  Obstetrics  Discharge Summary      Patient Name: Agustina Tillman  MRN: [id number]  Admission Date: 2022  Hospital Length of Stay: 3 days  Discharge Date and Time:  2022 10:56 AM  Attending Physician: Sharron Liz Mai, MD   Discharging Provider: Brenda Latham MD   Primary Care Provider: John Gaston MD    HPI: Pt is a 38 yo  @ 38w4d presented to L&D with ctx yesterday.  There was no ctx on monitor and SVE unchanged from previous exam in office  However NST shows minimal to moderate variability, no accels no decels.  Given pt at term she was admitted for IOL for concerning FHT  Pt reported active FM otherwise, denies LOF          Procedure(s) (LRB):   SECTION (N/A)     Hospital Course:   Pt received 2 doses of cytotec for cervical ripening   Pitocin was started and discontinue a few times due to repetitive late decel, pit was only up at 2 mU/min  FHT overnight was overall cat 2 overnight, very rare accelerations.         Consults (From admission, onward)        Status Ordering Provider     Inpatient consult to Lactation  Once        Provider:  (Not yet assigned)    Acknowledged SHARRON JAIMES     Inpatient consult to Anesthesiology  Once        Provider:  (Not yet assigned)    Acknowledged SHARRON JAIMES          Final Active Diagnoses:    Diagnosis Date Noted POA    PRINCIPAL PROBLEM:   delivery delivered [O82] 2022 No    AMA (advanced maternal age) primigravida 35+, third trimester [O09.513] 2022 Yes    Seizure disorder in pregnancy, antepartum [O99.350, G40.909] 2022 Yes    Obesity affecting pregnancy, antepartum [O99.210] 2021 Yes      Problems Resolved During this Admission:    Diagnosis Date Noted Date Resolved POA    Multigravida of advanced maternal age in third trimester [O09.523] 2022 Yes          Feeding Method: breast    Immunizations     Date Immunization Status Dose Route/Site Given  by    22 1244 MMR Incomplete 0.5 mL Subcutaneous/     22 1244 Tdap Incomplete 0.5 mL Intramuscular/           Delivery:    Episiotomy:     Lacerations:     Repair suture:     Repair # of packets:     Blood loss (ml):       Birth information:  YOB: 2020   Time of birth: 8:25 AM   Sex: male   Delivery type: , Low Transverse   Gestational Age: 38w4d    Delivery Clinician:      Other providers:       Additional  information:  Forceps:    Vacuum:    Breech:    Observed anomalies      Living?:           APGARS  One minute Five minutes Ten minutes   Skin color:         Heart rate:         Grimace:         Muscle tone:         Breathing:         Totals: 9  9        Placenta: Delivered:       appearance    Pending Diagnostic Studies:     Procedure Component Value Units Date/Time    Specimen to Pathology, Surgery Gynecology and Obstetrics [[id number]] Collected: 22    Order Status: Sent Lab Status: In process Updated: 22 1355    Specimen: Tissue           Discharged Condition: good    Disposition: Home or Self Care    Follow Up:   Follow-up Information     Bere Aldana MD Follow up in 1 week(s).    Specialties: Obstetrics and Gynecology, Obstetrics, Gynecology  Why: remove dressing  Contact information:  [address]  [place] [address]  [phone]                       Patient Instructions:      BREAST PUMP FOR HOME USE     Order Specific Question Answer Comments   Type of pump: Electric    Weight: 111.2 kg (245 lb 2.4 oz)    Length of need (1-99 months): 99      Diet Adult Regular     Pelvic Rest     Notify your health care provider if you experience any of the following:  redness, tenderness, or signs of infection (pain, swelling, redness, odor or green/yellow discharge around incision site)     Notify your health care provider if you experience any of the following:  severe uncontrolled pain     Notify your health care provider if you experience any of the  following:  persistent nausea and vomiting or diarrhea     Notify your health care provider if you experience any of the following:  temperature >100.4     Notify your health care provider if you experience any of the following:   Order Comments: Heavy red bleeding soaking a pad in less than 2 hours     Activity as tolerated     Medications:  Current Discharge Medication List      START taking these medications    Details   oxyCODONE-acetaminophen (PERCOCET) 5-325 mg per tablet Take 1 tablet by mouth every 4 (four) hours as needed.  Qty: 20 tablet, Refills: 0    Comments: Quantity prescribed more than 7 day supply? No         CONTINUE these medications which have NOT CHANGED    Details   famotidine (PEPCID) 20 MG tablet Take 1 tablet (20 mg total) by mouth 2 (two) times daily.  Qty: 60 tablet, Refills: 3      fexofenadine (ALLEGRA) 60 MG tablet Take 1 tablet (60 mg total) by mouth 2 (two) times daily.  Qty: 60 tablet, Refills: 2    Associated Diagnoses: Allergic sinusitis; Chronic nasal congestion      fluticasone propionate (FLONASE) 50 mcg/actuation nasal spray 1 spray (50 mcg total) by Each Nostril route once daily.  Qty: 16 g, Refills: 5    Associated Diagnoses: Nasal congestion; Nasal hypertrophy; Environmental and seasonal allergies      ondansetron (ZOFRAN) 4 MG tablet Take 1 tablet (4 mg total) by mouth every 4 (four) hours as needed for Nausea.  Qty: 30 tablet, Refills: 1      PNV,calcium 72-iron-folic acid (PRENATAL VITAMIN PLUS LOW IRON) 27 mg iron- 1 mg Tab Take one tablet daily.  Prescribe prenatal covered by insurance  Qty: 90 tablet, Refills: 11    Associated Diagnoses: PCOS (polycystic ovarian syndrome)         STOP taking these medications       azelastine (ASTELIN) 137 mcg (0.1 %) nasal spray Comments:   Reason for Stopping:         clobetasol 0.05% (TEMOVATE) 0.05 % Oint Comments:   Reason for Stopping:         folic acid (FOLVITE) 1 MG tablet Comments:   Reason for Stopping:         progesterone  (PROMETRIUM) 100 MG capsule Comments:   Reason for Stopping:               Brenda Latham MD  Obstetrics  [facility]

## 2022-07-24 NOTE — PROGRESS NOTES
[facility]  Obstetrics  Postpartum Progress Note    Patient Name: Agustina Tillman  MRN: [id number]  Admission Date: 2022  Hospital Length of Stay: 3 days  Attending Physician: Bere Liz Mai, MD  Primary Care Provider: John Gaston MD    Subjective:     Principal Problem:Obesity affecting pregnancy, antepartum    Hospital Course:  Pt received 2 doses of cytotec for cervical ripening   Pitocin was started and discontinue a few times due to repetitive late decel, pit was only up at 2 mU/min  FHT overnight was overall cat 2 overnight, very rare accelerations.        2022 - POD 1 S/P PRIMARY LTCS FOR NRHRT, DOING WELL WITH UNEVENTFUL NIGHT LAST NIGHT  2022 - POD2 CONT ROUTINE CARE, STABLE    Obstetric HPI:  Patient reports now comfortable with contractions, active fetal movement, No vaginal bleeding , No loss of fluid   This pregnancy has been complicated by obesity, gatric bypass, AMA, h/o seizure disoroder    OB History    Para Term  AB Living   1 1 1 0 0 1   SAB IAB Ectopic Multiple Live Births   0 0 0 0 1      # Outcome Date GA Lbr Horace/2nd Weight Sex Delivery Anes PTL Lv   1 Term 22 38w4d  2.61 kg (5 lb 12.1 oz) M CS-LTranv EPI N VIRIGNIA      Complications: Fetal Intolerance      Name: MARGIE TILLMAN      Apgar1: 9  Apgar5: 9      Obstetric Comments   PCOS?   Gynhx:  Reg then irregular, then became regular after weight lost surgery   Denies std   Denies abnl pap, pap  neg     Past Medical History:   Diagnosis Date    Allergy 2016    Sinus    Iron deficiency anemia secondary to inadequate dietary iron intake     Lateral epicondylitis, right elbow 3/10/2022    PCOS (polycystic ovarian syndrome) 2017    Seizures     I use to have epilepsy. But they have julieta away     Past Surgical History:   Procedure Laterality Date     SECTION N/A 2022    Procedure:  SECTION;  Surgeon: Bere Liz Mai, MD;  Location: [facility] L[facility] OR;   Service: OB/GYN;  Laterality: N/A;    ESOPHAGOGASTRODUODENOSCOPY N/A 8/19/2020    Procedure: EGD (ESOPHAGOGASTRODUODENOSCOPY);  Surgeon: Cj Umaña MD;  Location: [facility] OR [address] [place] [address];  Service: General;  Laterality: N/A;    LAPAROSCOPIC SLEEVE GASTRECTOMY N/A 8/19/2020    Procedure: GASTRECTOMY, SLEEVE, LAPAROSCOPIC, with intraop EGD;  Surgeon: Cj Umaña MD;  Location: [facility] OR [address] [place] [address];  Service: General;  Laterality: N/A;       PTA Medications   Medication Sig    famotidine (PEPCID) 20 MG tablet Take 1 tablet (20 mg total) by mouth 2 (two) times daily. (Patient not taking: No sig reported)    fexofenadine (ALLEGRA) 60 MG tablet Take 1 tablet (60 mg total) by mouth 2 (two) times daily. (Patient not taking: No sig reported)    fluticasone propionate (FLONASE) 50 mcg/actuation nasal spray 1 spray (50 mcg total) by Each Nostril route once daily. (Patient not taking: No sig reported)    folic acid (FOLVITE) 1 MG tablet Take 4 tablets (4 mg total) by mouth once daily.    ondansetron (ZOFRAN) 4 MG tablet Take 1 tablet (4 mg total) by mouth every 4 (four) hours as needed for Nausea. (Patient not taking: No sig reported)    PNV,calcium 72-iron-folic acid (PRENATAL VITAMIN PLUS LOW IRON) 27 mg iron- 1 mg Tab Take one tablet daily.  Prescribe prenatal covered by insurance       Review of patient's allergies indicates:   Allergen Reactions    Augmentin [amoxicillin-pot clavulanate] Diarrhea    Metformin Other (See Comments)     Severe low blood sugar    Nsaids (non-steroidal anti-inflammatory drug)      Had gastric sleeve cannot take    Sudafed pe head congestn-pain [ibuprofen-phenylephrine] Anxiety        Family History       Problem Relation (Age of Onset)    Cancer Father    Hypertension Mother    Lung cancer Father          Tobacco Use    Smoking status: Never Smoker    Smokeless tobacco: Never Used   Substance and Sexual Activity    Alcohol use: Not Currently     Alcohol/week: 1.0  standard drink     Types: 1 Glasses of wine per week    Drug use: Never    Sexual activity: Yes     Partners: Male     Birth control/protection: None     Review of Systems   Constitutional:  Negative for appetite change, chills and fever.   Respiratory:  Negative for shortness of breath.    Cardiovascular:  Negative for chest pain.   Gastrointestinal:  Negative for abdominal pain, blood in stool, constipation, diarrhea, nausea and vomiting.   Endocrine: Negative for hair loss and hot flashes.   Genitourinary:  Negative for decreased libido, dysmenorrhea, dyspareunia, dysuria, genital sores, menorrhagia, menstrual problem, pelvic pain, vaginal discharge, vaginal pain, urinary incontinence and vaginal odor.   Musculoskeletal:  Negative for back pain.   Integumentary:  Negative for rash, acne, hair changes, breast mass, nipple discharge and breast skin changes.   Breast: Negative for mass, mastodynia, nipple discharge and skin changes   Objective:     Vital Signs (Most Recent):  Temp: 98.4 °F (36.9 °C) (07/24/22 0735)  Pulse: 80 (07/24/22 0735)  Resp: 20 (07/24/22 0748)  BP: 115/72 (07/24/22 0735)  SpO2: 97 % (07/24/22 0735) Vital Signs (24h Range):  Temp:  [97.9 °F (36.6 °C)-99.4 °F (37.4 °C)] 98.4 °F (36.9 °C)  Pulse:  [64-80] 80  Resp:  [16-20] 20  SpO2:  [97 %-100 %] 97 %  BP: (102-115)/(58-72) 115/72     Weight: 111.2 kg (245 lb 2.4 oz)  Body mass index is 40.8 kg/m².    FHT: 140 bpm, mod jaylen, rare accels, repetitive late decel even with pitocin off. Cat 2 (reassuring)  TOCO:  Q 3-4 min    Physical Exam:   Constitutional: She appears well-developed and well-nourished. No distress.    HENT:   Head: Normocephalic.     Neck: No thyromegaly present.    Cardiovascular:  Normal rate and regular rhythm.      Exam reveals no edema.        Pulmonary/Chest: Effort normal.        Abdominal: Soft. Bowel sounds are normal. She exhibits no distension. There is no abdominal tenderness. There is no rebound and no guarding.    Fundus firm at umbilicus, nontender  DRESSING CDI     Genitourinary:    Uterus normal.             Musculoskeletal: No edema.         Psychiatric: She has a normal mood and affect.       Significant Labs:  Lab Results   Component Value Date    GROUPTRH B POS 2022    HEPBSAG Negative 2022    STREPBCULT No Group B Streptococcus isolated 2022       I have personallly reviewed all pertinent lab results from the last 24 hours.  Recent Lab Results       None          Assessment/Plan:     37 y.o. female  for:    * Obesity affecting pregnancy, antepartum  Latent labor  Given pt with repetitive late decel and fetal intolerance to labor and remote from delivery, discussed with pt will proceed with primary CD. Pt agreed     delivery delivered  CONT ROUTINE POSTOP CARE    Seizure disorder in pregnancy, antepartum  Controlled without meds        Disposition: As patient meets milestones, will plan to discharge THIS AFTERNOON.    Brenda Latham MD  Obstetrics  [facility] [place] [facility]

## 2022-07-24 NOTE — SUBJECTIVE & OBJECTIVE
2022 - POD 1 S/P PRIMARY LTCS FOR NRHRT, DOING WELL WITH UNEVENTFUL NIGHT LAST NIGHT  2022 - POD2 CONT ROUTINE CARE, STABLE    Obstetric HPI:  Patient reports now comfortable with contractions, active fetal movement, No vaginal bleeding , No loss of fluid   This pregnancy has been complicated by obesity, gatric bypass, AMA, h/o seizure disoroder    OB History    Para Term  AB Living   1 1 1 0 0 1   SAB IAB Ectopic Multiple Live Births   0 0 0 0 1      # Outcome Date GA Lbr Horace/2nd Weight Sex Delivery Anes PTL Lv   1 Term 22 38w4d  2.61 kg (5 lb 12.1 oz) M CS-LTranv EPI N VIRGINIA      Complications: Fetal Intolerance      Name: MARGIE SANDERS      Apgar1: 9  Apgar5: 9      Obstetric Comments   PCOS?   Gynhx:  Reg then irregular, then became regular after weight lost surgery   Denies std   Denies abnl pap, pap  neg     Past Medical History:   Diagnosis Date    Allergy     Sinus    Iron deficiency anemia secondary to inadequate dietary iron intake     Lateral epicondylitis, right elbow 3/10/2022    PCOS (polycystic ovarian syndrome) 2017    Seizures     I use to have epilepsy. But they have julieta away     Past Surgical History:   Procedure Laterality Date     SECTION N/A 2022    Procedure:  SECTION;  Surgeon: Bere Liz Mai, MD;  Location: [facility] L[facility];  Service: OB/GYN;  Laterality: N/A;    ESOPHAGOGASTRODUODENOSCOPY N/A 2020    Procedure: EGD (ESOPHAGOGASTRODUODENOSCOPY);  Surgeon: Cj Umaña MD;  Location: [address];  Service: General;  Laterality: N/A;    LAPAROSCOPIC SLEEVE GASTRECTOMY N/A 2020    Procedure: GASTRECTOMY, SLEEVE, LAPAROSCOPIC, with intraop EGD;  Surgeon: Cj Umaña MD;  Location: [address];  Service: General;  Laterality: N/A;       PTA Medications   Medication Sig    famotidine (PEPCID) 20 MG tablet Take 1 tablet (20 mg total) by mouth 2 (two) times daily. (Patient not  taking: No sig reported)    fexofenadine (ALLEGRA) 60 MG tablet Take 1 tablet (60 mg total) by mouth 2 (two) times daily. (Patient not taking: No sig reported)    fluticasone propionate (FLONASE) 50 mcg/actuation nasal spray 1 spray (50 mcg total) by Each Nostril route once daily. (Patient not taking: No sig reported)    folic acid (FOLVITE) 1 MG tablet Take 4 tablets (4 mg total) by mouth once daily.    ondansetron (ZOFRAN) 4 MG tablet Take 1 tablet (4 mg total) by mouth every 4 (four) hours as needed for Nausea. (Patient not taking: No sig reported)    PNV,calcium 72-iron-folic acid (PRENATAL VITAMIN PLUS LOW IRON) 27 mg iron- 1 mg Tab Take one tablet daily.  Prescribe prenatal covered by insurance       Review of patient's allergies indicates:   Allergen Reactions    Augmentin [amoxicillin-pot clavulanate] Diarrhea    Metformin Other (See Comments)     Severe low blood sugar    Nsaids (non-steroidal anti-inflammatory drug)      Had gastric sleeve cannot take    Sudafed pe head congestn-pain [ibuprofen-phenylephrine] Anxiety        Family History       Problem Relation (Age of Onset)    Cancer Father    Hypertension Mother    Lung cancer Father          Tobacco Use    Smoking status: Never Smoker    Smokeless tobacco: Never Used   Substance and Sexual Activity    Alcohol use: Not Currently     Alcohol/week: 1.0 standard drink     Types: 1 Glasses of wine per week    Drug use: Never    Sexual activity: Yes     Partners: Male     Birth control/protection: None     Review of Systems   Constitutional:  Negative for appetite change, chills and fever.   Respiratory:  Negative for shortness of breath.    Cardiovascular:  Negative for chest pain.   Gastrointestinal:  Negative for abdominal pain, blood in stool, constipation, diarrhea, nausea and vomiting.   Endocrine: Negative for hair loss and hot flashes.   Genitourinary:  Negative for decreased libido, dysmenorrhea, dyspareunia, dysuria, genital sores,  menorrhagia, menstrual problem, pelvic pain, vaginal discharge, vaginal pain, urinary incontinence and vaginal odor.   Musculoskeletal:  Negative for back pain.   Integumentary:  Negative for rash, acne, hair changes, breast mass, nipple discharge and breast skin changes.   Breast: Negative for mass, mastodynia, nipple discharge and skin changes   Objective:     Vital Signs (Most Recent):  Temp: 98.4 °F (36.9 °C) (07/24/22 0735)  Pulse: 80 (07/24/22 0735)  Resp: 20 (07/24/22 0748)  BP: 115/72 (07/24/22 0735)  SpO2: 97 % (07/24/22 0735) Vital Signs (24h Range):  Temp:  [97.9 °F (36.6 °C)-99.4 °F (37.4 °C)] 98.4 °F (36.9 °C)  Pulse:  [64-80] 80  Resp:  [16-20] 20  SpO2:  [97 %-100 %] 97 %  BP: (102-115)/(58-72) 115/72     Weight: 111.2 kg (245 lb 2.4 oz)  Body mass index is 40.8 kg/m².    FHT: 140 bpm, mod jaylen, rare accels, repetitive late decel even with pitocin off. Cat 2 (reassuring)  TOCO:  Q 3-4 min    Physical Exam:   Constitutional: She appears well-developed and well-nourished. No distress.    HENT:   Head: Normocephalic.     Neck: No thyromegaly present.    Cardiovascular:  Normal rate and regular rhythm.      Exam reveals no edema.        Pulmonary/Chest: Effort normal.        Abdominal: Soft. Bowel sounds are normal. She exhibits no distension. There is no abdominal tenderness. There is no rebound and no guarding.   Fundus firm at umbilicus, nontender  DRESSING CDI     Genitourinary:    Uterus normal.             Musculoskeletal: No edema.         Psychiatric: She has a normal mood and affect.       Significant Labs:  Lab Results   Component Value Date    GROUPTRH B POS 07/21/2022    HEPBSAG Negative 07/21/2022    STREPBCULT No Group B Streptococcus isolated 06/29/2022       I have personallly reviewed all pertinent lab results from the last 24 hours.  Recent Lab Results       None

## 2022-07-24 NOTE — PLAN OF CARE
Patient discharged per order. VS stable with no signs of distress. Discharge paperwork printed and reviewed with patient. Reviewed urgent maternal warning signs and instructed to go to ER or call physician if symptoms occur. Reviewed community resources available in the back of the breastfeeding guide. Instructed on follow-up appointment with physician. Patient voiced understanding of all.      Problem: Adult Inpatient Plan of Care  Goal: Plan of Care Review  Outcome: Met  Goal: Patient-Specific Goal (Individualized)  Outcome: Met  Flowsheets (Taken 2022 1342)  Anxieties, Fears or Concerns: Gas vs cramping from breastfeeding.  Individualized Care Needs: Continue prn pain medication and stool softener/gas reliever  Patient-Specific Goals (Include Timeframe): Discharge home today.  Goal: Absence of Hospital-Acquired Illness or Injury  Outcome: Met  Goal: Optimal Comfort and Wellbeing  Outcome: Met  Goal: Readiness for Transition of Care  Outcome: Met     Problem: Bariatric Environmental Safety  Goal: Safety Maintained with Care  Outcome: Met     Problem:  Fall Injury Risk  Goal: Absence of Fall, Infant Drop and Related Injury  Outcome: Met     Problem: Infection  Goal: Absence of Infection Signs and Symptoms  Outcome: Met     Problem: Adjustment to Role Transition (Postpartum  Delivery)  Goal: Successful Maternal Role Transition  Outcome: Met     Problem: Bleeding (Postpartum  Delivery)  Goal: Hemostasis  Outcome: Met     Problem: Infection (Postpartum  Delivery)  Goal: Absence of Infection Signs and Symptoms  Outcome: Met     Problem: Pain (Postpartum  Delivery)  Goal: Acceptable Pain Control  Outcome: Met     Problem: Breastfeeding  Goal: Effective Breastfeeding  Outcome: Met

## 2022-07-24 NOTE — PLAN OF CARE
Mom bonding well with baby. Breast feeding with occasional formula supplementation every 3 hours.  Ambulating to restroom and in hallway without difficulty.  Family at bedside participating with care.  Fundus firm with light lochia. Pain managed with PRN meds. Questions encouraged and addressed. Looking forward to discharge

## 2022-07-27 ENCOUNTER — TELEPHONE (OUTPATIENT)
Dept: OBSTETRICS AND GYNECOLOGY | Facility: HOSPITAL | Age: 37
End: 2022-07-27
Payer: COMMERCIAL

## 2022-07-27 LAB
FINAL PATHOLOGIC DIAGNOSIS: NORMAL
GROSS: NORMAL
Lab: NORMAL

## 2022-07-27 NOTE — TELEPHONE ENCOUNTER
Spoke to pt who states baby breastfeeding well -milk is in and has had a few lumps and knots in breasts but has been able to massage out with feedings -minimal nipple soreness and using lanolin as needed -baby with adequate output and saw pediatrician today and no problems seen -has no questions for us

## 2022-07-29 ENCOUNTER — POSTPARTUM VISIT (OUTPATIENT)
Dept: OBSTETRICS AND GYNECOLOGY | Facility: CLINIC | Age: 37
End: 2022-07-29
Payer: COMMERCIAL

## 2022-07-29 VITALS
SYSTOLIC BLOOD PRESSURE: 112 MMHG | DIASTOLIC BLOOD PRESSURE: 76 MMHG | BODY MASS INDEX: 38.37 KG/M2 | WEIGHT: 230.63 LBS

## 2022-07-29 DIAGNOSIS — Z98.891 S/P CESAREAN SECTION: Primary | ICD-10-CM

## 2022-07-29 PROCEDURE — 99999 PR PBB SHADOW E&M-EST. PATIENT-LVL II: ICD-10-PCS | Mod: PBBFAC,,, | Performed by: OBSTETRICS & GYNECOLOGY

## 2022-07-29 PROCEDURE — 99499 UNLISTED E&M SERVICE: CPT | Mod: S$GLB,,, | Performed by: OBSTETRICS & GYNECOLOGY

## 2022-07-29 PROCEDURE — 99499 NO LOS: ICD-10-PCS | Mod: S$GLB,,, | Performed by: OBSTETRICS & GYNECOLOGY

## 2022-07-29 PROCEDURE — 99999 PR PBB SHADOW E&M-EST. PATIENT-LVL II: CPT | Mod: PBBFAC,,, | Performed by: OBSTETRICS & GYNECOLOGY

## 2022-07-29 RX ORDER — TRAMADOL HYDROCHLORIDE 50 MG/1
50 TABLET ORAL EVERY 6 HOURS PRN
Qty: 20 TABLET | Refills: 0 | Status: SHIPPED | OUTPATIENT
Start: 2022-07-29

## 2022-07-29 RX ORDER — B-COMPLEX WITH VITAMIN C
1 TABLET ORAL DAILY
COMMUNITY
Start: 2022-05-27 | End: 2022-10-17

## 2022-07-29 NOTE — PROGRESS NOTES
CC: Post-partum follow-up    Agustina Tillman is a 37 y.o. female  who presents for post-partum visit.  She is S/P a , due to fetal intolerance to labor.  She and the baby are doing well.  No pain.  No fever.   No bowel / bladder complaints.    norco makes her drowsy and sleep all day.  Only taking it 2x/day  Stated tramadol works better    Delivery Date:   Delivery MD: Katya  Gender: male  Birth Weight: 5 pounds 12 ounces  Breast Feeding: YES  Depression: NO    Pregnancy was complicated by:  Obesity, AMA,h/o Seizure    /76   Wt 104.6 kg (230 lb 9.6 oz)   LMP 10/24/2021 (Exact Date)   BMI 38.37 kg/m²     ROS:  GENERAL: No fever, chills, fatigability.  VULVAR: No pain, no lesions and no itching.  VAGINAL: No relaxation, no itching, no discharge, no abnormal bleeding and no lesions.  ABDOMEN: No abdominal pain. Denies nausea. Denies vomiting. No diarrhea. No constipation  BREAST: Denies pain. No lumps. No discharge.  URINARY: No incontinence, no nocturia, no frequency and no dysuria.  CARDIOVASCULAR: No chest pain. No shortness of breath. No leg cramps.  NEUROLOGICAL: No headaches. No vision changes.    PHYSICAL EXAM:  ABDOMEN:  Soft, non-tender, non-distended  INCISION:  Dressing removed. D/c/i.     IMP:  Doing well S/P , due to DIAZ  Instructions / precautions reviewed  Contraceptive counseling      PLAN:  1.  Switch to tramadol  2.  rtc in 4 -5 weeks, strict pelvic rest

## 2022-08-02 ENCOUNTER — PATIENT MESSAGE (OUTPATIENT)
Dept: BARIATRICS | Facility: CLINIC | Age: 37
End: 2022-08-02

## 2022-08-04 ENCOUNTER — PATIENT MESSAGE (OUTPATIENT)
Dept: BARIATRICS | Facility: CLINIC | Age: 37
End: 2022-08-04

## 2022-08-12 ENCOUNTER — PATIENT MESSAGE (OUTPATIENT)
Dept: OBSTETRICS AND GYNECOLOGY | Facility: CLINIC | Age: 37
End: 2022-08-12

## 2022-09-02 ENCOUNTER — POSTPARTUM VISIT (OUTPATIENT)
Dept: OBSTETRICS AND GYNECOLOGY | Facility: CLINIC | Age: 37
End: 2022-09-02
Payer: COMMERCIAL

## 2022-09-02 ENCOUNTER — PATIENT MESSAGE (OUTPATIENT)
Dept: OBSTETRICS AND GYNECOLOGY | Facility: CLINIC | Age: 37
End: 2022-09-02

## 2022-09-02 VITALS
SYSTOLIC BLOOD PRESSURE: 110 MMHG | DIASTOLIC BLOOD PRESSURE: 78 MMHG | WEIGHT: 234.13 LBS | BODY MASS INDEX: 38.96 KG/M2

## 2022-09-02 DIAGNOSIS — Z98.891 S/P CESAREAN SECTION: ICD-10-CM

## 2022-09-02 PROCEDURE — 99999 PR PBB SHADOW E&M-EST. PATIENT-LVL III: CPT | Mod: PBBFAC,,, | Performed by: OBSTETRICS & GYNECOLOGY

## 2022-09-02 PROCEDURE — 0503F PR POSTPARTUM CARE VISIT: ICD-10-PCS | Mod: CPTII,S$GLB,, | Performed by: OBSTETRICS & GYNECOLOGY

## 2022-09-02 PROCEDURE — 99999 PR PBB SHADOW E&M-EST. PATIENT-LVL III: ICD-10-PCS | Mod: PBBFAC,,, | Performed by: OBSTETRICS & GYNECOLOGY

## 2022-09-02 PROCEDURE — 0503F POSTPARTUM CARE VISIT: CPT | Mod: CPTII,S$GLB,, | Performed by: OBSTETRICS & GYNECOLOGY

## 2022-09-02 RX ORDER — SERTRALINE HYDROCHLORIDE 25 MG/1
25 TABLET, FILM COATED ORAL DAILY
Qty: 30 TABLET | Refills: 2 | Status: SHIPPED | OUTPATIENT
Start: 2022-09-02 | End: 2023-12-07

## 2022-09-02 NOTE — LETTER
September 2, 2022      [facility]  [address]  [place] [address]  Phone: [phone]       Patient: Agustina Tillman   YOB: 1985  Date of Visit: 09/02/2022    To Whom It May Concern:    Evelyn Tillman  was at [facility] on 09/02/2022. The patient may return to work on 10/03/2022 due to post partum complications. If you have any questions or concerns, or if I can be of further assistance, please do not hesitate to contact me.    Sincerely,    Zuleyma Quinn MA

## 2022-09-02 NOTE — PROGRESS NOTES
CC: Post-partum follow-up    Agustina Tillman is a 37 y.o. female  who presents for post-partum visit.  She is S/P a , due to fetal intolerance to labor.  She and the baby are doing well.  No pain.  No fever.   No bowel / bladder complaints.    Pt noted lack of interest, crying spell, insomnia (sleeping 3 hours/day), no appetite for the past 3 weeks  Not bonding well with baby - but reported now better  Denies h/o depression     Delivery Date:   Delivery MD: Katya  Gender: male  Birth Weight: 5 pounds 12 ounces  Breast Feeding: YES  Depression: YES     Pregnancy was complicated by:  Obesity, AMA,h/o Seizure    /78   Wt 106.2 kg (234 lb 2.1 oz)   LMP 10/24/2021 (Exact Date)   Breastfeeding Yes   BMI 38.96 kg/m²     ROS:  GENERAL: No fever, chills, fatigability.  VULVAR: No pain, no lesions and no itching.  VAGINAL: No relaxation, no itching, no discharge, no abnormal bleeding and no lesions.  ABDOMEN: No abdominal pain. Denies nausea. Denies vomiting. No diarrhea. No constipation  BREAST: Denies pain. No lumps. No discharge.  URINARY: No incontinence, no nocturia, no frequency and no dysuria.  CARDIOVASCULAR: No chest pain. No shortness of breath. No leg cramps.  NEUROLOGICAL: No headaches. No vision changes.    PHYSICAL EXAM:  ABDOMEN:  Soft, non-tender, non-distended  INCISION: dry/clean/intact.  Intertrigo noted on right incisional edge but not involving incision  VULVA:  Normal, no lesions  PERINEUM:  Healing well  CERVIX:  Without lesions, polyps or tenderness.  UTERUS:  Normal size, shape, consistency, no mass or tenderness.  ADNEXA:  Normal in size without mass or tenderness    IMP:  Doing well S/P , due to fetal intolerance to labor  Instructions / precautions reviewed  Contraceptive counseling      PLAN:   Contraception:  elected paragard, will check benefit  Intertrigo: advised clotrimazole, baby powder, keep area dry  Pp depression:  referral to SW, rx for  zoloft , letter to provide for additional 1 month of maternity leave  F/u with me in 2 months

## 2022-09-07 ENCOUNTER — PATIENT MESSAGE (OUTPATIENT)
Dept: BARIATRICS | Facility: CLINIC | Age: 37
End: 2022-09-07
Payer: MEDICAID

## 2022-09-19 ENCOUNTER — PATIENT MESSAGE (OUTPATIENT)
Dept: OBSTETRICS AND GYNECOLOGY | Facility: CLINIC | Age: 37
End: 2022-09-19
Payer: MEDICAID

## 2022-10-06 ENCOUNTER — PATIENT MESSAGE (OUTPATIENT)
Dept: BARIATRICS | Facility: CLINIC | Age: 37
End: 2022-10-06
Payer: MEDICAID

## 2022-10-10 ENCOUNTER — OFFICE VISIT (OUTPATIENT)
Dept: FAMILY MEDICINE | Facility: CLINIC | Age: 37
End: 2022-10-10
Payer: MEDICAID

## 2022-10-10 VITALS
DIASTOLIC BLOOD PRESSURE: 78 MMHG | BODY MASS INDEX: 39.76 KG/M2 | TEMPERATURE: 98 F | SYSTOLIC BLOOD PRESSURE: 110 MMHG | WEIGHT: 238.63 LBS | HEIGHT: 65 IN | OXYGEN SATURATION: 98 % | HEART RATE: 76 BPM

## 2022-10-10 DIAGNOSIS — J10.1 INFLUENZA B: Primary | ICD-10-CM

## 2022-10-10 LAB
CTP QC/QA: YES
FLUAV AG NPH QL: NEGATIVE
FLUBV AG NPH QL: POSITIVE

## 2022-10-10 PROCEDURE — U0003 INFECTIOUS AGENT DETECTION BY NUCLEIC ACID (DNA OR RNA); SEVERE ACUTE RESPIRATORY SYNDROME CORONAVIRUS 2 (SARS-COV-2) (CORONAVIRUS DISEASE [COVID-19]), AMPLIFIED PROBE TECHNIQUE, MAKING USE OF HIGH THROUGHPUT TECHNOLOGIES AS DESCRIBED BY CMS-2020-01-R: HCPCS | Performed by: FAMILY MEDICINE

## 2022-10-10 PROCEDURE — 3078F PR MOST RECENT DIASTOLIC BLOOD PRESSURE < 80 MM HG: ICD-10-PCS | Mod: CPTII,,, | Performed by: FAMILY MEDICINE

## 2022-10-10 PROCEDURE — 99999 PR PBB SHADOW E&M-EST. PATIENT-LVL IV: CPT | Mod: PBBFAC,,, | Performed by: FAMILY MEDICINE

## 2022-10-10 PROCEDURE — 1159F PR MEDICATION LIST DOCUMENTED IN MEDICAL RECORD: ICD-10-PCS | Mod: CPTII,,, | Performed by: FAMILY MEDICINE

## 2022-10-10 PROCEDURE — 99213 OFFICE O/P EST LOW 20 MIN: CPT | Mod: S$PBB,,, | Performed by: FAMILY MEDICINE

## 2022-10-10 PROCEDURE — 99213 PR OFFICE/OUTPT VISIT, EST, LEVL III, 20-29 MIN: ICD-10-PCS | Mod: S$PBB,,, | Performed by: FAMILY MEDICINE

## 2022-10-10 PROCEDURE — 3074F SYST BP LT 130 MM HG: CPT | Mod: CPTII,,, | Performed by: FAMILY MEDICINE

## 2022-10-10 PROCEDURE — 3044F HG A1C LEVEL LT 7.0%: CPT | Mod: CPTII,,, | Performed by: FAMILY MEDICINE

## 2022-10-10 PROCEDURE — 3008F BODY MASS INDEX DOCD: CPT | Mod: CPTII,,, | Performed by: FAMILY MEDICINE

## 2022-10-10 PROCEDURE — 1159F MED LIST DOCD IN RCRD: CPT | Mod: CPTII,,, | Performed by: FAMILY MEDICINE

## 2022-10-10 PROCEDURE — 99999 PR PBB SHADOW E&M-EST. PATIENT-LVL IV: ICD-10-PCS | Mod: PBBFAC,,, | Performed by: FAMILY MEDICINE

## 2022-10-10 PROCEDURE — 3008F PR BODY MASS INDEX (BMI) DOCUMENTED: ICD-10-PCS | Mod: CPTII,,, | Performed by: FAMILY MEDICINE

## 2022-10-10 PROCEDURE — 3044F PR MOST RECENT HEMOGLOBIN A1C LEVEL <7.0%: ICD-10-PCS | Mod: CPTII,,, | Performed by: FAMILY MEDICINE

## 2022-10-10 PROCEDURE — 3074F PR MOST RECENT SYSTOLIC BLOOD PRESSURE < 130 MM HG: ICD-10-PCS | Mod: CPTII,,, | Performed by: FAMILY MEDICINE

## 2022-10-10 PROCEDURE — 3078F DIAST BP <80 MM HG: CPT | Mod: CPTII,,, | Performed by: FAMILY MEDICINE

## 2022-10-10 PROCEDURE — 1160F PR REVIEW ALL MEDS BY PRESCRIBER/CLIN PHARMACIST DOCUMENTED: ICD-10-PCS | Mod: CPTII,,, | Performed by: FAMILY MEDICINE

## 2022-10-10 PROCEDURE — 99214 OFFICE O/P EST MOD 30 MIN: CPT | Mod: PBBFAC,PO | Performed by: FAMILY MEDICINE

## 2022-10-10 PROCEDURE — 87804 INFLUENZA ASSAY W/OPTIC: CPT | Mod: PBBFAC,PO | Performed by: FAMILY MEDICINE

## 2022-10-10 PROCEDURE — U0005 INFEC AGEN DETEC AMPLI PROBE: HCPCS | Performed by: FAMILY MEDICINE

## 2022-10-10 PROCEDURE — 1160F RVW MEDS BY RX/DR IN RCRD: CPT | Mod: CPTII,,, | Performed by: FAMILY MEDICINE

## 2022-10-10 RX ORDER — OSELTAMIVIR PHOSPHATE 75 MG/1
75 CAPSULE ORAL 2 TIMES DAILY
Qty: 10 CAPSULE | Refills: 0 | Status: SHIPPED | OUTPATIENT
Start: 2022-10-10 | End: 2022-10-15

## 2022-10-10 NOTE — PROGRESS NOTES
Assessment & Plan  Influenza B  -     POCT Influenza A/B  -     COVID-19 Routine Screening; Future  -     oseltamivir (TAMIFLU) 75 MG capsule; Take 1 capsule (75 mg total) by mouth 2 (two) times daily. for 5 days  Dispense: 10 capsule; Refill: 0    POC influenza: +B  COVID test performed.  Discussed limited benefit of Tamiflu but patient would like the prescription to help her symptoms.  Encouraged rest, fluids, nasal decongestants, cough suppressants, honey-based lozenges and tea, Chloraseptic spray/Vicks Vapocool cough drops for sore throat.  Work excuse provided.    Follow-up: Follow up if symptoms worsen or fail to improve.  ______________________________________________________________________    Chief Complaint  Chief Complaint   Patient presents with    Nasal Congestion    Sore Throat     And loss of voice since Saturday       HPI  Agustina Tillman is a 37 y.o. female with medical diagnoses as listed in the medical history and problem list that presents to the office for congestion and a sore throat.     Sinus Problem  This is a new problem. Episode onset: Friday. The problem is unchanged. There has been no fever. Associated symptoms include congestion, coughing (very mild, unproductive), ear pain, a hoarse voice and a sore throat. Pertinent negatives include no chills, headaches, shortness of breath, sinus pressure, sneezing or swollen glands. Past treatments include nothing.       PAST MEDICAL HISTORY:  Past Medical History:   Diagnosis Date    Allergy 2016    Sinus    Iron deficiency anemia secondary to inadequate dietary iron intake     Lateral epicondylitis, right elbow 3/10/2022    PCOS (polycystic ovarian syndrome) 2017    Postpartum depression 2022    Seizures     I use to have epilepsy. But they have julieta away       PAST SURGICAL HISTORY:  Past Surgical History:   Procedure Laterality Date     SECTION N/A 2022    Procedure:  SECTION;  Surgeon: Bere Liz  MD Katya;  Location: [facility] L&D OR;  Service: OB/GYN;  Laterality: N/A;    ESOPHAGOGASTRODUODENOSCOPY N/A 8/19/2020    Procedure: EGD (ESOPHAGOGASTRODUODENOSCOPY);  Surgeon: Cj Umaña MD;  Location: [address];  Service: General;  Laterality: N/A;    LAPAROSCOPIC SLEEVE GASTRECTOMY N/A 8/19/2020    Procedure: GASTRECTOMY, SLEEVE, LAPAROSCOPIC, with intraop EGD;  Surgeon: Cj Umaña MD;  Location: [address];  Service: General;  Laterality: N/A;       SOCIAL HISTORY:  Social History     Socioeconomic History    Marital status: Single   Tobacco Use    Smoking status: Never    Smokeless tobacco: Never   Substance and Sexual Activity    Alcohol use: Not Currently     Alcohol/week: 1.0 standard drink     Types: 1 Glasses of wine per week    Drug use: Never    Sexual activity: Yes     Partners: Male     Birth control/protection: None   Other Topics Concern    Are you pregnant or think you may be? No    Breast-feeding No     Social Determinants of Health     Financial Resource Strain: Low Risk     Difficulty of Paying Living Expenses: Not hard at all   Food Insecurity: No Food Insecurity    Worried About Running Out of Food in the Last Year: Never true    Ran Out of Food in the Last Year: Never true   Transportation Needs: Unknown    Lack of Transportation (Medical): Patient refused    Lack of Transportation (Non-Medical): Patient refused   Physical Activity: Sufficiently Active    Days of Exercise per Week: 3 days    Minutes of Exercise per Session: 70 min   Stress: No Stress Concern Present    Feeling of Stress : Not at all   Social Connections: Unknown    Frequency of Communication with Friends and Family: More than three times a week    Frequency of Social Gatherings with Friends and Family: Twice a week    Active Member of Clubs or Organizations: No    Attends Club or Organization Meetings: Never    Marital Status: Living with partner   Housing Stability: Unknown    Unable to Pay for  Housing in the Last Year: Patient refused    Number of Places Lived in the Last Year: 1    Unstable Housing in the Last Year: Patient refused       FAMILY HISTORY:  Family History   Problem Relation Age of Onset    Lung cancer Father     Cancer Father         Lung cancer    Hypertension Mother     Breast cancer Neg Hx     Ovarian cancer Neg Hx     Colon cancer Neg Hx        ALLERGIES AND MEDICATIONS: updated and reviewed.  Review of patient's allergies indicates:   Allergen Reactions    Augmentin [amoxicillin-pot clavulanate] Diarrhea    Metformin Other (See Comments)     Severe low blood sugar    Nsaids (non-steroidal anti-inflammatory drug)      Had gastric sleeve cannot take    Sudafed pe head congestn-pain [ibuprofen-phenylephrine] Anxiety     Current Outpatient Medications   Medication Sig Dispense Refill    PRENATAL VITAMIN 27 mg iron- 0.8 mg Tab Take 1 tablet by mouth once daily.      sertraline (ZOLOFT) 25 MG tablet Take 1 tablet (25 mg total) by mouth once daily. 30 tablet 2    oseltamivir (TAMIFLU) 75 MG capsule Take 1 capsule (75 mg total) by mouth 2 (two) times daily. for 5 days 10 capsule 0    oxyCODONE-acetaminophen (PERCOCET) 5-325 mg per tablet Take 1 tablet by mouth every 4 (four) hours as needed. 20 tablet 0    traMADoL (ULTRAM) 50 mg tablet Take 1 tablet (50 mg total) by mouth every 6 (six) hours as needed for Pain. 20 tablet 0     No current facility-administered medications for this visit.         ROS  Review of Systems   Constitutional:  Negative for activity change, appetite change, chills and fever.   HENT:  Positive for congestion, ear pain, hoarse voice, rhinorrhea, sore throat, trouble swallowing and voice change (hoarse). Negative for postnasal drip, sinus pressure, sinus pain and sneezing.    Eyes:  Negative for discharge, redness and itching.   Respiratory:  Positive for cough (very mild, unproductive). Negative for shortness of breath and wheezing.    Musculoskeletal:  Positive for  "myalgias.   Skin:  Negative for color change and rash.   Neurological:  Negative for dizziness and headaches.   Hematological:  Negative for adenopathy.         Physical Exam  Vitals:    10/10/22 0947   BP: 110/78   BP Location: Right arm   Patient Position: Sitting   BP Method: Large (Manual)   Pulse: 76   Temp: 98.3 °F (36.8 °C)   TempSrc: Oral   SpO2: 98%   Weight: 108.3 kg (238 lb 10.4 oz)   Height: 5' 5" (1.651 m)    Body mass index is 39.71 kg/m².  Weight: 108.3 kg (238 lb 10.4 oz)   Height: 5' 5" (165.1 cm)   Physical Exam  Constitutional:       General: She is not in acute distress.     Appearance: She is obese.   HENT:      Head: Normocephalic and atraumatic.      Right Ear: Tympanic membrane and ear canal normal.      Left Ear: Tympanic membrane and ear canal normal.      Nose: Nose normal.      Mouth/Throat:      Mouth: Mucous membranes are moist.      Pharynx: Oropharynx is clear.   Eyes:      Conjunctiva/sclera: Conjunctivae normal.      Pupils: Pupils are equal, round, and reactive to light.   Neck:      Thyroid: No thyromegaly.   Cardiovascular:      Rate and Rhythm: Normal rate and regular rhythm.      Pulses: Normal pulses.      Heart sounds: Normal heart sounds.   Pulmonary:      Effort: Pulmonary effort is normal. No respiratory distress.      Breath sounds: Normal breath sounds.   Musculoskeletal:      Cervical back: Neck supple.      Right lower leg: No edema.      Left lower leg: No edema.   Lymphadenopathy:      Cervical: No cervical adenopathy.   Skin:     General: Skin is warm and dry.      Findings: No rash.   Neurological:      General: No focal deficit present.      Mental Status: She is alert and oriented to person, place, and time.   Psychiatric:         Mood and Affect: Mood normal.         Behavior: Behavior normal.         Thought Content: Thought content normal.             "

## 2022-10-10 NOTE — LETTER
October 10, 2022      [facility]  [address] [facility]  [place] [address]  Phone: [phone]  Fax: [phone]       Patient: Agustina Tillman   YOB: 1985  Date of Visit: 10/10/2022    To Whom It May Concern:    Evelyn Tillman  was at [facility] on 10/10/2022. She tested positive for the flu. Please excuse her from work today and tomorrow, 10/11/22, to rest at home. If you have any questions or concerns, or if I can be of further assistance, please do not hesitate to contact me.    Sincerely,    Nisreen Mobley, DO

## 2022-10-11 LAB
SARS-COV-2 RNA RESP QL NAA+PROBE: NOT DETECTED
SARS-COV-2- CYCLE NUMBER: NORMAL

## 2022-10-27 ENCOUNTER — TELEPHONE (OUTPATIENT)
Dept: BARIATRICS | Facility: CLINIC | Age: 37
End: 2022-10-27
Payer: MEDICAID

## 2022-10-27 ENCOUNTER — PATIENT MESSAGE (OUTPATIENT)
Dept: SURGERY | Facility: CLINIC | Age: 37
End: 2022-10-27
Payer: MEDICAID

## 2022-10-27 NOTE — TELEPHONE ENCOUNTER
----- Message from Adina Sheridan MA sent at 10/27/2022 11:22 AM CDT -----  Good Morning Im reaching out in regards to a Patient the was incorrectly schedules   with General Surgery.  She is trying to schedule a follow up with Bariatrics and was   scheduled with General Surgery instead. If you could,  please reach out to her to   schedule her follow up in your clinic. Thank you.

## 2022-10-27 NOTE — TELEPHONE ENCOUNTER
Called and LVM for [facility] to schedule pt in bariatric clinic for follow up.  Also sent a portal message for pt's convenience to answer.

## 2022-11-01 ENCOUNTER — TELEPHONE (OUTPATIENT)
Dept: OBSTETRICS AND GYNECOLOGY | Facility: CLINIC | Age: 37
End: 2022-11-01
Payer: MEDICAID

## 2022-11-01 ENCOUNTER — TELEPHONE (OUTPATIENT)
Dept: OBSTETRICS AND GYNECOLOGY | Facility: HOSPITAL | Age: 37
End: 2022-11-01
Payer: MEDICAID

## 2022-11-01 NOTE — TELEPHONE ENCOUNTER
Pt was called after consulting with . Pt has now decided to not get IUD and since OCP will not work for her (per ) pt is deciding to just protect herself during this time. Pt verbalized understanding and had no further questions.

## 2022-11-01 NOTE — TELEPHONE ENCOUNTER
----- Message from Bere Liz Mai, MD sent at 11/1/2022 12:54 PM CDT -----  Regarding: RE: OCP  Pt with history of gastric sleeve/bypass, birth control pills will not work for her    ----- Message -----  From: Ashley Chavez LPN  Sent: 11/1/2022  11:31 AM CDT  To: Bere Liz Mai, MD  Subject: OCP                                              Good Morning,    This pt delivered back in July. She was also seen in September by you. She no longer wants an IUD but wants OCP. Does she need an appointment?

## 2022-12-13 ENCOUNTER — HOSPITAL ENCOUNTER (EMERGENCY)
Facility: HOSPITAL | Age: 37
Discharge: HOME | End: 2022-12-13
Attending: EMERGENCY MEDICINE
Payer: MEDICAID

## 2022-12-13 ENCOUNTER — PATIENT MESSAGE (OUTPATIENT)
Dept: ORTHOPEDICS | Facility: CLINIC | Age: 37
End: 2022-12-13
Payer: MEDICAID

## 2022-12-13 VITALS
SYSTOLIC BLOOD PRESSURE: 114 MMHG | RESPIRATION RATE: 20 BRPM | DIASTOLIC BLOOD PRESSURE: 71 MMHG | HEIGHT: 65 IN | WEIGHT: 233 LBS | BODY MASS INDEX: 38.82 KG/M2 | HEART RATE: 58 BPM | TEMPERATURE: 99 F | OXYGEN SATURATION: 100 %

## 2022-12-13 DIAGNOSIS — S63.92XA HAND SPRAIN, LEFT, INITIAL ENCOUNTER: Primary | ICD-10-CM

## 2022-12-13 LAB
B-HCG UR QL: NEGATIVE
CTP QC/QA: YES

## 2022-12-13 PROCEDURE — 81025 URINE PREGNANCY TEST: CPT | Mod: ER | Performed by: EMERGENCY MEDICINE

## 2022-12-13 PROCEDURE — 99283 EMERGENCY DEPT VISIT LOW MDM: CPT | Mod: ER

## 2022-12-13 RX ORDER — METHYLPREDNISOLONE 4 MG/1
TABLET ORAL
Qty: 1 EACH | Refills: 0 | Status: SHIPPED | OUTPATIENT
Start: 2022-12-13 | End: 2023-01-03

## 2022-12-13 NOTE — ED PROVIDER NOTES
Encounter Date: 2022    SCRIBE #1 NOTE: I, Hetal Mejia, am scribing for, and in the presence of,  Lorenza Tapia MD. I have scribed the following portions of the note - Other sections scribed: HPI; ROS; PE.     History     Chief Complaint   Patient presents with    Hand Injury     Pain/tingling to left hand, states twisted hand  and her babys weight fell on it, had had pain since and now tinging to fingers, also difficulty making a fist.     Agustina Tillman is a 37 y.o. female who presents to the ED for chief complaint of left hand pain that began 2 days ago. Patient reports she was holding her baby when she twisted her hand the wrong way and the weight of her baby strained her hand. She has been having pain in the hand since. She states she has a tingling sensation in the fingers. Patient notes the pain shoots up the forearm with movement of the left hand. She attempted treatment with Tylenol and Ibuprofen. No further complaints at this time. Patient reports she cannot take NSAIDS due to a gastric sleeve she had.       The history is provided by the patient. No  was used.   Review of patient's allergies indicates:   Allergen Reactions    Augmentin [amoxicillin-pot clavulanate] Diarrhea    Ibuprofen Other (See Comments)     Told to avoid due to gastric sleeve    Metformin Other (See Comments)     Severe low blood sugar    Nsaids (non-steroidal anti-inflammatory drug)      Had gastric sleeve cannot take    Sudafed pe head congestn-pain [ibuprofen-phenylephrine] Anxiety     Past Medical History:   Diagnosis Date    Allergy 2016    Sinus    Iron deficiency anemia secondary to inadequate dietary iron intake     Lateral epicondylitis, right elbow 3/10/2022    PCOS (polycystic ovarian syndrome) 2017    Postpartum depression 2022    Seizures     I use to have epilepsy. But they have julieta away     Past Surgical History:   Procedure Laterality Date     SECTION  N/A 2022    Procedure:  SECTION;  Surgeon: Bere Liz Mai, MD;  Location: [facility] L&D OR;  Service: OB/GYN;  Laterality: N/A;    ESOPHAGOGASTRODUODENOSCOPY N/A 2020    Procedure: EGD (ESOPHAGOGASTRODUODENOSCOPY);  Surgeon: Cj Umaña MD;  Location: [address];  Service: General;  Laterality: N/A;    LAPAROSCOPIC SLEEVE GASTRECTOMY N/A 2020    Procedure: GASTRECTOMY, SLEEVE, LAPAROSCOPIC, with intraop EGD;  Surgeon: Cj Umaña MD;  Location: [facility] OR [address] [place] [address];  Service: General;  Laterality: N/A;     Family History   Problem Relation Age of Onset    Lung cancer Father     Cancer Father         Lung cancer    Hypertension Mother     Breast cancer Neg Hx     Ovarian cancer Neg Hx     Colon cancer Neg Hx      Social History     Tobacco Use    Smoking status: Never    Smokeless tobacco: Never   Substance Use Topics    Alcohol use: Not Currently     Alcohol/week: 1.0 standard drink     Types: 1 Glasses of wine per week    Drug use: Never     Review of Systems   Constitutional:  Negative for activity change, appetite change, chills and fever.   HENT:  Negative for congestion, rhinorrhea, sneezing and sore throat.    Respiratory:  Negative for cough, choking, shortness of breath and wheezing.    Cardiovascular:  Negative for chest pain and palpitations.   Gastrointestinal:  Negative for abdominal pain, diarrhea, nausea and vomiting.   Musculoskeletal:  Positive for arthralgias (left hand).   Skin:  Negative for color change, pallor, rash and wound.   Neurological:  Negative for dizziness, syncope, weakness, light-headedness, numbness and headaches.        Positive for tingling.    All other systems reviewed and are negative.    Physical Exam     Initial Vitals [22 1533]   BP Pulse Resp Temp SpO2   (!) 139/90 (!) 59 20 98.6 °F (37 °C) 100 %      MAP       --         Physical Exam    Nursing note and vitals reviewed.  Constitutional: Vital signs are normal. She appears  well-developed and well-nourished. She is cooperative. She does not appear ill. No distress.   HENT:   Head: Normocephalic and atraumatic.   Mouth/Throat: Uvula is midline and mucous membranes are normal.   Eyes: Conjunctivae and lids are normal.   Neck: Neck supple.   Normal range of motion.  Cardiovascular:  Normal rate, regular rhythm, S1 normal, S2 normal, normal heart sounds and intact distal pulses.           No murmur heard.  Pulmonary/Chest: Effort normal and breath sounds normal. No respiratory distress. She has no wheezes.   Abdominal: Abdomen is soft. Bowel sounds are normal. She exhibits no distension. There is no abdominal tenderness.   Musculoskeletal:         General: No tenderness or edema.      Left hand: Normal sensation. Normal capillary refill. Normal pulse.      Cervical back: Normal range of motion and neck supple.      Comments: NO swelling, no bruising, no deformity. NO atrophy. Unable to make a tight fist with the left hand due to pain.     Neurological: She is alert and oriented to person, place, and time. She has normal strength. No sensory deficit.   Skin: Skin is warm, dry and intact.   Psychiatric: She has a normal mood and affect. Her speech is normal and behavior is normal.       ED Course   Procedures  Labs Reviewed   POCT URINE PREGNANCY          Imaging Results    None          Medications - No data to display  Medical Decision Making:   History:   Old Medical Records: I decided to obtain old medical records.  Clinical Tests:   Lab Tests: Ordered and Reviewed  The following lab test(s) were unremarkable: UPT  ED Management:  Left hand pain - I suspect hyperextension injury. NVI. Will treat with steroids since unable to take NSAIDs, splint, tylenol for pain. Follow up if not better in 1 week.        Scribe Attestation:   Scribe #1: I performed the above scribed service and the documentation accurately describes the services I performed. I attest to the accuracy of the note.             I, Dr. Lorenza Tapia, personally performed the services described in this documentation.   All medical record entries made by the scribe were at my direction and in my presence.   I have reviewed the chart and agree that the record is accurate and complete.   Lorenza Tapia MD.  4:01 PM 12/13/2022          Clinical Impression:   Final diagnoses:  [S63.92XA] Hand sprain, left, initial encounter (Primary)        ED Disposition Condition    Discharge Stable          ED Prescriptions       Medication Sig Dispense Start Date End Date Auth. Provider    methylPREDNISolone (MEDROL DOSEPACK) 4 mg tablet Take as directed on package. 1 each 12/13/2022 1/3/2023 Lorenza Tapia MD          Follow-up Information    None          Lorenza Tapia MD  12/13/22 [id number]

## 2022-12-13 NOTE — DISCHARGE INSTRUCTIONS
Take steroids as directed to treat inflammation. Take tylenol for pain. Wear splint for comfort - remove to do range of motion exercises several times a day. Follow up with your Orthopedic doctor if you are not better with this treatment.

## 2023-01-05 ENCOUNTER — TELEPHONE (OUTPATIENT)
Dept: BARIATRICS | Facility: CLINIC | Age: 38
End: 2023-01-05
Payer: MEDICAID

## 2023-01-05 NOTE — TELEPHONE ENCOUNTER
----- Message from Kylah Forman sent at 1/5/2023  8:31 AM CST -----  Regarding: appt  Contact: [phone]  Pt is requesting a follow up appt. Please call to discuss further.

## 2023-01-05 NOTE — TELEPHONE ENCOUNTER
Phoned patient and discussed her post op care. She now has medicaid but had her surgery with us 8/19/2020 and then got pregnant.  She can be seen for post ops and follow ups but will not be able to have any additional bariatric surgery with us.  Scheduled her appointments with AMISH and RD.  Asked her to fast and she can go do fasting labs after the appointments.  She verbalized understanding.

## 2023-02-08 ENCOUNTER — HOSPITAL ENCOUNTER (EMERGENCY)
Facility: HOSPITAL | Age: 38
Discharge: HOME | End: 2023-02-08
Attending: EMERGENCY MEDICINE
Payer: MEDICAID

## 2023-02-08 ENCOUNTER — TELEPHONE (OUTPATIENT)
Dept: FAMILY MEDICINE | Facility: CLINIC | Age: 38
End: 2023-02-08
Payer: MEDICAID

## 2023-02-08 VITALS
WEIGHT: 240 LBS | HEIGHT: 65 IN | RESPIRATION RATE: 18 BRPM | BODY MASS INDEX: 39.99 KG/M2 | HEART RATE: 91 BPM | DIASTOLIC BLOOD PRESSURE: 75 MMHG | SYSTOLIC BLOOD PRESSURE: 112 MMHG | OXYGEN SATURATION: 100 % | TEMPERATURE: 101 F

## 2023-02-08 DIAGNOSIS — Z34.90 PREGNANCY, UNSPECIFIED GESTATIONAL AGE: ICD-10-CM

## 2023-02-08 DIAGNOSIS — J06.9 VIRAL URI WITH COUGH: Primary | ICD-10-CM

## 2023-02-08 LAB
B-HCG UR QL: POSITIVE
CTP QC/QA: YES
CTP QC/QA: YES
INFLUENZA A ANTIGEN, POC: NEGATIVE
INFLUENZA B ANTIGEN, POC: NEGATIVE
POC RAPID STREP A: NEGATIVE
SARS-COV-2 RDRP RESP QL NAA+PROBE: NEGATIVE

## 2023-02-08 PROCEDURE — 87635 SARS-COV-2 COVID-19 AMP PRB: CPT | Mod: ER | Performed by: EMERGENCY MEDICINE

## 2023-02-08 PROCEDURE — 99284 EMERGENCY DEPT VISIT MOD MDM: CPT | Mod: 25,ER

## 2023-02-08 PROCEDURE — 87502 INFLUENZA DNA AMP PROBE: CPT | Mod: ER

## 2023-02-08 PROCEDURE — 81025 URINE PREGNANCY TEST: CPT | Mod: ER | Performed by: EMERGENCY MEDICINE

## 2023-02-08 RX ORDER — FAMOTIDINE 20 MG
1 TABLET ORAL DAILY
Qty: 30 TABLET | Refills: 2 | Status: SHIPPED | OUTPATIENT
Start: 2023-02-08 | End: 2024-02-08

## 2023-02-08 RX ORDER — GUAIFENESIN 100 MG/5ML
200 SOLUTION ORAL EVERY 4 HOURS PRN
Qty: 60 ML | Refills: 2 | Status: SHIPPED | OUTPATIENT
Start: 2023-02-08 | End: 2023-02-18

## 2023-02-08 RX ORDER — CETIRIZINE HYDROCHLORIDE 10 MG/1
10 TABLET ORAL DAILY
Qty: 30 TABLET | Refills: 0 | Status: SHIPPED | OUTPATIENT
Start: 2023-02-08 | End: 2024-02-08

## 2023-02-08 RX ORDER — DOXYLAMINE SUCCINATE AND PYRIDOXINE HYDROCHLORIDE, DELAYED RELEASE TABLETS 10 MG/10 MG 10; 10 MG/1; MG/1
2 TABLET, DELAYED RELEASE ORAL NIGHTLY
Qty: 60 TABLET | Refills: 2 | Status: SHIPPED | OUTPATIENT
Start: 2023-02-08

## 2023-02-08 RX ORDER — ACETAMINOPHEN 500 MG
500 TABLET ORAL EVERY 6 HOURS PRN
Qty: 30 TABLET | Refills: 0 | Status: SHIPPED | OUTPATIENT
Start: 2023-02-08

## 2023-02-08 NOTE — TELEPHONE ENCOUNTER
----- Message from Jackelyn Castillo sent at 2/8/2023  4:43 PM CST -----  Type:  Sooner Apoointment Request    Caller is requesting a sooner appointment.  Caller declined first available appointment listed below.  Caller will not accept being placed on the waitlist and is requesting a message be sent to doctor.  Name of Caller:Melva Tillman     When is the first available appointment? 3/31/23     Symptoms:sore throat and runny nose     Would the patient rather a call back or a response via [facility]?  Call back     Best Call Back Number: [phone] (mobile)     Additional Information:Pt is requesting a appointment for tomorrow if possible

## 2023-02-08 NOTE — Clinical Note
"Agustina "Thomas Tillman was seen and treated in our emergency department on 2/8/2023.     COVID-19 is present in our communities across the state. There is limited testing for COVID at this time, so not all patients can be tested. In this situation, your employee meets the following criteria:    Agustina Tillman has met the criteria for COVID-19 testing and has a NEGATIVE result. The employee can return to work once they are asymptomatic for 24 hours without the use of fever reducing medications (Tylenol, Motrin, etc).     If the employee is not fully vaccinated and had a close contact:  · Retest at 5 to 7 days post-exposure  · If possible, it is recommended that they quarantine for 5 days from the time of contact regardless of their test status.  · A mask should be worn post quarantine for 5 days.    If you have any questions or concerns, or if I can be of further assistance, please do not hesitate to contact me.    Sincerely,             Cele Braden MD"

## 2023-02-09 NOTE — ED PROVIDER NOTES
Encounter Date: 2023       History     Chief Complaint   Patient presents with    URI     Runny nose and sore throat since yesterday.     37 y.o. female Past Medical History:  2016: Allergy      Comment:  Sinus  No date: Iron deficiency anemia secondary to inadequate dietary iron   intake  3/10/2022: Lateral epicondylitis, right elbow  2017: PCOS (polycystic ovarian syndrome)  2022: Postpartum depression  1995: Seizures      Comment:  I use to have epilepsy. But they have julieta away      Endorses headache, nasal congestion, mild nausea, no vomiting/diarrhea.  Denies cough.    Review of patient's allergies indicates:   Allergen Reactions    Augmentin [amoxicillin-pot clavulanate] Diarrhea    Ibuprofen Other (See Comments)     Told to avoid due to gastric sleeve    Metformin Other (See Comments)     Severe low blood sugar    Nsaids (non-steroidal anti-inflammatory drug)      Had gastric sleeve cannot take    Sudafed pe head congestn-pain [ibuprofen-phenylephrine] Anxiety     Past Medical History:   Diagnosis Date    Allergy 2016    Sinus    Iron deficiency anemia secondary to inadequate dietary iron intake     Lateral epicondylitis, right elbow 3/10/2022    PCOS (polycystic ovarian syndrome) 2017    Postpartum depression 2022    Seizures 1995    I use to have epilepsy. But they have julieta away     Past Surgical History:   Procedure Laterality Date     SECTION N/A 2022    Procedure:  SECTION;  Surgeon: Bere Liz Mai, MD;  Location: [facility] L[facility] OR;  Service: OB/GYN;  Laterality: N/A;    ESOPHAGOGASTRODUODENOSCOPY N/A 2020    Procedure: EGD (ESOPHAGOGASTRODUODENOSCOPY);  Surgeon: Cj Umaña MD;  Location: [address];  Service: General;  Laterality: N/A;    LAPAROSCOPIC SLEEVE GASTRECTOMY N/A 2020    Procedure: GASTRECTOMY, SLEEVE, LAPAROSCOPIC, with intraop EGD;  Surgeon: Cj Umaña MD;  Location: [address];  Service: General;   Laterality: N/A;     Family History   Problem Relation Age of Onset    Lung cancer Father     Cancer Father         Lung cancer    Hypertension Mother     Breast cancer Neg Hx     Ovarian cancer Neg Hx     Colon cancer Neg Hx      Social History     Tobacco Use    Smoking status: Never    Smokeless tobacco: Never   Substance Use Topics    Alcohol use: Not Currently     Alcohol/week: 1.0 standard drink     Types: 1 Glasses of wine per week    Drug use: Never     Review of Systems   Constitutional:  Negative for fever.   HENT:  Negative for sore throat.    Respiratory:  Negative for shortness of breath.    Cardiovascular:  Negative for chest pain.   Gastrointestinal:  Negative for nausea.   Genitourinary:  Negative for dysuria.   Musculoskeletal:  Negative for back pain.   Skin:  Negative for rash.   Neurological:  Negative for weakness.   Hematological:  Does not bruise/bleed easily.   All other systems reviewed and are negative.    Physical Exam     Initial Vitals [02/08/23 1812]   BP Pulse Resp Temp SpO2   112/75 91 18 (!) 100.5 °F (38.1 °C) 100 %      MAP       --         Physical Exam    Nursing note and vitals reviewed.  Constitutional: She appears well-developed and well-nourished.   HENT:   Head: Normocephalic and atraumatic.   Eyes: Conjunctivae and EOM are normal. Pupils are equal, round, and reactive to light.   Neck:   Normal range of motion.  Cardiovascular:  Normal rate.           Pulmonary/Chest: No respiratory distress.   Abdominal: She exhibits no distension.   Musculoskeletal:         General: Normal range of motion.      Cervical back: Normal range of motion.     Neurological: She is alert. No cranial nerve deficit. GCS score is 15. GCS eye subscore is 4. GCS verbal subscore is 5. GCS motor subscore is 6.   Skin: Skin is warm and dry.   Psychiatric: She has a normal mood and affect. Thought content normal.     Oropharynx clear  No ant cervical lymphs  ED Course   Procedures  MEDICAL DECISION  MAKING    After review of the patient's physical exam, ED testing, and history/symptoms, relevant labs, imaging, available outside records  a wide differential was considered including but not limited to: infectious, traumatic, vascular, toxicological , malignant, ischemic, embolic, psychological, genetic, iatrogenic, idiopathic, substance dependence/intoxication/withdrawal, electrolyte or blood dyscrasia, and other etiologies.        labs/imaging/interventions include:       Medications - No data to display  Labs Reviewed   POCT URINE PREGNANCY - Abnormal; Notable for the following components:       Result Value    POC Preg Test, Ur Positive (*)     All other components within normal limits   SARS-COV-2 RDRP GENE   POCT INFLUENZA A/B MOLECULAR   POCT STREP A MOLECULAR   POCT STREP A, RAPID      No orders to display     Covid/strep/flu negative.  Upt positive. Have written for prenatal vitamins, b6 doxyalamine, tylenol, robitussin and zyrtec.    I have independently evaluated and interpreted all available labs and imaging.  The suspected diagnosis, treatment and plan were discussed with the patient. All questions or concerns have been addressed.    Note was created using voice recognition software. Note may have occasional typographical or grammatical errors , garbled syntax, and other bizarre constructions that may not have been identified and edited despite good lyubov initial review prior to signing.       Labs Reviewed   POCT URINE PREGNANCY - Abnormal; Notable for the following components:       Result Value    POC Preg Test, Ur Positive (*)     All other components within normal limits   SARS-COV-2 RDRP GENE   POCT INFLUENZA A/B MOLECULAR   POCT STREP A MOLECULAR   POCT STREP A, RAPID          Imaging Results    None          Medications - No data to display                           Clinical Impression:   Final diagnoses:  [Z34.90] Pregnancy, unspecified gestational age (Primary)  [J06.9] Viral URI with  cough               Cele Braden MD  02/08/23 9770       Cele Braden MD  02/08/23 7810

## 2023-02-09 NOTE — DISCHARGE INSTRUCTIONS
Thank you for coming to our Emergency Department today. It is important to remember that some problems or medical conditions are difficult to diagnose and may not be found or addressed during your Emergency Department visit.     Be sure to follow up with your primary care doctor and review all labs/imaging/tests that were performed during your ER visit with them. Some labs/imaging/tests may be outside of the normal range, and require non-emergent follow-up and/or further investigation/treatment/procedures/testing to help diagnose/exclude/prevent complications or other potentially serious medical conditions that were not discussed or addressed during your ER visit.    If you do not have a primary care doctor, you may contact the one listed on your discharge paperwork or you may also call the [facility] Appointment Desk at [phone] to schedule an appointment and establish care with one. It is important to your health that you have a primary care doctor.    Please take all medications as directed. All medications may potentially have side-effects and it is impossible to predict which medications may give you side-effects or what side-effects (if any) they will give you.. If you feel that you are having a negative effect or side-effect of any medication you should immediately stop taking them and seek medical attention. If you feel that you are having a life-threatening reaction call 911.    Return to the ER with any questions/concerns, new/concerning symptoms, worsening or failure to improve.     Do not drive, swim, climb to height, take a bath, operate heavy machinery, drink alcohol or take potentially sedating medications, sign any legal documents or make any important decisions for 24 hours if you have received any pain medications, sedatives or mood altering drugs during your ER visit or within 24 hours of taking them if they have been prescribed to you.     You can find additional resources for Dentists,  hearing aids, durable medical equipment, low cost pharmacies and other resources at [email or web address].org    BELOW THIS LINE ONLY APPLIES IF YOU HAVE A COVID TEST PENDING OR IF YOU HAVE BEEN DIAGNOSED WITH COVID:  Please access [facility] to review the results of your test. Until the results of your COVID test return, you should isolate yourself so as not to potentially spread illness to others.   If your COVID test returns positive, you should isolate yourself so as not to spread illness to others. After five full days, if you are feeling better and you have not had fever for 24 hours, you can return to your typical daily activities, but you must wear a mask around others for an additional 5 days.   If your COVID test returns negative and you are either unvaccinated or more than six months out from your two-dose vaccine and are not yet boosted, you should still quarantine for 5 full days followed by strict mask use for an additional 5 full days.   If your COVID test returns negative and you have received your 2-dose initial vaccine as well as a booster, you should continue strict mask use for 10 full days after the exposure.  For all those exposed, best practice includes a test at day 5 after the exposure. This can be a home test or a test through one of the many testing centers throughout our community.   Masking is always advised to limit the spread of COVID. Cdc.gov is an excellent site to obtain the latest up to date recommendations regarding COVID and COVID testing.     CDC Testing and Quarantine Guidelines for patients with exposure to a known-positive COVID-19 person:  A close exposure is defined as anyone who has had an exposure (masked or unmasked) to a known COVID -19 positive person within 6 feet of someone for a cumulative total of 15 minutes or more over a 24-hour period.   Vaccinated and/or if you recently had a positive covid test within 90 days do NOT need to quarantine after contact with someone  who had COVID-19 unless you develop symptoms.   Fully vaccinated people who have not had a positive test within 90 days, should get tested 3-5 days after their exposure, even if they don't have symptoms and wear a mask indoors in public for 14 days following exposure or until their test result is negative.      Unvaccinated and/or NOT had a positive test within 90 days and meet close exposure  You are required by CDC guidelines to quarantine for at least 5 days from time of exposure followed by 5 days of strict masking. It is recommended, but not required to test after 5 days, unless you develop symptoms, in which case you should test at that time.  If you get tested after 5 days and your test is positive, your 5 day period of isolation starts the day of the positive test.    If your exposure does not meet the above definition, you can return to your normal daily activities to include social distancing, wearing a mask and frequent handwashing.      Here is a link to guidance from the CDC:  https://www.cdc.gov/media/releases/2021/s1227-isolation-quarantine-guidance.html      [place] [facility] [place] [facility] Testing Sites:  https://ldh.la.gov/page/3934      [facility] website with testing locations and guidance:  [email or web address]

## 2023-02-15 ENCOUNTER — LAB VISIT (OUTPATIENT)
Dept: LAB | Facility: HOSPITAL | Age: 38
End: 2023-02-15
Attending: OBSTETRICS & GYNECOLOGY
Payer: MEDICAID

## 2023-02-15 ENCOUNTER — INITIAL PRENATAL (OUTPATIENT)
Dept: OBSTETRICS AND GYNECOLOGY | Facility: CLINIC | Age: 38
End: 2023-02-15
Payer: MEDICAID

## 2023-02-15 VITALS — BODY MASS INDEX: 41.6 KG/M2 | DIASTOLIC BLOOD PRESSURE: 78 MMHG | SYSTOLIC BLOOD PRESSURE: 114 MMHG | WEIGHT: 250 LBS

## 2023-02-15 DIAGNOSIS — Z12.4 ENCOUNTER FOR PAPANICOLAOU SMEAR FOR CERVICAL CANCER SCREENING: ICD-10-CM

## 2023-02-15 DIAGNOSIS — Z90.3 S/P GASTRIC SLEEVE PROCEDURE: ICD-10-CM

## 2023-02-15 DIAGNOSIS — O34.219 HISTORY OF CESAREAN DELIVERY AFFECTING PREGNANCY: ICD-10-CM

## 2023-02-15 DIAGNOSIS — O09.519 ADVANCED MATERNAL AGE, PRIMIGRAVIDA, ANTEPARTUM: ICD-10-CM

## 2023-02-15 DIAGNOSIS — Z11.3 SCREEN FOR STD (SEXUALLY TRANSMITTED DISEASE): ICD-10-CM

## 2023-02-15 DIAGNOSIS — O99.210 OBESITY AFFECTING PREGNANCY, ANTEPARTUM: ICD-10-CM

## 2023-02-15 DIAGNOSIS — O09.529 ANTEPARTUM MULTIGRAVIDA OF ADVANCED MATERNAL AGE: ICD-10-CM

## 2023-02-15 DIAGNOSIS — O99.350 SEIZURE DISORDER IN PREGNANCY, ANTEPARTUM: ICD-10-CM

## 2023-02-15 DIAGNOSIS — O09.529 ANTEPARTUM MULTIGRAVIDA OF ADVANCED MATERNAL AGE: Primary | ICD-10-CM

## 2023-02-15 DIAGNOSIS — G40.909 SEIZURE DISORDER IN PREGNANCY, ANTEPARTUM: ICD-10-CM

## 2023-02-15 LAB
ABO + RH BLD: NORMAL
BACTERIA #/AREA URNS HPF: ABNORMAL /HPF
BILIRUB UR QL STRIP: NEGATIVE
BLD GP AB SCN CELLS X3 SERPL QL: NORMAL
CLARITY UR: CLEAR
COLOR UR: YELLOW
ERYTHROCYTE [DISTWIDTH] IN BLOOD BY AUTOMATED COUNT: 14.6 % (ref 11.5–14.5)
GLUCOSE UR QL STRIP: NEGATIVE
HCT VFR BLD AUTO: 36.5 % (ref 37–48.5)
HGB BLD-MCNC: 11.3 G/DL (ref 12–16)
HGB UR QL STRIP: NEGATIVE
KETONES UR QL STRIP: NEGATIVE
LEUKOCYTE ESTERASE UR QL STRIP: ABNORMAL
MCH RBC QN AUTO: 24.6 PG (ref 27–31)
MCHC RBC AUTO-ENTMCNC: 31 G/DL (ref 32–36)
MCV RBC AUTO: 79 FL (ref 82–98)
MICROSCOPIC COMMENT: ABNORMAL
NITRITE UR QL STRIP: NEGATIVE
PH UR STRIP: 6 [PH] (ref 5–8)
PLATELET # BLD AUTO: 278 K/UL (ref 150–450)
PMV BLD AUTO: 9.6 FL (ref 9.2–12.9)
PROT UR QL STRIP: NEGATIVE
RBC # BLD AUTO: 4.6 M/UL (ref 4–5.4)
RBC #/AREA URNS HPF: 1 /HPF (ref 0–4)
SP GR UR STRIP: 1.01 (ref 1–1.03)
SQUAMOUS #/AREA URNS HPF: 2 /HPF
UNIDENT CRYS URNS QL MICRO: ABNORMAL
URN SPEC COLLECT METH UR: ABNORMAL
UROBILINOGEN UR STRIP-ACNC: NEGATIVE EU/DL
WBC # BLD AUTO: 6.23 K/UL (ref 3.9–12.7)
WBC #/AREA URNS HPF: 84 /HPF (ref 0–5)
WBC CLUMPS URNS QL MICRO: ABNORMAL
YEAST URNS QL MICRO: ABNORMAL

## 2023-02-15 PROCEDURE — 99213 OFFICE O/P EST LOW 20 MIN: CPT | Mod: PBBFAC,TH | Performed by: OBSTETRICS & GYNECOLOGY

## 2023-02-15 PROCEDURE — 99205 OFFICE O/P NEW HI 60 MIN: CPT | Mod: TH,S$PBB,, | Performed by: OBSTETRICS & GYNECOLOGY

## 2023-02-15 PROCEDURE — 82607 VITAMIN B-12: CPT | Performed by: OBSTETRICS & GYNECOLOGY

## 2023-02-15 PROCEDURE — 87624 HPV HI-RISK TYP POOLED RSLT: CPT | Performed by: OBSTETRICS & GYNECOLOGY

## 2023-02-15 PROCEDURE — 86900 BLOOD TYPING SEROLOGIC ABO: CPT | Performed by: OBSTETRICS & GYNECOLOGY

## 2023-02-15 PROCEDURE — 87077 CULTURE AEROBIC IDENTIFY: CPT | Performed by: OBSTETRICS & GYNECOLOGY

## 2023-02-15 PROCEDURE — 87389 HIV-1 AG W/HIV-1&-2 AB AG IA: CPT | Performed by: OBSTETRICS & GYNECOLOGY

## 2023-02-15 PROCEDURE — 86762 RUBELLA ANTIBODY: CPT | Performed by: OBSTETRICS & GYNECOLOGY

## 2023-02-15 PROCEDURE — 87086 URINE CULTURE/COLONY COUNT: CPT | Performed by: OBSTETRICS & GYNECOLOGY

## 2023-02-15 PROCEDURE — 87186 SC STD MICRODIL/AGAR DIL: CPT | Performed by: OBSTETRICS & GYNECOLOGY

## 2023-02-15 PROCEDURE — 81000 URINALYSIS NONAUTO W/SCOPE: CPT | Performed by: OBSTETRICS & GYNECOLOGY

## 2023-02-15 PROCEDURE — 80074 ACUTE HEPATITIS PANEL: CPT | Performed by: OBSTETRICS & GYNECOLOGY

## 2023-02-15 PROCEDURE — 86592 SYPHILIS TEST NON-TREP QUAL: CPT | Performed by: OBSTETRICS & GYNECOLOGY

## 2023-02-15 PROCEDURE — 87591 N.GONORRHOEAE DNA AMP PROB: CPT | Performed by: OBSTETRICS & GYNECOLOGY

## 2023-02-15 PROCEDURE — 99999 PR PBB SHADOW E&M-EST. PATIENT-LVL III: ICD-10-PCS | Mod: PBBFAC,,, | Performed by: OBSTETRICS & GYNECOLOGY

## 2023-02-15 PROCEDURE — 99205 PR OFFICE/OUTPT VISIT, NEW, LEVL V, 60-74 MIN: ICD-10-PCS | Mod: TH,S$PBB,, | Performed by: OBSTETRICS & GYNECOLOGY

## 2023-02-15 PROCEDURE — 88175 CYTOPATH C/V AUTO FLUID REDO: CPT | Performed by: OBSTETRICS & GYNECOLOGY

## 2023-02-15 PROCEDURE — 83020 HEMOGLOBIN ELECTROPHORESIS: CPT | Performed by: OBSTETRICS & GYNECOLOGY

## 2023-02-15 PROCEDURE — 99999 PR PBB SHADOW E&M-EST. PATIENT-LVL III: CPT | Mod: PBBFAC,,, | Performed by: OBSTETRICS & GYNECOLOGY

## 2023-02-15 PROCEDURE — 82746 ASSAY OF FOLIC ACID SERUM: CPT | Performed by: OBSTETRICS & GYNECOLOGY

## 2023-02-15 PROCEDURE — 87088 URINE BACTERIA CULTURE: CPT | Performed by: OBSTETRICS & GYNECOLOGY

## 2023-02-15 PROCEDURE — 85027 COMPLETE CBC AUTOMATED: CPT | Performed by: OBSTETRICS & GYNECOLOGY

## 2023-02-15 PROCEDURE — 36415 COLL VENOUS BLD VENIPUNCTURE: CPT | Performed by: OBSTETRICS & GYNECOLOGY

## 2023-02-15 RX ORDER — FOLIC ACID 1 MG/1
4 TABLET ORAL DAILY
Qty: 120 TABLET | Refills: 11 | Status: ON HOLD | OUTPATIENT
Start: 2023-02-15 | End: 2023-10-12 | Stop reason: HOSPADM

## 2023-02-15 NOTE — PROGRESS NOTES
Patient presented today for new Ob appt.    Planned pregnancy: no, just stopped breastfeeding her 6 month old baby in Dec, first cycle came down on 22    ROS: NEG except    VB?: no  Cramping?:  no  Nausea/vomitting?: once but now resolved    PMH: obesity, h/o seizure  PSH: gastric bypass  OBH: CD x 1 due to fetal intolerance to labor  GynHx: denies std    /78   Wt 113.4 kg (250 lb)   LMP 2023   BMI 41.60 kg/m²   Phys Exam:  Abdomen: soft,NT, ND  Vulva and vagina appear normal. Bimanual exam reveals normal uterus and adnexa.    Transabdominal US done: very hard to see but thickened ES noted, no GS or fetal pole    1.  Early IUP:       -prenatal labs today,dating US with MFM       -GC/CT, affirm and papsmear done today       -Bleeding precautions       -Aneuploidy screen offered. Patient desires aneuploidy screening       -Diet, exercise, and social habits d/w patients. Patient was counseled today on  proper weight gain based on the [place] [employer]'s recommendations based  on her pre-pregnancy BMI of 41, total pregnancy weight gain of 11-19 lbs.       -Discussed foods to avoid in pregnancy (i.e. sushi, fish that are high in mercury,  deli meat, and unpasteurized cheeses). Discussed  prenatal vitamin options and  safe medications in pregnancy.     2. Short-interval pregnancy  3.  AMA: NIPT after dating US, no ASA due to h/o gastric sleeve  4.  Obesity:  -Discussed with pt obesity increased maternal risk for GDM, VTE, preE, fatty liver disease, LILLY, increased risk of , blood loss, infection, pp weight retention, etc.  -Obesity increased fetal risk for SAB, stillbirth, macrosomia, congenital anomalies (cardiac, orofacial, limb), childhood obesity/asthma, ADHD and autism spectrum disorder.  -Recommend diet, exercise, low weight gain of 11-20 lbs in pregnancy. Early diabetic screening at 16 weeks   5.  History of seizure: folic acid 4mg sent  6.  H/o CD:  due to Fetal intolerance to  labor  7.  H/o gastric bypass:  check folate, Vit B12, may need supplement if low  8. Discussed how call group function, we do most of our deliveries however other providers will be covering and possibility will be there at the time of deliveries.  Pt voiced understanding.  9  RTC in 4 weeks for Sara visit

## 2023-02-16 ENCOUNTER — TELEPHONE (OUTPATIENT)
Dept: MATERNAL FETAL MEDICINE | Facility: CLINIC | Age: 38
End: 2023-02-16
Payer: MEDICAID

## 2023-02-16 ENCOUNTER — PATIENT MESSAGE (OUTPATIENT)
Dept: MATERNAL FETAL MEDICINE | Facility: CLINIC | Age: 38
End: 2023-02-16
Payer: MEDICAID

## 2023-02-16 LAB
FOLATE SERPL-MCNC: 14.2 NG/ML (ref 4–24)
HAV IGM SERPL QL IA: NORMAL
HBV CORE IGM SERPL QL IA: NORMAL
HBV SURFACE AG SERPL QL IA: NORMAL
HCV AB SERPL QL IA: NORMAL
HIV 1+2 AB+HIV1 P24 AG SERPL QL IA: NORMAL
VIT B12 SERPL-MCNC: 431 PG/ML (ref 210–950)

## 2023-02-17 LAB
BACTERIA UR CULT: ABNORMAL
C TRACH DNA SPEC QL NAA+PROBE: NOT DETECTED
N GONORRHOEA DNA SPEC QL NAA+PROBE: NOT DETECTED
RPR SER QL: NORMAL
RUBV IGG SER-ACNC: 16.1 IU/ML
RUBV IGG SER-IMP: REACTIVE

## 2023-02-17 RX ORDER — CEPHALEXIN 500 MG/1
500 CAPSULE ORAL EVERY 12 HOURS
Qty: 14 CAPSULE | Refills: 0 | Status: ON HOLD | OUTPATIENT
Start: 2023-02-17 | End: 2023-10-12 | Stop reason: HOSPADM

## 2023-02-18 LAB
HB ELECTROPHORESIS INTERP CANCEL: NORMAL
HB ELECTROPHORESIS INTERPRETATION: NORMAL
HGB A MFR BLD ELPH: 97.5 % (ref 95.8–98)
HGB A2 MFR BLD: 2.5 % (ref 2–3.3)
HGB A2+XXX MFR BLD ELPH: NORMAL %
HGB F MFR BLD: 0 % (ref 0–0.9)
HGB XXX MFR BLD ELPH: NORMAL %
HPLC HB VARIANT: NORMAL

## 2023-02-23 ENCOUNTER — TELEPHONE (OUTPATIENT)
Dept: BARIATRICS | Facility: CLINIC | Age: 38
End: 2023-02-23
Payer: MEDICAID

## 2023-02-24 ENCOUNTER — PATIENT MESSAGE (OUTPATIENT)
Dept: OBSTETRICS AND GYNECOLOGY | Facility: CLINIC | Age: 38
End: 2023-02-24
Payer: MEDICAID

## 2023-02-28 ENCOUNTER — PATIENT MESSAGE (OUTPATIENT)
Dept: OBSTETRICS AND GYNECOLOGY | Facility: CLINIC | Age: 38
End: 2023-02-28
Payer: MEDICAID

## 2023-02-28 RX ORDER — UBIDECARENONE 75 MG
500 CAPSULE ORAL DAILY
Qty: 90 TABLET | Refills: 3 | Status: SHIPPED | OUTPATIENT
Start: 2023-02-28

## 2023-02-28 NOTE — PROGRESS NOTES
Your level is good, however I do advise continue your iron and folate,  I have just sent vit B12 to take once daily as well

## 2023-03-02 ENCOUNTER — PATIENT MESSAGE (OUTPATIENT)
Dept: MATERNAL FETAL MEDICINE | Facility: CLINIC | Age: 38
End: 2023-03-02
Payer: MEDICAID

## 2023-03-03 ENCOUNTER — PROCEDURE VISIT (OUTPATIENT)
Dept: MATERNAL FETAL MEDICINE | Facility: CLINIC | Age: 38
End: 2023-03-03
Payer: MEDICAID

## 2023-03-03 DIAGNOSIS — O09.529 ANTEPARTUM MULTIGRAVIDA OF ADVANCED MATERNAL AGE: ICD-10-CM

## 2023-03-03 DIAGNOSIS — Z36.89 ENCOUNTER FOR FETAL ANATOMIC SURVEY: Primary | ICD-10-CM

## 2023-03-03 PROCEDURE — 76801 US OB/GYN EXTENDED PROCEDURE (VIEWPOINT): ICD-10-PCS | Mod: 26,S$PBB,, | Performed by: OBSTETRICS & GYNECOLOGY

## 2023-03-03 PROCEDURE — 76801 OB US < 14 WKS SINGLE FETUS: CPT | Mod: PBBFAC,PO | Performed by: OBSTETRICS & GYNECOLOGY

## 2023-03-16 ENCOUNTER — TELEPHONE (OUTPATIENT)
Dept: OBSTETRICS AND GYNECOLOGY | Facility: CLINIC | Age: 38
End: 2023-03-16
Payer: MEDICAID

## 2023-03-16 NOTE — TELEPHONE ENCOUNTER
----- Message from Chelsey Mcdermott sent at 3/16/2023 10:11 AM CDT -----  Regarding: patient call back  Type: Patient Call Back    Who called: Self     What is the request in detail: Asked for a call to move her apt to either tomorrow or anytime next week.     Can the clinic reply by [facility]? No     Would the patient rather a call back or a response via [facility]? Call     Best call back number: .[phone]

## 2023-03-21 ENCOUNTER — ROUTINE PRENATAL (OUTPATIENT)
Dept: OBSTETRICS AND GYNECOLOGY | Facility: CLINIC | Age: 38
End: 2023-03-21
Payer: MEDICAID

## 2023-03-21 VITALS
SYSTOLIC BLOOD PRESSURE: 118 MMHG | WEIGHT: 248.25 LBS | BODY MASS INDEX: 41.31 KG/M2 | DIASTOLIC BLOOD PRESSURE: 70 MMHG

## 2023-03-21 DIAGNOSIS — O09.529 ANTEPARTUM MULTIGRAVIDA OF ADVANCED MATERNAL AGE: ICD-10-CM

## 2023-03-21 DIAGNOSIS — G40.909 SEIZURE DISORDER IN PREGNANCY, ANTEPARTUM: ICD-10-CM

## 2023-03-21 DIAGNOSIS — Z3A.10 10 WEEKS GESTATION OF PREGNANCY: ICD-10-CM

## 2023-03-21 DIAGNOSIS — O34.219 HISTORY OF CESAREAN DELIVERY AFFECTING PREGNANCY: Primary | ICD-10-CM

## 2023-03-21 DIAGNOSIS — O09.519 ADVANCED MATERNAL AGE, PRIMIGRAVIDA, ANTEPARTUM: ICD-10-CM

## 2023-03-21 DIAGNOSIS — O99.350 SEIZURE DISORDER IN PREGNANCY, ANTEPARTUM: ICD-10-CM

## 2023-03-21 DIAGNOSIS — O99.210 OBESITY AFFECTING PREGNANCY, ANTEPARTUM: ICD-10-CM

## 2023-03-21 DIAGNOSIS — Z90.3 S/P GASTRIC SLEEVE PROCEDURE: ICD-10-CM

## 2023-03-21 PROCEDURE — 99214 OFFICE O/P EST MOD 30 MIN: CPT | Mod: TH,S$PBB,, | Performed by: OBSTETRICS & GYNECOLOGY

## 2023-03-21 PROCEDURE — 99999 PR PBB SHADOW E&M-EST. PATIENT-LVL III: CPT | Mod: PBBFAC,,, | Performed by: OBSTETRICS & GYNECOLOGY

## 2023-03-21 PROCEDURE — 99214 PR OFFICE/OUTPT VISIT, EST, LEVL IV, 30-39 MIN: ICD-10-PCS | Mod: TH,S$PBB,, | Performed by: OBSTETRICS & GYNECOLOGY

## 2023-03-21 PROCEDURE — 99213 OFFICE O/P EST LOW 20 MIN: CPT | Mod: PBBFAC,TH | Performed by: OBSTETRICS & GYNECOLOGY

## 2023-03-21 PROCEDURE — 99999 PR PBB SHADOW E&M-EST. PATIENT-LVL III: ICD-10-PCS | Mod: PBBFAC,,, | Performed by: OBSTETRICS & GYNECOLOGY

## 2023-03-21 RX ORDER — FAMOTIDINE 40 MG/1
40 TABLET, FILM COATED ORAL 2 TIMES DAILY
Qty: 60 TABLET | Refills: 3 | Status: SHIPPED | OUTPATIENT
Start: 2023-03-21 | End: 2023-09-15 | Stop reason: SDUPTHER

## 2023-03-21 NOTE — PROGRESS NOTES
Here for routine OB visit  Denies cramping.  +spotting but now resolved  Denies PreE symptoms/UTI symptoms  ROS: Negative besides heartburn, she is eating healthy and small meals  tums and pepcid 20mg not working    General:  NAD, AxO x 3  Abdomen: soft, gravid, non-tender  Ext: peripheral pulses normal, no pedal edema, no clubbing or cyanosis  Skin: normal and good color    1.  GERD:  Continue small bland diet, increase pepcid to 40mg BID  2. Short-interval pregnancy  3.  AMA: NIPT today, no ASA due to h/o gastric sleeve  4.  Obesity:  not able to take ASA   5.  History of seizure: folic acid 4mg sent  6.  H/o CD:  due to Fetal intolerance to labor  7.  H/o gastric bypass:  on folate, B12, iron    Strict Labor precautions, FKCs    Next visit: 4 weeks

## 2023-04-20 PROBLEM — O09.519 ADVANCED MATERNAL AGE, PRIMIGRAVIDA, ANTEPARTUM: Status: ACTIVE | Noted: 2022-07-22

## 2023-04-20 PROBLEM — O09.899 SHORT INTERVAL BETWEEN PREGNANCIES AFFECTING PREGNANCY, ANTEPARTUM: Status: ACTIVE | Noted: 2023-04-20

## 2023-04-25 ENCOUNTER — TELEPHONE (OUTPATIENT)
Dept: OBSTETRICS AND GYNECOLOGY | Facility: CLINIC | Age: 38
End: 2023-04-25
Payer: MEDICAID

## 2023-04-25 NOTE — TELEPHONE ENCOUNTER
----- Message from Ramon Bae sent at 4/25/2023 10:34 AM CDT -----  Regarding: Patient Call Back  .Type: Patient Call Back    Who called: Self     What is the request in detail: Pt is requesting a call back from nurse about a missed prenatal appt. Wants to reschedule but the first available isnt until 06/13/2023. Wants to be seen sooner than that. Please advise.    Can the clinic reply by [facility]? No    Would the patient rather a call back or a response via [facility]? Call back    Best call back number: [phone]     Additional Information:

## 2023-04-26 ENCOUNTER — ROUTINE PRENATAL (OUTPATIENT)
Dept: OBSTETRICS AND GYNECOLOGY | Facility: CLINIC | Age: 38
End: 2023-04-26
Payer: MEDICAID

## 2023-04-26 VITALS — BODY MASS INDEX: 41.9 KG/M2 | WEIGHT: 251.75 LBS | DIASTOLIC BLOOD PRESSURE: 78 MMHG | SYSTOLIC BLOOD PRESSURE: 118 MMHG

## 2023-04-26 DIAGNOSIS — O09.519 ADVANCED MATERNAL AGE, PRIMIGRAVIDA, ANTEPARTUM: ICD-10-CM

## 2023-04-26 DIAGNOSIS — O99.210 OBESITY AFFECTING PREGNANCY, ANTEPARTUM: ICD-10-CM

## 2023-04-26 DIAGNOSIS — O09.529 ANTEPARTUM MULTIGRAVIDA OF ADVANCED MATERNAL AGE: Primary | ICD-10-CM

## 2023-04-26 DIAGNOSIS — O34.219 HISTORY OF CESAREAN DELIVERY AFFECTING PREGNANCY: ICD-10-CM

## 2023-04-26 DIAGNOSIS — O99.350 SEIZURE DISORDER IN PREGNANCY, ANTEPARTUM: ICD-10-CM

## 2023-04-26 DIAGNOSIS — Z3A.15 15 WEEKS GESTATION OF PREGNANCY: ICD-10-CM

## 2023-04-26 DIAGNOSIS — Z90.3 S/P GASTRIC SLEEVE PROCEDURE: ICD-10-CM

## 2023-04-26 DIAGNOSIS — G40.909 SEIZURE DISORDER IN PREGNANCY, ANTEPARTUM: ICD-10-CM

## 2023-04-26 PROCEDURE — 99211 OFF/OP EST MAY X REQ PHY/QHP: CPT | Mod: PBBFAC,TH | Performed by: OBSTETRICS & GYNECOLOGY

## 2023-04-26 PROCEDURE — 99214 PR OFFICE/OUTPT VISIT, EST, LEVL IV, 30-39 MIN: ICD-10-PCS | Mod: TH,S$PBB,, | Performed by: OBSTETRICS & GYNECOLOGY

## 2023-04-26 PROCEDURE — 99214 OFFICE O/P EST MOD 30 MIN: CPT | Mod: TH,S$PBB,, | Performed by: OBSTETRICS & GYNECOLOGY

## 2023-04-26 PROCEDURE — 99999 PR PBB SHADOW E&M-EST. PATIENT-LVL I: CPT | Mod: PBBFAC,,, | Performed by: OBSTETRICS & GYNECOLOGY

## 2023-04-26 PROCEDURE — 99999 PR PBB SHADOW E&M-EST. PATIENT-LVL I: ICD-10-PCS | Mod: PBBFAC,,, | Performed by: OBSTETRICS & GYNECOLOGY

## 2023-04-26 NOTE — PROGRESS NOTES
Here for routine OB visit  Denies ctx/VB. + no quickening yet  Denies PreE symptoms/UTI symptoms  ROS: Negative - heartburn improving      General:  NAD, AxO x 3  Abdomen: soft, gravid, non-tender  Ext: peripheral pulses normal, no pedal edema, no clubbing or cyanosis  Skin: normal and good color    1.  GERD:  Continue small bland diet, increase pepcid to 40mg BID  2. Short-interval pregnancy  3.  AMA: no ASA due to h/o gastric sleeve, decided not to do NIPT, QS ordered  4.  Obesity:  not able to take ASA, minimal weight gain   5.  History of seizure: folic acid 4mg sent  6.  H/o CD:  due to Fetal intolerance to labor  7.  H/o gastric bypass:  on folate, B12, iron    Strict Labor precautions, FKCs    Next visit: 4 weeks

## 2023-05-01 ENCOUNTER — PATIENT MESSAGE (OUTPATIENT)
Dept: OTHER | Facility: OTHER | Age: 38
End: 2023-05-01
Payer: MEDICAID

## 2023-05-08 ENCOUNTER — PATIENT MESSAGE (OUTPATIENT)
Dept: OTHER | Facility: OTHER | Age: 38
End: 2023-05-08
Payer: MEDICAID

## 2023-05-24 ENCOUNTER — ROUTINE PRENATAL (OUTPATIENT)
Dept: OBSTETRICS AND GYNECOLOGY | Facility: CLINIC | Age: 38
End: 2023-05-24
Payer: MEDICAID

## 2023-05-24 VITALS
BODY MASS INDEX: 42.08 KG/M2 | WEIGHT: 252.88 LBS | SYSTOLIC BLOOD PRESSURE: 116 MMHG | DIASTOLIC BLOOD PRESSURE: 74 MMHG

## 2023-05-24 DIAGNOSIS — O09.529 ANTEPARTUM MULTIGRAVIDA OF ADVANCED MATERNAL AGE: Primary | ICD-10-CM

## 2023-05-24 DIAGNOSIS — O34.219 HISTORY OF CESAREAN DELIVERY AFFECTING PREGNANCY: ICD-10-CM

## 2023-05-24 DIAGNOSIS — G40.909 SEIZURE DISORDER IN PREGNANCY, ANTEPARTUM: ICD-10-CM

## 2023-05-24 DIAGNOSIS — O99.350 SEIZURE DISORDER IN PREGNANCY, ANTEPARTUM: ICD-10-CM

## 2023-05-24 DIAGNOSIS — O99.210 OBESITY AFFECTING PREGNANCY, ANTEPARTUM: ICD-10-CM

## 2023-05-24 DIAGNOSIS — O09.519 ADVANCED MATERNAL AGE, PRIMIGRAVIDA, ANTEPARTUM: ICD-10-CM

## 2023-05-24 DIAGNOSIS — Z3A.19 19 WEEKS GESTATION OF PREGNANCY: ICD-10-CM

## 2023-05-24 DIAGNOSIS — Z90.3 S/P GASTRIC SLEEVE PROCEDURE: ICD-10-CM

## 2023-05-24 PROCEDURE — 99999 PR PBB SHADOW E&M-EST. PATIENT-LVL III: ICD-10-PCS | Mod: PBBFAC,,, | Performed by: OBSTETRICS & GYNECOLOGY

## 2023-05-24 PROCEDURE — 99999 PR PBB SHADOW E&M-EST. PATIENT-LVL III: CPT | Mod: PBBFAC,,, | Performed by: OBSTETRICS & GYNECOLOGY

## 2023-05-24 PROCEDURE — 99213 OFFICE O/P EST LOW 20 MIN: CPT | Mod: TH,S$PBB,, | Performed by: OBSTETRICS & GYNECOLOGY

## 2023-05-24 PROCEDURE — 99213 PR OFFICE/OUTPT VISIT, EST, LEVL III, 20-29 MIN: ICD-10-PCS | Mod: TH,S$PBB,, | Performed by: OBSTETRICS & GYNECOLOGY

## 2023-05-24 PROCEDURE — 99213 OFFICE O/P EST LOW 20 MIN: CPT | Mod: PBBFAC,TH | Performed by: OBSTETRICS & GYNECOLOGY

## 2023-05-24 NOTE — PROGRESS NOTES
Here for routine OB visit  Denies ctx/VB. + no quickening yet  Denies PreE symptoms/UTI symptoms  ROS: Negative - heartburn improving      General:  NAD, AxO x 3  Abdomen: soft, gravid, non-tender  Ext: peripheral pulses normal, no pedal edema, no clubbing or cyanosis  Skin: normal and good color    1.  GERD:  Continue small bland diet, increase pepcid to 40mg BID  2. Short-interval pregnancy  3.  AMA: no ASA due to h/o gastric sleeve, decided not to do NIPT, QS ordered  4.  Obesity:  not able to take ASA, minimal weight gain, early GTT ordered   5.  History of seizure: folic acid 4mg   6.  H/o CD:  due to Fetal intolerance to labor, plan to repeat  7.  H/o gastric bypass:  on folate, B12, iron    Strict Labor precautions, FKCs    Next visit: 4 weeks

## 2023-05-29 ENCOUNTER — PATIENT MESSAGE (OUTPATIENT)
Dept: OTHER | Facility: OTHER | Age: 38
End: 2023-05-29
Payer: MEDICAID

## 2023-05-30 ENCOUNTER — PROCEDURE VISIT (OUTPATIENT)
Dept: MATERNAL FETAL MEDICINE | Facility: CLINIC | Age: 38
End: 2023-05-30
Payer: MEDICAID

## 2023-05-30 DIAGNOSIS — Z36.2 ENCOUNTER FOR FOLLOW-UP ULTRASOUND OF FETAL ANATOMY: Primary | ICD-10-CM

## 2023-05-30 DIAGNOSIS — Z36.89 ENCOUNTER FOR FETAL ANATOMIC SURVEY: ICD-10-CM

## 2023-05-30 PROCEDURE — 76811 OB US DETAILED SNGL FETUS: CPT | Mod: PBBFAC,PO | Performed by: OBSTETRICS & GYNECOLOGY

## 2023-05-30 PROCEDURE — 76811 US MFM PROCEDURE (VIEWPOINT): ICD-10-PCS | Mod: 26,S$PBB,, | Performed by: OBSTETRICS & GYNECOLOGY

## 2023-06-06 ENCOUNTER — PATIENT MESSAGE (OUTPATIENT)
Dept: MATERNAL FETAL MEDICINE | Facility: CLINIC | Age: 38
End: 2023-06-06
Payer: MEDICAID

## 2023-06-26 ENCOUNTER — PATIENT MESSAGE (OUTPATIENT)
Dept: OTHER | Facility: OTHER | Age: 38
End: 2023-06-26
Payer: MEDICAID

## 2023-07-01 ENCOUNTER — PATIENT MESSAGE (OUTPATIENT)
Dept: ADMINISTRATIVE | Facility: OTHER | Age: 38
End: 2023-07-01
Payer: MEDICAID

## 2023-07-03 ENCOUNTER — PATIENT MESSAGE (OUTPATIENT)
Dept: OBSTETRICS AND GYNECOLOGY | Facility: CLINIC | Age: 38
End: 2023-07-03
Payer: MEDICAID

## 2023-07-03 ENCOUNTER — PATIENT MESSAGE (OUTPATIENT)
Dept: MATERNAL FETAL MEDICINE | Facility: CLINIC | Age: 38
End: 2023-07-03
Payer: MEDICAID

## 2023-07-03 ENCOUNTER — TELEPHONE (OUTPATIENT)
Dept: OBSTETRICS AND GYNECOLOGY | Facility: CLINIC | Age: 38
End: 2023-07-03
Payer: MEDICAID

## 2023-07-03 NOTE — LETTER
July 3, 2023    Agustina Tillman  [address] [address]              [facility] - OB GYN  [address]  [place] [address]  Phone: [phone]      To Whom It May Concern:    Ms. Tillman is currently under our care for pregnancy.  Estimated Date of Delivery: 10/16/2023    Any elective dental work should preferably be scheduled during or after the mid-trimester or postpartum.    Dental procedures can be performed with local anesthesia without epinephrine. Recommended antibiotics include penicillins, erythromycin, and cephalosporins. Tylenol #3 or Percocet may be used for pain control. No x-rays are allowed for routine screening. For dental problems, up to four x-rays may be taken with proper abdominal shield.    Sincerely,    Zuleyma Quinn MA

## 2023-07-03 NOTE — TELEPHONE ENCOUNTER
----- Message from Chhaya Garcia sent at 7/3/2023  3:39 PM CDT -----  Regarding: self [phone]  Who called:self        What is the request in detail:pt stated she's dental appt stating that she need letter stating that she can get x ray and what kind of med she can take       Can the clinic reply by [facility]? No        Would the patient rather a call back or a response via [facility]? Call back        Best call back number:[phone]

## 2023-07-03 NOTE — TELEPHONE ENCOUNTER
----- Message from Chhaya Garcia sent at 7/3/2023  3:39 PM CDT -----  Regarding: self [phone]  Who called:self        What is the request in detail:pt stated she's dental appt stating that she need letter stating that she can get x ray and what kind of med she can take       Can the clinic reply by [facility]? No        Would the patient rather a call back or a response via [facility]? Call back        Best call back number:[phone]

## 2023-07-10 ENCOUNTER — PATIENT MESSAGE (OUTPATIENT)
Dept: OTHER | Facility: OTHER | Age: 38
End: 2023-07-10
Payer: MEDICAID

## 2023-07-12 ENCOUNTER — PATIENT MESSAGE (OUTPATIENT)
Dept: MATERNAL FETAL MEDICINE | Facility: CLINIC | Age: 38
End: 2023-07-12
Payer: MEDICAID

## 2023-07-18 ENCOUNTER — LAB VISIT (OUTPATIENT)
Dept: LAB | Facility: HOSPITAL | Age: 38
End: 2023-07-18
Attending: OBSTETRICS & GYNECOLOGY
Payer: MEDICAID

## 2023-07-18 DIAGNOSIS — Z3A.15 15 WEEKS GESTATION OF PREGNANCY: ICD-10-CM

## 2023-07-18 DIAGNOSIS — O34.219 HISTORY OF CESAREAN DELIVERY AFFECTING PREGNANCY: ICD-10-CM

## 2023-07-18 DIAGNOSIS — O99.210 OBESITY AFFECTING PREGNANCY, ANTEPARTUM: ICD-10-CM

## 2023-07-18 LAB — GLUCOSE SERPL-MCNC: 98 MG/DL (ref 70–140)

## 2023-07-18 PROCEDURE — 81511 FTL CGEN ABNOR FOUR ANAL: CPT | Performed by: OBSTETRICS & GYNECOLOGY

## 2023-07-18 PROCEDURE — 82950 GLUCOSE TEST: CPT | Performed by: OBSTETRICS & GYNECOLOGY

## 2023-07-18 PROCEDURE — 36415 COLL VENOUS BLD VENIPUNCTURE: CPT | Performed by: OBSTETRICS & GYNECOLOGY

## 2023-07-19 ENCOUNTER — DOCUMENTATION ONLY (OUTPATIENT)
Dept: MATERNAL FETAL MEDICINE | Facility: CLINIC | Age: 38
End: 2023-07-19
Payer: MEDICAID

## 2023-07-19 ENCOUNTER — CLINICAL SUPPORT (OUTPATIENT)
Dept: OBSTETRICS AND GYNECOLOGY | Facility: CLINIC | Age: 38
End: 2023-07-19
Payer: MEDICAID

## 2023-07-19 ENCOUNTER — ROUTINE PRENATAL (OUTPATIENT)
Dept: OBSTETRICS AND GYNECOLOGY | Facility: CLINIC | Age: 38
End: 2023-07-19
Payer: MEDICAID

## 2023-07-19 VITALS — DIASTOLIC BLOOD PRESSURE: 78 MMHG | BODY MASS INDEX: 43 KG/M2 | SYSTOLIC BLOOD PRESSURE: 110 MMHG | WEIGHT: 258.38 LBS

## 2023-07-19 DIAGNOSIS — O99.210 OBESITY AFFECTING PREGNANCY, ANTEPARTUM: ICD-10-CM

## 2023-07-19 DIAGNOSIS — O36.8121 DECREASED FETAL MOVEMENT AFFECTING MANAGEMENT OF PREGNANCY IN SECOND TRIMESTER, FETUS 1: ICD-10-CM

## 2023-07-19 DIAGNOSIS — G40.909 SEIZURE DISORDER IN PREGNANCY, ANTEPARTUM: ICD-10-CM

## 2023-07-19 DIAGNOSIS — Z23 NEED FOR TDAP VACCINATION: Primary | ICD-10-CM

## 2023-07-19 DIAGNOSIS — O99.210 OBESITY AFFECTING PREGNANCY, ANTEPARTUM: Primary | ICD-10-CM

## 2023-07-19 DIAGNOSIS — O09.519 ADVANCED MATERNAL AGE, PRIMIGRAVIDA, ANTEPARTUM: ICD-10-CM

## 2023-07-19 DIAGNOSIS — O36.8121 DECREASED FETAL MOVEMENTS IN SECOND TRIMESTER, FETUS 1 OF MULTIPLE GESTATION: Primary | ICD-10-CM

## 2023-07-19 DIAGNOSIS — O36.8121 DECREASED FETAL MOVEMENTS IN SECOND TRIMESTER, FETUS 1 OF MULTIPLE GESTATION: ICD-10-CM

## 2023-07-19 DIAGNOSIS — O99.350 SEIZURE DISORDER IN PREGNANCY, ANTEPARTUM: ICD-10-CM

## 2023-07-19 DIAGNOSIS — O09.529 ANTEPARTUM MULTIGRAVIDA OF ADVANCED MATERNAL AGE: Primary | ICD-10-CM

## 2023-07-19 DIAGNOSIS — O34.219 HISTORY OF CESAREAN DELIVERY AFFECTING PREGNANCY: ICD-10-CM

## 2023-07-19 DIAGNOSIS — Z3A.27 27 WEEKS GESTATION OF PREGNANCY: Primary | ICD-10-CM

## 2023-07-19 DIAGNOSIS — Z3A.27 27 WEEKS GESTATION OF PREGNANCY: ICD-10-CM

## 2023-07-19 PROCEDURE — 90471 IMMUNIZATION ADMIN: CPT | Mod: PBBFAC

## 2023-07-19 PROCEDURE — 59025 PR FETAL 2N-STRESS TEST: ICD-10-PCS | Mod: 26,S$PBB,, | Performed by: OBSTETRICS & GYNECOLOGY

## 2023-07-19 PROCEDURE — 99999 PR PBB SHADOW E&M-EST. PATIENT-LVL I: CPT | Mod: PBBFAC,,, | Performed by: OBSTETRICS & GYNECOLOGY

## 2023-07-19 PROCEDURE — 90715 TDAP VACCINE 7 YRS/> IM: CPT | Mod: PBBFAC

## 2023-07-19 PROCEDURE — 99999 PR PBB SHADOW E&M-EST. PATIENT-LVL I: ICD-10-PCS | Mod: PBBFAC,,, | Performed by: OBSTETRICS & GYNECOLOGY

## 2023-07-19 PROCEDURE — 99213 OFFICE O/P EST LOW 20 MIN: CPT | Mod: TH,S$PBB,25, | Performed by: OBSTETRICS & GYNECOLOGY

## 2023-07-19 PROCEDURE — 76815 OB US LIMITED FETUS(S): CPT | Mod: PBBFAC | Performed by: OBSTETRICS & GYNECOLOGY

## 2023-07-19 PROCEDURE — 99211 OFF/OP EST MAY X REQ PHY/QHP: CPT | Mod: PBBFAC,TH | Performed by: OBSTETRICS & GYNECOLOGY

## 2023-07-19 PROCEDURE — 59025 FETAL NON-STRESS TEST: CPT | Mod: 26,S$PBB,, | Performed by: OBSTETRICS & GYNECOLOGY

## 2023-07-19 PROCEDURE — 76815 PR  US,PREGNANT UTERUS,LIMITED, 1/> FETUSES: ICD-10-PCS | Mod: 26,S$PBB,, | Performed by: OBSTETRICS & GYNECOLOGY

## 2023-07-19 PROCEDURE — 99213 PR OFFICE/OUTPT VISIT, EST, LEVL III, 20-29 MIN: ICD-10-PCS | Mod: TH,S$PBB,25, | Performed by: OBSTETRICS & GYNECOLOGY

## 2023-07-19 PROCEDURE — 76815 OB US LIMITED FETUS(S): CPT | Mod: 26,S$PBB,, | Performed by: OBSTETRICS & GYNECOLOGY

## 2023-07-19 PROCEDURE — 59025 FETAL NON-STRESS TEST: CPT | Mod: PBBFAC | Performed by: OBSTETRICS & GYNECOLOGY

## 2023-07-19 NOTE — PROGRESS NOTES
Here for routine OB visit  Denies ctx/VB/lof.  Decreased FM today, noted she has been running around all morning and baby typically more active at night  Denies PreE symptoms/UTI symptoms  ROS: Negative - heartburn improving      General:  NAD, AxO x 3  Abdomen: soft, gravid, non-tender  Ext: peripheral pulses normal, no pedal edema, no clubbing or cyanosis  Skin: normal and good color    NST:  140 bpm, mod jaylen, +accels, no decels  [place]:  no ctx  NST reviewed and interpreted by me  Limited US; single iup, breech, MVP 4.25 cm      1.  GERD:  Continue small bland diet, on pepcid to 40mg BID  2. Short-interval pregnancy  3.  AMA: no ASA due to h/o gastric sleeve, decided not to genetic screening  4.  Obesity:  not able to take ASA, minimal weight gain, GTT NL   5.  History of seizure: folic acid 4mg   6.  H/o CD:  due to Fetal intolerance to labor, plan to repeat  7.  H/o gastric bypass:  on folate, B12, iron  8.  3rd trim lab today  9.   Decreased FM:  mBPP 4/4 reassuring, strict FKCs,     Strict Labor precautions, FKCs    Next visit: 2 weeks

## 2023-07-19 NOTE — PROGRESS NOTES
Patient was in office getting a NST when she mentioned to staff that she missed her ultrasound. Went into room where patient getting NST and booked her ultrasound on the next available slot. Patient was given date and time of her ultrasound and she stated she would see it on My Chart.

## 2023-07-19 NOTE — PROGRESS NOTES
Add on NST due to decreased fetal movement. NST reactive and reassuring. Patient denies cxtn, lof and vb at this time.     Additional NST appointments made for future beginning at 34 weeks due to BMI of 43.

## 2023-07-24 ENCOUNTER — PATIENT MESSAGE (OUTPATIENT)
Dept: OTHER | Facility: OTHER | Age: 38
End: 2023-07-24
Payer: MEDICAID

## 2023-07-24 LAB
# FETUSES US: ABNORMAL
2ND TRIMESTER 4 SCREEN PNL SERPL: ABNORMAL
2ND TRIMESTER 4 SCREEN SERPL-IMP: ABNORMAL
AFP MOM SERPL: ABNORMAL
AFP SERPL-MCNC: 131.3 NG/ML
AGE AT DELIVERY: 36
B-HCG MOM SERPL: ABNORMAL
B-HCG SERPL-ACNC: 7.9 IU/ML
FET TS 21 RISK FROM MAT AGE: ABNORMAL
GA (DAYS): 1 D
GA (WEEKS): 27 WK
GA METHOD: ABNORMAL
IDDM PATIENT QL: ABNORMAL
INHIBIN A MOM SERPL: ABNORMAL
INHIBIN A SERPL-MCNC: 143.2 PG/ML
SMOKING STATUS FTND: NO
TS 18 RISK FETUS: ABNORMAL
TS 21 RISK FETUS: ABNORMAL
U ESTRIOL MOM SERPL: ABNORMAL
U ESTRIOL SERPL-MCNC: 3.51 NG/ML

## 2023-07-27 ENCOUNTER — PATIENT MESSAGE (OUTPATIENT)
Dept: OBSTETRICS AND GYNECOLOGY | Facility: CLINIC | Age: 38
End: 2023-07-27
Payer: MEDICAID

## 2023-07-31 ENCOUNTER — PATIENT MESSAGE (OUTPATIENT)
Dept: MATERNAL FETAL MEDICINE | Facility: CLINIC | Age: 38
End: 2023-07-31
Payer: MEDICAID

## 2023-08-01 ENCOUNTER — LAB VISIT (OUTPATIENT)
Dept: LAB | Facility: HOSPITAL | Age: 38
End: 2023-08-01
Attending: OBSTETRICS & GYNECOLOGY
Payer: MEDICAID

## 2023-08-01 ENCOUNTER — PROCEDURE VISIT (OUTPATIENT)
Dept: MATERNAL FETAL MEDICINE | Facility: CLINIC | Age: 38
End: 2023-08-01
Payer: MEDICAID

## 2023-08-01 DIAGNOSIS — Z36.2 ENCOUNTER FOR FOLLOW-UP ULTRASOUND OF FETAL ANATOMY: ICD-10-CM

## 2023-08-01 DIAGNOSIS — O09.529 ANTEPARTUM MULTIGRAVIDA OF ADVANCED MATERNAL AGE: ICD-10-CM

## 2023-08-01 DIAGNOSIS — Z36.89 ENCOUNTER FOR ULTRASOUND TO ASSESS FETAL GROWTH: Primary | ICD-10-CM

## 2023-08-01 LAB
ALBUMIN SERPL BCP-MCNC: 2.7 G/DL (ref 3.5–5.2)
ALP SERPL-CCNC: 100 U/L (ref 55–135)
ALT SERPL W/O P-5'-P-CCNC: 14 U/L (ref 10–44)
ANION GAP SERPL CALC-SCNC: 6 MMOL/L (ref 8–16)
AST SERPL-CCNC: 14 U/L (ref 10–40)
BASOPHILS # BLD AUTO: 0.04 K/UL (ref 0–0.2)
BASOPHILS NFR BLD: 0.4 % (ref 0–1.9)
BILIRUB SERPL-MCNC: 0.3 MG/DL (ref 0.1–1)
BUN SERPL-MCNC: 5 MG/DL (ref 6–20)
CALCIUM SERPL-MCNC: 9 MG/DL (ref 8.7–10.5)
CHLORIDE SERPL-SCNC: 108 MMOL/L (ref 95–110)
CO2 SERPL-SCNC: 24 MMOL/L (ref 23–29)
CREAT SERPL-MCNC: 0.8 MG/DL (ref 0.5–1.4)
DIFFERENTIAL METHOD: ABNORMAL
EOSINOPHIL # BLD AUTO: 0.1 K/UL (ref 0–0.5)
EOSINOPHIL NFR BLD: 0.8 % (ref 0–8)
ERYTHROCYTE [DISTWIDTH] IN BLOOD BY AUTOMATED COUNT: 14.5 % (ref 11.5–14.5)
EST. GFR  (NO RACE VARIABLE): >60 ML/MIN/1.73 M^2
FERRITIN SERPL-MCNC: 5 NG/ML (ref 20–300)
GLUCOSE SERPL-MCNC: 76 MG/DL (ref 70–110)
HCT VFR BLD AUTO: 33.6 % (ref 37–48.5)
HGB BLD-MCNC: 10.6 G/DL (ref 12–16)
IMM GRANULOCYTES # BLD AUTO: 0.03 K/UL (ref 0–0.04)
IMM GRANULOCYTES NFR BLD AUTO: 0.3 % (ref 0–0.5)
IRON SERPL-MCNC: 39 UG/DL (ref 30–160)
LYMPHOCYTES # BLD AUTO: 2 K/UL (ref 1–4.8)
LYMPHOCYTES NFR BLD: 22.2 % (ref 18–48)
MCH RBC QN AUTO: 24.2 PG (ref 27–31)
MCHC RBC AUTO-ENTMCNC: 31.5 G/DL (ref 32–36)
MCV RBC AUTO: 77 FL (ref 82–98)
MONOCYTES # BLD AUTO: 0.6 K/UL (ref 0.3–1)
MONOCYTES NFR BLD: 6.4 % (ref 4–15)
NEUTROPHILS # BLD AUTO: 6.2 K/UL (ref 1.8–7.7)
NEUTROPHILS NFR BLD: 69.9 % (ref 38–73)
NRBC BLD-RTO: 0 /100 WBC
PLATELET # BLD AUTO: 237 K/UL (ref 150–450)
PMV BLD AUTO: 10.2 FL (ref 9.2–12.9)
POTASSIUM SERPL-SCNC: 3.4 MMOL/L (ref 3.5–5.1)
PROT SERPL-MCNC: 6 G/DL (ref 6–8.4)
RBC # BLD AUTO: 4.38 M/UL (ref 4–5.4)
SATURATED IRON: 7 % (ref 20–50)
SODIUM SERPL-SCNC: 138 MMOL/L (ref 136–145)
TOTAL IRON BINDING CAPACITY: 552 UG/DL (ref 250–450)
TRANSFERRIN SERPL-MCNC: 373 MG/DL (ref 200–375)
WBC # BLD AUTO: 8.92 K/UL (ref 3.9–12.7)

## 2023-08-01 PROCEDURE — 36415 COLL VENOUS BLD VENIPUNCTURE: CPT | Performed by: OBSTETRICS & GYNECOLOGY

## 2023-08-01 PROCEDURE — 80053 COMPREHEN METABOLIC PANEL: CPT | Performed by: OBSTETRICS & GYNECOLOGY

## 2023-08-01 PROCEDURE — 84466 ASSAY OF TRANSFERRIN: CPT | Performed by: OBSTETRICS & GYNECOLOGY

## 2023-08-01 PROCEDURE — 76816 OB US FOLLOW-UP PER FETUS: CPT | Mod: PBBFAC,PO | Performed by: OBSTETRICS & GYNECOLOGY

## 2023-08-01 PROCEDURE — 85025 COMPLETE CBC W/AUTO DIFF WBC: CPT | Performed by: OBSTETRICS & GYNECOLOGY

## 2023-08-01 PROCEDURE — 82652 VIT D 1 25-DIHYDROXY: CPT | Performed by: OBSTETRICS & GYNECOLOGY

## 2023-08-01 PROCEDURE — 82607 VITAMIN B-12: CPT | Performed by: OBSTETRICS & GYNECOLOGY

## 2023-08-01 PROCEDURE — 87389 HIV-1 AG W/HIV-1&-2 AB AG IA: CPT | Performed by: OBSTETRICS & GYNECOLOGY

## 2023-08-01 PROCEDURE — 76816 US MFM PROCEDURE (VIEWPOINT): ICD-10-PCS | Mod: 26,S$PBB,, | Performed by: OBSTETRICS & GYNECOLOGY

## 2023-08-01 PROCEDURE — 86592 SYPHILIS TEST NON-TREP QUAL: CPT | Performed by: OBSTETRICS & GYNECOLOGY

## 2023-08-01 PROCEDURE — 82728 ASSAY OF FERRITIN: CPT | Performed by: OBSTETRICS & GYNECOLOGY

## 2023-08-02 ENCOUNTER — ROUTINE PRENATAL (OUTPATIENT)
Dept: OBSTETRICS AND GYNECOLOGY | Facility: CLINIC | Age: 38
End: 2023-08-02
Payer: MEDICAID

## 2023-08-02 VITALS
SYSTOLIC BLOOD PRESSURE: 120 MMHG | DIASTOLIC BLOOD PRESSURE: 78 MMHG | WEIGHT: 256.38 LBS | BODY MASS INDEX: 42.67 KG/M2

## 2023-08-02 DIAGNOSIS — O99.350 SEIZURE DISORDER IN PREGNANCY, ANTEPARTUM: ICD-10-CM

## 2023-08-02 DIAGNOSIS — O09.529 ANTEPARTUM MULTIGRAVIDA OF ADVANCED MATERNAL AGE: ICD-10-CM

## 2023-08-02 DIAGNOSIS — O99.210 OBESITY AFFECTING PREGNANCY, ANTEPARTUM: Primary | ICD-10-CM

## 2023-08-02 DIAGNOSIS — Z3A.29 29 WEEKS GESTATION OF PREGNANCY: ICD-10-CM

## 2023-08-02 DIAGNOSIS — G40.909 SEIZURE DISORDER IN PREGNANCY, ANTEPARTUM: ICD-10-CM

## 2023-08-02 DIAGNOSIS — O34.219 HISTORY OF CESAREAN DELIVERY AFFECTING PREGNANCY: ICD-10-CM

## 2023-08-02 DIAGNOSIS — O09.519 ADVANCED MATERNAL AGE, PRIMIGRAVIDA, ANTEPARTUM: ICD-10-CM

## 2023-08-02 DIAGNOSIS — Z90.3 S/P GASTRIC SLEEVE PROCEDURE: ICD-10-CM

## 2023-08-02 LAB
HIV 1+2 AB+HIV1 P24 AG SERPL QL IA: NORMAL
VIT B12 SERPL-MCNC: 280 PG/ML (ref 210–950)

## 2023-08-02 PROCEDURE — 99213 OFFICE O/P EST LOW 20 MIN: CPT | Mod: PBBFAC,TH | Performed by: OBSTETRICS & GYNECOLOGY

## 2023-08-02 PROCEDURE — 99999 PR PBB SHADOW E&M-EST. PATIENT-LVL III: ICD-10-PCS | Mod: PBBFAC,,, | Performed by: OBSTETRICS & GYNECOLOGY

## 2023-08-02 PROCEDURE — 99213 OFFICE O/P EST LOW 20 MIN: CPT | Mod: TH,S$PBB,, | Performed by: OBSTETRICS & GYNECOLOGY

## 2023-08-02 PROCEDURE — 99213 PR OFFICE/OUTPT VISIT, EST, LEVL III, 20-29 MIN: ICD-10-PCS | Mod: TH,S$PBB,, | Performed by: OBSTETRICS & GYNECOLOGY

## 2023-08-02 PROCEDURE — 99999 PR PBB SHADOW E&M-EST. PATIENT-LVL III: CPT | Mod: PBBFAC,,, | Performed by: OBSTETRICS & GYNECOLOGY

## 2023-08-02 RX ORDER — FERROUS SULFATE 325(65) MG
325 TABLET, DELAYED RELEASE (ENTERIC COATED) ORAL DAILY
Qty: 90 TABLET | Refills: 3 | Status: CANCELLED | OUTPATIENT
Start: 2023-08-02

## 2023-08-02 RX ORDER — DOCUSATE SODIUM 100 MG/1
100 CAPSULE, LIQUID FILLED ORAL 2 TIMES DAILY
Qty: 60 CAPSULE | Refills: 3 | Status: CANCELLED | OUTPATIENT
Start: 2023-08-02 | End: 2024-08-01

## 2023-08-02 NOTE — LETTER
August 2, 2023    Agustina Tillman  [address] [address]              [facility] - OB GYN  [address]  [facility] [address]  Phone: [phone]    To Whom It May Concern:    Ms. Tillman is currently under our care for pregnancy. Her delivery date is 10/10/2023. If you have any questions, feel free to give us a call at [phone]        Sincerely,    Zuleyma Quinn MA

## 2023-08-02 NOTE — PROGRESS NOTES
Here for routine OB visit  Denies ctx/VB/lof. Active FM  Denies PreE symptoms/UTI symptoms  ROS: Negative - heartburn improving      General:  NAD, AxO x 3  Abdomen: soft, gravid, non-tender  Ext: peripheral pulses normal, no pedal edema, no clubbing or cyanosis  Skin: normal and good color      1.  GERD:  Continue small bland diet, on pepcid to 40mg BID  2. Short-interval pregnancy  3.  AMA: no ASA due to h/o gastric sleeve, decided not to genetic screening  4.  Obesity:  not able to take ASA, minimal weight gain, GTT NL   5.  History of seizure: folic acid 4mg   6.  H/o CD:  due to Fetal intolerance to labor, plan to repeat 10/9 vs. 10/10  7.  H/o gastric bypass:  on folate, B12, iron  8.   Anemia: with low iron storage, she is taking iron daily, will try BID - take it every other day  9.   8/1: efw 27%, AC 10%, repeat in 3 weeks    Strict Labor precautions, FKCs    Next visit: 2 weeks

## 2023-08-03 LAB — RPR SER QL: NORMAL

## 2023-08-04 LAB — 1,25(OH)2D3 SERPL-MCNC: 120 PG/ML (ref 20–79)

## 2023-08-09 NOTE — PROGRESS NOTES
Your other labs are normal besides the low iron, try to take your iron 2x a day, and doing it every other day

## 2023-08-16 ENCOUNTER — ROUTINE PRENATAL (OUTPATIENT)
Dept: OBSTETRICS AND GYNECOLOGY | Facility: CLINIC | Age: 38
End: 2023-08-16
Payer: MEDICAID

## 2023-08-16 VITALS
DIASTOLIC BLOOD PRESSURE: 78 MMHG | BODY MASS INDEX: 42.92 KG/M2 | SYSTOLIC BLOOD PRESSURE: 120 MMHG | WEIGHT: 257.94 LBS

## 2023-08-16 DIAGNOSIS — Z90.3 S/P GASTRIC SLEEVE PROCEDURE: ICD-10-CM

## 2023-08-16 DIAGNOSIS — Z3A.31 31 WEEKS GESTATION OF PREGNANCY: ICD-10-CM

## 2023-08-16 DIAGNOSIS — O99.210 OBESITY AFFECTING PREGNANCY, ANTEPARTUM: Primary | ICD-10-CM

## 2023-08-16 DIAGNOSIS — G40.909 SEIZURE DISORDER IN PREGNANCY, ANTEPARTUM: ICD-10-CM

## 2023-08-16 DIAGNOSIS — O34.219 HISTORY OF CESAREAN DELIVERY AFFECTING PREGNANCY: ICD-10-CM

## 2023-08-16 DIAGNOSIS — O09.519 ADVANCED MATERNAL AGE, PRIMIGRAVIDA, ANTEPARTUM: ICD-10-CM

## 2023-08-16 DIAGNOSIS — O99.350 SEIZURE DISORDER IN PREGNANCY, ANTEPARTUM: ICD-10-CM

## 2023-08-16 DIAGNOSIS — O09.529 ANTEPARTUM MULTIGRAVIDA OF ADVANCED MATERNAL AGE: ICD-10-CM

## 2023-08-16 PROCEDURE — 99999 PR PBB SHADOW E&M-EST. PATIENT-LVL III: CPT | Mod: PBBFAC,,, | Performed by: OBSTETRICS & GYNECOLOGY

## 2023-08-16 PROCEDURE — 99999 PR PBB SHADOW E&M-EST. PATIENT-LVL III: ICD-10-PCS | Mod: PBBFAC,,, | Performed by: OBSTETRICS & GYNECOLOGY

## 2023-08-16 PROCEDURE — 99213 OFFICE O/P EST LOW 20 MIN: CPT | Mod: PBBFAC,TH | Performed by: OBSTETRICS & GYNECOLOGY

## 2023-08-16 PROCEDURE — 99214 PR OFFICE/OUTPT VISIT, EST, LEVL IV, 30-39 MIN: ICD-10-PCS | Mod: TH,S$PBB,, | Performed by: OBSTETRICS & GYNECOLOGY

## 2023-08-16 PROCEDURE — 99214 OFFICE O/P EST MOD 30 MIN: CPT | Mod: TH,S$PBB,, | Performed by: OBSTETRICS & GYNECOLOGY

## 2023-08-16 NOTE — PROGRESS NOTES
Here for routine OB visit  Denies ctx/VB/lof. Active FM  Denies PreE symptoms/UTI symptoms  ROS: Negative besides feeling 6 ctx in 1 hour last Friday, then resolved.  Now just janell's rhoades      General:  NAD, AxO x 3  Abdomen: soft, gravid, non-tender  Ext: peripheral pulses normal, no pedal edema, no clubbing or cyanosis  Skin: normal and good color      1.  GERD:  Continue small bland diet, on pepcid  2. Short-interval pregnancy  3.  AMA: no ASA due to h/o gastric sleeve, decided not to do genetic screening  4.  Obesity:  not able to take ASA, minimal weight gain, GTT NL   5.  History of seizure: folic acid 4mg   6.  H/o CD:  due to Fetal intolerance to labor, plan to repeat  10/10/23  7.  H/o gastric bypass:  on folate, B12, iron  8.   Anemia: with low iron storage, she is taking iron daily, will try BID - take it every other day  9.   8/1: efw 27%, AC 10%, repeat in 3 weeks    Strict Labor precautions, FKCs    Next visit: 2 weeks

## 2023-08-21 ENCOUNTER — PATIENT MESSAGE (OUTPATIENT)
Dept: OTHER | Facility: OTHER | Age: 38
End: 2023-08-21
Payer: MEDICAID

## 2023-08-23 ENCOUNTER — PATIENT MESSAGE (OUTPATIENT)
Dept: OBSTETRICS AND GYNECOLOGY | Facility: CLINIC | Age: 38
End: 2023-08-23
Payer: MEDICAID

## 2023-08-24 ENCOUNTER — CLINICAL SUPPORT (OUTPATIENT)
Dept: OBSTETRICS AND GYNECOLOGY | Facility: CLINIC | Age: 38
End: 2023-08-24
Payer: MEDICAID

## 2023-08-24 VITALS — DIASTOLIC BLOOD PRESSURE: 76 MMHG | SYSTOLIC BLOOD PRESSURE: 116 MMHG

## 2023-08-24 DIAGNOSIS — O99.210 OBESITY AFFECTING PREGNANCY, ANTEPARTUM: ICD-10-CM

## 2023-08-24 DIAGNOSIS — Z3A.32 32 WEEKS GESTATION OF PREGNANCY: Primary | ICD-10-CM

## 2023-08-24 NOTE — PROGRESS NOTES
NST completed in prenatal testing clinic. NST reactive and reassuring. Patient denies regular painful cxtn, however she has been experiencing some janell rhoades contractions. Denies leaking of fluid or vaginal bleeding. Patient reports less fetal movement today, but in general movement is normal. Patient advised to be evaluated if she feels less than 10 kicks in 2 hours. Verbalized understanding.

## 2023-09-06 ENCOUNTER — PROCEDURE VISIT (OUTPATIENT)
Dept: MATERNAL FETAL MEDICINE | Facility: CLINIC | Age: 38
End: 2023-09-06
Payer: MEDICAID

## 2023-09-06 DIAGNOSIS — Z36.89 ENCOUNTER FOR ULTRASOUND TO ASSESS FETAL GROWTH: ICD-10-CM

## 2023-09-06 PROCEDURE — 76816 US MFM PROCEDURE (VIEWPOINT): ICD-10-PCS | Mod: 26,S$PBB,, | Performed by: OBSTETRICS & GYNECOLOGY

## 2023-09-06 PROCEDURE — 76819 FETAL BIOPHYS PROFIL W/O NST: CPT | Mod: 26,S$PBB,, | Performed by: OBSTETRICS & GYNECOLOGY

## 2023-09-06 PROCEDURE — 76816 OB US FOLLOW-UP PER FETUS: CPT | Mod: PBBFAC,PO | Performed by: OBSTETRICS & GYNECOLOGY

## 2023-09-06 PROCEDURE — 76819 US MFM PROCEDURE (VIEWPOINT): ICD-10-PCS | Mod: 26,S$PBB,, | Performed by: OBSTETRICS & GYNECOLOGY

## 2023-09-07 ENCOUNTER — CLINICAL SUPPORT (OUTPATIENT)
Dept: OBSTETRICS AND GYNECOLOGY | Facility: CLINIC | Age: 38
End: 2023-09-07
Payer: MEDICAID

## 2023-09-07 DIAGNOSIS — O99.210 OBESITY AFFECTING PREGNANCY, ANTEPARTUM: ICD-10-CM

## 2023-09-07 DIAGNOSIS — Z36.89 ENCOUNTER FOR ULTRASOUND TO ASSESS FETAL GROWTH: Primary | ICD-10-CM

## 2023-09-07 DIAGNOSIS — Z3A.34 34 WEEKS GESTATION OF PREGNANCY: Primary | ICD-10-CM

## 2023-09-07 NOTE — PROGRESS NOTES
NST completed in prenatal testing clinic. NST reactive and reassuring. Patient denies cxtn, leaking of fluid or vaginal bleeding. Patient reports less FM today.

## 2023-09-11 ENCOUNTER — PATIENT MESSAGE (OUTPATIENT)
Dept: OTHER | Facility: OTHER | Age: 38
End: 2023-09-11
Payer: MEDICAID

## 2023-09-13 ENCOUNTER — ROUTINE PRENATAL (OUTPATIENT)
Dept: OBSTETRICS AND GYNECOLOGY | Facility: CLINIC | Age: 38
End: 2023-09-13
Payer: MEDICAID

## 2023-09-13 ENCOUNTER — PATIENT MESSAGE (OUTPATIENT)
Dept: OBSTETRICS AND GYNECOLOGY | Facility: CLINIC | Age: 38
End: 2023-09-13

## 2023-09-13 ENCOUNTER — CLINICAL SUPPORT (OUTPATIENT)
Dept: OBSTETRICS AND GYNECOLOGY | Facility: CLINIC | Age: 38
End: 2023-09-13
Payer: MEDICAID

## 2023-09-13 VITALS
WEIGHT: 260.38 LBS | BODY MASS INDEX: 43.33 KG/M2 | DIASTOLIC BLOOD PRESSURE: 70 MMHG | SYSTOLIC BLOOD PRESSURE: 110 MMHG

## 2023-09-13 DIAGNOSIS — O99.350 SEIZURE DISORDER IN PREGNANCY, ANTEPARTUM: ICD-10-CM

## 2023-09-13 DIAGNOSIS — Z3A.35 35 WEEKS GESTATION OF PREGNANCY: ICD-10-CM

## 2023-09-13 DIAGNOSIS — O09.529 ANTEPARTUM MULTIGRAVIDA OF ADVANCED MATERNAL AGE: ICD-10-CM

## 2023-09-13 DIAGNOSIS — O99.210 OBESITY AFFECTING PREGNANCY, ANTEPARTUM: Primary | ICD-10-CM

## 2023-09-13 DIAGNOSIS — O99.210 OBESITY AFFECTING PREGNANCY, ANTEPARTUM: ICD-10-CM

## 2023-09-13 DIAGNOSIS — Z3A.34 34 WEEKS GESTATION OF PREGNANCY: ICD-10-CM

## 2023-09-13 DIAGNOSIS — Z3A.35 35 WEEKS GESTATION OF PREGNANCY: Primary | ICD-10-CM

## 2023-09-13 DIAGNOSIS — G40.909 SEIZURE DISORDER IN PREGNANCY, ANTEPARTUM: ICD-10-CM

## 2023-09-13 DIAGNOSIS — O34.219 HISTORY OF CESAREAN DELIVERY AFFECTING PREGNANCY: ICD-10-CM

## 2023-09-13 DIAGNOSIS — Z90.3 S/P GASTRIC SLEEVE PROCEDURE: ICD-10-CM

## 2023-09-13 DIAGNOSIS — O09.519 ADVANCED MATERNAL AGE, PRIMIGRAVIDA, ANTEPARTUM: ICD-10-CM

## 2023-09-13 PROCEDURE — 59025 FETAL NON-STRESS TEST: CPT | Mod: 26,S$PBB,, | Performed by: OBSTETRICS & GYNECOLOGY

## 2023-09-13 PROCEDURE — 59025 PR FETAL 2N-STRESS TEST: ICD-10-PCS | Mod: 26,S$PBB,, | Performed by: OBSTETRICS & GYNECOLOGY

## 2023-09-13 PROCEDURE — 99999 PR PBB SHADOW E&M-EST. PATIENT-LVL III: CPT | Mod: PBBFAC,,, | Performed by: OBSTETRICS & GYNECOLOGY

## 2023-09-13 PROCEDURE — 99213 PR OFFICE/OUTPT VISIT, EST, LEVL III, 20-29 MIN: ICD-10-PCS | Mod: TH,S$PBB,25, | Performed by: OBSTETRICS & GYNECOLOGY

## 2023-09-13 PROCEDURE — 99213 OFFICE O/P EST LOW 20 MIN: CPT | Mod: TH,S$PBB,25, | Performed by: OBSTETRICS & GYNECOLOGY

## 2023-09-13 PROCEDURE — 59025 FETAL NON-STRESS TEST: CPT | Mod: PBBFAC | Performed by: OBSTETRICS & GYNECOLOGY

## 2023-09-13 PROCEDURE — 99213 OFFICE O/P EST LOW 20 MIN: CPT | Mod: PBBFAC,TH | Performed by: OBSTETRICS & GYNECOLOGY

## 2023-09-13 PROCEDURE — 99999 PR PBB SHADOW E&M-EST. PATIENT-LVL III: ICD-10-PCS | Mod: PBBFAC,,, | Performed by: OBSTETRICS & GYNECOLOGY

## 2023-09-13 NOTE — PROGRESS NOTES
Here for routine OB visit  Denies ctx/VB/lof. Active FM  Denies PreE symptoms/UTI symptoms  ROS: Negative janell's rhoades once a week      General:  NAD, AxO x 3  Abdomen: soft, gravid, non-tender  Ext: peripheral pulses normal, no pedal edema, no clubbing or cyanosis  Skin: normal and good color    NST:  130 bpm, mod jaylen, +accels, no decels  [place]:  no ctx    NST reviewed and interpreted by me      1.  GERD:  Continue small bland diet, on pepcid  2. Short-interval pregnancy  3.  AMA: no ASA due to h/o gastric sleeve, decided not to do genetic screening  4.  Obesity:  not able to take ASA, minimal weight gain, GTT NL   5.  History of seizure: folic acid 4mg   6.  H/o CD:  due to Fetal intolerance to labor, plan to repeat  10/10/23 @ noon  7.  H/o gastric bypass:  on folate, B12, iron  8.   Anemia: with low iron storage, she is taking iron daily, will try BID - take it every other day  9.   9/7/23: efw 23%, AC 10%, serial growth US monthly  10.  Consent signed today  Strict Labor precautions, FKCs    Next visit: 2 weeks

## 2023-09-18 RX ORDER — FAMOTIDINE 40 MG/1
40 TABLET, FILM COATED ORAL 2 TIMES DAILY
Qty: 60 TABLET | Refills: 3 | Status: SHIPPED | OUTPATIENT
Start: 2023-09-18 | End: 2024-09-17

## 2023-09-26 ENCOUNTER — CLINICAL SUPPORT (OUTPATIENT)
Dept: OBSTETRICS AND GYNECOLOGY | Facility: CLINIC | Age: 38
End: 2023-09-26
Payer: MEDICAID

## 2023-09-26 ENCOUNTER — ROUTINE PRENATAL (OUTPATIENT)
Dept: OBSTETRICS AND GYNECOLOGY | Facility: CLINIC | Age: 38
End: 2023-09-26
Payer: MEDICAID

## 2023-09-26 VITALS
WEIGHT: 262.38 LBS | BODY MASS INDEX: 43.66 KG/M2 | DIASTOLIC BLOOD PRESSURE: 70 MMHG | SYSTOLIC BLOOD PRESSURE: 120 MMHG

## 2023-09-26 DIAGNOSIS — Z3A.37 37 WEEKS GESTATION OF PREGNANCY: Primary | ICD-10-CM

## 2023-09-26 DIAGNOSIS — O99.210 OBESITY AFFECTING PREGNANCY, ANTEPARTUM: ICD-10-CM

## 2023-09-26 DIAGNOSIS — Z3A.37 37 WEEKS GESTATION OF PREGNANCY: ICD-10-CM

## 2023-09-26 DIAGNOSIS — O34.219 HISTORY OF CESAREAN DELIVERY AFFECTING PREGNANCY: ICD-10-CM

## 2023-09-26 DIAGNOSIS — O09.519 ADVANCED MATERNAL AGE, PRIMIGRAVIDA, ANTEPARTUM: ICD-10-CM

## 2023-09-26 DIAGNOSIS — Z90.3 S/P GASTRIC SLEEVE PROCEDURE: ICD-10-CM

## 2023-09-26 DIAGNOSIS — O09.529 ANTEPARTUM MULTIGRAVIDA OF ADVANCED MATERNAL AGE: ICD-10-CM

## 2023-09-26 DIAGNOSIS — O99.210 OBESITY AFFECTING PREGNANCY, ANTEPARTUM: Primary | ICD-10-CM

## 2023-09-26 PROCEDURE — 99213 PR OFFICE/OUTPT VISIT, EST, LEVL III, 20-29 MIN: ICD-10-PCS | Mod: TH,S$PBB,25, | Performed by: OBSTETRICS & GYNECOLOGY

## 2023-09-26 PROCEDURE — 99211 OFF/OP EST MAY X REQ PHY/QHP: CPT | Mod: PBBFAC,TH | Performed by: OBSTETRICS & GYNECOLOGY

## 2023-09-26 PROCEDURE — 59025 FETAL NON-STRESS TEST: CPT | Mod: PBBFAC | Performed by: OBSTETRICS & GYNECOLOGY

## 2023-09-26 PROCEDURE — 59025 FETAL NON-STRESS TEST: CPT | Mod: 26,S$PBB,, | Performed by: OBSTETRICS & GYNECOLOGY

## 2023-09-26 PROCEDURE — 99999 PR PBB SHADOW E&M-EST. PATIENT-LVL I: ICD-10-PCS | Mod: PBBFAC,,, | Performed by: OBSTETRICS & GYNECOLOGY

## 2023-09-26 PROCEDURE — 76815 OB US LIMITED FETUS(S): CPT | Mod: PBBFAC | Performed by: OBSTETRICS & GYNECOLOGY

## 2023-09-26 PROCEDURE — 59025 PR FETAL 2N-STRESS TEST: ICD-10-PCS | Mod: 26,S$PBB,, | Performed by: OBSTETRICS & GYNECOLOGY

## 2023-09-26 PROCEDURE — 99999 PR PBB SHADOW E&M-EST. PATIENT-LVL I: CPT | Mod: PBBFAC,,, | Performed by: OBSTETRICS & GYNECOLOGY

## 2023-09-26 PROCEDURE — 76815 OB US LIMITED FETUS(S): CPT | Mod: 26,S$PBB,, | Performed by: OBSTETRICS & GYNECOLOGY

## 2023-09-26 PROCEDURE — 99213 OFFICE O/P EST LOW 20 MIN: CPT | Mod: TH,S$PBB,25, | Performed by: OBSTETRICS & GYNECOLOGY

## 2023-09-26 PROCEDURE — 76815 PR  US,PREGNANT UTERUS,LIMITED, 1/> FETUSES: ICD-10-PCS | Mod: 26,S$PBB,, | Performed by: OBSTETRICS & GYNECOLOGY

## 2023-09-26 NOTE — PROGRESS NOTES
Here for routine OB visit  Denies ctx/VB/lof. Active FM  Denies PreE symptoms/UTI symptoms  ROS: was at concert in [place] last week, was having ctx due to not drinking much water, which subsided after hydration      General:  NAD, AxO x 3  Abdomen: soft, gravid, non-tender  Ext: peripheral pulses normal, no pedal edema, no clubbing or cyanosis  Skin: normal and good color    NST:  135 bpm, mod jaylen, +accels, no decels  [place]:  no ctx    NST reviewed and interpreted by me  Limited US:  Single IUP, breech presentation, MVP 4.2 cm      1.  GERD:  Continue small bland diet, on pepcid  2. Short-interval pregnancy  3.  AMA: no ASA due to h/o gastric sleeve, decided not to do genetic screening  4.  Obesity:  not able to take ASA, minimal weight gain, GTT NL.  testing weekly   5.  History of seizure: folic acid 4mg   6.  H/o CD:  due to Fetal intolerance to labor, plan to repeat  10/10/23 @ noon  7.  H/o gastric bypass:  on folate, B12, iron  8.   Anemia: with low iron storage, she is taking iron daily, will try BID - take it every other day  9.   23: efw 23%, AC 10%, serial growth US monthly      Strict Labor precautions, FKCs    Next visit: 1 weeks

## 2023-10-01 ENCOUNTER — PATIENT MESSAGE (OUTPATIENT)
Dept: OBSTETRICS AND GYNECOLOGY | Facility: CLINIC | Age: 38
End: 2023-10-01
Payer: MEDICAID

## 2023-10-02 ENCOUNTER — PATIENT MESSAGE (OUTPATIENT)
Dept: OBSTETRICS AND GYNECOLOGY | Facility: CLINIC | Age: 38
End: 2023-10-02
Payer: MEDICAID

## 2023-10-03 ENCOUNTER — ROUTINE PRENATAL (OUTPATIENT)
Dept: OBSTETRICS AND GYNECOLOGY | Facility: CLINIC | Age: 38
End: 2023-10-03
Payer: MEDICAID

## 2023-10-03 VITALS — WEIGHT: 263 LBS | BODY MASS INDEX: 43.77 KG/M2 | DIASTOLIC BLOOD PRESSURE: 70 MMHG | SYSTOLIC BLOOD PRESSURE: 120 MMHG

## 2023-10-03 DIAGNOSIS — G40.909 SEIZURE DISORDER IN PREGNANCY, ANTEPARTUM: ICD-10-CM

## 2023-10-03 DIAGNOSIS — O99.210 OBESITY AFFECTING PREGNANCY, ANTEPARTUM, UNSPECIFIED OBESITY TYPE: ICD-10-CM

## 2023-10-03 DIAGNOSIS — Z3A.38 38 WEEKS GESTATION OF PREGNANCY: ICD-10-CM

## 2023-10-03 DIAGNOSIS — O09.519 ADVANCED MATERNAL AGE, PRIMIGRAVIDA, ANTEPARTUM: ICD-10-CM

## 2023-10-03 DIAGNOSIS — O34.219 HISTORY OF CESAREAN DELIVERY AFFECTING PREGNANCY: ICD-10-CM

## 2023-10-03 DIAGNOSIS — Z90.3 S/P GASTRIC SLEEVE PROCEDURE: ICD-10-CM

## 2023-10-03 DIAGNOSIS — O09.529 ANTEPARTUM MULTIGRAVIDA OF ADVANCED MATERNAL AGE: Primary | ICD-10-CM

## 2023-10-03 DIAGNOSIS — O99.350 SEIZURE DISORDER IN PREGNANCY, ANTEPARTUM: ICD-10-CM

## 2023-10-03 PROCEDURE — 99213 OFFICE O/P EST LOW 20 MIN: CPT | Mod: TH,S$PBB,, | Performed by: OBSTETRICS & GYNECOLOGY

## 2023-10-03 PROCEDURE — 99999 PR PBB SHADOW E&M-EST. PATIENT-LVL III: ICD-10-PCS | Mod: PBBFAC,,, | Performed by: OBSTETRICS & GYNECOLOGY

## 2023-10-03 PROCEDURE — 99213 OFFICE O/P EST LOW 20 MIN: CPT | Mod: PBBFAC,TH | Performed by: OBSTETRICS & GYNECOLOGY

## 2023-10-03 PROCEDURE — 99213 PR OFFICE/OUTPT VISIT, EST, LEVL III, 20-29 MIN: ICD-10-PCS | Mod: TH,S$PBB,, | Performed by: OBSTETRICS & GYNECOLOGY

## 2023-10-03 PROCEDURE — 99999 PR PBB SHADOW E&M-EST. PATIENT-LVL III: CPT | Mod: PBBFAC,,, | Performed by: OBSTETRICS & GYNECOLOGY

## 2023-10-03 RX ORDER — CARBOPROST TROMETHAMINE 250 UG/ML
250 INJECTION, SOLUTION INTRAMUSCULAR
Status: CANCELLED | OUTPATIENT
Start: 2023-10-03

## 2023-10-03 RX ORDER — MUPIROCIN 20 MG/G
OINTMENT TOPICAL
Status: CANCELLED | OUTPATIENT
Start: 2023-10-03

## 2023-10-03 RX ORDER — SODIUM CITRATE AND CITRIC ACID MONOHYDRATE 334; 500 MG/5ML; MG/5ML
30 SOLUTION ORAL
Status: CANCELLED | OUTPATIENT
Start: 2023-10-03

## 2023-10-03 RX ORDER — MISOPROSTOL 100 UG/1
800 TABLET ORAL
Status: CANCELLED | OUTPATIENT
Start: 2023-10-03

## 2023-10-03 RX ORDER — METHYLERGONOVINE MALEATE 0.2 MG/ML
200 INJECTION INTRAVENOUS
Status: CANCELLED | OUTPATIENT
Start: 2023-10-03

## 2023-10-03 RX ORDER — FAMOTIDINE 10 MG/ML
20 INJECTION INTRAVENOUS
Status: CANCELLED | OUTPATIENT
Start: 2023-10-03

## 2023-10-03 RX ORDER — OXYTOCIN/RINGER'S LACTATE 30/500 ML
95 PLASTIC BAG, INJECTION (ML) INTRAVENOUS ONCE
Status: CANCELLED | OUTPATIENT
Start: 2023-10-03 | End: 2023-10-03

## 2023-10-03 RX ORDER — OXYTOCIN/RINGER'S LACTATE 30/500 ML
334 PLASTIC BAG, INJECTION (ML) INTRAVENOUS ONCE
Status: CANCELLED | OUTPATIENT
Start: 2023-10-03 | End: 2023-10-03

## 2023-10-03 RX ORDER — SODIUM CHLORIDE, SODIUM LACTATE, POTASSIUM CHLORIDE, CALCIUM CHLORIDE 600; 310; 30; 20 MG/100ML; MG/100ML; MG/100ML; MG/100ML
INJECTION, SOLUTION INTRAVENOUS CONTINUOUS
Status: CANCELLED | OUTPATIENT
Start: 2023-10-03

## 2023-10-03 NOTE — PROGRESS NOTES
Here for routine OB visit  Denies ctx/VB/lof. Active FM  Denies PreE symptoms/UTI symptoms  ROS: negative      General:  NAD, AxO x 3  Abdomen: soft, gravid, non-tender  Ext: peripheral pulses normal, no pedal edema, no clubbing or cyanosis  Skin: normal and good color      Limited US:  Single IUP, gavin breech presentation, MVP 4. 1 cm      1.  GERD:  Continue small bland diet, on pepcid  2. Short-interval pregnancy  3.  AMA: no ASA due to h/o gastric sleeve, decided not to do genetic screening  4.  Obesity:  not able to take ASA, minimal weight gain, GTT NL.  testing weekly, BPP scheduled tomorrow    5.  History of seizure: folic acid 4mg   6.  H/o CD:  due to Fetal intolerance to labor, plan to repeat  10/10/23 @ noon  7.  H/o gastric bypass:  on folate, B12, iron  8.   Anemia: with low iron storage, she is taking iron daily, will try BID - take it every other day  9.   23: efw 23%, AC 10%, serial growth US monthly      Strict Labor precautions, FKCs    Next visit: 1 weeks

## 2023-10-09 ENCOUNTER — CLINICAL SUPPORT (OUTPATIENT)
Dept: OBSTETRICS AND GYNECOLOGY | Facility: CLINIC | Age: 38
End: 2023-10-09
Payer: MEDICAID

## 2023-10-09 ENCOUNTER — PROCEDURE VISIT (OUTPATIENT)
Dept: MATERNAL FETAL MEDICINE | Facility: CLINIC | Age: 38
End: 2023-10-09
Payer: MEDICAID

## 2023-10-09 ENCOUNTER — HOSPITAL ENCOUNTER (OUTPATIENT)
Dept: PREADMISSION TESTING | Facility: HOSPITAL | Age: 38
Discharge: HOME | End: 2023-10-09
Attending: OBSTETRICS & GYNECOLOGY
Payer: MEDICAID

## 2023-10-09 DIAGNOSIS — Z36.89 ENCOUNTER FOR ULTRASOUND TO ASSESS FETAL GROWTH: ICD-10-CM

## 2023-10-09 DIAGNOSIS — Z01.818 PREOP TESTING: Primary | ICD-10-CM

## 2023-10-09 DIAGNOSIS — O34.219 HISTORY OF CESAREAN DELIVERY AFFECTING PREGNANCY: ICD-10-CM

## 2023-10-09 DIAGNOSIS — O99.210 OBESITY AFFECTING PREGNANCY, ANTEPARTUM: ICD-10-CM

## 2023-10-09 DIAGNOSIS — Z3A.39 39 WEEKS GESTATION OF PREGNANCY: Primary | ICD-10-CM

## 2023-10-09 LAB
ABO + RH BLD: NORMAL
BLD GP AB SCN CELLS X3 SERPL QL: NORMAL

## 2023-10-09 PROCEDURE — 36415 COLL VENOUS BLD VENIPUNCTURE: CPT | Performed by: OBSTETRICS & GYNECOLOGY

## 2023-10-09 PROCEDURE — 86592 SYPHILIS TEST NON-TREP QUAL: CPT | Performed by: OBSTETRICS & GYNECOLOGY

## 2023-10-09 PROCEDURE — 76820 UMBILICAL ARTERY ECHO: CPT | Mod: 26,S$PBB,, | Performed by: OBSTETRICS & GYNECOLOGY

## 2023-10-09 PROCEDURE — 86901 BLOOD TYPING SEROLOGIC RH(D): CPT | Performed by: OBSTETRICS & GYNECOLOGY

## 2023-10-09 PROCEDURE — 76816 OB US FOLLOW-UP PER FETUS: CPT | Mod: PBBFAC,PO | Performed by: OBSTETRICS & GYNECOLOGY

## 2023-10-09 PROCEDURE — 76819 FETAL BIOPHYS PROFIL W/O NST: CPT | Mod: 26,S$PBB,, | Performed by: OBSTETRICS & GYNECOLOGY

## 2023-10-09 PROCEDURE — 76819 US MFM PROCEDURE (VIEWPOINT): ICD-10-PCS | Mod: 26,S$PBB,, | Performed by: OBSTETRICS & GYNECOLOGY

## 2023-10-09 PROCEDURE — 76820 US MFM PROCEDURE (VIEWPOINT): ICD-10-PCS | Mod: 26,S$PBB,, | Performed by: OBSTETRICS & GYNECOLOGY

## 2023-10-09 PROCEDURE — 76816 US MFM PROCEDURE (VIEWPOINT): ICD-10-PCS | Mod: 26,S$PBB,, | Performed by: OBSTETRICS & GYNECOLOGY

## 2023-10-09 NOTE — DISCHARGE INSTRUCTIONS
Pre  instructions reviewed with the patient. Reviewed bathing instructions with chlorhexidine. Copy of instructions given to the patient.  The patient voiced understanding. Labs done as ordered.

## 2023-10-09 NOTE — PROGRESS NOTES
NST completed in prenatal testing clinic. NST reactive and reassuring. Dr. Aldana notified. Patient denies cxtn, leaking of fluid or vaginal bleeding. Patient reports + fetal movement.   Strict FKC encouraged. Patient verbalized understanding.

## 2023-10-10 ENCOUNTER — ANESTHESIA EVENT (OUTPATIENT)
Dept: OBSTETRICS AND GYNECOLOGY | Facility: HOSPITAL | Age: 38
End: 2023-10-10
Payer: MEDICAID

## 2023-10-10 ENCOUNTER — ANESTHESIA (OUTPATIENT)
Dept: OBSTETRICS AND GYNECOLOGY | Facility: HOSPITAL | Age: 38
End: 2023-10-10
Payer: MEDICAID

## 2023-10-10 ENCOUNTER — HOSPITAL ENCOUNTER (INPATIENT)
Facility: HOSPITAL | Age: 38
LOS: 2 days | Discharge: HOME | End: 2023-10-12
Attending: OBSTETRICS & GYNECOLOGY | Admitting: OBSTETRICS & GYNECOLOGY
Payer: MEDICAID

## 2023-10-10 DIAGNOSIS — Z98.891 S/P CESAREAN SECTION: ICD-10-CM

## 2023-10-10 DIAGNOSIS — O09.529 ANTEPARTUM MULTIGRAVIDA OF ADVANCED MATERNAL AGE: ICD-10-CM

## 2023-10-10 DIAGNOSIS — O34.219 HISTORY OF CESAREAN DELIVERY AFFECTING PREGNANCY: Primary | ICD-10-CM

## 2023-10-10 PROBLEM — O36.5990 FETAL GROWTH RESTRICTION ANTEPARTUM: Status: ACTIVE | Noted: 2023-10-10

## 2023-10-10 LAB
ERYTHROCYTE [DISTWIDTH] IN BLOOD BY AUTOMATED COUNT: 16.4 % (ref 11.5–14.5)
HCT VFR BLD AUTO: 32.9 % (ref 37–48.5)
HGB BLD-MCNC: 10.1 G/DL (ref 12–16)
MCH RBC QN AUTO: 22.2 PG (ref 27–31)
MCHC RBC AUTO-ENTMCNC: 30.7 G/DL (ref 32–36)
MCV RBC AUTO: 73 FL (ref 82–98)
PLATELET # BLD AUTO: 222 K/UL (ref 150–450)
PMV BLD AUTO: 10.8 FL (ref 9.2–12.9)
RBC # BLD AUTO: 4.54 M/UL (ref 4–5.4)
RPR SER QL: NORMAL
WBC # BLD AUTO: 8.57 K/UL (ref 3.9–12.7)

## 2023-10-10 PROCEDURE — 37000009 HC ANESTHESIA EA ADD 15 MINS: Performed by: OBSTETRICS & GYNECOLOGY

## 2023-10-10 PROCEDURE — 27201423 OPTIME MED/SURG SUP & DEVICES STERILE SUPPLY: Performed by: OBSTETRICS & GYNECOLOGY

## 2023-10-10 PROCEDURE — 71000039 HC RECOVERY, EACH ADD'L HOUR: Performed by: OBSTETRICS & GYNECOLOGY

## 2023-10-10 PROCEDURE — 25000003 PHARM REV CODE 250: Performed by: ANESTHESIOLOGY

## 2023-10-10 PROCEDURE — 63600175 PHARM REV CODE 636 W HCPCS: Performed by: STUDENT IN AN ORGANIZED HEALTH CARE EDUCATION/TRAINING PROGRAM

## 2023-10-10 PROCEDURE — 25000003 PHARM REV CODE 250: Performed by: STUDENT IN AN ORGANIZED HEALTH CARE EDUCATION/TRAINING PROGRAM

## 2023-10-10 PROCEDURE — 63600175 PHARM REV CODE 636 W HCPCS: Performed by: ANESTHESIOLOGY

## 2023-10-10 PROCEDURE — 36004724 HC OB OR TIME LEV III - 1ST 15 MIN: Performed by: OBSTETRICS & GYNECOLOGY

## 2023-10-10 PROCEDURE — 25000003 PHARM REV CODE 250: Performed by: OBSTETRICS & GYNECOLOGY

## 2023-10-10 PROCEDURE — 71000033 HC RECOVERY, INTIAL HOUR: Performed by: OBSTETRICS & GYNECOLOGY

## 2023-10-10 PROCEDURE — 59514 PR CESAREAN DELIVERY ONLY: ICD-10-PCS | Mod: AS,GB,, | Performed by: PHYSICIAN ASSISTANT

## 2023-10-10 PROCEDURE — 59514 PR CESAREAN DELIVERY ONLY: ICD-10-PCS | Mod: GB,,, | Performed by: OBSTETRICS & GYNECOLOGY

## 2023-10-10 PROCEDURE — 59025 PR FETAL 2N-STRESS TEST: ICD-10-PCS | Mod: 26,,, | Performed by: OBSTETRICS & GYNECOLOGY

## 2023-10-10 PROCEDURE — 85027 COMPLETE CBC AUTOMATED: CPT | Performed by: OBSTETRICS & GYNECOLOGY

## 2023-10-10 PROCEDURE — 37000008 HC ANESTHESIA 1ST 15 MINUTES: Performed by: OBSTETRICS & GYNECOLOGY

## 2023-10-10 PROCEDURE — 27200688 HC TRAY, SPINAL-HYPER/ ISOBARIC: Performed by: ANESTHESIOLOGY

## 2023-10-10 PROCEDURE — 59025 FETAL NON-STRESS TEST: CPT | Mod: 26,,, | Performed by: OBSTETRICS & GYNECOLOGY

## 2023-10-10 PROCEDURE — 59514 PRA REAN DELIVERY ONLY: ICD-10-PCS | Mod: QX,CRNA,, | Performed by: STUDENT IN AN ORGANIZED HEALTH CARE EDUCATION/TRAINING PROGRAM

## 2023-10-10 PROCEDURE — 63600175 PHARM REV CODE 636 W HCPCS: Performed by: OBSTETRICS & GYNECOLOGY

## 2023-10-10 PROCEDURE — C1751 CATH, INF, PER/CENT/MIDLINE: HCPCS | Performed by: ANESTHESIOLOGY

## 2023-10-10 PROCEDURE — 59514 PRA REAN DELIVERY ONLY: ICD-10-PCS | Mod: QY,ANES,, | Performed by: ANESTHESIOLOGY

## 2023-10-10 PROCEDURE — 59514 CESAREAN DELIVERY ONLY: CPT | Mod: AS,GB,, | Performed by: PHYSICIAN ASSISTANT

## 2023-10-10 PROCEDURE — 11000001 HC ACUTE MED/SURG PRIVATE ROOM

## 2023-10-10 PROCEDURE — 59514 CESAREAN DELIVERY ONLY: CPT | Mod: QX,CRNA,, | Performed by: STUDENT IN AN ORGANIZED HEALTH CARE EDUCATION/TRAINING PROGRAM

## 2023-10-10 PROCEDURE — 51702 INSERT TEMP BLADDER CATH: CPT

## 2023-10-10 PROCEDURE — 36004725 HC OB OR TIME LEV III - EA ADD 15 MIN: Performed by: OBSTETRICS & GYNECOLOGY

## 2023-10-10 PROCEDURE — 59514 CESAREAN DELIVERY ONLY: CPT | Mod: GB,,, | Performed by: OBSTETRICS & GYNECOLOGY

## 2023-10-10 PROCEDURE — 59514 CESAREAN DELIVERY ONLY: CPT | Mod: QY,ANES,, | Performed by: ANESTHESIOLOGY

## 2023-10-10 RX ORDER — ONDANSETRON 8 MG/1
8 TABLET, ORALLY DISINTEGRATING ORAL EVERY 8 HOURS PRN
Status: DISCONTINUED | OUTPATIENT
Start: 2023-10-10 | End: 2023-10-12 | Stop reason: HOSPADM

## 2023-10-10 RX ORDER — FENTANYL CITRATE 50 UG/ML
INJECTION, SOLUTION INTRAMUSCULAR; INTRAVENOUS
Status: DISCONTINUED | OUTPATIENT
Start: 2023-10-10 | End: 2023-10-10

## 2023-10-10 RX ORDER — BUPIVACAINE HYDROCHLORIDE 7.5 MG/ML
INJECTION, SOLUTION EPIDURAL; RETROBULBAR
Status: COMPLETED | OUTPATIENT
Start: 2023-10-10 | End: 2023-10-10

## 2023-10-10 RX ORDER — HYDROCODONE BITARTRATE AND ACETAMINOPHEN 5; 325 MG/1; MG/1
1 TABLET ORAL EVERY 4 HOURS PRN
Status: DISCONTINUED | OUTPATIENT
Start: 2023-10-10 | End: 2023-10-12 | Stop reason: HOSPADM

## 2023-10-10 RX ORDER — MISOPROSTOL 200 UG/1
800 TABLET ORAL ONCE AS NEEDED
Status: DISCONTINUED | OUTPATIENT
Start: 2023-10-10 | End: 2023-10-12 | Stop reason: HOSPADM

## 2023-10-10 RX ORDER — MUPIROCIN 20 MG/G
OINTMENT TOPICAL
Status: DISCONTINUED | OUTPATIENT
Start: 2023-10-10 | End: 2023-10-12 | Stop reason: HOSPADM

## 2023-10-10 RX ORDER — SODIUM CITRATE AND CITRIC ACID MONOHYDRATE 334; 500 MG/5ML; MG/5ML
30 SOLUTION ORAL
Status: DISCONTINUED | OUTPATIENT
Start: 2023-10-10 | End: 2023-10-12 | Stop reason: HOSPADM

## 2023-10-10 RX ORDER — PRENATAL WITH FERROUS FUM AND FOLIC ACID 3080; 920; 120; 400; 22; 1.84; 3; 20; 10; 1; 12; 200; 27; 25; 2 [IU]/1; [IU]/1; MG/1; [IU]/1; MG/1; MG/1; MG/1; MG/1; MG/1; MG/1; UG/1; MG/1; MG/1; MG/1; MG/1
1 TABLET ORAL DAILY
Status: DISCONTINUED | OUTPATIENT
Start: 2023-10-10 | End: 2023-10-12 | Stop reason: HOSPADM

## 2023-10-10 RX ORDER — FAMOTIDINE 10 MG/ML
20 INJECTION INTRAVENOUS ONCE
Status: COMPLETED | OUTPATIENT
Start: 2023-10-10 | End: 2023-10-10

## 2023-10-10 RX ORDER — BISACODYL 10 MG
10 SUPPOSITORY, RECTAL RECTAL ONCE AS NEEDED
Status: DISCONTINUED | OUTPATIENT
Start: 2023-10-10 | End: 2023-10-12 | Stop reason: HOSPADM

## 2023-10-10 RX ORDER — MISOPROSTOL 200 UG/1
800 TABLET ORAL
Status: DISCONTINUED | OUTPATIENT
Start: 2023-10-10 | End: 2023-10-12 | Stop reason: HOSPADM

## 2023-10-10 RX ORDER — DIPHENOXYLATE HYDROCHLORIDE AND ATROPINE SULFATE 2.5; .025 MG/1; MG/1
2 TABLET ORAL EVERY 6 HOURS PRN
Status: DISCONTINUED | OUTPATIENT
Start: 2023-10-10 | End: 2023-10-12 | Stop reason: HOSPADM

## 2023-10-10 RX ORDER — FAMOTIDINE 10 MG/ML
20 INJECTION INTRAVENOUS
Status: DISCONTINUED | OUTPATIENT
Start: 2023-10-10 | End: 2023-10-12 | Stop reason: HOSPADM

## 2023-10-10 RX ORDER — NALOXONE HCL 0.4 MG/ML
0.04 VIAL (ML) INJECTION EVERY 5 MIN PRN
Status: DISCONTINUED | OUTPATIENT
Start: 2023-10-10 | End: 2023-10-12 | Stop reason: HOSPADM

## 2023-10-10 RX ORDER — METHYLERGONOVINE MALEATE 0.2 MG/ML
200 INJECTION INTRAVENOUS
Status: DISCONTINUED | OUTPATIENT
Start: 2023-10-10 | End: 2023-10-12 | Stop reason: HOSPADM

## 2023-10-10 RX ORDER — KETOROLAC TROMETHAMINE 30 MG/ML
30 INJECTION, SOLUTION INTRAMUSCULAR; INTRAVENOUS EVERY 6 HOURS
Status: COMPLETED | OUTPATIENT
Start: 2023-10-10 | End: 2023-10-11

## 2023-10-10 RX ORDER — TRANEXAMIC ACID 10 MG/ML
1000 INJECTION, SOLUTION INTRAVENOUS EVERY 30 MIN PRN
Status: DISCONTINUED | OUTPATIENT
Start: 2023-10-10 | End: 2023-10-12 | Stop reason: HOSPADM

## 2023-10-10 RX ORDER — PROCHLORPERAZINE EDISYLATE 5 MG/ML
5 INJECTION INTRAMUSCULAR; INTRAVENOUS EVERY 6 HOURS PRN
Status: DISCONTINUED | OUTPATIENT
Start: 2023-10-10 | End: 2023-10-12 | Stop reason: HOSPADM

## 2023-10-10 RX ORDER — OXYCODONE HYDROCHLORIDE 5 MG/1
5 TABLET ORAL EVERY 4 HOURS PRN
Status: ACTIVE | OUTPATIENT
Start: 2023-10-10 | End: 2023-10-11

## 2023-10-10 RX ORDER — MUPIROCIN 20 MG/G
OINTMENT TOPICAL 2 TIMES DAILY
Status: DISCONTINUED | OUTPATIENT
Start: 2023-10-10 | End: 2023-10-12 | Stop reason: HOSPADM

## 2023-10-10 RX ORDER — OXYTOCIN/RINGER'S LACTATE 30/500 ML
334 PLASTIC BAG, INJECTION (ML) INTRAVENOUS ONCE AS NEEDED
Status: DISCONTINUED | OUTPATIENT
Start: 2023-10-10 | End: 2023-10-12 | Stop reason: HOSPADM

## 2023-10-10 RX ORDER — OXYCODONE HYDROCHLORIDE 5 MG/1
10 TABLET ORAL EVERY 4 HOURS PRN
Status: ACTIVE | OUTPATIENT
Start: 2023-10-10 | End: 2023-10-11

## 2023-10-10 RX ORDER — PROCHLORPERAZINE EDISYLATE 5 MG/ML
5 INJECTION INTRAMUSCULAR; INTRAVENOUS EVERY 6 HOURS PRN
Status: DISCONTINUED | OUTPATIENT
Start: 2023-10-10 | End: 2023-10-10 | Stop reason: SDUPTHER

## 2023-10-10 RX ORDER — KETOROLAC TROMETHAMINE 30 MG/ML
30 INJECTION, SOLUTION INTRAMUSCULAR; INTRAVENOUS EVERY 6 HOURS
Status: DISCONTINUED | OUTPATIENT
Start: 2023-10-10 | End: 2023-10-10

## 2023-10-10 RX ORDER — CLINDAMYCIN PHOSPHATE 900 MG/50ML
900 INJECTION, SOLUTION INTRAVENOUS
Status: COMPLETED | OUTPATIENT
Start: 2023-10-10 | End: 2023-10-10

## 2023-10-10 RX ORDER — SODIUM CHLORIDE, SODIUM LACTATE, POTASSIUM CHLORIDE, CALCIUM CHLORIDE 600; 310; 30; 20 MG/100ML; MG/100ML; MG/100ML; MG/100ML
INJECTION, SOLUTION INTRAVENOUS CONTINUOUS
Status: DISCONTINUED | OUTPATIENT
Start: 2023-10-10 | End: 2023-10-12 | Stop reason: HOSPADM

## 2023-10-10 RX ORDER — OXYTOCIN/RINGER'S LACTATE 30/500 ML
95 PLASTIC BAG, INJECTION (ML) INTRAVENOUS ONCE
Status: COMPLETED | OUTPATIENT
Start: 2023-10-10 | End: 2023-10-10

## 2023-10-10 RX ORDER — METHYLERGONOVINE MALEATE 0.2 MG/ML
200 INJECTION INTRAVENOUS ONCE AS NEEDED
Status: DISCONTINUED | OUTPATIENT
Start: 2023-10-10 | End: 2023-10-12 | Stop reason: HOSPADM

## 2023-10-10 RX ORDER — CARBOPROST TROMETHAMINE 250 UG/ML
250 INJECTION, SOLUTION INTRAMUSCULAR
Status: DISCONTINUED | OUTPATIENT
Start: 2023-10-10 | End: 2023-10-12 | Stop reason: HOSPADM

## 2023-10-10 RX ORDER — DIPHENHYDRAMINE HCL 25 MG
25 CAPSULE ORAL EVERY 4 HOURS PRN
Status: DISCONTINUED | OUTPATIENT
Start: 2023-10-10 | End: 2023-10-12 | Stop reason: HOSPADM

## 2023-10-10 RX ORDER — SIMETHICONE 80 MG
1 TABLET,CHEWABLE ORAL EVERY 6 HOURS PRN
Status: DISCONTINUED | OUTPATIENT
Start: 2023-10-10 | End: 2023-10-12 | Stop reason: HOSPADM

## 2023-10-10 RX ORDER — ONDANSETRON HYDROCHLORIDE 2 MG/ML
INJECTION, SOLUTION INTRAMUSCULAR; INTRAVENOUS
Status: DISCONTINUED | OUTPATIENT
Start: 2023-10-10 | End: 2023-10-10

## 2023-10-10 RX ORDER — AMOXICILLIN 250 MG
1 CAPSULE ORAL NIGHTLY PRN
Status: DISCONTINUED | OUTPATIENT
Start: 2023-10-10 | End: 2023-10-12 | Stop reason: HOSPADM

## 2023-10-10 RX ORDER — DOCUSATE SODIUM 100 MG/1
200 CAPSULE, LIQUID FILLED ORAL 2 TIMES DAILY
Status: DISCONTINUED | OUTPATIENT
Start: 2023-10-10 | End: 2023-10-12 | Stop reason: HOSPADM

## 2023-10-10 RX ORDER — PHENYLEPHRINE HYDROCHLORIDE 10 MG/ML
INJECTION INTRAVENOUS
Status: DISCONTINUED | OUTPATIENT
Start: 2023-10-10 | End: 2023-10-10

## 2023-10-10 RX ORDER — OXYTOCIN/RINGER'S LACTATE 30/500 ML
95 PLASTIC BAG, INJECTION (ML) INTRAVENOUS ONCE AS NEEDED
Status: DISCONTINUED | OUTPATIENT
Start: 2023-10-10 | End: 2023-10-12 | Stop reason: HOSPADM

## 2023-10-10 RX ORDER — OXYTOCIN/RINGER'S LACTATE 30/500 ML
334 PLASTIC BAG, INJECTION (ML) INTRAVENOUS ONCE
Status: COMPLETED | OUTPATIENT
Start: 2023-10-10 | End: 2023-10-10

## 2023-10-10 RX ORDER — ONDANSETRON 2 MG/ML
4 INJECTION INTRAMUSCULAR; INTRAVENOUS EVERY 6 HOURS PRN
Status: DISPENSED | OUTPATIENT
Start: 2023-10-10 | End: 2023-10-11

## 2023-10-10 RX ORDER — SODIUM CHLORIDE 0.9 % (FLUSH) 0.9 %
10 SYRINGE (ML) INJECTION
Status: DISCONTINUED | OUTPATIENT
Start: 2023-10-10 | End: 2023-10-12 | Stop reason: HOSPADM

## 2023-10-10 RX ORDER — ACETAMINOPHEN 325 MG/1
975 TABLET ORAL EVERY 8 HOURS
Status: DISCONTINUED | OUTPATIENT
Start: 2023-10-10 | End: 2023-10-12 | Stop reason: HOSPADM

## 2023-10-10 RX ORDER — HYDROCODONE BITARTRATE AND ACETAMINOPHEN 7.5; 325 MG/1; MG/1
1 TABLET ORAL EVERY 4 HOURS PRN
Status: DISCONTINUED | OUTPATIENT
Start: 2023-10-10 | End: 2023-10-12 | Stop reason: HOSPADM

## 2023-10-10 RX ORDER — OXYTOCIN 10 [USP'U]/ML
10 INJECTION, SOLUTION INTRAMUSCULAR; INTRAVENOUS ONCE AS NEEDED
Status: DISCONTINUED | OUTPATIENT
Start: 2023-10-10 | End: 2023-10-12 | Stop reason: HOSPADM

## 2023-10-10 RX ORDER — ACETAMINOPHEN 10 MG/ML
INJECTION, SOLUTION INTRAVENOUS
Status: DISCONTINUED | OUTPATIENT
Start: 2023-10-10 | End: 2023-10-10

## 2023-10-10 RX ORDER — ACETAMINOPHEN 325 MG/1
650 TABLET ORAL EVERY 6 HOURS
Status: DISCONTINUED | OUTPATIENT
Start: 2023-10-10 | End: 2023-10-10 | Stop reason: SDUPTHER

## 2023-10-10 RX ORDER — SODIUM CITRATE AND CITRIC ACID MONOHYDRATE 334; 500 MG/5ML; MG/5ML
30 SOLUTION ORAL ONCE
Status: COMPLETED | OUTPATIENT
Start: 2023-10-10 | End: 2023-10-10

## 2023-10-10 RX ORDER — MORPHINE SULFATE 0.5 MG/ML
INJECTION, SOLUTION EPIDURAL; INTRATHECAL; INTRAVENOUS
Status: DISCONTINUED | OUTPATIENT
Start: 2023-10-10 | End: 2023-10-10

## 2023-10-10 RX ORDER — EPHEDRINE SULFATE 50 MG/ML
INJECTION, SOLUTION INTRAVENOUS
Status: DISCONTINUED | OUTPATIENT
Start: 2023-10-10 | End: 2023-10-10

## 2023-10-10 RX ORDER — ADHESIVE BANDAGE
30 BANDAGE TOPICAL 2 TIMES DAILY PRN
Status: DISCONTINUED | OUTPATIENT
Start: 2023-10-11 | End: 2023-10-12 | Stop reason: HOSPADM

## 2023-10-10 RX ORDER — OXYTOCIN/RINGER'S LACTATE 30/500 ML
95 PLASTIC BAG, INJECTION (ML) INTRAVENOUS ONCE
Status: DISCONTINUED | OUTPATIENT
Start: 2023-10-10 | End: 2023-10-12 | Stop reason: HOSPADM

## 2023-10-10 RX ADMIN — FENTANYL CITRATE 10 MCG: 0.05 INJECTION, SOLUTION INTRAMUSCULAR; INTRAVENOUS at 01:10

## 2023-10-10 RX ADMIN — FAMOTIDINE 20 MG: 10 INJECTION INTRAVENOUS at 12:10

## 2023-10-10 RX ADMIN — KETOROLAC TROMETHAMINE 30 MG: 30 INJECTION, SOLUTION INTRAMUSCULAR; INTRAVENOUS at 03:10

## 2023-10-10 RX ADMIN — MUPIROCIN: 20 OINTMENT TOPICAL at 12:10

## 2023-10-10 RX ADMIN — PHENYLEPHRINE HYDROCHLORIDE 100 MCG: 10 INJECTION INTRAVENOUS at 01:10

## 2023-10-10 RX ADMIN — Medication 334 MILLI-UNITS/MIN: at 01:10

## 2023-10-10 RX ADMIN — ACETAMINOPHEN 975 MG: 325 TABLET ORAL at 09:10

## 2023-10-10 RX ADMIN — BUPIVACAINE HYDROCHLORIDE 1.6 ML: 7.5 INJECTION, SOLUTION EPIDURAL; RETROBULBAR at 01:10

## 2023-10-10 RX ADMIN — EPHEDRINE SULFATE 5 MG: 50 INJECTION INTRAVENOUS at 01:10

## 2023-10-10 RX ADMIN — GENTAMICIN SULFATE 409.6 MG: 40 INJECTION, SOLUTION INTRAMUSCULAR; INTRAVENOUS at 12:10

## 2023-10-10 RX ADMIN — ONDANSETRON 4 MG: 2 INJECTION INTRAMUSCULAR; INTRAVENOUS at 12:10

## 2023-10-10 RX ADMIN — MORPHINE SULFATE 0.1 MG: 0.5 INJECTION, SOLUTION EPIDURAL; INTRATHECAL; INTRAVENOUS at 01:10

## 2023-10-10 RX ADMIN — SODIUM CITRATE AND CITRIC ACID MONOHYDRATE 30 ML: 500; 334 SOLUTION ORAL at 12:10

## 2023-10-10 RX ADMIN — KETOROLAC TROMETHAMINE 30 MG: 30 INJECTION, SOLUTION INTRAMUSCULAR; INTRAVENOUS at 11:10

## 2023-10-10 RX ADMIN — OXYTOCIN 95 MILLI-UNITS/MIN: 10 INJECTION, SOLUTION INTRAMUSCULAR; INTRAVENOUS at 02:10

## 2023-10-10 RX ADMIN — ONDANSETRON 4 MG: 2 INJECTION INTRAMUSCULAR; INTRAVENOUS at 03:10

## 2023-10-10 RX ADMIN — DOCUSATE SODIUM 200 MG: 100 CAPSULE, LIQUID FILLED ORAL at 09:10

## 2023-10-10 RX ADMIN — FENTANYL CITRATE 50 MCG: 50 INJECTION, SOLUTION INTRAMUSCULAR; INTRAVENOUS at 02:10

## 2023-10-10 RX ADMIN — ACETAMINOPHEN 1000 MG: 10 INJECTION INTRAVENOUS at 02:10

## 2023-10-10 RX ADMIN — SODIUM CHLORIDE, POTASSIUM CHLORIDE, SODIUM LACTATE AND CALCIUM CHLORIDE 1000 ML: 600; 310; 30; 20 INJECTION, SOLUTION INTRAVENOUS at 02:10

## 2023-10-10 RX ADMIN — CLINDAMYCIN PHOSPHATE 900 MG: 900 INJECTION, SOLUTION INTRAVENOUS at 12:10

## 2023-10-10 RX ADMIN — FENTANYL CITRATE 40 MCG: 50 INJECTION, SOLUTION INTRAMUSCULAR; INTRAVENOUS at 01:10

## 2023-10-10 RX ADMIN — SODIUM CHLORIDE, POTASSIUM CHLORIDE, SODIUM LACTATE AND CALCIUM CHLORIDE 1000 ML: 600; 310; 30; 20 INJECTION, SOLUTION INTRAVENOUS at 12:10

## 2023-10-10 RX ADMIN — MUPIROCIN: 20 OINTMENT TOPICAL at 09:10

## 2023-10-10 NOTE — ANESTHESIA POSTPROCEDURE EVALUATION
Anesthesia Post Evaluation    Patient: Agustina Tillman    Procedure(s) Performed: Procedure(s) (LRB):   SECTION (N/A)    Final Anesthesia Type: spinal      Patient location during evaluation: labor & delivery  Patient participation: Yes- Able to Participate  Level of consciousness: awake and alert and oriented  Post-procedure vital signs: reviewed and stable  Pain management: adequate  Airway patency: patent    PONV status at discharge: No PONV  Anesthetic complications: no      Cardiovascular status: blood pressure returned to baseline and hemodynamically stable  Respiratory status: unassisted, room air and spontaneous ventilation  Hydration status: euvolemic  Follow-up not needed.          Vitals Value Taken Time   BP 97/53 10/10/23 1502   Temp 36.7 °C (98.1 °F) 10/10/23 1417   Pulse 82 10/10/23 1503   Resp  10/10/23 1503   SpO2 100 % 10/10/23 1500   Vitals shown include unvalidated device data.      Event Time   Out of Recovery 16:20:00         Pain/Carlos Score: No data recorded

## 2023-10-10 NOTE — ANESTHESIA PREPROCEDURE EVALUATION
10/10/2023  Agustina Tillman is a 38 y.o., female  To undergo Procedure(s) (LRB):   SECTION (N/A)     Denies CP/SOB/MI/CVA/URI symptoms.  METS > 4  NPO > 8    Past Medical History:  Past Medical History:   Diagnosis Date    Allergy     Sinus    Iron deficiency anemia secondary to inadequate dietary iron intake     Lateral epicondylitis, right elbow 3/10/2022    PCOS (polycystic ovarian syndrome) 2017    Postpartum depression 2022    Seizures 1995    I use to have epilepsy. But they have julieta away       Past Surgical History:  Past Surgical History:   Procedure Laterality Date     SECTION N/A 2022    Procedure:  SECTION;  Surgeon: Bere Liz Mai, MD;  Location: [facility] L&D OR;  Service: OB/GYN;  Laterality: N/A;    ESOPHAGOGASTRODUODENOSCOPY N/A 2020    Procedure: EGD (ESOPHAGOGASTRODUODENOSCOPY);  Surgeon: Cj Umaña MD;  Location: [address];  Service: General;  Laterality: N/A;    LAPAROSCOPIC SLEEVE GASTRECTOMY N/A 2020    Procedure: GASTRECTOMY, SLEEVE, LAPAROSCOPIC, with intraop EGD;  Surgeon: Cj Umaña MD;  Location: [address];  Service: General;  Laterality: N/A;       Social History:  Social History     Socioeconomic History    Marital status: Single   Tobacco Use    Smoking status: Never    Smokeless tobacco: Never   Substance and Sexual Activity    Alcohol use: Not Currently     Alcohol/week: 1.0 standard drink of alcohol     Types: 1 Glasses of wine per week    Drug use: Never    Sexual activity: Yes     Partners: Male     Birth control/protection: None   Other Topics Concern    Are you pregnant or think you may be? No    Breast-feeding No     Social Determinants of Health     Financial Resource Strain: Unknown (2023)    Overall Financial Resource Strain (CARDIA)     Difficulty of  Paying Living Expenses: Patient refused   Food Insecurity: Unknown (6/21/2023)    Hunger Vital Sign     Worried About Running Out of Food in the Last Year: Patient refused     Ran Out of Food in the Last Year: Patient refused   Transportation Needs: Unknown (6/21/2023)    PRAPARE - Transportation     Lack of Transportation (Medical): Patient refused     Lack of Transportation (Non-Medical): Patient refused   Physical Activity: Sufficiently Active (6/21/2023)    Exercise Vital Sign     Days of Exercise per Week: 4 days     Minutes of Exercise per Session: 40 min   Stress: No Stress Concern Present (6/21/2023)    [employer] - Occupational Stress Questionnaire     Feeling of Stress : Not at all   Social Connections: Unknown (6/21/2023)    Social Connection and Isolation Panel [NHANES]     Frequency of Communication with Friends and Family: Patient refused     Frequency of Social Gatherings with Friends and Family: Patient refused     Active Member of Clubs or Organizations: Patient refused     Attends Club or Organization Meetings: Patient refused     Marital Status: Patient refused   Housing Stability: Unknown (6/21/2023)    Housing Stability Vital Sign     Unable to Pay for Housing in the Last Year: Patient refused     Number of Places Lived in the Last Year: 1     Unstable Housing in the Last Year: Patient refused       Medications:  No current facility-administered medications on file prior to encounter.     Current Outpatient Medications on File Prior to Encounter   Medication Sig Dispense Refill    acetaminophen (TYLENOL) 500 MG tablet Take 1 tablet (500 mg total) by mouth every 6 (six) hours as needed for Pain or Temperature greater than (100). 30 tablet 0    cephALEXin (KEFLEX) 500 MG capsule Take 1 capsule (500 mg total) by mouth every 12 (twelve) hours. 14 capsule 0    cetirizine (ZYRTEC) 10 MG tablet Take 1 tablet (10 mg total) by mouth once daily. 30 tablet 0     cyanocobalamin (VITAMIN B-12) 500 MCG tablet Take 1 tablet (500 mcg total) by mouth once daily. 90 tablet 3    doxylamine-pyridoxine, vit B6, (DICLEGIS) 10-10 mg TbEC Take 2 tablets by mouth every evening. 60 tablet 2    famotidine (PEPCID) 40 MG tablet Take 1 tablet (40 mg total) by mouth 2 (two) times daily. 60 tablet 3    folic acid (FOLVITE) 1 MG tablet Take 4 tablets (4 mg total) by mouth once daily. 120 tablet 11    oxyCODONE-acetaminophen (PERCOCET) 5-325 mg per tablet Take 1 tablet by mouth every 4 (four) hours as needed. 20 tablet 0    prenatal 21-iron fu-folic acid (PRENATAL COMPLETE) 14 mg iron- 400 mcg Tab Take 1 tablet by mouth once daily. 30 tablet 2    prenatal vit no.130-iron-folic (PRENATAL VITAMIN) 27 mg iron- 800 mcg Tab TAKE 1 TABLET BY MOUTH EVERY DAY 90 tablet 2    sertraline (ZOLOFT) 25 MG tablet Take 1 tablet (25 mg total) by mouth once daily. 30 tablet 2    traMADoL (ULTRAM) 50 mg tablet Take 1 tablet (50 mg total) by mouth every 6 (six) hours as needed for Pain. 20 tablet 0    [DISCONTINUED] azelastine (ASTELIN) 137 mcg (0.1 %) nasal spray 1 spray (137 mcg total) by Nasal route 2 (two) times daily. 30 mL 8    [DISCONTINUED] clobetasol 0.05% (TEMOVATE) 0.05 % Oint Apply topically 2 (two) times daily. 45 g 3    [DISCONTINUED] progesterone (PROMETRIUM) 100 MG capsule Take 1 capsule (100 mg total) by mouth nightly. 12 capsule 11       Allergies:  Review of patient's allergies indicates:   Allergen Reactions    Augmentin [amoxicillin-pot clavulanate] Diarrhea    Ibuprofen Other (See Comments)     Told to avoid due to gastric sleeve    Metformin Other (See Comments)     Severe low blood sugar    Nsaids (non-steroidal anti-inflammatory drug)      Had gastric sleeve cannot take    Sudafed pe head congestn-pain [ibuprofen-phenylephrine] Anxiety       Active Problems:  Patient Active Problem List   Diagnosis    PCOS (polycystic ovarian syndrome)    Female fertility problems     Morbid obesity with BMI of 45.0-49.9, adult    Vitamin D deficiency    Prediabetes    Iron deficiency anemia secondary to inadequate dietary iron intake    Chronic allergic rhinitis due to pollen    Low back pain    Difficulty walking    Thoracic back pain    Generalized weakness    Juvenile idiopathic scoliosis of thoracolumbar region    Allergic sinusitis    Obesity affecting pregnancy, antepartum    Seizure disorder in pregnancy, antepartum    S/P gastric sleeve procedure    Ovarian cyst affecting pregnancy, antepartum    Lateral epicondylitis, right elbow    Nausea/vomiting in pregnancy    Advanced maternal age, primigravida, antepartum     delivery delivered    Postpartum depression    Antepartum multigravida of advanced maternal age    History of  delivery affecting pregnancy    Short interval between pregnancies affecting pregnancy, antepartum    Fetal growth restriction antepartum     24 Hour Vitals:  Temp:  [37.4 °C (99.3 °F)] 37.4 °C (99.3 °F)  Pulse:  [71] 71  Resp:  [20] 20  SpO2:  [99 %] 99 %  BP: (109)/(59) 109/59   See Nursing Charting For Additional Vitals    Pre-op Assessment    I have reviewed the Patient Summary Reports.     I have reviewed the Nursing Notes.       Review of Systems  Anesthesia Hx:  No problems with previous Anesthesia   Denies Personal Hx of Anesthesia complications.   Social:  Non-Smoker, No Alcohol Use    EENT/Dental:   chronic allergic rhinitis   Cardiovascular:  Cardiovascular Normal Exercise tolerance: good     Pulmonary:  Pulmonary Normal    Hepatic/GI:  Hepatic/GI Normal    Musculoskeletal:  Spine Disorders: lumbar and thoracic    Neurological:   Seizures    Endocrine:  Morbid Obesity / BMI > 40      Physical Exam  General: Well nourished and Cooperative    Airway:  Mallampati: III   Mouth Opening: Normal  TM Distance: Normal      Dental:  Intact    Chest/Lungs:  Clear to auscultation, Normal Respiratory Rate    Heart:  Rate:  Normal  Rhythm: Regular Rhythm        Anesthesia Plan  Type of Anesthesia, risks & benefits discussed:    Anesthesia Type: Gen ETT, Epidural, Spinal, CSE  Intra-op Monitoring Plan: Standard ASA Monitors  Post Op Pain Control Plan: multimodal analgesia and intrathecal opioid  Informed Consent: Informed consent signed with the Patient and all parties understand the risks and agree with anesthesia plan.  All questions answered. Patient consented to blood products? Yes  ASA Score: 3    Ready For Surgery From Anesthesia Perspective.     .

## 2023-10-10 NOTE — H&P
[facility] - Labor & Delivery  Obstetrics  History & Physical    Patient Name: Agustina Tillman  MRN: [id number]  Admission Date: (Not on file)  Primary Care Provider: John Gaston MD    Subjective:     Principal Problem:History of  delivery affecting pregnancy    History of Present Illness:  Pt is a 37 yo  @ 39w1d presented to L&D for scheduled CD, pt with h/o CD x 1  Pt also with newly diagnosed IUGR on MFM scan yesterday, EFW 21%, AC at 3%.      Obstetric HPI:  Patient reports None and no contractions, active fetal movement, No vaginal bleeding , No loss of fluid     This pregnancy has been complicated by:  1. newly diagnosed IUGR  2. Short-interval pregnancy  3. AMA: no ASA due to h/o gastric sleeve, decided not to do genetic screening  4. Obesity:  not able to take ASA, minimal weight gain, GTT NL.  testing weekly, BPP scheduled tomorrow   5.  History of seizure: folic acid 4mg   6. H/o CD:  due to Fetal intolerance to labor, plan to repeat    7. H/o gastric bypass:  on folate, B12, iron  8. Anemia: with low iron storage, she is taking iron daily, will try BID - take it every other day    OB History    Para Term  AB Living   2 1 1 0 0 1   SAB IAB Ectopic Multiple Live Births   0 0 0 0 1      # Outcome Date GA Lbr Horace/2nd Weight Sex Delivery Anes PTL Lv   2 Current            1 Term 22 38w4d  2.61 kg (5 lb 12.1 oz) M CS-LTranv EPI N [place]      Complications: Fetal Intolerance      Name: MARGIE TILLMAN      Apgar1: 9  Apgar5: 9      Obstetric Comments   PCOS?   Gynhx:  Reg then irregular, then became regular after weight lost surgery   Denies std   Denies abnl pap, pap  neg     Past Medical History:   Diagnosis Date    Allergy 2016    Sinus    Iron deficiency anemia secondary to inadequate dietary iron intake     Lateral epicondylitis, right elbow 3/10/2022    PCOS (polycystic ovarian syndrome) 2017    Postpartum depression 2022     Seizures     I use to have epilepsy. But they have julieta away     Past Surgical History:   Procedure Laterality Date     SECTION N/A 2022    Procedure:  SECTION;  Surgeon: Bere Liz Mai, MD;  Location: [facility] L&D OR;  Service: OB/GYN;  Laterality: N/A;    ESOPHAGOGASTRODUODENOSCOPY N/A 2020    Procedure: EGD (ESOPHAGOGASTRODUODENOSCOPY);  Surgeon: Cj Umaña MD;  Location: [address];  Service: General;  Laterality: N/A;    LAPAROSCOPIC SLEEVE GASTRECTOMY N/A 2020    Procedure: GASTRECTOMY, SLEEVE, LAPAROSCOPIC, with intraop EGD;  Surgeon: Cj Umaña MD;  Location: [address];  Service: General;  Laterality: N/A;       No medications prior to admission.       Review of patient's allergies indicates:   Allergen Reactions    Augmentin [amoxicillin-pot clavulanate] Diarrhea    Ibuprofen Other (See Comments)     Told to avoid due to gastric sleeve    Metformin Other (See Comments)     Severe low blood sugar    Nsaids (non-steroidal anti-inflammatory drug)      Had gastric sleeve cannot take    Sudafed pe head congestn-pain [ibuprofen-phenylephrine] Anxiety        Family History       Problem Relation (Age of Onset)    Cancer Father    Hypertension Mother    Lung cancer Father          Tobacco Use    Smoking status: Never    Smokeless tobacco: Never   Substance and Sexual Activity    Alcohol use: Not Currently     Alcohol/week: 1.0 standard drink of alcohol     Types: 1 Glasses of wine per week    Drug use: Never    Sexual activity: Yes     Partners: Male     Birth control/protection: None     Review of Systems   Constitutional:  Negative for appetite change, chills and fever.   Respiratory:  Negative for shortness of breath.    Cardiovascular:  Negative for chest pain.   Gastrointestinal:  Negative for abdominal pain, blood in stool, constipation, diarrhea, nausea and vomiting.   Endocrine: Negative for hair loss and hot flashes.    Genitourinary:  Negative for decreased libido, dysmenorrhea, dyspareunia, dysuria, genital sores, menorrhagia, menstrual problem, pelvic pain, vaginal discharge, vaginal pain, urinary incontinence and vaginal odor.   Musculoskeletal:  Negative for back pain.   Integumentary:  Negative for rash, acne, hair changes, breast mass, nipple discharge and breast skin changes.   Breast: Negative for mass, mastodynia, nipple discharge and skin changes     Objective:     Vital Signs (Most Recent):    Vital Signs (24h Range):           There is no height or weight on file to calculate BMI.    FHT: 130 bpm, mod jaylen, +accels, no decels. Cat 1 (reassuring)  TOCO:  No ctx     Physical Exam:   Constitutional: She is oriented to person, place, and time. She appears well-developed and well-nourished.    HENT:   Head: Normocephalic and atraumatic.    Eyes: Pupils are equal, round, and reactive to light. EOM are normal.     Cardiovascular:       Exam reveals no clubbing and no cyanosis.        Pulmonary/Chest: Effort normal.        Abdominal: Soft. She exhibits no mass. There is no rebound and no guarding. No hernia.             Musculoskeletal: Normal range of motion and moves all extremeties.       Neurological: She is alert and oriented to person, place, and time.    Skin: Skin is warm. No cyanosis. Nails show no clubbing.    Psychiatric: She has a normal mood and affect. Her behavior is normal. Thought content normal.        Cervix:  Dilation:  deferred     Significant Labs:  Lab Results   Component Value Date    GROUPTRH B POS 10/09/2023    HEPBSAG Non-reactive 02/15/2023    STREPBCULT No Group B Streptococcus isolated 2022       I have personallly reviewed all pertinent lab results from the last 24 hours.  Recent Lab Results         10/09/23  1010        Group & Rh B POS       INDIRECT FERNY NEG             Assessment/Plan:     38 y.o. female  at 39w1d for:    Fetal growth restriction antepartum  Plan for delivery  today    History of  delivery affecting pregnancy  H/o CD x 1, current fetus is in breech presentation  Desired repeat CD    Seizure disorder in pregnancy, antepartum  On folic acid        Bere Aldana MD  Obstetrics  [facility] [place] - Labor & Delivery

## 2023-10-10 NOTE — TRANSFER OF CARE
"Anesthesia Transfer of Care Note    Patient: Agustina Tillman    Procedure(s) Performed: Procedure(s) (LRB):   SECTION (N/A)    Patient location: Labor and Delivery    Anesthesia Type: spinal    Transport from OR: Transported from OR on room air with adequate spontaneous ventilation    Post pain: adequate analgesia    Post assessment: no apparent anesthetic complications and tolerated procedure well    Post vital signs: stable    Level of consciousness: awake and alert    Nausea/Vomiting: no nausea/vomiting    Complications: none    Transfer of care protocol was followed      Last vitals:   Visit Vitals  BP (!) 101/53 (BP Location: Right arm, Patient Position: Lying)   Pulse 81   Temp 36.7 °C (98.1 °F)   Resp 20   Ht 5' 4" (1.626 m)   Wt 119.3 kg (263 lb)   LMP 2023   SpO2 96%   Breastfeeding Unknown   BMI 45.14 kg/m²     "

## 2023-10-10 NOTE — SUBJECTIVE & OBJECTIVE
Obstetric HPI:  Patient reports None and no contractions, active fetal movement, No vaginal bleeding , No loss of fluid     This pregnancy has been complicated by:  newly diagnosed IUGR  Short-interval pregnancy  AMA: no ASA due to h/o gastric sleeve, decided not to do genetic screening  Obesity:  not able to take ASA, minimal weight gain, GTT NL.  testing weekly, BPP scheduled tomorrow    History of seizure: folic acid 4mg   H/o CD:  due to Fetal intolerance to labor, plan to repeat    H/o gastric bypass:  on folate, B12, iron  Anemia: with low iron storage, she is taking iron daily, will try BID - take it every other day    OB History    Para Term  AB Living   2 1 1 0 0 1   SAB IAB Ectopic Multiple Live Births   0 0 0 0 1      # Outcome Date GA Lbr Horace/2nd Weight Sex Delivery Anes PTL Lv   2 Current            1 Term 22 38w4d  2.61 kg (5 lb 12.1 oz) M CS-LTranv EPI N [place]      Complications: Fetal Intolerance      Name: MARGIE SANDERS      Apgar1: 9  Apgar5: 9      Obstetric Comments   PCOS?   Gynhx:  Reg then irregular, then became regular after weight lost surgery   Denies std   Denies abnl pap, pap  neg     Past Medical History:   Diagnosis Date    Allergy 2016    Sinus    Iron deficiency anemia secondary to inadequate dietary iron intake     Lateral epicondylitis, right elbow 3/10/2022    PCOS (polycystic ovarian syndrome) 2017    Postpartum depression 2022    Seizures     I use to have epilepsy. But they have julieta away     Past Surgical History:   Procedure Laterality Date     SECTION N/A 2022    Procedure:  SECTION;  Surgeon: Bere Liz Mai, MD;  Location: [facility] L&D OR;  Service: OB/GYN;  Laterality: N/A;    ESOPHAGOGASTRODUODENOSCOPY N/A 2020    Procedure: EGD (ESOPHAGOGASTRODUODENOSCOPY);  Surgeon: Cj Umaña MD;  Location: [address];  Service: General;  Laterality: N/A;    LAPAROSCOPIC SLEEVE GASTRECTOMY N/A  8/19/2020    Procedure: GASTRECTOMY, SLEEVE, LAPAROSCOPIC, with intraop EGD;  Surgeon: Cj Umaña MD;  Location: [facility] OR [address] [place] [address];  Service: General;  Laterality: N/A;       No medications prior to admission.       Review of patient's allergies indicates:   Allergen Reactions    Augmentin [amoxicillin-pot clavulanate] Diarrhea    Ibuprofen Other (See Comments)     Told to avoid due to gastric sleeve    Metformin Other (See Comments)     Severe low blood sugar    Nsaids (non-steroidal anti-inflammatory drug)      Had gastric sleeve cannot take    Sudafed pe head congestn-pain [ibuprofen-phenylephrine] Anxiety        Family History       Problem Relation (Age of Onset)    Cancer Father    Hypertension Mother    Lung cancer Father          Tobacco Use    Smoking status: Never    Smokeless tobacco: Never   Substance and Sexual Activity    Alcohol use: Not Currently     Alcohol/week: 1.0 standard drink of alcohol     Types: 1 Glasses of wine per week    Drug use: Never    Sexual activity: Yes     Partners: Male     Birth control/protection: None     Review of Systems   Constitutional:  Negative for appetite change, chills and fever.   Respiratory:  Negative for shortness of breath.    Cardiovascular:  Negative for chest pain.   Gastrointestinal:  Negative for abdominal pain, blood in stool, constipation, diarrhea, nausea and vomiting.   Endocrine: Negative for hair loss and hot flashes.   Genitourinary:  Negative for decreased libido, dysmenorrhea, dyspareunia, dysuria, genital sores, menorrhagia, menstrual problem, pelvic pain, vaginal discharge, vaginal pain, urinary incontinence and vaginal odor.   Musculoskeletal:  Negative for back pain.   Integumentary:  Negative for rash, acne, hair changes, breast mass, nipple discharge and breast skin changes.   Breast: Negative for mass, mastodynia, nipple discharge and skin changes     Objective:     Vital Signs (Most Recent):    Vital Signs (24h Range):            There is no height or weight on file to calculate BMI.    FHT: 130 bpm, mod jaylen, +accels, no decels. Cat 1 (reassuring)  TOCO:  No ctx     Physical Exam:   Constitutional: She is oriented to person, place, and time. She appears well-developed and well-nourished.    HENT:   Head: Normocephalic and atraumatic.    Eyes: Pupils are equal, round, and reactive to light. EOM are normal.     Cardiovascular:       Exam reveals no clubbing and no cyanosis.        Pulmonary/Chest: Effort normal.        Abdominal: Soft. She exhibits no mass. There is no rebound and no guarding. No hernia.             Musculoskeletal: Normal range of motion and moves all extremeties.       Neurological: She is alert and oriented to person, place, and time.    Skin: Skin is warm. No cyanosis. Nails show no clubbing.    Psychiatric: She has a normal mood and affect. Her behavior is normal. Thought content normal.        Cervix:  Dilation:  deferred     Significant Labs:  Lab Results   Component Value Date    GROUPTRH B POS 10/09/2023    HEPBSAG Non-reactive 02/15/2023    STREPBCULT No Group B Streptococcus isolated 06/29/2022       I have personallly reviewed all pertinent lab results from the last 24 hours.  Recent Lab Results         10/09/23  1010        Group & Rh B POS       INDIRECT FERNY NEG

## 2023-10-10 NOTE — HPI
Pt is a 39 yo  @ 39w1d presented to L&D for scheduled CD, pt with h/o CD x 1  Pt also with newly diagnosed IUGR on MFM scan yesterday, EFW 21%, AC at 3%.

## 2023-10-10 NOTE — LACTATION NOTE
10/10/23 1428   Maternal Assessment   Breast Density Bilateral:;soft   Areola Bilateral:;elastic   Nipples Bilateral:;everted   Maternal Infant Feeding   Maternal Emotional State assist needed   Infant Positioning laid back (ventral)   Signs of Milk Transfer audible swallow;infant jaw motion present   Pain with Feeding no   Latch Assistance yes     Infant skin to skin with patient. Assisted with latching on the left side in laid back position. Infant able to obtain and sustain effective latch. Suck and swallow verified. Patient denies pain with breastfeeding. Signs of an effective latch and hunger/satiety cues reviewed. Encouraged to feed infant on cue at least 8 in 24. Basic breastfeeding information reviewed utilizing breastfeeding guide. Patient verbalized understanding. All questions answered. Told to call for further lactation support needs.

## 2023-10-10 NOTE — ANESTHESIA PROCEDURE NOTES
Spinal    Diagnosis: IUP  Patient location during procedure: OR  Start time: 10/10/2023 1:01 PM  Timeout: 10/10/2023 1:00 PM  End time: 10/10/2023 1:16 PM    Staffing  Authorizing Provider: August Waterman MD  Performing Provider: August Waterman MD    Staffing  Performed by: August Waterman MD  Authorized by: August Waterman MD    Preanesthetic Checklist  Completed: patient identified, IV checked, site marked, risks and benefits discussed, surgical consent, monitors and equipment checked, pre-op evaluation and timeout performed  Spinal Block  Patient position: sitting  Prep: ChloraPrep  Patient monitoring: heart rate, cardiac monitor, continuous pulse ox, continuous capnometry and frequent blood pressure checks  Approach: midline  Location: L3-4  Injection technique: single shot  CSF Fluid: clear free-flowing CSF  Needle  Needle type: Quincke   Needle gauge: 25 G  Needle length: 5 in  Additional Documentation: no paresthesia on injection  Needle localization: anatomical landmarks  Assessment  Ease of block: easy  Patient's tolerance of the procedure: comfortable throughout block and no complaints  Medications:    Medications: bupivacaine (pf) (MARCAINE) injection 0.75% - Intraspinal   1.6 mL - 10/10/2023 1:16:00 PM

## 2023-10-10 NOTE — DISCHARGE INSTRUCTIONS
Breastfeeding Discharge Instructions     AAP recommendation of exclusive breastfeeding for the first 6 months of life and continued breastfeeding with the introduction of supplemental foods beyond the first year of life and recommends to delay all bottle and pacifier use until after 4 weeks of age and breastfeeding is well established.  Discussed the benefits of exclusive breastfeeding for both mother and baby.  Discussed the risks of supplementation/pacifier use on the exclusivity of breastfeeding in the first 6 months. Feed the baby at the earliest sign of hunger or comfort  Hands to mouth, sucking motions  Rooting or searching for something to suck on  Dont wait for crying - it is a not a late sign of hunger; it is a sign of distress    The feedings may be 8-12 times per 24hrs and will not follow a schedule  Alternate the breast you start the feeding with, or start with the breast that feels the fullest  Switch breasts when the baby takes himself off the breast or falls asleep  Keep offering breasts until the baby looks full, no longer gives hunger signs, and stays asleep when placed on his back in the crib  If the baby is sleepy and wont wake for a feeding, put the baby skin-to-skin dressed in a diaper against the mothers bare chest  Sleep near your baby  The baby should be positioned and latched on to the breast correctly  Chest-to-chest, chin in the breast  Babys lips are flipped outward  Babys mouth is stretched open wide like a shout  Babys sucking should feel like tugging to the mother  The baby should be drinking at the breast:  You should hear swallowing or gulping throughout the feeding  You should see milk on the babys lips when he comes off the breast  Your breasts should be softer when the baby is finished feeding  The baby should look relaxed at the end of feedings  After the 4th day and your milk is in:  The babys poop should turn bright yellow and be loose, watery, and seedy  The baby  should have at least 3-4 poops the size of the palm of your hand per day  The baby should have at least 6-8 wet diapers per day  The urine should be light yellow in color  You should drink when you are thirsty and eat a healthy diet when you are    hungry.     Take naps to get the rest you need.   Take medications and/or drink alcohol only with permission of your obstetrician    or the babys pediatrician.  You can also call the [facility],   ([phone]), Monday-Friday, 8am-5pm Central time, to get the most   up-to-date evidence-based information on the use of medications during   pregnancy and breastfeeding.      The baby should be examined by a pediatrician at 3-5 days of age; unless ordered sooner by the pediatrician.  Once your milk comes in, the baby should be back to birth weight no later than 10-14 days of age.    Primary Engorgement    If the milk is flowing, use wet or dry heat applied to the breasts for approximately 10min prior to each feeding as a comfort measure to facilitate the milk ejection reflex    Follow heat treatment with breast massage to soften hard/lumpy areas of the breast    Use unrestricted, frequent, effective feedings.      Wake baby to feed if necessary    Avoid pacifier and bottle feedings    Hand express or pump breasts to the point of comfort prn    Use cold treatments in the form of ice packs/gel packs/ frozen vegetables wrapped in a soft thin cloth and applied to the breasts for approximately 20min after each feeding until engorgement is resolved    Wear comfortable, supportive bra    Take pain medicine prn    Use anti-inflammatory medications if prescribed by physician    Other:     Pumping Instructions :    Preparation and Hygiene:    Shower daily.  Wear a clean bra each day and wash daily in warm soapy water.  Change wet or moist breast pads frequently.  Moist pads can promote growth of germs.  Actively wash your hands, paying close attention to the area around  and under your fingernails, thoroughly with soap and water for 15 seconds before pumping or handling your milk.  Re-wash your hands if you touch anything (scratching your nose, answering the phone, etc) while pumping or handling your milk.   Before pumping your breasts, assemble the pump collection kit and have ready the sterile container and labels.  Place these items on a clean surface next to the breastpump.After a Cesearean Birth    General Discharge Instructions  May follow a regular diet, unless otherwise discussed with physician.    Take showers, not baths unless otherwise discussed with physician.    Activity as tolerated.    No lifting or heavy exercise for 6 weeks, no driving for 2 weeks, no sexual   intercourse, douching or tampons for 6 weeks    May return to work/school as discussed with physician    Discuss birth control with physician    Take medications as directed    Breast care support bra worn at all times    Lactation consultant referral number ( [phone] or [phone])    Call Your Healthcare Provider Right Away If You Have:  A temperature of 100.4°F or higher.  If your blood pressure is over 155/105.  You have difficulty catching your breath or trouble breathing.  Heavy vaginal bleeding, clots, or vaginal discharge with foul odor. (heavier than menses)  Persistent nausea or vomiting.  You gain more than 3 pounds in 3 days.  Severe headaches not relieved by Tylenol (acetaminophen) or Motrin (ibuprofen)  Blurry or double vision, see spots or flashing lights.  Dizziness or fainting.  New onset swelling or worsening of existing swelling.  Burning or pain when you urinate.  No bowel movement for 5 days.  Redness, warmth, swelling, or pain in the lower leg.  Redness, discharge, or pain worse than you had in the hospital.  Burning, pain, red streaks, or lumpy areas in your breasts.  Cracks, blisters, or blood on your nipples.  Feelings of extreme sadness or anxiety, or a feeling that you dont  want to be with your baby.  If you have any new or unusual symptoms or have questions or concerns    Some of these symptoms can occur up to 4 to 6 weeks after delivery. This can be a sign of pre-eclampsia, which is a serious disease that can cause stroke, seizures, organ damage, or death. Do not wait to call your doctor or seek medical attention.          Incision Care  If you have an Aquacel dressing, then it stays in place for 1 week. It is waterproof and will be removed at your post-op visit. If it comes off before then, call your physician and keep the incision clean with warm soapy water and pat dry.  Watch your incision for signs of infection, such as increasing redness or drainage, or a foul smelling odor.  For ease of movement, hold a pillow against the incision when you get up from a lying or sitting position, and when you laugh or cough.  Avoid heavy lifting--nothing heavier than your baby until your doctor instructs you otherwise.     Follow-Up  Schedule a  follow-up exam with your healthcare provider for about 1 week after delivery. During this exam, your uterus and vaginal area will be checked. Contact your healthcare provider if you think you or your baby are having any problems.         Each time after you have finished pumping, take apart all of the parts of the breastpump collection kit and place them in a separate cleaning container (do not place them in the sink).  Be sure to remove the yellow valve from the breastshield and separate the white membrane from the yellow valve.  Rinse all of these parts with cool water.  Then use a new sponge and/or bottle brush and dishwashing detergent to clean the parts.  Rinse off the soapy water with cool water and air dry on a clean towel covered with a clean cloth.  All parts may also be washed after each use in the top rack of a .  Once each day, sterilize all of the parts of the breastpump collection kit.  This can be done by  boiling the kit parts for 10 minutes or by using a Quick Clean Micro-Steam Bag made by [employer]  If condensation appears in the tubing, continue to run the pump with the tubing attached for 1-2 minutes or until the tubing is dry.   Notify your babys nurse or doctor if you become ill or need to take any medication, even over-the-counter medicines.        Collection and Storage of Expressed Breastmilk:         1. Pump your breasts at least 8-10 times every 24 hours.  Double pump (both breasts at  the same time) for at least 15-20 minutes using the most suction that is comfortable.    2. Write the date and time of pumping and the name of any medications you are takingon the babys pre-printed hospital identification label.   3.    Do not touch the inside of the storage containers or lids.      4.        Tightly screw the lid onto the container and place immediately into the                                refrigerator for daily use.  Bottle may remain at room temperature if the next                    feeding is within 4 hours.  5.    Expressed breastmilk should be refrigerated or frozen within 4 hours of                pumping.  6.        Do not store expressed breastmilk on the door of your refrigerator or freeze             where the temperature is warmer.   7.        Refrigerated milk may be stored in for up to 7 days.  At this point it can be              moved to the freezer for 6 -12 months.  8.        Thaw frozen breast milk in overnight in the refrigerator.  Once milk is thawed it              must be used within 24 hours.  9.        Refrigerated breast milk needs to be warmed to room temperature.  Warm by leaving unrefrigerated until it reached room temperature, or place sealed bottle into a cup of warm (not boiling) water or use a bottle warmer.               Never warm breast milk in a microwave or boiling water.    For any questions or concerns call:  Lactation Department at [phone]

## 2023-10-10 NOTE — L&D DELIVERY NOTE
[facility] - [place] - Labor & Delivery   Section   Operative Note    SUMMARY     Date of Procedure: 10/10/2023     Procedure: Procedure(s) (LRB):   SECTION (N/A)    Surgeon(s) and Role:     * Bere Aldana MD - Primary    Assisting Surgeon: STEVEN Goins (no qualified resident available at time of surgery)    Pre-Operative Diagnosis: 1.  Term IUP @ 39 weeks,   2. H/o CD x 1   3. Breech presentation    4. IUGR diagnosed at 39 weeks    5.  Seizure disorder   6.  Obesity affect pregnancy   7.  anemia    Post-Operative Diagnosis: Post-Op Diagnosis Codes:     * Third trimester pregnancy [Z34.93]    Anesthesia: Spinal/Epidural    Technical Procedures Used: Repeat Low Transverse  Delivery            Complications: No    Blood Loss: * No values recorded between 10/10/2023 12:00 AM and 10/10/2023 12:50 PM *     With patient in supine position, the legs are  and Shannon Catheter placed and positioning to supine done.   Abdomen prepped with Chloroprep and 3 minute drying time allowed prior to draping of the abdomen.   Time out taken with OR team members.  Pfannenstiel Incision made through the skin, transverse fascial incision developed, rectus muscles  in the midline and the peritoneum entered.   no adhesions noted.  The lower uterine segment and position of the fetus identified.   Bladder flap taken down through transverse peritoneal incision.    Low Transverse Incision made through well developer lower uterine segment and extended laterally with blunt dissection.   Clear fluid noted.  Infant delivered from frankbreech presentation.  The infant's buttock was delivered and wrapped with a blue towel, the anterior arm delivered then the posterior arm.  The fetal head was delivered spontaneously.  Cord clamped after one minute and  handed to attending nurse.  Cord blood taken, placenta delivered.  The uterus was exteriorized.  The edges of the uterine incision are grasped with  Coley clamps at the angles and the inferior and superior midline edges of the incision.    Closure with running lock 0 monocryl starting at the left angle, tying in the right angle  Observation for bleeding with suture of any bleeding along the hysterotomy line.   With good hemostasis noted, the anterior pelvis is rinsed with sterile saline.   Right and left adnexa with normal anatomy.     Closure of the abdomen with 2 0 chromic running of the peritoneum, fascial closure with 0 vicryl starting at each angle and tying the knot at the midline.  Skin closure with insorb stapler.  Wound dressed with aquacel.          Specimens:   Specimen (24h ago, onward)      None            Condition: Good    VTE Risk Mitigation (From admission, onward)           Ordered     IP VTE LOW RISK PATIENT  Once         10/10/23 1108     Place sequential compression device  Until discontinued         10/10/23 1108     Place DELILAH hose  Until discontinued         10/10/23 1108                    Disposition: back to L&D    Attestation: Good         This patient has no babies on file.

## 2023-10-11 PROBLEM — O09.899 SHORT INTERVAL BETWEEN PREGNANCIES AFFECTING PREGNANCY, ANTEPARTUM: Status: RESOLVED | Noted: 2023-04-20 | Resolved: 2023-10-11

## 2023-10-11 PROBLEM — O99.210 OBESITY AFFECTING PREGNANCY, ANTEPARTUM: Status: RESOLVED | Noted: 2021-12-06 | Resolved: 2023-10-11

## 2023-10-11 PROBLEM — O09.519 ADVANCED MATERNAL AGE, PRIMIGRAVIDA, ANTEPARTUM: Status: RESOLVED | Noted: 2022-07-22 | Resolved: 2023-10-11

## 2023-10-11 PROBLEM — Z98.891 S/P CESAREAN SECTION: Status: ACTIVE | Noted: 2023-02-15

## 2023-10-11 PROBLEM — O99.350 SEIZURE DISORDER IN PREGNANCY, ANTEPARTUM: Status: RESOLVED | Noted: 2022-01-13 | Resolved: 2023-10-11

## 2023-10-11 PROBLEM — O09.529 ANTEPARTUM MULTIGRAVIDA OF ADVANCED MATERNAL AGE: Status: RESOLVED | Noted: 2023-02-15 | Resolved: 2023-10-11

## 2023-10-11 PROBLEM — O36.5990 FETAL GROWTH RESTRICTION ANTEPARTUM: Status: RESOLVED | Noted: 2023-10-10 | Resolved: 2023-10-11

## 2023-10-11 PROBLEM — G40.909 SEIZURE DISORDER IN PREGNANCY, ANTEPARTUM: Status: RESOLVED | Noted: 2022-01-13 | Resolved: 2023-10-11

## 2023-10-11 LAB
BASOPHILS # BLD AUTO: 0.06 K/UL (ref 0–0.2)
BASOPHILS NFR BLD: 0.7 % (ref 0–1.9)
DIFFERENTIAL METHOD: ABNORMAL
EOSINOPHIL # BLD AUTO: 0.1 K/UL (ref 0–0.5)
EOSINOPHIL NFR BLD: 0.9 % (ref 0–8)
ERYTHROCYTE [DISTWIDTH] IN BLOOD BY AUTOMATED COUNT: 16.5 % (ref 11.5–14.5)
HCT VFR BLD AUTO: 30.8 % (ref 37–48.5)
HGB BLD-MCNC: 9.3 G/DL (ref 12–16)
IMM GRANULOCYTES # BLD AUTO: 0.02 K/UL (ref 0–0.04)
IMM GRANULOCYTES NFR BLD AUTO: 0.2 % (ref 0–0.5)
LYMPHOCYTES # BLD AUTO: 2 K/UL (ref 1–4.8)
LYMPHOCYTES NFR BLD: 24.4 % (ref 18–48)
MCH RBC QN AUTO: 22.9 PG (ref 27–31)
MCHC RBC AUTO-ENTMCNC: 30.2 G/DL (ref 32–36)
MCV RBC AUTO: 76 FL (ref 82–98)
MONOCYTES # BLD AUTO: 0.6 K/UL (ref 0.3–1)
MONOCYTES NFR BLD: 7.7 % (ref 4–15)
NEUTROPHILS # BLD AUTO: 5.4 K/UL (ref 1.8–7.7)
NEUTROPHILS NFR BLD: 66.1 % (ref 38–73)
NRBC BLD-RTO: 0 /100 WBC
PLATELET # BLD AUTO: 212 K/UL (ref 150–450)
PMV BLD AUTO: 10.4 FL (ref 9.2–12.9)
RBC # BLD AUTO: 4.06 M/UL (ref 4–5.4)
WBC # BLD AUTO: 8.14 K/UL (ref 3.9–12.7)

## 2023-10-11 PROCEDURE — 11000001 HC ACUTE MED/SURG PRIVATE ROOM

## 2023-10-11 PROCEDURE — 36415 COLL VENOUS BLD VENIPUNCTURE: CPT | Performed by: OBSTETRICS & GYNECOLOGY

## 2023-10-11 PROCEDURE — 99231 SBSQ HOSP IP/OBS SF/LOW 25: CPT | Mod: ,,, | Performed by: OBSTETRICS & GYNECOLOGY

## 2023-10-11 PROCEDURE — 63600175 PHARM REV CODE 636 W HCPCS: Performed by: OBSTETRICS & GYNECOLOGY

## 2023-10-11 PROCEDURE — 99231 PR SUBSEQUENT HOSPITAL CARE,LEVL I: ICD-10-PCS | Mod: ,,, | Performed by: OBSTETRICS & GYNECOLOGY

## 2023-10-11 PROCEDURE — 85025 COMPLETE CBC W/AUTO DIFF WBC: CPT | Performed by: OBSTETRICS & GYNECOLOGY

## 2023-10-11 PROCEDURE — 25000003 PHARM REV CODE 250: Performed by: OBSTETRICS & GYNECOLOGY

## 2023-10-11 RX ADMIN — DOCUSATE SODIUM 200 MG: 100 CAPSULE, LIQUID FILLED ORAL at 08:10

## 2023-10-11 RX ADMIN — KETOROLAC TROMETHAMINE 30 MG: 30 INJECTION, SOLUTION INTRAMUSCULAR; INTRAVENOUS at 11:10

## 2023-10-11 RX ADMIN — DIPHENHYDRAMINE HYDROCHLORIDE 25 MG: 25 CAPSULE ORAL at 12:10

## 2023-10-11 RX ADMIN — ACETAMINOPHEN 975 MG: 325 TABLET ORAL at 09:10

## 2023-10-11 RX ADMIN — MUPIROCIN: 20 OINTMENT TOPICAL at 08:10

## 2023-10-11 RX ADMIN — DOCUSATE SODIUM 200 MG: 100 CAPSULE, LIQUID FILLED ORAL at 11:10

## 2023-10-11 RX ADMIN — ACETAMINOPHEN 975 MG: 325 TABLET ORAL at 05:10

## 2023-10-11 RX ADMIN — ACETAMINOPHEN 975 MG: 325 TABLET ORAL at 04:10

## 2023-10-11 RX ADMIN — KETOROLAC TROMETHAMINE 30 MG: 30 INJECTION, SOLUTION INTRAMUSCULAR; INTRAVENOUS at 05:10

## 2023-10-11 RX ADMIN — MUPIROCIN: 20 OINTMENT TOPICAL at 11:10

## 2023-10-11 RX ADMIN — PRENATAL VIT W/ FE FUMARATE-FA TAB 27-0.8 MG 1 TABLET: 27-0.8 TAB at 11:10

## 2023-10-11 NOTE — PLAN OF CARE
Plan of care reviewed. Fundus firm. Bonding with infant. Frequent checks made to ensure safety and comfort. Bed low, call bell within reach. Shannon discontinued as ordered. Due to void

## 2023-10-11 NOTE — PLAN OF CARE
VSS. Afebrile  Ambulating in room without difficulty.   FF @ 1cm below umbilicus. Scant rubra lochia. No clots. No odor.  LTV incision, aquacel dressing in place. No drainage noted.   Tolerating regular diet without any GI symptoms voiced.  Voiding without difficulty. Passing flatus. Denies BM.  Pain controlled with PRN meds.  Appropriate maternal bonding noted.   Plan of care reviewed with patient. All questions answered.

## 2023-10-11 NOTE — PROGRESS NOTES
[facility]  Obstetrics  Postpartum Progress Note    Patient Name: Agustina Tillman  MRN: [id number]  Admission Date: 10/10/2023  Hospital Length of Stay: 1 days  Attending Physician: Bere Aldana MD  Primary Care Provider: John Gaston MD    Subjective:     Principal Problem:S/P  section    Hospital Course:  10/10/23, s/p RCD without complications  10/11/23.  PPD #1, doing well      Interval History:     She is doing well this morning. She is tolerating a regular diet without nausea or vomiting. She is voiding spontaneously. She is ambulating. She has passed flatus, and has not a BM. Vaginal bleeding is mild. She denies fever or chills. Abdominal pain is mild and controlled with oral medications. She Is breastfeeding. She desires circumcision for her male baby: not applicable.    Objective:     Vital Signs (Most Recent):  Temp: 97.7 °F (36.5 °C) (10/11/23 1128)  Pulse: 63 (10/11/23 1128)  Resp: 19 (10/11/23 1128)  BP: (!) 100/58 (10/11/23 1128)  SpO2: 99 % (10/11/23 112) Vital Signs (24h Range):  Temp:  [97.7 °F (36.5 °C)-98.9 °F (37.2 °C)] 97.7 °F (36.5 °C)  Pulse:  [57-72] 63  Resp:  [18-20] 19  SpO2:  [96 %-99 %] 99 %  BP: ()/(54-62) 100/58     Weight: 119.3 kg (263 lb)  Body mass index is 45.14 kg/m².      Intake/Output Summary (Last 24 hours) at 10/11/2023 1628  Last data filed at 10/11/2023 0633  Gross per 24 hour   Intake 1200 ml   Output 2025 ml   Net -825 ml         Significant Labs:  Lab Results   Component Value Date    GROUPTRH B POS 10/09/2023    HEPBSAG Non-reactive 02/15/2023    STREPBCULT No Group B Streptococcus isolated 2022     Recent Labs   Lab 10/11/23  0602   HGB 9.3*   HCT 30.8*       I have personallly reviewed all pertinent lab results from the last 24 hours.  Recent Lab Results         10/11/23  0602        Baso # 0.06       Basophil % 0.7       Differential Method Automated       Eos # 0.1       Eosinophil % 0.9       Gran # (ANC) 5.4        Gran % 66.1       Hematocrit 30.8       Hemoglobin 9.3       Immature Grans (Abs) 0.02  Comment: Mild elevation in immature granulocytes is non specific and   can be seen in a variety of conditions including stress response,   acute inflammation, trauma and pregnancy. Correlation with other   laboratory and clinical findings is essential.         Immature Granulocytes 0.2       Lymph # 2.0       Lymph % 24.4       MCH 22.9       MCHC 30.2       MCV 76       Mono # 0.6       Mono % 7.7       MPV 10.4       nRBC 0       Platelet Count 212       RBC 4.06       RDW 16.5       WBC 8.14               Physical Exam:   Constitutional: She is oriented to person, place, and time. She appears well-developed and well-nourished.    HENT:   Head: Normocephalic and atraumatic.    Eyes: Pupils are equal, round, and reactive to light. EOM are normal.     Cardiovascular:       Exam reveals no clubbing and no cyanosis.        Pulmonary/Chest: Effort normal.        Abdominal: Soft. She exhibits no mass. There is no rebound and no guarding. No hernia.             Musculoskeletal: Normal range of motion and moves all extremeties.       Neurological: She is alert and oriented to person, place, and time.    Skin: Skin is warm. No cyanosis. Nails show no clubbing.    Psychiatric: She has a normal mood and affect. Her behavior is normal. Thought content normal.       Review of Systems   Constitutional:  Negative for appetite change, chills and fever.   Respiratory:  Negative for shortness of breath.    Cardiovascular:  Negative for chest pain.   Gastrointestinal:  Negative for abdominal pain, blood in stool, constipation, diarrhea, nausea and vomiting.   Endocrine: Negative for hair loss and hot flashes.   Genitourinary:  Negative for decreased libido, dysmenorrhea, dyspareunia, dysuria, genital sores, menorrhagia, menstrual problem, pelvic pain, vaginal discharge, vaginal pain, urinary incontinence and vaginal odor.   Musculoskeletal:   Negative for back pain.   Integumentary:  Negative for rash, acne, hair changes, breast mass, nipple discharge and breast skin changes.   Breast: Negative for mass, mastodynia, nipple discharge and skin changes      Assessment/Plan:     38 y.o. female  for:    * S/P  section  Routine pp care  hgb 10.1 --> 9.3        Disposition: As patient meets milestones, will plan to discharge tomorrow.    Bere Aldana MD  Obstetrics  [facility] [place] - [facility]

## 2023-10-11 NOTE — LACTATION NOTE
10/11/23 1218   Maternal Assessment   Breast Density Bilateral:;soft   Areola Bilateral:;elastic   Nipples Bilateral:;everted   Maternal Infant Feeding   Maternal Emotional State assist needed   Infant Positioning clutch/football   Signs of Milk Transfer audible swallow;infant jaw motion present   Pain with Feeding no   Comfort Measures Before/During Feeding infant position adjusted;latch adjusted;maternal position adjusted   Latch Assistance yes     Assisted to latch baby to right breast in football position. Baby latched deeply, nursing well with audible swallows. Mother denies pain during feeding. Reviewed basic breastfeeding instructions and encouraged to call me for any further breastfeeding assistance. Patient verbalizes understanding of all instructions with good recall.

## 2023-10-11 NOTE — SUBJECTIVE & OBJECTIVE
Interval History:     She is doing well this morning. She is tolerating a regular diet without nausea or vomiting. She is voiding spontaneously. She is ambulating. She has passed flatus, and has not a BM. Vaginal bleeding is mild. She denies fever or chills. Abdominal pain is mild and controlled with oral medications. She Is breastfeeding. She desires circumcision for her male baby: not applicable.    Objective:     Vital Signs (Most Recent):  Temp: 97.7 °F (36.5 °C) (10/11/23 1128)  Pulse: 63 (10/11/23 1128)  Resp: 19 (10/11/23 1128)  BP: (!) 100/58 (10/11/23 1128)  SpO2: 99 % (10/11/23 1128) Vital Signs (24h Range):  Temp:  [97.7 °F (36.5 °C)-98.9 °F (37.2 °C)] 97.7 °F (36.5 °C)  Pulse:  [57-72] 63  Resp:  [18-20] 19  SpO2:  [96 %-99 %] 99 %  BP: ()/(54-62) 100/58     Weight: 119.3 kg (263 lb)  Body mass index is 45.14 kg/m².      Intake/Output Summary (Last 24 hours) at 10/11/2023 1628  Last data filed at 10/11/2023 0633  Gross per 24 hour   Intake 1200 ml   Output 2025 ml   Net -825 ml         Significant Labs:  Lab Results   Component Value Date    GROUPTRH B POS 10/09/2023    HEPBSAG Non-reactive 02/15/2023    STREPBCULT No Group B Streptococcus isolated 06/29/2022     Recent Labs   Lab 10/11/23  0602   HGB 9.3*   HCT 30.8*       I have personallly reviewed all pertinent lab results from the last 24 hours.  Recent Lab Results         10/11/23  0602        Baso # 0.06       Basophil % 0.7       Differential Method Automated       Eos # 0.1       Eosinophil % 0.9       Gran # (ANC) 5.4       Gran % 66.1       Hematocrit 30.8       Hemoglobin 9.3       Immature Grans (Abs) 0.02  Comment: Mild elevation in immature granulocytes is non specific and   can be seen in a variety of conditions including stress response,   acute inflammation, trauma and pregnancy. Correlation with other   laboratory and clinical findings is essential.         Immature Granulocytes 0.2       Lymph # 2.0       Lymph % 24.4       MCH  22.9       MCHC 30.2       MCV 76       Mono # 0.6       Mono % 7.7       MPV 10.4       nRBC 0       Platelet Count 212       RBC 4.06       RDW 16.5       WBC 8.14               Physical Exam:   Constitutional: She is oriented to person, place, and time. She appears well-developed and well-nourished.    HENT:   Head: Normocephalic and atraumatic.    Eyes: Pupils are equal, round, and reactive to light. EOM are normal.     Cardiovascular:       Exam reveals no clubbing and no cyanosis.        Pulmonary/Chest: Effort normal.        Abdominal: Soft. She exhibits no mass. There is no rebound and no guarding. No hernia.             Musculoskeletal: Normal range of motion and moves all extremeties.       Neurological: She is alert and oriented to person, place, and time.    Skin: Skin is warm. No cyanosis. Nails show no clubbing.    Psychiatric: She has a normal mood and affect. Her behavior is normal. Thought content normal.       Review of Systems   Constitutional:  Negative for appetite change, chills and fever.   Respiratory:  Negative for shortness of breath.    Cardiovascular:  Negative for chest pain.   Gastrointestinal:  Negative for abdominal pain, blood in stool, constipation, diarrhea, nausea and vomiting.   Endocrine: Negative for hair loss and hot flashes.   Genitourinary:  Negative for decreased libido, dysmenorrhea, dyspareunia, dysuria, genital sores, menorrhagia, menstrual problem, pelvic pain, vaginal discharge, vaginal pain, urinary incontinence and vaginal odor.   Musculoskeletal:  Negative for back pain.   Integumentary:  Negative for rash, acne, hair changes, breast mass, nipple discharge and breast skin changes.   Breast: Negative for mass, mastodynia, nipple discharge and skin changes

## 2023-10-12 VITALS
SYSTOLIC BLOOD PRESSURE: 111 MMHG | RESPIRATION RATE: 20 BRPM | OXYGEN SATURATION: 98 % | BODY MASS INDEX: 44.9 KG/M2 | DIASTOLIC BLOOD PRESSURE: 57 MMHG | HEIGHT: 64 IN | WEIGHT: 263 LBS | HEART RATE: 59 BPM | TEMPERATURE: 98 F

## 2023-10-12 PROCEDURE — 25000003 PHARM REV CODE 250: Performed by: OBSTETRICS & GYNECOLOGY

## 2023-10-12 PROCEDURE — 99238 PR HOSPITAL DISCHARGE DAY,<30 MIN: ICD-10-PCS | Mod: ,,, | Performed by: OBSTETRICS & GYNECOLOGY

## 2023-10-12 PROCEDURE — 99238 HOSP IP/OBS DSCHRG MGMT 30/<: CPT | Mod: ,,, | Performed by: OBSTETRICS & GYNECOLOGY

## 2023-10-12 PROCEDURE — 63600175 PHARM REV CODE 636 W HCPCS: Performed by: OBSTETRICS & GYNECOLOGY

## 2023-10-12 PROCEDURE — 90471 IMMUNIZATION ADMIN: CPT | Performed by: OBSTETRICS & GYNECOLOGY

## 2023-10-12 PROCEDURE — 90686 IIV4 VACC NO PRSV 0.5 ML IM: CPT | Performed by: OBSTETRICS & GYNECOLOGY

## 2023-10-12 RX ORDER — DOCUSATE SODIUM 100 MG/1
100 CAPSULE, LIQUID FILLED ORAL 2 TIMES DAILY
Qty: 60 CAPSULE | Refills: 2 | Status: SHIPPED | OUTPATIENT
Start: 2023-10-12 | End: 2024-10-11

## 2023-10-12 RX ORDER — OXYCODONE HYDROCHLORIDE 5 MG/1
10 TABLET ORAL EVERY 4 HOURS PRN
Status: DISCONTINUED | OUTPATIENT
Start: 2023-10-12 | End: 2023-10-12 | Stop reason: HOSPADM

## 2023-10-12 RX ORDER — OXYCODONE HYDROCHLORIDE 5 MG/1
5 TABLET ORAL EVERY 4 HOURS PRN
Status: DISCONTINUED | OUTPATIENT
Start: 2023-10-12 | End: 2023-10-12 | Stop reason: HOSPADM

## 2023-10-12 RX ORDER — FERROUS SULFATE 325(65) MG
325 TABLET, DELAYED RELEASE (ENTERIC COATED) ORAL DAILY
Qty: 30 TABLET | Refills: 11 | Status: SHIPPED | OUTPATIENT
Start: 2023-10-12

## 2023-10-12 RX ORDER — HYDROCODONE BITARTRATE AND ACETAMINOPHEN 5; 325 MG/1; MG/1
1 TABLET ORAL EVERY 6 HOURS PRN
Qty: 30 TABLET | Refills: 0 | Status: SHIPPED | OUTPATIENT
Start: 2023-10-12

## 2023-10-12 RX ADMIN — OXYCODONE HYDROCHLORIDE 10 MG: 5 TABLET ORAL at 08:10

## 2023-10-12 RX ADMIN — MUPIROCIN: 20 OINTMENT TOPICAL at 08:10

## 2023-10-12 RX ADMIN — ACETAMINOPHEN 975 MG: 325 TABLET ORAL at 05:10

## 2023-10-12 RX ADMIN — INFLUENZA VIRUS VACCINE 0.5 ML: 15; 15; 15; 15 SUSPENSION INTRAMUSCULAR at 11:10

## 2023-10-12 RX ADMIN — HYDROCODONE BITARTRATE AND ACETAMINOPHEN 1 TABLET: 7.5; 325 TABLET ORAL at 01:10

## 2023-10-12 RX ADMIN — PRENATAL VIT W/ FE FUMARATE-FA TAB 27-0.8 MG 1 TABLET: 27-0.8 TAB at 08:10

## 2023-10-12 RX ADMIN — DOCUSATE SODIUM 200 MG: 100 CAPSULE, LIQUID FILLED ORAL at 08:10

## 2023-10-12 NOTE — PLAN OF CARE
Problem: Adult Inpatient Plan of Care  Goal: Optimal Comfort and Wellbeing  Outcome: Ongoing, Progressing     Problem:  Fall Injury Risk  Goal: Absence of Fall, Infant Drop and Related Injury  Outcome: Ongoing, Progressing     Problem: Adjustment to Role Transition (Postpartum  Delivery)  Goal: Successful Maternal Role Transition  Outcome: Ongoing, Progressing     Problem: Bleeding (Postpartum  Delivery)  Goal: Hemostasis  Outcome: Ongoing, Progressing     Problem: Pain (Postpartum  Delivery)  Goal: Acceptable Pain Control  Outcome: Ongoing, Progressing     Problem: Postoperative Urinary Retention (Postpartum  Delivery)  Goal: Effective Urinary Elimination  Outcome: Ongoing, Progressing     Pt stable, vitals WNL.  Postpartum bleeding WNL. Pt ambulates around room, steady gait, denies dizziness.  Plan of care and safety reviewed, verbalized understanding.

## 2023-10-12 NOTE — DISCHARGE SUMMARY
[facility] - [facility]  Obstetrics  Discharge Summary      Patient Name: Agustina Tillman  MRN: [id number]  Admission Date: 10/10/2023  Hospital Length of Stay: 2 days  Discharge Date and Time:  10/12/2023 8:19 AM  Attending Physician: Sharron Jaimes MD   Discharging Provider: Sharron Jaimes MD   Primary Care Provider: John Gaston MD    HPI: Pt is a 37 yo  @ 39w1d presented to L&D for scheduled CD, pt with h/o CD x 1  Pt also with newly diagnosed IUGR on MFM scan yesterday, EFW 21%, AC at 3%.          Procedure(s) (LRB):   SECTION (N/A)     Hospital Course:   10/10/23, s/p RCD without complications  10/11/23.  PPD #1, doing well  10/12/23. Pt underwent a routine postpartum course.  Her lochia was appropriate.  And pain is controlled with medication.  Her vital signs were stable and she remained afebrile.  Pt was discharged to home in stable condition.         Consults (From admission, onward)        Status Ordering Provider     Inpatient consult to Anesthesiology  Once        Provider:  (Not yet assigned)    Acknowledged SHARRON JAIMES          Final Active Diagnoses:    Diagnosis Date Noted POA    PRINCIPAL PROBLEM:  S/P  section [Z98.891] 02/15/2023 Not Applicable      Problems Resolved During this Admission:    Diagnosis Date Noted Date Resolved POA    Fetal growth restriction antepartum [O36.5990] 10/10/2023 10/11/2023 Yes    Short interval between pregnancies affecting pregnancy, antepartum [O09.899] 2023 10/11/2023 Not Applicable    Antepartum multigravida of advanced maternal age [O09.529] 02/15/2023 10/11/2023 Yes    Advanced maternal age, primigravida, antepartum [O09.519] 2022 10/11/2023 Yes    Seizure disorder in pregnancy, antepartum [O99.350, G40.909] 2022 10/11/2023 Yes    Obesity affecting pregnancy, antepartum [O99.210] 2021 10/11/2023 Yes        Significant Diagnostic Studies: Labs:   CBC   Recent Labs   Lab 10/10/23  1138  10/11/23  0602   WBC 8.57 8.14   HGB 10.1* 9.3*   HCT 32.9* 30.8*    212         Feeding Method: breast    Immunizations     Date Immunization Status Dose Route/Site Given by    10/10/23 1506 MMR Incomplete 0.5 mL Subcutaneous/     10/10/23 1506 Tdap Incomplete 0.5 mL Intramuscular/           Delivery:    Episiotomy: None   Lacerations: None   Repair suture:     Repair # of packets:     Blood loss (ml):       Birth information:  YOB: 2021   Time of birth: 1:40 PM   Sex: female   Delivery type: , Low Transverse   Gestational Age: 39w1d     Measurements    Weight: 2850 g  Weight (lbs): 6 lb 4.5 oz  Length:          Delivery Clinician: Delivery Providers    Delivering clinician: Bere Aldana MD   Provider Role    Maricarmen Costa NNP Nurse Practitioner    Randi Silver, RN Delivery Nurse    Adry Read Scrub Person    Pilar Membreno RN Registered Nurse    Shannan Mooney CRNA Nurse Anesthetist    EstRenae ling PAPrasannaC First Assist    August Waterman MD Anesthesiologist           Additional  information:  Forceps:    Vacuum:    Breech:    Observed anomalies      Living?:     Apgars    Living status: Living  Apgar Component Scores:  1 min.:  5 min.:  10 min.:  15 min.:  20 min.:    Skin color:  0  1       Heart rate:  2  2       Reflex irritability:  2  2       Muscle tone:  2  2       Respiratory effort:  2  2       Total:  8  9       Apgars assigned by: REUBEN BEARDEN         Placenta: Delivered:       appearance    Pending Diagnostic Studies:     None          Discharged Condition: good    Disposition: Home or Self Care    Follow Up:   Follow-up Information     Bere Aldana MD Follow up in 1 week(s).    Specialties: Obstetrics and Gynecology, Obstetrics, Gynecology  Contact information:  [address]   [place] [address] [phone]                       Patient Instructions:      Diet Adult Regular     Pelvic Rest     Notify your health care provider  if you experience any of the following:  increased confusion or weakness     Notify your health care provider if you experience any of the following:  persistent dizziness, light-headedness, or visual disturbances     Notify your health care provider if you experience any of the following:  worsening rash     Notify your health care provider if you experience any of the following:  severe persistent headache     Notify your health care provider if you experience any of the following:  difficulty breathing or increased cough     Notify your health care provider if you experience any of the following:  redness, tenderness, or signs of infection (pain, swelling, redness, odor or green/yellow discharge around incision site)     Notify your health care provider if you experience any of the following:  severe uncontrolled pain     Notify your health care provider if you experience any of the following:  persistent nausea and vomiting or diarrhea     Notify your health care provider if you experience any of the following:  temperature >100.4     Leave dressing on - Keep it clean, dry, and intact until clinic visit     Activity as tolerated     Medications:  Current Discharge Medication List      START taking these medications    Details   docusate sodium (COLACE) 100 MG capsule Take 1 capsule (100 mg total) by mouth 2 (two) times daily.  Qty: 60 capsule, Refills: 2      ferrous sulfate 325 (65 FE) MG EC tablet Take 1 tablet (325 mg total) by mouth once daily.  Qty: 30 tablet, Refills: 11      HYDROcodone-acetaminophen (NORCO) 5-325 mg per tablet Take 1 tablet by mouth every 6 (six) hours as needed for Pain.  Qty: 30 tablet, Refills: 0    Comments: Quantity prescribed more than 7 day supply? Yes, quantity medically necessary         CONTINUE these medications which have NOT CHANGED    Details   acetaminophen (TYLENOL) 500 MG tablet Take 1 tablet (500 mg total) by mouth every 6 (six) hours as needed for Pain or Temperature  greater than (100).  Qty: 30 tablet, Refills: 0      cetirizine (ZYRTEC) 10 MG tablet Take 1 tablet (10 mg total) by mouth once daily.  Qty: 30 tablet, Refills: 0      cyanocobalamin (VITAMIN B-12) 500 MCG tablet Take 1 tablet (500 mcg total) by mouth once daily.  Qty: 90 tablet, Refills: 3      doxylamine-pyridoxine, vit B6, (DICLEGIS) 10-10 mg TbEC Take 2 tablets by mouth every evening.  Qty: 60 tablet, Refills: 2      famotidine (PEPCID) 40 MG tablet Take 1 tablet (40 mg total) by mouth 2 (two) times daily.  Qty: 60 tablet, Refills: 3      prenatal 21-iron fu-folic acid (PRENATAL COMPLETE) 14 mg iron- 400 mcg Tab Take 1 tablet by mouth once daily.  Qty: 30 tablet, Refills: 2      prenatal vit no.130-iron-folic (PRENATAL VITAMIN) 27 mg iron- 800 mcg Tab TAKE 1 TABLET BY MOUTH EVERY DAY  Qty: 90 tablet, Refills: 2      sertraline (ZOLOFT) 25 MG tablet Take 1 tablet (25 mg total) by mouth once daily.  Qty: 30 tablet, Refills: 2      traMADoL (ULTRAM) 50 mg tablet Take 1 tablet (50 mg total) by mouth every 6 (six) hours as needed for Pain.  Qty: 20 tablet, Refills: 0    Comments: Quantity prescribed more than 7 day supply? Yes, quantity medically necessary         STOP taking these medications       cephALEXin (KEFLEX) 500 MG capsule Comments:   Reason for Stopping:         folic acid (FOLVITE) 1 MG tablet Comments:   Reason for Stopping:         oxyCODONE-acetaminophen (PERCOCET) 5-325 mg per tablet Comments:   Reason for Stopping:               Bere Aldana MD  Obstetrics  [facility] [place] - [facility]

## 2023-10-13 ENCOUNTER — TELEPHONE (OUTPATIENT)
Dept: OBSTETRICS AND GYNECOLOGY | Facility: HOSPITAL | Age: 38
End: 2023-10-13
Payer: MEDICAID

## 2023-10-13 NOTE — TELEPHONE ENCOUNTER
"Spoke to pt who states baby latching well overnight -denies further nipple pain -milk coming in and baby with many wet and dirty diapers overnight -stools are "greenish" color -denies baby's skin looks yellow -has no other questions or concerns for us   "

## 2023-10-17 ENCOUNTER — ROUTINE PRENATAL (OUTPATIENT)
Dept: OBSTETRICS AND GYNECOLOGY | Facility: CLINIC | Age: 38
End: 2023-10-17
Payer: MEDICAID

## 2023-10-17 VITALS
SYSTOLIC BLOOD PRESSURE: 120 MMHG | DIASTOLIC BLOOD PRESSURE: 80 MMHG | BODY MASS INDEX: 42.53 KG/M2 | WEIGHT: 247.81 LBS

## 2023-10-17 DIAGNOSIS — Z30.09 FAMILY PLANNING: Primary | ICD-10-CM

## 2023-10-17 PROCEDURE — 99999 PR PBB SHADOW E&M-EST. PATIENT-LVL III: ICD-10-PCS | Mod: PBBFAC,,, | Performed by: OBSTETRICS & GYNECOLOGY

## 2023-10-17 PROCEDURE — 99999 PR PBB SHADOW E&M-EST. PATIENT-LVL III: CPT | Mod: PBBFAC,,, | Performed by: OBSTETRICS & GYNECOLOGY

## 2023-10-17 PROCEDURE — 59430 PR CARE AFTER DELIVERY ONLY: ICD-10-PCS | Mod: ,,, | Performed by: OBSTETRICS & GYNECOLOGY

## 2023-10-17 PROCEDURE — 99213 OFFICE O/P EST LOW 20 MIN: CPT | Mod: PBBFAC | Performed by: OBSTETRICS & GYNECOLOGY

## 2023-10-17 NOTE — PROGRESS NOTES
CC: Post-partum follow-up    Agustina Tillman is a 38 y.o. female  who presents for post-partum visit.  She is S/P a .  She and the baby are doing well.  No pain.  No fever.   No bowel / bladder complaints.    Delivery Date: October 10, 2023  Delivery MD: Katya  Gender: female  Birth Weight: 6 pounds 5 ounces  Breast Feeding: YES  Depression: NO  Contraception: mirena IUD    Pregnancy was complicated by:  h/o CD x 1,   seizure d/o:   gastric bypass  Obesity  IUGR    /80   Wt 112.4 kg (247 lb 12.8 oz)   LMP 2023   BMI 42.53 kg/m²     ROS:  GENERAL: No fever, chills, fatigability.  VULVAR: No pain, no lesions and no itching.  VAGINAL: No relaxation, no itching, no discharge, no abnormal bleeding and no lesions.  ABDOMEN: No abdominal pain. Denies nausea. Denies vomiting. No diarrhea. No constipation  BREAST: Denies pain. No lumps. No discharge.  URINARY: No incontinence, no nocturia, no frequency and no dysuria.  CARDIOVASCULAR: No chest pain. No shortness of breath. No leg cramps.  NEUROLOGICAL: No headaches. No vision changes.    PHYSICAL EXAM:  ABDOMEN:  Soft, non-tender, non-distended  DRESSING removed, incision d/c/i    IMP:  Doing well S/P  repeat CD  Instructions / precautions reviewed  Contraceptive counseling      PLAN:  Continue pelvic rest  Wound care discussed  Rtc in 4-5 weeks for IUD insertion

## 2023-10-20 ENCOUNTER — PATIENT MESSAGE (OUTPATIENT)
Dept: OBSTETRICS AND GYNECOLOGY | Facility: HOSPITAL | Age: 38
End: 2023-10-20
Payer: MEDICAID

## 2023-10-31 ENCOUNTER — PATIENT MESSAGE (OUTPATIENT)
Dept: OBSTETRICS AND GYNECOLOGY | Facility: CLINIC | Age: 38
End: 2023-10-31
Payer: MEDICAID

## 2023-10-31 ENCOUNTER — PATIENT MESSAGE (OUTPATIENT)
Dept: MATERNAL FETAL MEDICINE | Facility: CLINIC | Age: 38
End: 2023-10-31
Payer: MEDICAID

## 2023-11-02 ENCOUNTER — PATIENT MESSAGE (OUTPATIENT)
Dept: OBSTETRICS AND GYNECOLOGY | Facility: CLINIC | Age: 38
End: 2023-11-02
Payer: MEDICAID

## 2023-11-18 ENCOUNTER — PATIENT MESSAGE (OUTPATIENT)
Dept: OBSTETRICS AND GYNECOLOGY | Facility: CLINIC | Age: 38
End: 2023-11-18
Payer: MEDICAID

## 2023-11-30 ENCOUNTER — POSTPARTUM VISIT (OUTPATIENT)
Dept: OBSTETRICS AND GYNECOLOGY | Facility: CLINIC | Age: 38
End: 2023-11-30
Payer: MEDICAID

## 2023-11-30 VITALS
SYSTOLIC BLOOD PRESSURE: 118 MMHG | DIASTOLIC BLOOD PRESSURE: 70 MMHG | WEIGHT: 241.38 LBS | BODY MASS INDEX: 41.44 KG/M2

## 2023-11-30 PROCEDURE — 0503F PR POSTPARTUM CARE VISIT: ICD-10-PCS | Mod: CPTII,,, | Performed by: OBSTETRICS & GYNECOLOGY

## 2023-11-30 PROCEDURE — 0503F POSTPARTUM CARE VISIT: CPT | Mod: CPTII,,, | Performed by: OBSTETRICS & GYNECOLOGY

## 2023-11-30 PROCEDURE — 99999 PR PBB SHADOW E&M-EST. PATIENT-LVL III: CPT | Mod: PBBFAC,,, | Performed by: OBSTETRICS & GYNECOLOGY

## 2023-11-30 PROCEDURE — 99213 OFFICE O/P EST LOW 20 MIN: CPT | Mod: PBBFAC | Performed by: OBSTETRICS & GYNECOLOGY

## 2023-11-30 PROCEDURE — 99999 PR PBB SHADOW E&M-EST. PATIENT-LVL III: ICD-10-PCS | Mod: PBBFAC,,, | Performed by: OBSTETRICS & GYNECOLOGY

## 2023-11-30 NOTE — PROGRESS NOTES
CC: Post-partum follow-up    Agustina Tillman is a 38 y.o. female  who presents for post-partum visit.  She is S/P a repeat CD She and the baby are doing well.  No pain.  No fever.   No bowel / bladder complaints.    Delivery Date: October 10, 2023  Delivery MD: Katya  Gender: female  Birth Weight: 6 pounds 5 ounces  Breast Feeding: YES  Depression: NO  Contraception: mirena IUD    Pregnancy was complicated by:  h/o CD x 1,   seizure d/o:   gastric bypass  Obesity  IUGR    /70   Wt 109.5 kg (241 lb 6.5 oz)   LMP 2023   Breastfeeding Yes   BMI 41.44 kg/m²     ROS:  GENERAL: No fever, chills, fatigability.  VULVAR: No pain, no lesions and no itching.  VAGINAL: No relaxation, no itching, no discharge, no abnormal bleeding and no lesions.  ABDOMEN: No abdominal pain. Denies nausea. Denies vomiting. No diarrhea. No constipation  BREAST: Denies pain. No lumps. No discharge.  URINARY: No incontinence, no nocturia, no frequency and no dysuria.  CARDIOVASCULAR: No chest pain. No shortness of breath. No leg cramps.  NEUROLOGICAL: No headaches. No vision changes.    PHYSICAL EXAM:  ABDOMEN:  Soft, non-tender, non-distended  DRESSING removed, incision d/c/I  Vagina: normal  Cervix: normal appearing  Uterus:  normal and mobile  Adnexa: no masses    IMP:  Doing well S/P  repeat CD  Instructions / precautions reviewed  Contraceptive counseling      PLAN:  Continue pelvic rest ok to resume other normal activity  Rtc in 1 week for IUD insertion, mirena

## 2023-12-06 ENCOUNTER — PATIENT MESSAGE (OUTPATIENT)
Dept: OBSTETRICS AND GYNECOLOGY | Facility: CLINIC | Age: 38
End: 2023-12-06
Payer: MEDICAID

## 2023-12-07 ENCOUNTER — PATIENT MESSAGE (OUTPATIENT)
Dept: OBSTETRICS AND GYNECOLOGY | Facility: CLINIC | Age: 38
End: 2023-12-07

## 2023-12-07 ENCOUNTER — PROCEDURE VISIT (OUTPATIENT)
Dept: OBSTETRICS AND GYNECOLOGY | Facility: CLINIC | Age: 38
End: 2023-12-07
Payer: MEDICAID

## 2023-12-07 VITALS
SYSTOLIC BLOOD PRESSURE: 110 MMHG | WEIGHT: 244.06 LBS | DIASTOLIC BLOOD PRESSURE: 80 MMHG | BODY MASS INDEX: 41.89 KG/M2

## 2023-12-07 DIAGNOSIS — Z30.430 ENCOUNTER FOR INSERTION OF MIRENA IUD: Primary | ICD-10-CM

## 2023-12-07 PROCEDURE — 99999PBSHW PR PBB SHADOW TECHNICAL ONLY FILED TO HB: ICD-10-PCS | Mod: PBBFAC,,,

## 2023-12-07 PROCEDURE — 99999PBSHW PR PBB SHADOW TECHNICAL ONLY FILED TO HB: Mod: PBBFAC,,,

## 2023-12-07 PROCEDURE — 58300 INSERT INTRAUTERINE DEVICE: CPT | Mod: PBBFAC | Performed by: OBSTETRICS & GYNECOLOGY

## 2023-12-07 PROCEDURE — 58300 INSERTION OF IUD: ICD-10-PCS | Mod: S$PBB,,, | Performed by: OBSTETRICS & GYNECOLOGY

## 2023-12-07 RX ADMIN — LEVONORGESTREL 1 INTRA UTERINE DEVICE: 52 INTRAUTERINE DEVICE INTRAUTERINE at 10:12

## 2023-12-07 NOTE — PROCEDURES
Insertion of IUD    Date/Time: 12/7/2023 10:30 AM    Performed by: Bere Aldana MD  Authorized by: Bere Aldana MD    Consent:     Consent given by:  Patient    Procedure risks and benefits discussed: yes      Patient questions answered: yes      Patient agrees, verbalizes understanding, and wants to proceed: yes     Device to be inserted was verified by patient: yes    Educational handouts given: yes      Instructions and paperwork completed: yes    Insertion Procedure:   1 Intra Uterine Device levonorgestreL 21 mcg/24 hours (8 yrs) 52 mg       Pelvic exam performed: yes      Negative GC/chlamydia test: yes      Negative urine pregnancy test: yes      Cervix cleaned and prepped: yes      Speculum placed in vagina: yes      Tenaculum applied to cervix: yes      Uterus sounded: yes      Uterus sound depth (cm):  9    IUD inserted with no complications: yes      IUD type:  Mirena    Strings trimmed: yes    Post-procedure:     Patient tolerated procedure well: yes      Patient will follow up after next period: yes

## 2023-12-12 ENCOUNTER — PATIENT MESSAGE (OUTPATIENT)
Dept: OBSTETRICS AND GYNECOLOGY | Facility: CLINIC | Age: 38
End: 2023-12-12
Payer: MEDICAID

## 2024-04-03 DIAGNOSIS — R73.03 PREDIABETES: ICD-10-CM

## 2024-04-05 DIAGNOSIS — M79.671 RIGHT FOOT PAIN: Primary | ICD-10-CM

## 2024-09-16 DIAGNOSIS — M25.571 RIGHT ANKLE PAIN, UNSPECIFIED CHRONICITY: Primary | ICD-10-CM

## 2024-09-16 DIAGNOSIS — M79.671 RIGHT FOOT PAIN: Primary | ICD-10-CM

## 2025-03-31 ENCOUNTER — HOSPITAL ENCOUNTER (EMERGENCY)
Facility: HOSPITAL | Age: 40
Discharge: HOME | End: 2025-03-31
Attending: EMERGENCY MEDICINE
Payer: MEDICAID

## 2025-03-31 VITALS
WEIGHT: 280 LBS | SYSTOLIC BLOOD PRESSURE: 139 MMHG | TEMPERATURE: 99 F | OXYGEN SATURATION: 100 % | HEART RATE: 81 BPM | DIASTOLIC BLOOD PRESSURE: 84 MMHG | RESPIRATION RATE: 18 BRPM | HEIGHT: 65 IN | BODY MASS INDEX: 46.65 KG/M2

## 2025-03-31 DIAGNOSIS — J06.9 VIRAL URI: Primary | ICD-10-CM

## 2025-03-31 LAB
B-HCG UR QL: NEGATIVE
CTP QC/QA: YES

## 2025-03-31 PROCEDURE — 81025 URINE PREGNANCY TEST: CPT | Mod: ER

## 2025-03-31 PROCEDURE — 99283 EMERGENCY DEPT VISIT LOW MDM: CPT | Mod: 25,ER

## 2025-03-31 PROCEDURE — 25000003 PHARM REV CODE 250: Mod: ER

## 2025-03-31 PROCEDURE — 81025 URINE PREGNANCY TEST: CPT | Mod: ER | Performed by: PHYSICIAN ASSISTANT

## 2025-03-31 RX ORDER — CETIRIZINE HYDROCHLORIDE 10 MG/1
10 TABLET ORAL DAILY
Qty: 30 TABLET | Refills: 0 | OUTPATIENT
Start: 2025-03-31 | End: 2025-04-30

## 2025-03-31 RX ORDER — DEXTROMETHORPHAN HYDROBROMIDE, GUAIFENESIN 5; 100 MG/5ML; MG/5ML
650 LIQUID ORAL EVERY 8 HOURS
Qty: 12 TABLET | Refills: 0 | Status: SHIPPED | OUTPATIENT
Start: 2025-03-31

## 2025-03-31 RX ORDER — FLUTICASONE PROPIONATE 50 MCG
1 SPRAY, SUSPENSION (ML) NASAL 2 TIMES DAILY PRN
Qty: 15 G | Refills: 0 | Status: SHIPPED | OUTPATIENT
Start: 2025-03-31

## 2025-03-31 RX ORDER — BENZONATATE 100 MG/1
100 CAPSULE ORAL 3 TIMES DAILY PRN
Qty: 12 CAPSULE | Refills: 0 | Status: SHIPPED | OUTPATIENT
Start: 2025-03-31 | End: 2025-04-04

## 2025-03-31 RX ORDER — ACETAMINOPHEN 325 MG/1
650 TABLET ORAL
Status: COMPLETED | OUTPATIENT
Start: 2025-03-31 | End: 2025-03-31

## 2025-03-31 RX ADMIN — ACETAMINOPHEN 650 MG: 325 TABLET ORAL at 04:03

## 2025-03-31 NOTE — Clinical Note
"Agustina Ottoralph Tillman was seen and treated in our emergency department on 3/31/2025.  She may return to work on 04/04/2025.       If you have any questions or concerns, please don't hesitate to call.      Bret Archibald PA-C"

## 2025-03-31 NOTE — DISCHARGE INSTRUCTIONS
Take medication as prescribed as needed.  Follow up with your primary care doctor.  Thank you for coming to our Emergency Department today. It is important to remember that some problems or medical conditions are difficult to diagnose and may not be found or addressed during your Emergency Department visit.  These conditions often start with non-specific symptoms and can only be diagnosed on follow up visits with your primary care physician or specialist when the symptoms continue or change. Please remember that all medical conditions can change, and we cannot predict how you will be feeling tomorrow or the next day. Return to the ER with any questions/concerns, new/concerning symptoms, worsening or failure to improve.       Be sure to follow up with your primary care doctor and review all labs/imaging/tests that were performed during your ER visit with them. It is very common for us to identify non-emergent incidental findings which must be followed up with your primary care physician.  Some labs/imaging/tests may be outside of the normal range, and require non-emergent follow-up and/or further investigation/treatment/procedures/testing to help diagnose/exclude/prevent complications or other potentially serious medical conditions. Some abnormalities may not have been discussed or addressed during your ER visit. Some lab results may not return during your ER visit but can be accessible by downloading the free [facility] sugey or by visiting [email or web address] . It is important for you to review all labs/imaging/tests which are outside of the normal range with your physician.    An ER visit does not replace a primary care visit, and many screening tests or follow-up tests cannot be ordered by an ER doctor or performed by the ER. Some tests may even require pre-approval.    If you do not have a primary care doctor, you may contact the one listed on your discharge paperwork or you may also call the [facility] Appointment Desk at [phone] , or [address] [place] [address] at  [phone] to schedule an appointment, or establish care with a primary care doctor or even a specialist and to obtain information about local resources. It is important to your health that you have a primary care doctor.    Please take all medications as directed. We have done our best to select a medication for you that will treat your condition however, all medications may potentially have side-effects and it is impossible to predict which medications may give you side-effects or what those side-effects (if any) those medications may give you.  If you feel that you are having a negative effect or side-effect of any medication you should stop taking those medications immediately and seek medical attention. If you feel that you are having a life-threatening reaction call 911.        Do not drive, swim, climb to height, take a bath, operate heavy machinery, drink alcohol or take potentially sedating medications, sign any legal documents or make any important decisions for 24 hours if you have received any pain medications, sedatives or mood altering drugs during your ER visit or within 24 hours of taking them if they have been prescribed to you.     You can find additional resources for Dentists, hearing aids, durable medical equipment, low cost pharmacies and other resources at [email or web address]

## 2025-03-31 NOTE — ED PROVIDER NOTES
Encounter Date: 3/31/2025    SCRIBE #1 NOTE: I, Laura Chandra, am scribing for, and in the presence of,  Bret Archibald PA-C. I have scribed the following portions of the note - Other sections scribed: HPI,ROS.       History     Chief Complaint   Patient presents with    URI     Sore throat, runny nose, productive cough, body aches worsening since Thursday.      Agustina Tillman is a 39 y.o. female, with a PMHx of seizures, allergies, PCOS, iron deficiency anemia, who presents to the ED with complaint of URI symptoms that began 4 days ago. Symptoms include: sore throat, congestion, generalized myalgias, productive cough.  Associated symptoms include pain in the chest only following cough.  Patient does not complain of she has pain at rest or on exertion.  Patient reports sore throat has since resolved. Reports yellow/green phlegm production with cough. No attempt at treatment.  No other exacerbating or alleviating factors. Denies fever, chills, chest tightness, dyspnea, chest pressure or other associated symptoms. Patient reports known drug allergy to NSAIDs.       The history is provided by the patient. No  was used.     Review of patient's allergies indicates:   Allergen Reactions    Augmentin [amoxicillin-pot clavulanate] Diarrhea    Ibuprofen Other (See Comments)     Told to avoid due to gastric sleeve    Metformin Other (See Comments)     Severe low blood sugar    Nsaids (non-steroidal anti-inflammatory drug)      Had gastric sleeve cannot take    Sudafed pe head congestn-pain [ibuprofen-phenylephrine] Anxiety     Past Medical History:   Diagnosis Date    Allergy 2016    Sinus    Iron deficiency anemia secondary to inadequate dietary iron intake     Lateral epicondylitis, right elbow 3/10/2022    PCOS (polycystic ovarian syndrome) 5/18/2017    Postpartum depression 9/2/2022    Seizures 1995    I use to have epilepsy. But they have julieta away     Past Surgical History:   Procedure  Laterality Date     SECTION N/A 2022    Procedure:  SECTION;  Surgeon: Bere Liz Mai, MD;  Location: [facility] L&D OR;  Service: OB/GYN;  Laterality: N/A;     SECTION N/A 10/10/2023    Procedure:  SECTION;  Surgeon: Bere Aldana MD;  Location: [facility] L&D OR;  Service: OB/GYN;  Laterality: N/A;  RN PREOP 10/9/2023 TYPE&SCREEN UPT RPR TODAY    ESOPHAGOGASTRODUODENOSCOPY N/A 2020    Procedure: EGD (ESOPHAGOGASTRODUODENOSCOPY);  Surgeon: Cj Umaña MD;  [facility];  Service: General;  Laterality: N/A;    LAPAROSCOPIC SLEEVE GASTRECTOMY N/A 2020    Procedure: GASTRECTOMY, SLEEVE, LAPAROSCOPIC, with intraop EGD;  Surgeon: Cj Umaña MD;  Location: [facility];  Service: General;  Laterality: N/A;     Family History   Problem Relation Name Age of Onset    Lung cancer Father Reginlad Tillman     Cancer Father Reginald Tillman         Lung cancer    Hypertension Mother Luz Marina Tillman     Breast cancer Neg Hx      Ovarian cancer Neg Hx      Colon cancer Neg Hx       Social History[1]  Review of Systems   Constitutional:  Negative for chills and fever.   HENT:  Positive for congestion. Negative for sore throat ((resolved)).    Eyes:  Negative for pain.   Respiratory:  Positive for cough (producive; green/yellow phlegm). Negative for shortness of breath and wheezing.    Cardiovascular:  Positive for chest pain (with cough).   Gastrointestinal:  Negative for abdominal pain.   Genitourinary:  Negative for flank pain.   Musculoskeletal:  Positive for myalgias (generalized).   Skin:  Negative for color change.   Neurological:  Negative for weakness and headaches.   Hematological:  Does not bruise/bleed easily.   Psychiatric/Behavioral:  Negative for suicidal ideas.        Physical Exam     Initial Vitals [25 1621]   BP Pulse Resp Temp SpO2   139/84 81 18 99.1 °F (37.3 °C) 100 %      MAP       --         Physical Exam    Nursing note and  vitals reviewed.  Constitutional: She appears well-developed and well-nourished.   HENT:   Head: Normocephalic and atraumatic.   Right Ear: External ear normal.   Left Ear: External ear normal.   Nose: Nose normal. Mouth/Throat: Uvula is midline and mucous membranes are normal. No oropharyngeal exudate, posterior oropharyngeal edema, posterior oropharyngeal erythema or tonsillar abscesses.   No oropharyngeal erythema or exudates. Patient is able to easily communicate with me. No hoarseness or hot potato voice. Uvula is midline. No tripoding, drooling, trismus. Patient is able to completely open and close the mouth. No tonsillar abscess.  There is no swelling noted to the face, neck or soft tissue of the mouth.       Eyes: Conjunctivae and lids are normal.   Neck: Trachea normal. Neck supple.    Full passive range of motion without pain.     Cardiovascular:  Normal rate, regular rhythm, S1 normal, S2 normal and normal heart sounds.     Exam reveals no gallop and no friction rub.       No murmur heard.  Pulmonary/Chest: Effort normal and breath sounds normal. No respiratory distress. She has no decreased breath sounds. She has no wheezes. She has no rhonchi. She has no rales.   Abdominal: She exhibits no distension.   Musculoskeletal:         General: Normal range of motion.      Cervical back: Full passive range of motion without pain and neck supple.      Comments: Patient is able to move all extremities. 5/5 strength at upper and lower extremities.        Lymphadenopathy:     She has no cervical adenopathy.   Neurological: She is alert. She has normal strength.   Skin: Skin is warm and dry.   Psychiatric: She has a normal mood and affect.         ED Course   Procedures  Labs Reviewed   POCT URINE PREGNANCY       Result Value    POC Preg Test, Ur Negative       Acceptable Yes            Imaging Results    None          Medications   acetaminophen tablet 650 mg (650 mg Oral Given 3/31/25 2131)      Medical Decision Making  Encounter Date: 3/31/2025    39 y.o. female presents for evaluation of congestion, body aches, cough.  Hemodynamically stable. Afebrile. Phonating and protecting the airway spontaneously. No clinical evidence for cardiovascular instability or impending airway compromise.  Remainder of physical exam as above and unremarkable.  Prior medical records reviewed. PMD note reviewed. Current co-morbidities considered that will impact clinical decision making include as above.   Vitals range:   Temp:  [99.1 °F (37.3 °C)] 99.1 °F (37.3 °C)  Pulse:  [81] 81  Resp:  [18] 18  SpO2:  [100 %] 100 %  BP: (139)/(84) 139/84    Differential diagnoses includes but is not limited to:   COVID and flu tests not performed considering symptom onset of 5 days ago.  Positive results would not change treatment.  There is no  oropharyngeal erythema.  No exudates.  Patient states her sore throat has since resolved.  Patient is able to easily communicate with me. No hoarseness or hot potato voice. Uvula is midline. No tripoding, drooling, trismus. Patient is able to completely open and close the mouth. No tonsillar abscess.  Pt is able to tolerate oral secretions. Patients is not in any respiratory distress. Lungs are clear to auscultation. There is no swelling noted to the face, neck or soft tissue of the mouth. I have low suspicion for retropharyngeal abscess, peritonsillar abscess, epiglottitis, Tommie angina.  No fever, and patient's lung exam is not consistent with pneumonia.      Clinical picture today most consistent with viral URI.   Doubt alternate pathology as listed in the differential above.    Patient has not taken any medication for symptoms.  I will discharge patient home with symptomatic care.  I advised patient to follow up with primary care doctor.    ED MEDS GIVEN:  Medications  acetaminophen tablet 650 mg (650 mg Oral Given 3/31/25 1656)    Clinical Impression:  Final diagnoses:  [J06.9] Viral URI  (Primary)       I discussed with the patient/family the diagnosis, treatment plan, indications for return to the emergency department, and for expected follow-up. The patient/family verbalized an understanding. The patient/family is asked if there are any questions or concerns. We discuss the case, until all issues are addressed to the patient/family's satisfaction. Patient/family understands and is agreeable to the plan.   Bret Archibald    DISCLAIMER: This note was prepared with [employer] voice recognition transcription software. Garbled syntax, mangled pronouns, and other bizarre constructions may be attributed to that software system.      Amount and/or Complexity of Data Reviewed  Labs: ordered. Decision-making details documented in ED Course.    Risk  OTC drugs.  Prescription drug management.            Scribe Attestation:   Scribe #1: I performed the above scribed service and the documentation accurately describes the services I performed. I attest to the accuracy of the note.                             I, Bret Archibald PA-C, personally performed the services described in this documentation. All medical record entries made by the scribe were at my direction and in my presence. I have reviewed the chart and agree that the record reflects my personal performance and is accurate and complete.      DISCLAIMER: This note was prepared with [employer] voice recognition transcription software. Garbled syntax, mangled pronouns, and other bizarre constructions may be attributed to that software system.    Clinical Impression:  Final diagnoses:  [J06.9] Viral URI (Primary)          ED Disposition Condition    Discharge Stable          ED Prescriptions       Medication Sig Dispense Start Date End Date Auth. Provider    acetaminophen (TYLENOL) 650 MG TbSR Take 1 tablet (650 mg total) by mouth every 8 (eight) hours. 12 tablet 3/31/2025 -- Bret Archibald PA-C    fluticasone propionate (FLONASE) 50 mcg/actuation nasal  spray 1 spray (50 mcg total) by Each Nostril route 2 (two) times daily as needed for Rhinitis. 15 g 3/31/2025 -- Bret Archibald PA-C    cetirizine (ZYRTEC) 10 MG tablet Take 1 tablet (10 mg total) by mouth once daily. 30 tablet 3/31/2025 4/30/2025 Bret Archibald PA-C    benzonatate (TESSALON) 100 MG capsule Take 1 capsule (100 mg total) by mouth 3 (three) times daily as needed for Cough. 12 capsule 3/31/2025 4/4/2025 Bret Archibald PA-C          Follow-up Information       Follow up With Specialties Details Why Contact Info    John Gaston MD Internal Medicine, Pediatrics Schedule an appointment as soon as possible for a visit in 1 day For further evaluation, more definitive management of symptoms [address] [facility] [id number] [phone]      [facility] ED Emergency Medicine Go to  As needed, If symptoms worsen [address] [facility] [address] [phone]                 [1]   Social History  Tobacco Use    Smoking status: Never    Smokeless tobacco: Never   Substance Use Topics    Alcohol use: Not Currently     Alcohol/week: 1.0 standard drink of alcohol     Types: 1 Glasses of wine per week    Drug use: Never        Bret Archibald PA-C  03/31/25 6156
